# Patient Record
Sex: MALE | Race: BLACK OR AFRICAN AMERICAN | Employment: OTHER | URBAN - METROPOLITAN AREA
[De-identification: names, ages, dates, MRNs, and addresses within clinical notes are randomized per-mention and may not be internally consistent; named-entity substitution may affect disease eponyms.]

---

## 2021-01-01 ENCOUNTER — APPOINTMENT (OUTPATIENT)
Dept: GENERAL RADIOLOGY | Age: 51
DRG: 871 | End: 2021-01-01
Payer: MEDICARE

## 2021-01-01 ENCOUNTER — APPOINTMENT (OUTPATIENT)
Dept: CT IMAGING | Age: 51
DRG: 871 | End: 2021-01-01
Attending: INTERNAL MEDICINE
Payer: MEDICARE

## 2021-01-01 ENCOUNTER — APPOINTMENT (OUTPATIENT)
Dept: GENERAL RADIOLOGY | Age: 51
End: 2021-01-01
Payer: MEDICARE

## 2021-01-01 ENCOUNTER — TELEPHONE (OUTPATIENT)
Dept: OTHER | Facility: CLINIC | Age: 51
End: 2021-01-01

## 2021-01-01 ENCOUNTER — HOSPITAL ENCOUNTER (EMERGENCY)
Age: 51
Discharge: OTHER INSTITUTION | End: 2021-11-30
Attending: STUDENT IN AN ORGANIZED HEALTH CARE EDUCATION/TRAINING PROGRAM
Payer: MEDICARE

## 2021-01-01 ENCOUNTER — APPOINTMENT (OUTPATIENT)
Dept: MRI IMAGING | Age: 51
DRG: 871 | End: 2021-01-01
Attending: INTERNAL MEDICINE
Payer: MEDICARE

## 2021-01-01 ENCOUNTER — HOSPITAL ENCOUNTER (EMERGENCY)
Age: 51
Discharge: ANOTHER HOSPITAL | End: 2021-12-11
Attending: EMERGENCY MEDICINE
Payer: MEDICARE

## 2021-01-01 ENCOUNTER — APPOINTMENT (OUTPATIENT)
Dept: CT IMAGING | Age: 51
End: 2021-01-01
Payer: MEDICARE

## 2021-01-01 ENCOUNTER — HOSPITAL ENCOUNTER (INPATIENT)
Age: 51
LOS: 13 days | Discharge: OTHER INSTITUTION | DRG: 871 | End: 2021-11-24
Attending: EMERGENCY MEDICINE | Admitting: INTERNAL MEDICINE
Payer: MEDICARE

## 2021-01-01 ENCOUNTER — HOSPITAL ENCOUNTER (INPATIENT)
Age: 51
LOS: 7 days | Discharge: NURSING, REHAB OR LONG-TERM CARE | DRG: 871 | End: 2021-12-18
Attending: INTERNAL MEDICINE | Admitting: INTERNAL MEDICINE
Payer: MEDICARE

## 2021-01-01 ENCOUNTER — ANESTHESIA (OUTPATIENT)
Dept: ENDOSCOPY | Age: 51
DRG: 871 | End: 2021-01-01
Payer: MEDICARE

## 2021-01-01 ENCOUNTER — ANESTHESIA EVENT (OUTPATIENT)
Dept: ENDOSCOPY | Age: 51
DRG: 871 | End: 2021-01-01
Payer: MEDICARE

## 2021-01-01 VITALS
HEIGHT: 76 IN | WEIGHT: 156.31 LBS | TEMPERATURE: 97.6 F | RESPIRATION RATE: 18 BRPM | HEART RATE: 58 BPM | OXYGEN SATURATION: 98 % | SYSTOLIC BLOOD PRESSURE: 115 MMHG | DIASTOLIC BLOOD PRESSURE: 48 MMHG | BODY MASS INDEX: 19.03 KG/M2

## 2021-01-01 VITALS
HEART RATE: 72 BPM | DIASTOLIC BLOOD PRESSURE: 82 MMHG | HEIGHT: 70 IN | BODY MASS INDEX: 24.34 KG/M2 | OXYGEN SATURATION: 99 % | SYSTOLIC BLOOD PRESSURE: 115 MMHG | RESPIRATION RATE: 23 BRPM | WEIGHT: 170 LBS

## 2021-01-01 VITALS
RESPIRATION RATE: 15 BRPM | HEART RATE: 84 BPM | BODY MASS INDEX: 20.7 KG/M2 | DIASTOLIC BLOOD PRESSURE: 72 MMHG | WEIGHT: 170 LBS | SYSTOLIC BLOOD PRESSURE: 110 MMHG | TEMPERATURE: 96.4 F | HEIGHT: 76 IN | OXYGEN SATURATION: 95 %

## 2021-01-01 VITALS
RESPIRATION RATE: 20 BRPM | DIASTOLIC BLOOD PRESSURE: 52 MMHG | SYSTOLIC BLOOD PRESSURE: 93 MMHG | OXYGEN SATURATION: 97 %

## 2021-01-01 VITALS
OXYGEN SATURATION: 96 % | WEIGHT: 185.63 LBS | RESPIRATION RATE: 16 BRPM | DIASTOLIC BLOOD PRESSURE: 89 MMHG | HEART RATE: 90 BPM | SYSTOLIC BLOOD PRESSURE: 156 MMHG | TEMPERATURE: 97.5 F | BODY MASS INDEX: 26.63 KG/M2

## 2021-01-01 DIAGNOSIS — N18.6 ESRD (END STAGE RENAL DISEASE) (HCC): ICD-10-CM

## 2021-01-01 DIAGNOSIS — E16.2 HYPOGLYCEMIA: Primary | ICD-10-CM

## 2021-01-01 DIAGNOSIS — R56.9 SEIZURE (HCC): ICD-10-CM

## 2021-01-01 DIAGNOSIS — N18.6 ESRD (END STAGE RENAL DISEASE) ON DIALYSIS (HCC): ICD-10-CM

## 2021-01-01 DIAGNOSIS — J18.9 PNEUMONIA DUE TO INFECTIOUS ORGANISM, UNSPECIFIED LATERALITY, UNSPECIFIED PART OF LUNG: ICD-10-CM

## 2021-01-01 DIAGNOSIS — Z99.2 ESRD (END STAGE RENAL DISEASE) ON DIALYSIS (HCC): ICD-10-CM

## 2021-01-01 DIAGNOSIS — J96.01 ACUTE RESPIRATORY FAILURE WITH HYPOXEMIA (HCC): Primary | ICD-10-CM

## 2021-01-01 DIAGNOSIS — R41.82 ALTERED MENTAL STATUS, UNSPECIFIED ALTERED MENTAL STATUS TYPE: Primary | ICD-10-CM

## 2021-01-01 DIAGNOSIS — I24.8 DEMAND ISCHEMIA OF MYOCARDIUM (HCC): ICD-10-CM

## 2021-01-01 LAB
A/G RATIO: 0.7 (ref 1.1–2.2)
A/G RATIO: 0.8 (ref 1.1–2.2)
A/G RATIO: 0.9 (ref 1.1–2.2)
ABO/RH: NORMAL
ACANTHOCYTES: ABNORMAL
ALBUMIN SERPL-MCNC: 3.1 G/DL (ref 3.1–4.9)
ALBUMIN SERPL-MCNC: 3.3 G/DL (ref 3.4–5)
ALBUMIN SERPL-MCNC: 3.4 G/DL (ref 3.4–5)
ALBUMIN SERPL-MCNC: 3.4 G/DL (ref 3.4–5)
ALBUMIN SERPL-MCNC: 3.6 G/DL (ref 3.4–5)
ALBUMIN SERPL-MCNC: 3.6 G/DL (ref 3.4–5)
ALBUMIN SERPL-MCNC: 3.8 G/DL (ref 3.4–5)
ALP BLD-CCNC: 143 U/L (ref 40–129)
ALP BLD-CCNC: 212 U/L (ref 40–129)
ALP BLD-CCNC: 225 U/L (ref 40–129)
ALP BLD-CCNC: 287 U/L (ref 40–129)
ALPHA-1-GLOBULIN: 0.4 G/DL (ref 0.2–0.4)
ALPHA-2-GLOBULIN: 0.7 G/DL (ref 0.4–1.1)
ALT SERPL-CCNC: 12 U/L (ref 10–40)
ALT SERPL-CCNC: 13 U/L (ref 10–40)
ALT SERPL-CCNC: 13 U/L (ref 10–40)
ALT SERPL-CCNC: 21 U/L (ref 10–40)
AMMONIA: 45 UMOL/L (ref 16–60)
ANION GAP SERPL CALCULATED.3IONS-SCNC: 15 MMOL/L (ref 3–16)
ANION GAP SERPL CALCULATED.3IONS-SCNC: 16 MMOL/L (ref 3–16)
ANION GAP SERPL CALCULATED.3IONS-SCNC: 17 MMOL/L (ref 3–16)
ANION GAP SERPL CALCULATED.3IONS-SCNC: 18 MMOL/L (ref 3–16)
ANION GAP SERPL CALCULATED.3IONS-SCNC: 20 MMOL/L (ref 3–16)
ANION GAP SERPL CALCULATED.3IONS-SCNC: 22 MMOL/L (ref 3–16)
ANION GAP SERPL CALCULATED.3IONS-SCNC: 22 MMOL/L (ref 3–16)
ANION GAP SERPL CALCULATED.3IONS-SCNC: 27 MMOL/L (ref 3–16)
ANION GAP SERPL CALCULATED.3IONS-SCNC: 28 MMOL/L (ref 3–16)
ANION GAP SERPL CALCULATED.3IONS-SCNC: 29 MMOL/L (ref 3–16)
ANION GAP SERPL CALCULATED.3IONS-SCNC: 31 MMOL/L (ref 3–16)
ANION GAP SERPL CALCULATED.3IONS-SCNC: 32 MMOL/L (ref 3–16)
ANION GAP SERPL CALCULATED.3IONS-SCNC: 35 MMOL/L (ref 3–16)
ANION GAP SERPL CALCULATED.3IONS-SCNC: 36 MMOL/L (ref 3–16)
ANION GAP SERPL CALCULATED.3IONS-SCNC: 37 MMOL/L (ref 3–16)
ANISOCYTOSIS: ABNORMAL
ANTIBODY SCREEN: NORMAL
APTT: 36.8 SEC (ref 26.2–38.6)
AST SERPL-CCNC: 17 U/L (ref 15–37)
AST SERPL-CCNC: 20 U/L (ref 15–37)
AST SERPL-CCNC: 30 U/L (ref 15–37)
AST SERPL-CCNC: 43 U/L (ref 15–37)
BASE EXCESS ARTERIAL: -2 (ref -3–3)
BASE EXCESS ARTERIAL: 1.9 MMOL/L (ref -3–3)
BASE EXCESS ARTERIAL: 5.1 MMOL/L (ref -3–3)
BASE EXCESS VENOUS: -17.9 MMOL/L (ref -3–3)
BASE EXCESS VENOUS: 4.4 MMOL/L (ref -3–3)
BASOPHILS ABSOLUTE: 0 K/UL (ref 0–0.2)
BASOPHILS ABSOLUTE: 0.1 K/UL (ref 0–0.2)
BASOPHILS RELATIVE PERCENT: 0 %
BASOPHILS RELATIVE PERCENT: 0.1 %
BASOPHILS RELATIVE PERCENT: 0.2 %
BASOPHILS RELATIVE PERCENT: 0.3 %
BASOPHILS RELATIVE PERCENT: 0.4 %
BASOPHILS RELATIVE PERCENT: 0.4 %
BASOPHILS RELATIVE PERCENT: 0.6 %
BASOPHILS RELATIVE PERCENT: 0.8 %
BASOPHILS RELATIVE PERCENT: 1 %
BASOPHILS RELATIVE PERCENT: 1.2 %
BASOPHILS RELATIVE PERCENT: 2.3 %
BETA GLOBULIN: 1.5 G/DL (ref 0.9–1.6)
BILIRUB SERPL-MCNC: 0.5 MG/DL (ref 0–1)
BILIRUB SERPL-MCNC: 0.5 MG/DL (ref 0–1)
BILIRUB SERPL-MCNC: 0.9 MG/DL (ref 0–1)
BILIRUB SERPL-MCNC: 1.1 MG/DL (ref 0–1)
BILIRUBIN DIRECT: <0.2 MG/DL (ref 0–0.3)
BILIRUBIN, INDIRECT: ABNORMAL MG/DL (ref 0–1)
BLOOD BANK DISPENSE STATUS: NORMAL
BLOOD BANK DISPENSE STATUS: NORMAL
BLOOD BANK PRODUCT CODE: NORMAL
BLOOD BANK PRODUCT CODE: NORMAL
BLOOD CULTURE, ROUTINE: ABNORMAL
BLOOD CULTURE, ROUTINE: NORMAL
BPU ID: NORMAL
BPU ID: NORMAL
BUN BLDV-MCNC: 24 MG/DL (ref 7–20)
BUN BLDV-MCNC: 25 MG/DL (ref 7–20)
BUN BLDV-MCNC: 26 MG/DL (ref 7–20)
BUN BLDV-MCNC: 27 MG/DL (ref 7–20)
BUN BLDV-MCNC: 29 MG/DL (ref 7–20)
BUN BLDV-MCNC: 30 MG/DL (ref 7–20)
BUN BLDV-MCNC: 30 MG/DL (ref 7–20)
BUN BLDV-MCNC: 32 MG/DL (ref 7–20)
BUN BLDV-MCNC: 33 MG/DL (ref 7–20)
BUN BLDV-MCNC: 36 MG/DL (ref 7–20)
BUN BLDV-MCNC: 37 MG/DL (ref 7–20)
BUN BLDV-MCNC: 39 MG/DL (ref 7–20)
BUN BLDV-MCNC: 40 MG/DL (ref 7–20)
BUN BLDV-MCNC: 43 MG/DL (ref 7–20)
BUN BLDV-MCNC: 51 MG/DL (ref 7–20)
BUN BLDV-MCNC: 51 MG/DL (ref 7–20)
BUN BLDV-MCNC: 52 MG/DL (ref 7–20)
BUN BLDV-MCNC: 53 MG/DL (ref 7–20)
BUN BLDV-MCNC: 55 MG/DL (ref 7–20)
BUN BLDV-MCNC: 57 MG/DL (ref 7–20)
BUN BLDV-MCNC: 60 MG/DL (ref 7–20)
BUN BLDV-MCNC: 64 MG/DL (ref 7–20)
BURR CELLS: ABNORMAL
C-REACTIVE PROTEIN: 114.7 MG/L (ref 0–5.1)
C-REACTIVE PROTEIN: 231.9 MG/L (ref 0–5.1)
C-REACTIVE PROTEIN: 299.9 MG/L (ref 0–5.1)
CALCIUM IONIZED: 0.97 MMOL/L (ref 1.12–1.32)
CALCIUM SERPL-MCNC: 6.7 MG/DL (ref 8.3–10.6)
CALCIUM SERPL-MCNC: 6.8 MG/DL (ref 8.3–10.6)
CALCIUM SERPL-MCNC: 7.1 MG/DL (ref 8.3–10.6)
CALCIUM SERPL-MCNC: 7.2 MG/DL (ref 8.3–10.6)
CALCIUM SERPL-MCNC: 7.3 MG/DL (ref 8.3–10.6)
CALCIUM SERPL-MCNC: 7.4 MG/DL (ref 8.3–10.6)
CALCIUM SERPL-MCNC: 7.5 MG/DL (ref 8.3–10.6)
CALCIUM SERPL-MCNC: 7.6 MG/DL (ref 8.3–10.6)
CALCIUM SERPL-MCNC: 7.7 MG/DL (ref 8.3–10.6)
CALCIUM SERPL-MCNC: 7.8 MG/DL (ref 8.3–10.6)
CALCIUM SERPL-MCNC: 7.9 MG/DL (ref 8.3–10.6)
CALCIUM SERPL-MCNC: 8 MG/DL (ref 8.3–10.6)
CALCIUM SERPL-MCNC: 8.2 MG/DL (ref 8.3–10.6)
CALCIUM SERPL-MCNC: 8.6 MG/DL (ref 8.3–10.6)
CALCIUM SERPL-MCNC: 8.9 MG/DL (ref 8.3–10.6)
CALCIUM SERPL-MCNC: 9 MG/DL (ref 8.3–10.6)
CARBOXYHEMOGLOBIN ARTERIAL: 1.1 % (ref 0–1.5)
CARBOXYHEMOGLOBIN ARTERIAL: <0 % (ref 0–1.5)
CARBOXYHEMOGLOBIN: 3.7 % (ref 0–1.5)
CARBOXYHEMOGLOBIN: 4.3 % (ref 0–1.5)
CERULOPLASMIN: 13 MG/DL (ref 15–30)
CHLORIDE BLD-SCNC: 86 MMOL/L (ref 99–110)
CHLORIDE BLD-SCNC: 87 MMOL/L (ref 99–110)
CHLORIDE BLD-SCNC: 87 MMOL/L (ref 99–110)
CHLORIDE BLD-SCNC: 88 MMOL/L (ref 99–110)
CHLORIDE BLD-SCNC: 88 MMOL/L (ref 99–110)
CHLORIDE BLD-SCNC: 89 MMOL/L (ref 99–110)
CHLORIDE BLD-SCNC: 90 MMOL/L (ref 99–110)
CHLORIDE BLD-SCNC: 91 MMOL/L (ref 99–110)
CHLORIDE BLD-SCNC: 91 MMOL/L (ref 99–110)
CHLORIDE BLD-SCNC: 92 MMOL/L (ref 99–110)
CHLORIDE BLD-SCNC: 94 MMOL/L (ref 99–110)
CHLORIDE BLD-SCNC: 94 MMOL/L (ref 99–110)
CHLORIDE BLD-SCNC: 97 MMOL/L (ref 99–110)
CO2: 11 MMOL/L (ref 21–32)
CO2: 11 MMOL/L (ref 21–32)
CO2: 12 MMOL/L (ref 21–32)
CO2: 16 MMOL/L (ref 21–32)
CO2: 18 MMOL/L (ref 21–32)
CO2: 20 MMOL/L (ref 21–32)
CO2: 20 MMOL/L (ref 21–32)
CO2: 22 MMOL/L (ref 21–32)
CO2: 23 MMOL/L (ref 21–32)
CO2: 23 MMOL/L (ref 21–32)
CO2: 24 MMOL/L (ref 21–32)
CO2: 24 MMOL/L (ref 21–32)
CO2: 25 MMOL/L (ref 21–32)
CO2: 26 MMOL/L (ref 21–32)
CO2: 26 MMOL/L (ref 21–32)
CO2: 27 MMOL/L (ref 21–32)
CO2: 8 MMOL/L (ref 21–32)
CO2: 9 MMOL/L (ref 21–32)
COPPER: 48.4 UG/DL (ref 70–140)
CREAT SERPL-MCNC: 3.2 MG/DL (ref 0.9–1.3)
CREAT SERPL-MCNC: 3.7 MG/DL (ref 0.9–1.3)
CREAT SERPL-MCNC: 3.8 MG/DL (ref 0.9–1.3)
CREAT SERPL-MCNC: 4.3 MG/DL (ref 0.9–1.3)
CREAT SERPL-MCNC: 4.4 MG/DL (ref 0.9–1.3)
CREAT SERPL-MCNC: 4.6 MG/DL (ref 0.9–1.3)
CREAT SERPL-MCNC: 4.7 MG/DL (ref 0.9–1.3)
CREAT SERPL-MCNC: 4.8 MG/DL (ref 0.9–1.3)
CREAT SERPL-MCNC: 4.8 MG/DL (ref 0.9–1.3)
CREAT SERPL-MCNC: 5 MG/DL (ref 0.9–1.3)
CREAT SERPL-MCNC: 5 MG/DL (ref 0.9–1.3)
CREAT SERPL-MCNC: 5.1 MG/DL (ref 0.9–1.3)
CREAT SERPL-MCNC: 5.3 MG/DL (ref 0.9–1.3)
CREAT SERPL-MCNC: 5.4 MG/DL (ref 0.9–1.3)
CREAT SERPL-MCNC: 5.5 MG/DL (ref 0.9–1.3)
CREAT SERPL-MCNC: 5.5 MG/DL (ref 0.9–1.3)
CREAT SERPL-MCNC: 5.6 MG/DL (ref 0.9–1.3)
CREAT SERPL-MCNC: 5.8 MG/DL (ref 0.9–1.3)
CREAT SERPL-MCNC: 6 MG/DL (ref 0.9–1.3)
CREAT SERPL-MCNC: 6 MG/DL (ref 0.9–1.3)
CREAT SERPL-MCNC: 6.1 MG/DL (ref 0.9–1.3)
CULTURE, BLOOD 2: NORMAL
D DIMER: 3681 NG/ML DDU (ref 0–229)
D DIMER: 3740 NG/ML DDU (ref 0–229)
D DIMER: 4327 NG/ML DDU (ref 0–229)
DESCRIPTION BLOOD BANK: NORMAL
DESCRIPTION BLOOD BANK: NORMAL
EKG ATRIAL RATE: 76 BPM
EKG ATRIAL RATE: 93 BPM
EKG DIAGNOSIS: NORMAL
EKG DIAGNOSIS: NORMAL
EKG P AXIS: 104 DEGREES
EKG P AXIS: 86 DEGREES
EKG P-R INTERVAL: 146 MS
EKG P-R INTERVAL: 166 MS
EKG Q-T INTERVAL: 430 MS
EKG Q-T INTERVAL: 432 MS
EKG QRS DURATION: 104 MS
EKG QRS DURATION: 92 MS
EKG QTC CALCULATION (BAZETT): 483 MS
EKG QTC CALCULATION (BAZETT): 537 MS
EKG R AXIS: -19 DEGREES
EKG R AXIS: 21 DEGREES
EKG T AXIS: 130 DEGREES
EKG T AXIS: 144 DEGREES
EKG VENTRICULAR RATE: 76 BPM
EKG VENTRICULAR RATE: 93 BPM
EOSINOPHILS ABSOLUTE: 0 K/UL (ref 0–0.6)
EOSINOPHILS ABSOLUTE: 0.1 K/UL (ref 0–0.6)
EOSINOPHILS ABSOLUTE: 0.2 K/UL (ref 0–0.6)
EOSINOPHILS RELATIVE PERCENT: 0 %
EOSINOPHILS RELATIVE PERCENT: 0.1 %
EOSINOPHILS RELATIVE PERCENT: 0.2 %
EOSINOPHILS RELATIVE PERCENT: 0.3 %
EOSINOPHILS RELATIVE PERCENT: 1 %
EOSINOPHILS RELATIVE PERCENT: 3.1 %
EOSINOPHILS RELATIVE PERCENT: 3.6 %
EOSINOPHILS RELATIVE PERCENT: 3.7 %
EOSINOPHILS RELATIVE PERCENT: 4.2 %
EOSINOPHILS RELATIVE PERCENT: 5.2 %
EOSINOPHILS RELATIVE PERCENT: 5.6 %
ESTIMATED AVERAGE GLUCOSE: 355.1 MG/DL
FERRITIN: 1623 NG/ML (ref 30–400)
FIBRINOGEN: 446 MG/DL (ref 200–397)
GAMMA GLOBULIN: 1.5 G/DL (ref 0.6–1.8)
GFR AFRICAN AMERICAN: 12
GFR AFRICAN AMERICAN: 13
GFR AFRICAN AMERICAN: 14
GFR AFRICAN AMERICAN: 14
GFR AFRICAN AMERICAN: 15
GFR AFRICAN AMERICAN: 16
GFR AFRICAN AMERICAN: 17
GFR AFRICAN AMERICAN: 18
GFR AFRICAN AMERICAN: 20
GFR AFRICAN AMERICAN: 21
GFR AFRICAN AMERICAN: 25
GFR NON-AFRICAN AMERICAN: 10
GFR NON-AFRICAN AMERICAN: 11
GFR NON-AFRICAN AMERICAN: 12
GFR NON-AFRICAN AMERICAN: 13
GFR NON-AFRICAN AMERICAN: 14
GFR NON-AFRICAN AMERICAN: 14
GFR NON-AFRICAN AMERICAN: 15
GFR NON-AFRICAN AMERICAN: 17
GFR NON-AFRICAN AMERICAN: 17
GFR NON-AFRICAN AMERICAN: 21
GLUCOSE BLD-MCNC: 100 MG/DL (ref 70–99)
GLUCOSE BLD-MCNC: 100 MG/DL (ref 70–99)
GLUCOSE BLD-MCNC: 103 MG/DL (ref 70–99)
GLUCOSE BLD-MCNC: 107 MG/DL (ref 70–99)
GLUCOSE BLD-MCNC: 107 MG/DL (ref 70–99)
GLUCOSE BLD-MCNC: 111 MG/DL (ref 70–99)
GLUCOSE BLD-MCNC: 113 MG/DL (ref 70–99)
GLUCOSE BLD-MCNC: 114 MG/DL (ref 70–99)
GLUCOSE BLD-MCNC: 115 MG/DL (ref 70–99)
GLUCOSE BLD-MCNC: 116 MG/DL (ref 70–99)
GLUCOSE BLD-MCNC: 117 MG/DL (ref 70–99)
GLUCOSE BLD-MCNC: 121 MG/DL (ref 70–99)
GLUCOSE BLD-MCNC: 121 MG/DL (ref 70–99)
GLUCOSE BLD-MCNC: 126 MG/DL (ref 70–99)
GLUCOSE BLD-MCNC: 133 MG/DL (ref 70–99)
GLUCOSE BLD-MCNC: 135 MG/DL (ref 70–99)
GLUCOSE BLD-MCNC: 139 MG/DL (ref 70–99)
GLUCOSE BLD-MCNC: 139 MG/DL (ref 70–99)
GLUCOSE BLD-MCNC: 141 MG/DL (ref 70–99)
GLUCOSE BLD-MCNC: 143 MG/DL (ref 70–99)
GLUCOSE BLD-MCNC: 145 MG/DL (ref 70–99)
GLUCOSE BLD-MCNC: 149 MG/DL (ref 70–99)
GLUCOSE BLD-MCNC: 149 MG/DL (ref 70–99)
GLUCOSE BLD-MCNC: 150 MG/DL (ref 70–99)
GLUCOSE BLD-MCNC: 150 MG/DL (ref 70–99)
GLUCOSE BLD-MCNC: 156 MG/DL (ref 70–99)
GLUCOSE BLD-MCNC: 157 MG/DL (ref 70–99)
GLUCOSE BLD-MCNC: 163 MG/DL (ref 70–99)
GLUCOSE BLD-MCNC: 163 MG/DL (ref 70–99)
GLUCOSE BLD-MCNC: 164 MG/DL (ref 70–99)
GLUCOSE BLD-MCNC: 167 MG/DL (ref 70–99)
GLUCOSE BLD-MCNC: 169 MG/DL (ref 70–99)
GLUCOSE BLD-MCNC: 171 MG/DL (ref 70–99)
GLUCOSE BLD-MCNC: 171 MG/DL (ref 70–99)
GLUCOSE BLD-MCNC: 172 MG/DL (ref 70–99)
GLUCOSE BLD-MCNC: 176 MG/DL (ref 70–99)
GLUCOSE BLD-MCNC: 177 MG/DL (ref 70–99)
GLUCOSE BLD-MCNC: 179 MG/DL (ref 70–99)
GLUCOSE BLD-MCNC: 179 MG/DL (ref 70–99)
GLUCOSE BLD-MCNC: 190 MG/DL (ref 70–99)
GLUCOSE BLD-MCNC: 192 MG/DL (ref 70–99)
GLUCOSE BLD-MCNC: 193 MG/DL (ref 70–99)
GLUCOSE BLD-MCNC: 198 MG/DL (ref 70–99)
GLUCOSE BLD-MCNC: 204 MG/DL (ref 70–99)
GLUCOSE BLD-MCNC: 217 MG/DL (ref 70–99)
GLUCOSE BLD-MCNC: 220 MG/DL (ref 70–99)
GLUCOSE BLD-MCNC: 225 MG/DL (ref 70–99)
GLUCOSE BLD-MCNC: 225 MG/DL (ref 70–99)
GLUCOSE BLD-MCNC: 228 MG/DL (ref 70–99)
GLUCOSE BLD-MCNC: 229 MG/DL (ref 70–99)
GLUCOSE BLD-MCNC: 237 MG/DL (ref 70–99)
GLUCOSE BLD-MCNC: 258 MG/DL (ref 70–99)
GLUCOSE BLD-MCNC: 268 MG/DL (ref 70–99)
GLUCOSE BLD-MCNC: 272 MG/DL (ref 70–99)
GLUCOSE BLD-MCNC: 278 MG/DL (ref 70–99)
GLUCOSE BLD-MCNC: 280 MG/DL (ref 70–99)
GLUCOSE BLD-MCNC: 285 MG/DL (ref 70–99)
GLUCOSE BLD-MCNC: 293 MG/DL (ref 70–99)
GLUCOSE BLD-MCNC: 293 MG/DL (ref 70–99)
GLUCOSE BLD-MCNC: 299 MG/DL (ref 70–99)
GLUCOSE BLD-MCNC: 300 MG/DL (ref 70–99)
GLUCOSE BLD-MCNC: 302 MG/DL (ref 70–99)
GLUCOSE BLD-MCNC: 308 MG/DL (ref 70–99)
GLUCOSE BLD-MCNC: 316 MG/DL (ref 70–99)
GLUCOSE BLD-MCNC: 320 MG/DL (ref 70–99)
GLUCOSE BLD-MCNC: 322 MG/DL (ref 70–99)
GLUCOSE BLD-MCNC: 328 MG/DL (ref 70–99)
GLUCOSE BLD-MCNC: 35 MG/DL (ref 70–99)
GLUCOSE BLD-MCNC: 400 MG/DL (ref 70–99)
GLUCOSE BLD-MCNC: 41 MG/DL (ref 70–99)
GLUCOSE BLD-MCNC: 42 MG/DL (ref 70–99)
GLUCOSE BLD-MCNC: 44 MG/DL (ref 70–99)
GLUCOSE BLD-MCNC: 46 MG/DL (ref 70–99)
GLUCOSE BLD-MCNC: 47 MG/DL (ref 70–99)
GLUCOSE BLD-MCNC: 48 MG/DL (ref 70–99)
GLUCOSE BLD-MCNC: 52 MG/DL (ref 70–99)
GLUCOSE BLD-MCNC: 53 MG/DL (ref 70–99)
GLUCOSE BLD-MCNC: 56 MG/DL (ref 70–99)
GLUCOSE BLD-MCNC: 64 MG/DL (ref 70–99)
GLUCOSE BLD-MCNC: 67 MG/DL (ref 70–99)
GLUCOSE BLD-MCNC: 69 MG/DL (ref 70–99)
GLUCOSE BLD-MCNC: 70 MG/DL (ref 70–99)
GLUCOSE BLD-MCNC: 71 MG/DL (ref 70–99)
GLUCOSE BLD-MCNC: 72 MG/DL (ref 70–99)
GLUCOSE BLD-MCNC: 72 MG/DL (ref 70–99)
GLUCOSE BLD-MCNC: 74 MG/DL (ref 70–99)
GLUCOSE BLD-MCNC: 75 MG/DL (ref 70–99)
GLUCOSE BLD-MCNC: 76 MG/DL (ref 70–99)
GLUCOSE BLD-MCNC: 76 MG/DL (ref 70–99)
GLUCOSE BLD-MCNC: 77 MG/DL (ref 70–99)
GLUCOSE BLD-MCNC: 77 MG/DL (ref 70–99)
GLUCOSE BLD-MCNC: 78 MG/DL (ref 70–99)
GLUCOSE BLD-MCNC: 82 MG/DL (ref 70–99)
GLUCOSE BLD-MCNC: 83 MG/DL (ref 70–99)
GLUCOSE BLD-MCNC: 84 MG/DL (ref 70–99)
GLUCOSE BLD-MCNC: 86 MG/DL (ref 70–99)
GLUCOSE BLD-MCNC: 87 MG/DL (ref 70–99)
GLUCOSE BLD-MCNC: 87 MG/DL (ref 70–99)
GLUCOSE BLD-MCNC: 89 MG/DL (ref 70–99)
GLUCOSE BLD-MCNC: 91 MG/DL (ref 70–99)
GLUCOSE BLD-MCNC: 94 MG/DL (ref 70–99)
GLUCOSE BLD-MCNC: 94 MG/DL (ref 70–99)
GLUCOSE BLD-MCNC: 97 MG/DL (ref 70–99)
GLUCOSE BLD-MCNC: 98 MG/DL (ref 70–99)
GLUCOSE BLD-MCNC: 98 MG/DL (ref 70–99)
GLUCOSE BLD-MCNC: 99 MG/DL (ref 70–99)
HAPTOGLOBIN: 145 MG/DL (ref 30–200)
HAV IGM SER IA-ACNC: NORMAL
HBA1C MFR BLD: 14 %
HBV SURFACE AB TITR SER: 5.14 MIU/ML
HBV SURFACE AB TITR SER: 5.69 MIU/ML
HBV SURFACE AB TITR SER: <3.5 MIU/ML
HCO3 ARTERIAL: 23.2 MMOL/L (ref 21–29)
HCO3 ARTERIAL: 26 MMOL/L (ref 21–29)
HCO3 ARTERIAL: 28.6 MMOL/L (ref 21–29)
HCO3 VENOUS: 27.9 MMOL/L (ref 23–29)
HCO3 VENOUS: 9.7 MMOL/L (ref 23–29)
HCT VFR BLD CALC: 19.8 % (ref 40.5–52.5)
HCT VFR BLD CALC: 20.3 % (ref 40.5–52.5)
HCT VFR BLD CALC: 21.1 % (ref 40.5–52.5)
HCT VFR BLD CALC: 21.3 % (ref 40.5–52.5)
HCT VFR BLD CALC: 21.6 % (ref 40.5–52.5)
HCT VFR BLD CALC: 21.9 % (ref 40.5–52.5)
HCT VFR BLD CALC: 23 % (ref 40.5–52.5)
HCT VFR BLD CALC: 23.1 % (ref 40.5–52.5)
HCT VFR BLD CALC: 23.6 % (ref 40.5–52.5)
HCT VFR BLD CALC: 23.8 % (ref 40.5–52.5)
HCT VFR BLD CALC: 23.9 % (ref 40.5–52.5)
HCT VFR BLD CALC: 23.9 % (ref 40.5–52.5)
HCT VFR BLD CALC: 24.5 % (ref 40.5–52.5)
HCT VFR BLD CALC: 24.6 % (ref 40.5–52.5)
HCT VFR BLD CALC: 24.8 % (ref 40.5–52.5)
HCT VFR BLD CALC: 25 % (ref 40.5–52.5)
HCT VFR BLD CALC: 25.3 % (ref 40.5–52.5)
HCT VFR BLD CALC: 25.5 % (ref 40.5–52.5)
HCT VFR BLD CALC: 25.8 % (ref 40.5–52.5)
HCT VFR BLD CALC: 26.5 % (ref 40.5–52.5)
HCT VFR BLD CALC: 27.6 % (ref 40.5–52.5)
HCT VFR BLD CALC: 29.4 % (ref 40.5–52.5)
HCT VFR BLD CALC: 30.3 % (ref 40.5–52.5)
HCT VFR BLD CALC: 31.5 % (ref 40.5–52.5)
HCT VFR BLD CALC: 31.7 % (ref 40.5–52.5)
HCT VFR BLD CALC: 31.7 % (ref 40.5–52.5)
HCT VFR BLD CALC: 32 % (ref 40.5–52.5)
HCT VFR BLD CALC: 32.3 % (ref 40.5–52.5)
HCT VFR BLD CALC: 33 % (ref 40.5–52.5)
HCT VFR BLD CALC: 33.6 % (ref 40.5–52.5)
HCT VFR BLD CALC: 35 % (ref 40.5–52.5)
HCT VFR BLD CALC: 35.7 % (ref 40.5–52.5)
HCT VFR BLD CALC: 42.5 % (ref 40.5–52.5)
HEMATOLOGY PATH CONSULT: NO
HEMATOLOGY PATH CONSULT: NORMAL
HEMATOLOGY PATH CONSULT: YES
HEMOGLOBIN, ART, EXTENDED: 10.5 G/DL (ref 13.5–17.5)
HEMOGLOBIN, ART, EXTENDED: 12.2 G/DL
HEMOGLOBIN, VEN, REDUCED: 3 %
HEMOGLOBIN, VEN, REDUCED: 4 %
HEMOGLOBIN: 10 G/DL (ref 13.5–17.5)
HEMOGLOBIN: 10.3 G/DL (ref 13.5–17.5)
HEMOGLOBIN: 10.7 G/DL (ref 13.5–17.5)
HEMOGLOBIN: 10.7 G/DL (ref 13.5–17.5)
HEMOGLOBIN: 11 G/DL (ref 13.5–17.5)
HEMOGLOBIN: 11.2 G/DL (ref 13.5–17.5)
HEMOGLOBIN: 11.3 G/DL (ref 13.5–17.5)
HEMOGLOBIN: 13.4 G/DL (ref 13.5–17.5)
HEMOGLOBIN: 6.5 G/DL (ref 13.5–17.5)
HEMOGLOBIN: 6.7 G/DL (ref 13.5–17.5)
HEMOGLOBIN: 6.9 G/DL (ref 13.5–17.5)
HEMOGLOBIN: 7 G/DL (ref 13.5–17.5)
HEMOGLOBIN: 7.1 G/DL (ref 13.5–17.5)
HEMOGLOBIN: 7.2 G/DL (ref 13.5–17.5)
HEMOGLOBIN: 7.5 G/DL (ref 13.5–17.5)
HEMOGLOBIN: 7.7 G/DL (ref 13.5–17.5)
HEMOGLOBIN: 7.8 G/DL (ref 13.5–17.5)
HEMOGLOBIN: 7.8 G/DL (ref 13.5–17.5)
HEMOGLOBIN: 7.9 G/DL (ref 13.5–17.5)
HEMOGLOBIN: 8 G/DL (ref 13.5–17.5)
HEMOGLOBIN: 8 G/DL (ref 13.5–17.5)
HEMOGLOBIN: 8.1 G/DL (ref 13.5–17.5)
HEMOGLOBIN: 8.3 G/DL (ref 13.5–17.5)
HEMOGLOBIN: 8.4 G/DL (ref 13.5–17.5)
HEMOGLOBIN: 8.5 G/DL (ref 13.5–17.5)
HEMOGLOBIN: 8.7 G/DL (ref 13.5–17.5)
HEMOGLOBIN: 9 G/DL (ref 13.5–17.5)
HEMOGLOBIN: 9.9 G/DL (ref 13.5–17.5)
HEPATITIS B CORE IGM ANTIBODY: NORMAL
HEPATITIS B CORE TOTAL ANTIBODY: NEGATIVE
HEPATITIS B SURFACE ANTIGEN INTERPRETATION: NORMAL
HEPATITIS C ANTIBODY INTERPRETATION: NORMAL
HYPOCHROMIA: ABNORMAL
IGA: 306 MG/DL (ref 70–400)
IGG: 1700 MG/DL (ref 700–1600)
IGM: 144 MG/DL (ref 40–230)
INR BLD: 1.07 (ref 0.88–1.12)
INR BLD: 1.1 (ref 0.88–1.12)
IRON SATURATION: 80 % (ref 20–50)
IRON: 106 UG/DL (ref 59–158)
KAPPA, FREE LIGHT CHAINS, SERUM: 256.82 MG/L (ref 3.3–19.4)
KAPPA/LAMBDA RATIO: 1.71 (ref 0.26–1.65)
KAPPA/LAMBDA TEST COMMENT: ABNORMAL
LACTATE DEHYDROGENASE: 310 U/L (ref 100–190)
LACTATE DEHYDROGENASE: 366 U/L (ref 100–190)
LACTATE: 0.77 MMOL/L (ref 0.4–2)
LACTIC ACID, SEPSIS: 1.1 MMOL/L (ref 0.4–1.9)
LACTIC ACID: 1.6 MMOL/L (ref 0.4–2)
LACTIC ACID: 1.7 MMOL/L (ref 0.4–2)
LACTIC ACID: 1.9 MMOL/L (ref 0.4–2)
LAMBDA, FREE LIGHT CHAINS, SERUM: 150.05 MG/L (ref 5.71–26.3)
LIPASE: 13 U/L (ref 13–60)
LYMPHOCYTES ABSOLUTE: 0.1 K/UL (ref 1–5.1)
LYMPHOCYTES ABSOLUTE: 0.2 K/UL (ref 1–5.1)
LYMPHOCYTES ABSOLUTE: 0.3 K/UL (ref 1–5.1)
LYMPHOCYTES ABSOLUTE: 0.4 K/UL (ref 1–5.1)
LYMPHOCYTES ABSOLUTE: 0.4 K/UL (ref 1–5.1)
LYMPHOCYTES ABSOLUTE: 0.5 K/UL (ref 1–5.1)
LYMPHOCYTES ABSOLUTE: 0.6 K/UL (ref 1–5.1)
LYMPHOCYTES ABSOLUTE: 0.8 K/UL (ref 1–5.1)
LYMPHOCYTES RELATIVE PERCENT: 14.1 %
LYMPHOCYTES RELATIVE PERCENT: 18.8 %
LYMPHOCYTES RELATIVE PERCENT: 20.2 %
LYMPHOCYTES RELATIVE PERCENT: 20.7 %
LYMPHOCYTES RELATIVE PERCENT: 21 %
LYMPHOCYTES RELATIVE PERCENT: 23 %
LYMPHOCYTES RELATIVE PERCENT: 3.1 %
LYMPHOCYTES RELATIVE PERCENT: 3.1 %
LYMPHOCYTES RELATIVE PERCENT: 3.2 %
LYMPHOCYTES RELATIVE PERCENT: 3.6 %
LYMPHOCYTES RELATIVE PERCENT: 3.9 %
LYMPHOCYTES RELATIVE PERCENT: 4 %
LYMPHOCYTES RELATIVE PERCENT: 4.4 %
LYMPHOCYTES RELATIVE PERCENT: 4.7 %
LYMPHOCYTES RELATIVE PERCENT: 5.1 %
LYMPHOCYTES RELATIVE PERCENT: 6.1 %
LYMPHOCYTES RELATIVE PERCENT: 6.4 %
LYMPHOCYTES RELATIVE PERCENT: 6.7 %
LYMPHOCYTES RELATIVE PERCENT: 9 %
LYMPHOCYTES RELATIVE PERCENT: 9.2 %
MAGNESIUM: 1.6 MG/DL (ref 1.8–2.4)
MAGNESIUM: 1.8 MG/DL (ref 1.8–2.4)
MAGNESIUM: 1.8 MG/DL (ref 1.8–2.4)
MAGNESIUM: 1.9 MG/DL (ref 1.8–2.4)
MCH RBC QN AUTO: 22.4 PG (ref 26–34)
MCH RBC QN AUTO: 22.5 PG (ref 26–34)
MCH RBC QN AUTO: 22.6 PG (ref 26–34)
MCH RBC QN AUTO: 22.7 PG (ref 26–34)
MCH RBC QN AUTO: 22.8 PG (ref 26–34)
MCH RBC QN AUTO: 22.8 PG (ref 26–34)
MCH RBC QN AUTO: 22.9 PG (ref 26–34)
MCH RBC QN AUTO: 23.1 PG (ref 26–34)
MCH RBC QN AUTO: 23.1 PG (ref 26–34)
MCH RBC QN AUTO: 23.3 PG (ref 26–34)
MCH RBC QN AUTO: 24.6 PG (ref 26–34)
MCH RBC QN AUTO: 24.6 PG (ref 26–34)
MCH RBC QN AUTO: 25.1 PG (ref 26–34)
MCH RBC QN AUTO: 25.2 PG (ref 26–34)
MCH RBC QN AUTO: 25.2 PG (ref 26–34)
MCH RBC QN AUTO: 25.5 PG (ref 26–34)
MCHC RBC AUTO-ENTMCNC: 31.4 G/DL (ref 31–36)
MCHC RBC AUTO-ENTMCNC: 31.4 G/DL (ref 31–36)
MCHC RBC AUTO-ENTMCNC: 31.7 G/DL (ref 31–36)
MCHC RBC AUTO-ENTMCNC: 31.9 G/DL (ref 31–36)
MCHC RBC AUTO-ENTMCNC: 32.4 G/DL (ref 31–36)
MCHC RBC AUTO-ENTMCNC: 32.4 G/DL (ref 31–36)
MCHC RBC AUTO-ENTMCNC: 32.5 G/DL (ref 31–36)
MCHC RBC AUTO-ENTMCNC: 32.6 G/DL (ref 31–36)
MCHC RBC AUTO-ENTMCNC: 32.7 G/DL (ref 31–36)
MCHC RBC AUTO-ENTMCNC: 32.7 G/DL (ref 31–36)
MCHC RBC AUTO-ENTMCNC: 32.8 G/DL (ref 31–36)
MCHC RBC AUTO-ENTMCNC: 32.8 G/DL (ref 31–36)
MCHC RBC AUTO-ENTMCNC: 33 G/DL (ref 31–36)
MCHC RBC AUTO-ENTMCNC: 33 G/DL (ref 31–36)
MCHC RBC AUTO-ENTMCNC: 33.3 G/DL (ref 31–36)
MCHC RBC AUTO-ENTMCNC: 33.3 G/DL (ref 31–36)
MCHC RBC AUTO-ENTMCNC: 33.5 G/DL (ref 31–36)
MCHC RBC AUTO-ENTMCNC: 34 G/DL (ref 31–36)
MCV RBC AUTO: 67.3 FL (ref 80–100)
MCV RBC AUTO: 67.7 FL (ref 80–100)
MCV RBC AUTO: 69.2 FL (ref 80–100)
MCV RBC AUTO: 69.4 FL (ref 80–100)
MCV RBC AUTO: 69.8 FL (ref 80–100)
MCV RBC AUTO: 69.9 FL (ref 80–100)
MCV RBC AUTO: 70.3 FL (ref 80–100)
MCV RBC AUTO: 70.4 FL (ref 80–100)
MCV RBC AUTO: 70.7 FL (ref 80–100)
MCV RBC AUTO: 71.2 FL (ref 80–100)
MCV RBC AUTO: 71.5 FL (ref 80–100)
MCV RBC AUTO: 71.8 FL (ref 80–100)
MCV RBC AUTO: 71.9 FL (ref 80–100)
MCV RBC AUTO: 73 FL (ref 80–100)
MCV RBC AUTO: 74.7 FL (ref 80–100)
MCV RBC AUTO: 75.7 FL (ref 80–100)
MCV RBC AUTO: 75.8 FL (ref 80–100)
MCV RBC AUTO: 76.4 FL (ref 80–100)
MCV RBC AUTO: 76.5 FL (ref 80–100)
MCV RBC AUTO: 76.6 FL (ref 80–100)
METHEMOGLOBIN ARTERIAL: 0.3 % (ref 0–1.4)
METHEMOGLOBIN ARTERIAL: 0.8 %
METHEMOGLOBIN VENOUS: 0.4 %
METHEMOGLOBIN VENOUS: 0.8 %
MICROCYTES: ABNORMAL
MONOCYTES ABSOLUTE: 0.1 K/UL (ref 0–1.3)
MONOCYTES ABSOLUTE: 0.2 K/UL (ref 0–1.3)
MONOCYTES ABSOLUTE: 0.3 K/UL (ref 0–1.3)
MONOCYTES ABSOLUTE: 0.4 K/UL (ref 0–1.3)
MONOCYTES ABSOLUTE: 0.4 K/UL (ref 0–1.3)
MONOCYTES ABSOLUTE: 0.5 K/UL (ref 0–1.3)
MONOCYTES ABSOLUTE: 0.7 K/UL (ref 0–1.3)
MONOCYTES ABSOLUTE: 0.8 K/UL (ref 0–1.3)
MONOCYTES ABSOLUTE: 0.9 K/UL (ref 0–1.3)
MONOCYTES RELATIVE PERCENT: 10.6 %
MONOCYTES RELATIVE PERCENT: 10.8 %
MONOCYTES RELATIVE PERCENT: 11.5 %
MONOCYTES RELATIVE PERCENT: 13.3 %
MONOCYTES RELATIVE PERCENT: 14 %
MONOCYTES RELATIVE PERCENT: 3.2 %
MONOCYTES RELATIVE PERCENT: 4 %
MONOCYTES RELATIVE PERCENT: 4.2 %
MONOCYTES RELATIVE PERCENT: 4.7 %
MONOCYTES RELATIVE PERCENT: 4.9 %
MONOCYTES RELATIVE PERCENT: 5 %
MONOCYTES RELATIVE PERCENT: 6 %
MONOCYTES RELATIVE PERCENT: 6.3 %
MONOCYTES RELATIVE PERCENT: 6.3 %
MONOCYTES RELATIVE PERCENT: 6.6 %
MONOCYTES RELATIVE PERCENT: 6.7 %
MONOCYTES RELATIVE PERCENT: 6.8 %
MONOCYTES RELATIVE PERCENT: 7.9 %
MONOCYTES RELATIVE PERCENT: 8.3 %
MONOCYTES RELATIVE PERCENT: 8.7 %
MRSA SCREEN RT-PCR: NORMAL
NEUTROPHILS ABSOLUTE: 1.6 K/UL (ref 1.7–7.7)
NEUTROPHILS ABSOLUTE: 1.7 K/UL (ref 1.7–7.7)
NEUTROPHILS ABSOLUTE: 1.9 K/UL (ref 1.7–7.7)
NEUTROPHILS ABSOLUTE: 2 K/UL (ref 1.7–7.7)
NEUTROPHILS ABSOLUTE: 2.2 K/UL (ref 1.7–7.7)
NEUTROPHILS ABSOLUTE: 2.2 K/UL (ref 1.7–7.7)
NEUTROPHILS ABSOLUTE: 2.5 K/UL (ref 1.7–7.7)
NEUTROPHILS ABSOLUTE: 2.8 K/UL (ref 1.7–7.7)
NEUTROPHILS ABSOLUTE: 2.9 K/UL (ref 1.7–7.7)
NEUTROPHILS ABSOLUTE: 3.2 K/UL (ref 1.7–7.7)
NEUTROPHILS ABSOLUTE: 3.2 K/UL (ref 1.7–7.7)
NEUTROPHILS ABSOLUTE: 3.4 K/UL (ref 1.7–7.7)
NEUTROPHILS ABSOLUTE: 3.6 K/UL (ref 1.7–7.7)
NEUTROPHILS ABSOLUTE: 3.7 K/UL (ref 1.7–7.7)
NEUTROPHILS ABSOLUTE: 4.7 K/UL (ref 1.7–7.7)
NEUTROPHILS ABSOLUTE: 4.7 K/UL (ref 1.7–7.7)
NEUTROPHILS ABSOLUTE: 4.8 K/UL (ref 1.7–7.7)
NEUTROPHILS ABSOLUTE: 4.9 K/UL (ref 1.7–7.7)
NEUTROPHILS ABSOLUTE: 6.6 K/UL (ref 1.7–7.7)
NEUTROPHILS ABSOLUTE: 8 K/UL (ref 1.7–7.7)
NEUTROPHILS RELATIVE PERCENT: 57.3 %
NEUTROPHILS RELATIVE PERCENT: 62.1 %
NEUTROPHILS RELATIVE PERCENT: 66.2 %
NEUTROPHILS RELATIVE PERCENT: 66.3 %
NEUTROPHILS RELATIVE PERCENT: 74 %
NEUTROPHILS RELATIVE PERCENT: 75.6 %
NEUTROPHILS RELATIVE PERCENT: 79.9 %
NEUTROPHILS RELATIVE PERCENT: 81.1 %
NEUTROPHILS RELATIVE PERCENT: 81.7 %
NEUTROPHILS RELATIVE PERCENT: 82.5 %
NEUTROPHILS RELATIVE PERCENT: 85.8 %
NEUTROPHILS RELATIVE PERCENT: 87.5 %
NEUTROPHILS RELATIVE PERCENT: 87.9 %
NEUTROPHILS RELATIVE PERCENT: 88.4 %
NEUTROPHILS RELATIVE PERCENT: 89.8 %
NEUTROPHILS RELATIVE PERCENT: 89.9 %
NEUTROPHILS RELATIVE PERCENT: 90.3 %
NEUTROPHILS RELATIVE PERCENT: 91.9 %
NEUTROPHILS RELATIVE PERCENT: 92.1 %
NEUTROPHILS RELATIVE PERCENT: 93.3 %
O2 CONTENT, VEN: 12 VOL %
O2 CONTENT, VEN: 15 VOL %
O2 SAT, ARTERIAL: 39.1 %
O2 SAT, ARTERIAL: 92 % (ref 93–100)
O2 SAT, ARTERIAL: 96 % (ref 93–100)
O2 SAT, VEN: 96 %
O2 SAT, VEN: 97 %
O2 THERAPY: ABNORMAL
OCCULT BLOOD DIAGNOSTIC: ABNORMAL
ORGANISM: ABNORMAL
OVALOCYTES: ABNORMAL
OVALOCYTES: ABNORMAL
PCO2 ARTERIAL: 36.8 MMHG (ref 35–45)
PCO2 ARTERIAL: 37.2 MMHG (ref 35–45)
PCO2 ARTERIAL: 37.4 MM HG (ref 35–45)
PCO2, VEN: 28.5 MMHG (ref 40–50)
PCO2, VEN: 36.4 MMHG (ref 40–50)
PDW BLD-RTO: 18.6 % (ref 12.4–15.4)
PDW BLD-RTO: 18.6 % (ref 12.4–15.4)
PDW BLD-RTO: 18.7 % (ref 12.4–15.4)
PDW BLD-RTO: 18.8 % (ref 12.4–15.4)
PDW BLD-RTO: 18.9 % (ref 12.4–15.4)
PDW BLD-RTO: 19 % (ref 12.4–15.4)
PDW BLD-RTO: 19.2 % (ref 12.4–15.4)
PDW BLD-RTO: 19.2 % (ref 12.4–15.4)
PDW BLD-RTO: 19.4 % (ref 12.4–15.4)
PDW BLD-RTO: 19.5 % (ref 12.4–15.4)
PDW BLD-RTO: 19.7 % (ref 12.4–15.4)
PDW BLD-RTO: 23.7 % (ref 12.4–15.4)
PDW BLD-RTO: 24.5 % (ref 12.4–15.4)
PDW BLD-RTO: 24.5 % (ref 12.4–15.4)
PDW BLD-RTO: 24.6 % (ref 12.4–15.4)
PDW BLD-RTO: 24.7 % (ref 12.4–15.4)
PDW BLD-RTO: 25 % (ref 12.4–15.4)
PDW BLD-RTO: 25 % (ref 12.4–15.4)
PERFORMED ON: ABNORMAL
PERFORMED ON: NORMAL
PH ARTERIAL: 7.4 (ref 7.35–7.45)
PH ARTERIAL: 7.45 (ref 7.35–7.45)
PH ARTERIAL: 7.5 (ref 7.35–7.45)
PH VENOUS: 7.14 (ref 7.35–7.45)
PH VENOUS: 7.49 (ref 7.35–7.45)
PHOSPHORUS: 4.7 MG/DL (ref 2.5–4.9)
PHOSPHORUS: 6.9 MG/DL (ref 2.5–4.9)
PHOSPHORUS: 7.9 MG/DL (ref 2.5–4.9)
PLATELET # BLD: 100 K/UL (ref 135–450)
PLATELET # BLD: 104 K/UL (ref 135–450)
PLATELET # BLD: 109 K/UL (ref 135–450)
PLATELET # BLD: 111 K/UL (ref 135–450)
PLATELET # BLD: 117 K/UL (ref 135–450)
PLATELET # BLD: 122 K/UL (ref 135–450)
PLATELET # BLD: 22 K/UL (ref 135–450)
PLATELET # BLD: 26 K/UL (ref 135–450)
PLATELET # BLD: 29 K/UL (ref 135–450)
PLATELET # BLD: 36 K/UL (ref 135–450)
PLATELET # BLD: 41 K/UL (ref 135–450)
PLATELET # BLD: 42 K/UL (ref 135–450)
PLATELET # BLD: 52 K/UL (ref 135–450)
PLATELET # BLD: 65 K/UL (ref 135–450)
PLATELET # BLD: 72 K/UL (ref 135–450)
PLATELET # BLD: 73 K/UL (ref 135–450)
PLATELET # BLD: 75 K/UL (ref 135–450)
PLATELET # BLD: 78 K/UL (ref 135–450)
PLATELET # BLD: 92 K/UL (ref 135–450)
PLATELET # BLD: 94 K/UL (ref 135–450)
PLATELET SLIDE REVIEW: ABNORMAL
PLATELET SLIDE REVIEW: ADEQUATE
PMV BLD AUTO: 8 FL (ref 5–10.5)
PMV BLD AUTO: 8.1 FL (ref 5–10.5)
PMV BLD AUTO: 8.2 FL (ref 5–10.5)
PMV BLD AUTO: 8.3 FL (ref 5–10.5)
PMV BLD AUTO: 8.4 FL (ref 5–10.5)
PMV BLD AUTO: 8.5 FL (ref 5–10.5)
PMV BLD AUTO: 8.6 FL (ref 5–10.5)
PMV BLD AUTO: 8.7 FL (ref 5–10.5)
PMV BLD AUTO: 8.7 FL (ref 5–10.5)
PMV BLD AUTO: 8.8 FL (ref 5–10.5)
PMV BLD AUTO: 9.4 FL (ref 5–10.5)
PMV BLD AUTO: 9.9 FL (ref 5–10.5)
PO2 ARTERIAL: 73 MMHG (ref 75–108)
PO2 ARTERIAL: 80.1 MM HG (ref 75–108)
PO2 ARTERIAL: <30 MMHG (ref 75–108)
PO2, VEN: 145 MMHG (ref 25–40)
PO2, VEN: 155 MMHG (ref 25–40)
POC POTASSIUM: 4.1 MMOL/L (ref 3.5–5.1)
POC SAMPLE TYPE: ABNORMAL
POC SODIUM: 130 MMOL/L (ref 136–145)
POIKILOCYTES: ABNORMAL
POLYCHROMASIA: ABNORMAL
POTASSIUM REFLEX MAGNESIUM: 3.6 MMOL/L (ref 3.5–5.1)
POTASSIUM REFLEX MAGNESIUM: 4.6 MMOL/L (ref 3.5–5.1)
POTASSIUM REFLEX MAGNESIUM: 4.6 MMOL/L (ref 3.5–5.1)
POTASSIUM SERPL-SCNC: 3.4 MMOL/L (ref 3.5–5.1)
POTASSIUM SERPL-SCNC: 3.6 MMOL/L (ref 3.5–5.1)
POTASSIUM SERPL-SCNC: 3.7 MMOL/L (ref 3.5–5.1)
POTASSIUM SERPL-SCNC: 3.8 MMOL/L (ref 3.5–5.1)
POTASSIUM SERPL-SCNC: 4.3 MMOL/L (ref 3.5–5.1)
POTASSIUM SERPL-SCNC: 4.4 MMOL/L (ref 3.5–5.1)
POTASSIUM SERPL-SCNC: 4.5 MMOL/L (ref 3.5–5.1)
POTASSIUM SERPL-SCNC: 4.7 MMOL/L (ref 3.5–5.1)
POTASSIUM SERPL-SCNC: 4.8 MMOL/L (ref 3.5–5.1)
POTASSIUM SERPL-SCNC: 4.8 MMOL/L (ref 3.5–5.1)
POTASSIUM SERPL-SCNC: 5.4 MMOL/L (ref 3.5–5.1)
POTASSIUM SERPL-SCNC: 5.4 MMOL/L (ref 3.5–5.1)
POTASSIUM SERPL-SCNC: 5.6 MMOL/L (ref 3.5–5.1)
POTASSIUM SERPL-SCNC: 6.1 MMOL/L (ref 3.5–5.1)
PRO-BNP: 7065 PG/ML (ref 0–124)
PRO-BNP: 8609 PG/ML (ref 0–124)
PROCALCITONIN: 2.06 NG/ML (ref 0–0.15)
PROCALCITONIN: 3.83 NG/ML (ref 0–0.15)
PROCALCITONIN: 3.87 NG/ML (ref 0–0.15)
PROTHROMBIN TIME: 12.1 SEC (ref 9.9–12.7)
PROTHROMBIN TIME: 12.5 SEC (ref 9.9–12.7)
RBC # BLD: 2.72 M/UL (ref 4.2–5.9)
RBC # BLD: 2.79 M/UL (ref 4.2–5.9)
RBC # BLD: 2.9 M/UL (ref 4.2–5.9)
RBC # BLD: 3.02 M/UL (ref 4.2–5.9)
RBC # BLD: 3.1 M/UL (ref 4.2–5.9)
RBC # BLD: 3.1 M/UL (ref 4.2–5.9)
RBC # BLD: 3.42 M/UL (ref 4.2–5.9)
RBC # BLD: 3.75 M/UL (ref 4.2–5.9)
RBC # BLD: 4.37 M/UL (ref 4.2–5.9)
RBC # BLD: 4.37 M/UL (ref 4.2–5.9)
RBC # BLD: 4.48 M/UL (ref 4.2–5.9)
RBC # BLD: 4.5 M/UL (ref 4.2–5.9)
RBC # BLD: 4.51 M/UL (ref 4.2–5.9)
RBC # BLD: 4.54 M/UL (ref 4.2–5.9)
RBC # BLD: 4.63 M/UL (ref 4.2–5.9)
RBC # BLD: 4.72 M/UL (ref 4.2–5.9)
RBC # BLD: 4.82 M/UL (ref 4.2–5.9)
RBC # BLD: 4.97 M/UL (ref 4.2–5.9)
RBC # BLD: 5.04 M/UL (ref 4.2–5.9)
RBC # BLD: 5.95 M/UL (ref 4.2–5.9)
REASON FOR REJECTION: NORMAL
REASON FOR REJECTION: NORMAL
REJECTED TEST: NORMAL
REJECTED TEST: NORMAL
REPORT: NORMAL
SARS-COV-2: DETECTED
SARS-COV-2: DETECTED
SCHISTOCYTES: ABNORMAL
SLIDE REVIEW: ABNORMAL
SODIUM BLD-SCNC: 125 MMOL/L (ref 136–145)
SODIUM BLD-SCNC: 130 MMOL/L (ref 136–145)
SODIUM BLD-SCNC: 130 MMOL/L (ref 136–145)
SODIUM BLD-SCNC: 131 MMOL/L (ref 136–145)
SODIUM BLD-SCNC: 132 MMOL/L (ref 136–145)
SODIUM BLD-SCNC: 132 MMOL/L (ref 136–145)
SODIUM BLD-SCNC: 133 MMOL/L (ref 136–145)
SODIUM BLD-SCNC: 134 MMOL/L (ref 136–145)
SODIUM BLD-SCNC: 135 MMOL/L (ref 136–145)
SODIUM BLD-SCNC: 136 MMOL/L (ref 136–145)
SODIUM BLD-SCNC: 138 MMOL/L (ref 136–145)
SPE/IFE INTERPRETATION: NORMAL
TARGET CELLS: ABNORMAL
TARGET CELLS: ABNORMAL
TCO2 ARTERIAL: 24 MMOL/L
TCO2 ARTERIAL: 27 MMOL/L
TCO2 ARTERIAL: 66.5 MMOL/L
TCO2 CALC VENOUS: 24 MMOL/L
TCO2 CALC VENOUS: 65 MMOL/L
TEAR DROP CELLS: ABNORMAL
TOTAL CK: 130 U/L (ref 39–308)
TOTAL IRON BINDING CAPACITY: 133 UG/DL (ref 260–445)
TOTAL PROTEIN: 7.2 G/DL (ref 6.4–8.2)
TOTAL PROTEIN: 7.2 G/DL (ref 6.4–8.2)
TOTAL PROTEIN: 7.5 G/DL (ref 6.4–8.2)
TOTAL PROTEIN: 7.8 G/DL (ref 6.4–8.2)
TOTAL PROTEIN: 8.3 G/DL (ref 6.4–8.2)
TROPONIN: 0.07 NG/ML
TROPONIN: 0.09 NG/ML
TROPONIN: 0.1 NG/ML
VANCOMYCIN RANDOM: 10.7 UG/ML
VANCOMYCIN RANDOM: 20.6 UG/ML
VANCOMYCIN RANDOM: 21.4 UG/ML
VANCOMYCIN RANDOM: 28.1 UG/ML
VITAMIN D 25-HYDROXY: 44.6 NG/ML
WBC # BLD: 2.5 K/UL (ref 4–11)
WBC # BLD: 2.7 K/UL (ref 4–11)
WBC # BLD: 2.7 K/UL (ref 4–11)
WBC # BLD: 2.8 K/UL (ref 4–11)
WBC # BLD: 2.9 K/UL (ref 4–11)
WBC # BLD: 3 K/UL (ref 4–11)
WBC # BLD: 3.1 K/UL (ref 4–11)
WBC # BLD: 3.4 K/UL (ref 4–11)
WBC # BLD: 3.5 K/UL (ref 4–11)
WBC # BLD: 3.5 K/UL (ref 4–11)
WBC # BLD: 3.8 K/UL (ref 4–11)
WBC # BLD: 3.8 K/UL (ref 4–11)
WBC # BLD: 4 K/UL (ref 4–11)
WBC # BLD: 4.1 K/UL (ref 4–11)
WBC # BLD: 5.1 K/UL (ref 4–11)
WBC # BLD: 5.6 K/UL (ref 4–11)
WBC # BLD: 5.7 K/UL (ref 4–11)
WBC # BLD: 6 K/UL (ref 4–11)
WBC # BLD: 8 K/UL (ref 4–11)
WBC # BLD: 9.3 K/UL (ref 4–11)
ZINC: 59 UG/DL (ref 60–120)

## 2021-01-01 PROCEDURE — 85025 COMPLETE CBC W/AUTO DIFF WBC: CPT

## 2021-01-01 PROCEDURE — 90935 HEMODIALYSIS ONE EVALUATION: CPT

## 2021-01-01 PROCEDURE — 2500000003 HC RX 250 WO HCPCS

## 2021-01-01 PROCEDURE — 6360000002 HC RX W HCPCS: Performed by: INTERNAL MEDICINE

## 2021-01-01 PROCEDURE — 84484 ASSAY OF TROPONIN QUANT: CPT

## 2021-01-01 PROCEDURE — 87340 HEPATITIS B SURFACE AG IA: CPT

## 2021-01-01 PROCEDURE — 93308 TTE F-UP OR LMTD: CPT

## 2021-01-01 PROCEDURE — 6370000000 HC RX 637 (ALT 250 FOR IP): Performed by: NURSE PRACTITIONER

## 2021-01-01 PROCEDURE — 83735 ASSAY OF MAGNESIUM: CPT

## 2021-01-01 PROCEDURE — 6370000000 HC RX 637 (ALT 250 FOR IP): Performed by: INTERNAL MEDICINE

## 2021-01-01 PROCEDURE — 99233 SBSQ HOSP IP/OBS HIGH 50: CPT | Performed by: INTERNAL MEDICINE

## 2021-01-01 PROCEDURE — 99285 EMERGENCY DEPT VISIT HI MDM: CPT

## 2021-01-01 PROCEDURE — 36415 COLL VENOUS BLD VENIPUNCTURE: CPT

## 2021-01-01 PROCEDURE — 95813 EEG EXTND MNTR 61-119 MIN: CPT

## 2021-01-01 PROCEDURE — 93010 ELECTROCARDIOGRAM REPORT: CPT | Performed by: INTERNAL MEDICINE

## 2021-01-01 PROCEDURE — 3700000001 HC ADD 15 MINUTES (ANESTHESIA): Performed by: INTERNAL MEDICINE

## 2021-01-01 PROCEDURE — 6360000002 HC RX W HCPCS

## 2021-01-01 PROCEDURE — 2580000003 HC RX 258: Performed by: PHYSICIAN ASSISTANT

## 2021-01-01 PROCEDURE — 2500000003 HC RX 250 WO HCPCS: Performed by: NURSE ANESTHETIST, CERTIFIED REGISTERED

## 2021-01-01 PROCEDURE — 2000000000 HC ICU R&B

## 2021-01-01 PROCEDURE — 5A1D70Z PERFORMANCE OF URINARY FILTRATION, INTERMITTENT, LESS THAN 6 HOURS PER DAY: ICD-10-PCS | Performed by: HOSPITALIST

## 2021-01-01 PROCEDURE — 97530 THERAPEUTIC ACTIVITIES: CPT

## 2021-01-01 PROCEDURE — 2700000000 HC OXYGEN THERAPY PER DAY

## 2021-01-01 PROCEDURE — 84295 ASSAY OF SERUM SODIUM: CPT

## 2021-01-01 PROCEDURE — 85014 HEMATOCRIT: CPT

## 2021-01-01 PROCEDURE — 82728 ASSAY OF FERRITIN: CPT

## 2021-01-01 PROCEDURE — 83010 ASSAY OF HAPTOGLOBIN QUANT: CPT

## 2021-01-01 PROCEDURE — 36556 INSERT NON-TUNNEL CV CATH: CPT

## 2021-01-01 PROCEDURE — 2580000003 HC RX 258: Performed by: INTERNAL MEDICINE

## 2021-01-01 PROCEDURE — 82803 BLOOD GASES ANY COMBINATION: CPT

## 2021-01-01 PROCEDURE — 6360000002 HC RX W HCPCS: Performed by: STUDENT IN AN ORGANIZED HEALTH CARE EDUCATION/TRAINING PROGRAM

## 2021-01-01 PROCEDURE — 71045 X-RAY EXAM CHEST 1 VIEW: CPT

## 2021-01-01 PROCEDURE — 84145 PROCALCITONIN (PCT): CPT

## 2021-01-01 PROCEDURE — 6360000002 HC RX W HCPCS: Performed by: NURSE PRACTITIONER

## 2021-01-01 PROCEDURE — 6370000000 HC RX 637 (ALT 250 FOR IP)

## 2021-01-01 PROCEDURE — 87150 DNA/RNA AMPLIFIED PROBE: CPT

## 2021-01-01 PROCEDURE — 82947 ASSAY GLUCOSE BLOOD QUANT: CPT

## 2021-01-01 PROCEDURE — 87641 MR-STAPH DNA AMP PROBE: CPT

## 2021-01-01 PROCEDURE — 94761 N-INVAS EAR/PLS OXIMETRY MLT: CPT

## 2021-01-01 PROCEDURE — C9113 INJ PANTOPRAZOLE SODIUM, VIA: HCPCS

## 2021-01-01 PROCEDURE — 86923 COMPATIBILITY TEST ELECTRIC: CPT

## 2021-01-01 PROCEDURE — 83883 ASSAY NEPHELOMETRY NOT SPEC: CPT

## 2021-01-01 PROCEDURE — 85730 THROMBOPLASTIN TIME PARTIAL: CPT

## 2021-01-01 PROCEDURE — 85384 FIBRINOGEN ACTIVITY: CPT

## 2021-01-01 PROCEDURE — 80074 ACUTE HEPATITIS PANEL: CPT

## 2021-01-01 PROCEDURE — 1200000000 HC SEMI PRIVATE

## 2021-01-01 PROCEDURE — 83880 ASSAY OF NATRIURETIC PEPTIDE: CPT

## 2021-01-01 PROCEDURE — 6360000002 HC RX W HCPCS: Performed by: HOSPITALIST

## 2021-01-01 PROCEDURE — 83605 ASSAY OF LACTIC ACID: CPT

## 2021-01-01 PROCEDURE — P9047 ALBUMIN (HUMAN), 25%, 50ML: HCPCS

## 2021-01-01 PROCEDURE — C9113 INJ PANTOPRAZOLE SODIUM, VIA: HCPCS | Performed by: INTERNAL MEDICINE

## 2021-01-01 PROCEDURE — 2500000003 HC RX 250 WO HCPCS: Performed by: STUDENT IN AN ORGANIZED HEALTH CARE EDUCATION/TRAINING PROGRAM

## 2021-01-01 PROCEDURE — 36600 WITHDRAWAL OF ARTERIAL BLOOD: CPT

## 2021-01-01 PROCEDURE — 86706 HEP B SURFACE ANTIBODY: CPT

## 2021-01-01 PROCEDURE — U0005 INFEC AGEN DETEC AMPLI PROBE: HCPCS

## 2021-01-01 PROCEDURE — 86850 RBC ANTIBODY SCREEN: CPT

## 2021-01-01 PROCEDURE — 85018 HEMOGLOBIN: CPT

## 2021-01-01 PROCEDURE — 99255 IP/OBS CONSLTJ NEW/EST HI 80: CPT | Performed by: INTERNAL MEDICINE

## 2021-01-01 PROCEDURE — 93005 ELECTROCARDIOGRAM TRACING: CPT | Performed by: EMERGENCY MEDICINE

## 2021-01-01 PROCEDURE — 86704 HEP B CORE ANTIBODY TOTAL: CPT

## 2021-01-01 PROCEDURE — 83550 IRON BINDING TEST: CPT

## 2021-01-01 PROCEDURE — 80048 BASIC METABOLIC PNL TOTAL CA: CPT

## 2021-01-01 PROCEDURE — 99232 SBSQ HOSP IP/OBS MODERATE 35: CPT | Performed by: NURSE PRACTITIONER

## 2021-01-01 PROCEDURE — 2580000003 HC RX 258: Performed by: EMERGENCY MEDICINE

## 2021-01-01 PROCEDURE — 2580000003 HC RX 258: Performed by: STUDENT IN AN ORGANIZED HEALTH CARE EDUCATION/TRAINING PROGRAM

## 2021-01-01 PROCEDURE — 2580000003 HC RX 258: Performed by: NURSE PRACTITIONER

## 2021-01-01 PROCEDURE — 90935 HEMODIALYSIS ONE EVALUATION: CPT | Performed by: INTERNAL MEDICINE

## 2021-01-01 PROCEDURE — 97162 PT EVAL MOD COMPLEX 30 MIN: CPT

## 2021-01-01 PROCEDURE — 80069 RENAL FUNCTION PANEL: CPT

## 2021-01-01 PROCEDURE — 99232 SBSQ HOSP IP/OBS MODERATE 35: CPT | Performed by: INTERNAL MEDICINE

## 2021-01-01 PROCEDURE — APPNB60 APP NON BILLABLE TIME 46-60 MINS: Performed by: NURSE PRACTITIONER

## 2021-01-01 PROCEDURE — P9016 RBC LEUKOCYTES REDUCED: HCPCS

## 2021-01-01 PROCEDURE — 83615 LACTATE (LD) (LDH) ENZYME: CPT

## 2021-01-01 PROCEDURE — 96375 TX/PRO/DX INJ NEW DRUG ADDON: CPT

## 2021-01-01 PROCEDURE — 85379 FIBRIN DEGRADATION QUANT: CPT

## 2021-01-01 PROCEDURE — 70450 CT HEAD/BRAIN W/O DYE: CPT

## 2021-01-01 PROCEDURE — 80076 HEPATIC FUNCTION PANEL: CPT

## 2021-01-01 PROCEDURE — 97166 OT EVAL MOD COMPLEX 45 MIN: CPT

## 2021-01-01 PROCEDURE — 96365 THER/PROPH/DIAG IV INF INIT: CPT

## 2021-01-01 PROCEDURE — 2580000003 HC RX 258

## 2021-01-01 PROCEDURE — 82784 ASSAY IGA/IGD/IGG/IGM EACH: CPT

## 2021-01-01 PROCEDURE — 6370000000 HC RX 637 (ALT 250 FOR IP): Performed by: HOSPITALIST

## 2021-01-01 PROCEDURE — 3700000000 HC ANESTHESIA ATTENDED CARE: Performed by: INTERNAL MEDICINE

## 2021-01-01 PROCEDURE — 80053 COMPREHEN METABOLIC PANEL: CPT

## 2021-01-01 PROCEDURE — 83690 ASSAY OF LIPASE: CPT

## 2021-01-01 PROCEDURE — 70551 MRI BRAIN STEM W/O DYE: CPT

## 2021-01-01 PROCEDURE — 6360000002 HC RX W HCPCS: Performed by: PHYSICIAN ASSISTANT

## 2021-01-01 PROCEDURE — 80202 ASSAY OF VANCOMYCIN: CPT

## 2021-01-01 PROCEDURE — 2580000003 HC RX 258: Performed by: NURSE ANESTHETIST, CERTIFIED REGISTERED

## 2021-01-01 PROCEDURE — 0DJ08ZZ INSPECTION OF UPPER INTESTINAL TRACT, VIA NATURAL OR ARTIFICIAL OPENING ENDOSCOPIC: ICD-10-PCS | Performed by: INTERNAL MEDICINE

## 2021-01-01 PROCEDURE — 84155 ASSAY OF PROTEIN SERUM: CPT

## 2021-01-01 PROCEDURE — 6370000000 HC RX 637 (ALT 250 FOR IP): Performed by: STUDENT IN AN ORGANIZED HEALTH CARE EDUCATION/TRAINING PROGRAM

## 2021-01-01 PROCEDURE — 93325 DOPPLER ECHO COLOR FLOW MAPG: CPT

## 2021-01-01 PROCEDURE — 97535 SELF CARE MNGMENT TRAINING: CPT

## 2021-01-01 PROCEDURE — 36430 TRANSFUSION BLD/BLD COMPNT: CPT

## 2021-01-01 PROCEDURE — 2500000003 HC RX 250 WO HCPCS: Performed by: INTERNAL MEDICINE

## 2021-01-01 PROCEDURE — 87040 BLOOD CULTURE FOR BACTERIA: CPT

## 2021-01-01 PROCEDURE — 84100 ASSAY OF PHOSPHORUS: CPT

## 2021-01-01 PROCEDURE — 85610 PROTHROMBIN TIME: CPT

## 2021-01-01 PROCEDURE — U0003 INFECTIOUS AGENT DETECTION BY NUCLEIC ACID (DNA OR RNA); SEVERE ACUTE RESPIRATORY SYNDROME CORONAVIRUS 2 (SARS-COV-2) (CORONAVIRUS DISEASE [COVID-19]), AMPLIFIED PROBE TECHNIQUE, MAKING USE OF HIGH THROUGHPUT TECHNOLOGIES AS DESCRIBED BY CMS-2020-01-R: HCPCS

## 2021-01-01 PROCEDURE — 86901 BLOOD TYPING SEROLOGIC RH(D): CPT

## 2021-01-01 PROCEDURE — 99291 CRITICAL CARE FIRST HOUR: CPT | Performed by: INTERNAL MEDICINE

## 2021-01-01 PROCEDURE — 0BH17EZ INSERTION OF ENDOTRACHEAL AIRWAY INTO TRACHEA, VIA NATURAL OR ARTIFICIAL OPENING: ICD-10-PCS | Performed by: STUDENT IN AN ORGANIZED HEALTH CARE EDUCATION/TRAINING PROGRAM

## 2021-01-01 PROCEDURE — 2580000003 HC RX 258: Performed by: HOSPITALIST

## 2021-01-01 PROCEDURE — 97110 THERAPEUTIC EXERCISES: CPT

## 2021-01-01 PROCEDURE — 5A1935Z RESPIRATORY VENTILATION, LESS THAN 24 CONSECUTIVE HOURS: ICD-10-PCS | Performed by: STUDENT IN AN ORGANIZED HEALTH CARE EDUCATION/TRAINING PROGRAM

## 2021-01-01 PROCEDURE — 82390 ASSAY OF CERULOPLASMIN: CPT

## 2021-01-01 PROCEDURE — 87077 CULTURE AEROBIC IDENTIFY: CPT

## 2021-01-01 PROCEDURE — 93005 ELECTROCARDIOGRAM TRACING: CPT | Performed by: PHYSICIAN ASSISTANT

## 2021-01-01 PROCEDURE — 82270 OCCULT BLOOD FECES: CPT

## 2021-01-01 PROCEDURE — 99232 SBSQ HOSP IP/OBS MODERATE 35: CPT

## 2021-01-01 PROCEDURE — 86140 C-REACTIVE PROTEIN: CPT

## 2021-01-01 PROCEDURE — 94002 VENT MGMT INPAT INIT DAY: CPT

## 2021-01-01 PROCEDURE — 83036 HEMOGLOBIN GLYCOSYLATED A1C: CPT

## 2021-01-01 PROCEDURE — C9113 INJ PANTOPRAZOLE SODIUM, VIA: HCPCS | Performed by: STUDENT IN AN ORGANIZED HEALTH CARE EDUCATION/TRAINING PROGRAM

## 2021-01-01 PROCEDURE — 84132 ASSAY OF SERUM POTASSIUM: CPT

## 2021-01-01 PROCEDURE — 94799 UNLISTED PULMONARY SVC/PX: CPT

## 2021-01-01 PROCEDURE — 93320 DOPPLER ECHO COMPLETE: CPT

## 2021-01-01 PROCEDURE — 83540 ASSAY OF IRON: CPT

## 2021-01-01 PROCEDURE — 6360000004 HC RX CONTRAST MEDICATION: Performed by: INTERNAL MEDICINE

## 2021-01-01 PROCEDURE — 82330 ASSAY OF CALCIUM: CPT

## 2021-01-01 PROCEDURE — 3609017100 HC EGD: Performed by: INTERNAL MEDICINE

## 2021-01-01 PROCEDURE — 86900 BLOOD TYPING SEROLOGIC ABO: CPT

## 2021-01-01 PROCEDURE — 6360000002 HC RX W HCPCS: Performed by: EMERGENCY MEDICINE

## 2021-01-01 PROCEDURE — 94003 VENT MGMT INPAT SUBQ DAY: CPT

## 2021-01-01 PROCEDURE — 84630 ASSAY OF ZINC: CPT

## 2021-01-01 PROCEDURE — 82525 ASSAY OF COPPER: CPT

## 2021-01-01 PROCEDURE — 82550 ASSAY OF CK (CPK): CPT

## 2021-01-01 PROCEDURE — 99223 1ST HOSP IP/OBS HIGH 75: CPT | Performed by: HOSPITALIST

## 2021-01-01 PROCEDURE — 7100000010 HC PHASE II RECOVERY - FIRST 15 MIN: Performed by: INTERNAL MEDICINE

## 2021-01-01 PROCEDURE — 87186 SC STD MICRODIL/AGAR DIL: CPT

## 2021-01-01 PROCEDURE — 7100000011 HC PHASE II RECOVERY - ADDTL 15 MIN: Performed by: INTERNAL MEDICINE

## 2021-01-01 PROCEDURE — 99221 1ST HOSP IP/OBS SF/LOW 40: CPT | Performed by: NURSE PRACTITIONER

## 2021-01-01 PROCEDURE — 0DJD8ZZ INSPECTION OF LOWER INTESTINAL TRACT, VIA NATURAL OR ARTIFICIAL OPENING ENDOSCOPIC: ICD-10-PCS | Performed by: INTERNAL MEDICINE

## 2021-01-01 PROCEDURE — 3609027000 HC COLONOSCOPY: Performed by: INTERNAL MEDICINE

## 2021-01-01 PROCEDURE — 96368 THER/DIAG CONCURRENT INF: CPT

## 2021-01-01 PROCEDURE — 84165 PROTEIN E-PHORESIS SERUM: CPT

## 2021-01-01 PROCEDURE — P9047 ALBUMIN (HUMAN), 25%, 50ML: HCPCS | Performed by: INTERNAL MEDICINE

## 2021-01-01 PROCEDURE — 82306 VITAMIN D 25 HYDROXY: CPT

## 2021-01-01 PROCEDURE — 31500 INSERT EMERGENCY AIRWAY: CPT

## 2021-01-01 PROCEDURE — 6360000002 HC RX W HCPCS: Performed by: NURSE ANESTHETIST, CERTIFIED REGISTERED

## 2021-01-01 PROCEDURE — 96366 THER/PROPH/DIAG IV INF ADDON: CPT

## 2021-01-01 PROCEDURE — 74176 CT ABD & PELVIS W/O CONTRAST: CPT

## 2021-01-01 PROCEDURE — 82140 ASSAY OF AMMONIA: CPT

## 2021-01-01 RX ORDER — SODIUM CHLORIDE 0.9 % (FLUSH) 0.9 %
5-40 SYRINGE (ML) INJECTION EVERY 12 HOURS SCHEDULED
Status: DISCONTINUED | OUTPATIENT
Start: 2021-01-01 | End: 2021-01-01 | Stop reason: HOSPADM

## 2021-01-01 RX ORDER — SODIUM CHLORIDE 9 MG/ML
25 INJECTION, SOLUTION INTRAVENOUS PRN
Status: DISCONTINUED | OUTPATIENT
Start: 2021-01-01 | End: 2021-01-01 | Stop reason: HOSPADM

## 2021-01-01 RX ORDER — SODIUM CHLORIDE 0.9 % (FLUSH) 0.9 %
5-40 SYRINGE (ML) INJECTION PRN
Status: DISCONTINUED | OUTPATIENT
Start: 2021-01-01 | End: 2021-01-01 | Stop reason: HOSPADM

## 2021-01-01 RX ORDER — LINEZOLID 2 MG/ML
600 INJECTION, SOLUTION INTRAVENOUS EVERY 12 HOURS
Status: DISCONTINUED | OUTPATIENT
Start: 2021-01-01 | End: 2021-01-01

## 2021-01-01 RX ORDER — HEPARIN SODIUM 1000 [USP'U]/ML
1000 INJECTION, SOLUTION INTRAVENOUS; SUBCUTANEOUS PRN
Status: DISCONTINUED | OUTPATIENT
Start: 2021-01-01 | End: 2021-01-01

## 2021-01-01 RX ORDER — MIDODRINE HYDROCHLORIDE 2.5 MG/1
2.5 TABLET ORAL
Qty: 90 TABLET | Refills: 3 | Status: ON HOLD
Start: 2021-01-01 | End: 2021-01-01 | Stop reason: HOSPADM

## 2021-01-01 RX ORDER — HEPARIN SODIUM 5000 [USP'U]/ML
5000 INJECTION, SOLUTION INTRAVENOUS; SUBCUTANEOUS EVERY 8 HOURS SCHEDULED
Status: DISCONTINUED | OUTPATIENT
Start: 2021-01-01 | End: 2021-01-01 | Stop reason: ALTCHOICE

## 2021-01-01 RX ORDER — ALBUMIN (HUMAN) 12.5 G/50ML
12.5 SOLUTION INTRAVENOUS PRN
Status: DISCONTINUED | OUTPATIENT
Start: 2021-01-01 | End: 2021-01-01 | Stop reason: HOSPADM

## 2021-01-01 RX ORDER — ALBUMIN (HUMAN) 12.5 G/50ML
SOLUTION INTRAVENOUS
Status: COMPLETED
Start: 2021-01-01 | End: 2021-01-01

## 2021-01-01 RX ORDER — MIDODRINE HYDROCHLORIDE 5 MG/1
2.5 TABLET ORAL
Status: DISCONTINUED | OUTPATIENT
Start: 2021-01-01 | End: 2021-01-01 | Stop reason: HOSPADM

## 2021-01-01 RX ORDER — ONDANSETRON 4 MG/1
4 TABLET, FILM COATED ORAL EVERY 8 HOURS PRN
COMMUNITY

## 2021-01-01 RX ORDER — SODIUM HYPOCHLORITE 1.25 MG/ML
SOLUTION TOPICAL DAILY
Status: DISCONTINUED | OUTPATIENT
Start: 2021-01-01 | End: 2021-01-01 | Stop reason: HOSPADM

## 2021-01-01 RX ORDER — ONDANSETRON 2 MG/ML
4 INJECTION INTRAMUSCULAR; INTRAVENOUS EVERY 6 HOURS PRN
Status: DISCONTINUED | OUTPATIENT
Start: 2021-01-01 | End: 2021-01-01 | Stop reason: HOSPADM

## 2021-01-01 RX ORDER — CASTOR OIL AND BALSAM, PERU 788; 87 MG/G; MG/G
OINTMENT TOPICAL 2 TIMES DAILY
Status: DISCONTINUED | OUTPATIENT
Start: 2021-01-01 | End: 2021-01-01 | Stop reason: HOSPADM

## 2021-01-01 RX ORDER — FENTANYL CITRATE 50 UG/ML
INJECTION, SOLUTION INTRAMUSCULAR; INTRAVENOUS
Status: COMPLETED
Start: 2021-01-01 | End: 2021-01-01

## 2021-01-01 RX ORDER — INSULIN LISPRO 100 [IU]/ML
0-3 INJECTION, SOLUTION INTRAVENOUS; SUBCUTANEOUS NIGHTLY
Status: DISCONTINUED | OUTPATIENT
Start: 2021-01-01 | End: 2021-01-01 | Stop reason: HOSPADM

## 2021-01-01 RX ORDER — DEXTROSE MONOHYDRATE 100 MG/ML
INJECTION, SOLUTION INTRAVENOUS CONTINUOUS
Status: DISPENSED | OUTPATIENT
Start: 2021-01-01 | End: 2021-01-01

## 2021-01-01 RX ORDER — KETAMINE HYDROCHLORIDE 100 MG/ML
200 INJECTION, SOLUTION INTRAMUSCULAR; INTRAVENOUS ONCE
Status: DISCONTINUED | OUTPATIENT
Start: 2021-01-01 | End: 2021-01-01 | Stop reason: HOSPADM

## 2021-01-01 RX ORDER — PROPOFOL 10 MG/ML
INJECTION, EMULSION INTRAVENOUS PRN
Status: DISCONTINUED | OUTPATIENT
Start: 2021-01-01 | End: 2021-01-01 | Stop reason: SDUPTHER

## 2021-01-01 RX ORDER — PROMETHAZINE HYDROCHLORIDE 25 MG/ML
6.25 INJECTION, SOLUTION INTRAMUSCULAR; INTRAVENOUS EVERY 6 HOURS PRN
Status: DISCONTINUED | OUTPATIENT
Start: 2021-01-01 | End: 2021-01-01 | Stop reason: HOSPADM

## 2021-01-01 RX ORDER — DEXTROSE MONOHYDRATE 50 MG/ML
100 INJECTION, SOLUTION INTRAVENOUS PRN
Status: DISCONTINUED | OUTPATIENT
Start: 2021-01-01 | End: 2021-01-01 | Stop reason: HOSPADM

## 2021-01-01 RX ORDER — 0.9 % SODIUM CHLORIDE 0.9 %
250 INTRAVENOUS SOLUTION INTRAVENOUS ONCE
Status: COMPLETED | OUTPATIENT
Start: 2021-01-01 | End: 2021-01-01

## 2021-01-01 RX ORDER — INSULIN LISPRO 100 [IU]/ML
0-9 INJECTION, SOLUTION INTRAVENOUS; SUBCUTANEOUS NIGHTLY
Status: DISCONTINUED | OUTPATIENT
Start: 2021-01-01 | End: 2021-01-01

## 2021-01-01 RX ORDER — SEVELAMER CARBONATE 800 MG/1
1 TABLET, FILM COATED ORAL
COMMUNITY

## 2021-01-01 RX ORDER — PHENYLEPHRINE HCL IN 0.9% NACL 1 MG/10 ML
SYRINGE (ML) INTRAVENOUS
Status: COMPLETED
Start: 2021-01-01 | End: 2021-01-01

## 2021-01-01 RX ORDER — INSULIN LISPRO 100 [IU]/ML
0-3 INJECTION, SOLUTION INTRAVENOUS; SUBCUTANEOUS NIGHTLY
Status: DISCONTINUED | OUTPATIENT
Start: 2021-01-01 | End: 2021-01-01

## 2021-01-01 RX ORDER — HEPARIN SODIUM (PORCINE) LOCK FLUSH IV SOLN 100 UNIT/ML 100 UNIT/ML
100 SOLUTION INTRAVENOUS PRN
Status: DISCONTINUED | OUTPATIENT
Start: 2021-01-01 | End: 2021-01-01

## 2021-01-01 RX ORDER — ZOLPIDEM TARTRATE 5 MG/1
5 TABLET ORAL NIGHTLY PRN
Status: DISCONTINUED | OUTPATIENT
Start: 2021-01-01 | End: 2021-01-01

## 2021-01-01 RX ORDER — INSULIN LISPRO 100 [IU]/ML
0-12 INJECTION, SOLUTION INTRAVENOUS; SUBCUTANEOUS
Status: DISCONTINUED | OUTPATIENT
Start: 2021-01-01 | End: 2021-01-01

## 2021-01-01 RX ORDER — ACETAMINOPHEN 650 MG/1
650 SUPPOSITORY RECTAL EVERY 6 HOURS PRN
Status: DISCONTINUED | OUTPATIENT
Start: 2021-01-01 | End: 2021-01-01 | Stop reason: HOSPADM

## 2021-01-01 RX ORDER — DEXAMETHASONE SODIUM PHOSPHATE 4 MG/ML
10 INJECTION, SOLUTION INTRA-ARTICULAR; INTRALESIONAL; INTRAMUSCULAR; INTRAVENOUS; SOFT TISSUE ONCE
Status: COMPLETED | OUTPATIENT
Start: 2021-01-01 | End: 2021-01-01

## 2021-01-01 RX ORDER — DEXAMETHASONE SODIUM PHOSPHATE 10 MG/ML
10 INJECTION, SOLUTION INTRAMUSCULAR; INTRAVENOUS DAILY
Status: DISCONTINUED | OUTPATIENT
Start: 2021-01-01 | End: 2021-01-01

## 2021-01-01 RX ORDER — ONDANSETRON 4 MG/1
4 TABLET, ORALLY DISINTEGRATING ORAL EVERY 8 HOURS PRN
Status: DISCONTINUED | OUTPATIENT
Start: 2021-01-01 | End: 2021-01-01 | Stop reason: HOSPADM

## 2021-01-01 RX ORDER — DEXAMETHASONE SODIUM PHOSPHATE 10 MG/ML
6 INJECTION, SOLUTION INTRAMUSCULAR; INTRAVENOUS DAILY
Status: DISCONTINUED | OUTPATIENT
Start: 2021-01-01 | End: 2021-01-01 | Stop reason: HOSPADM

## 2021-01-01 RX ORDER — INSULIN LISPRO 100 [IU]/ML
0-6 INJECTION, SOLUTION INTRAVENOUS; SUBCUTANEOUS
Status: DISCONTINUED | OUTPATIENT
Start: 2021-01-01 | End: 2021-01-01 | Stop reason: HOSPADM

## 2021-01-01 RX ORDER — LIDOCAINE HYDROCHLORIDE 10 MG/ML
5 INJECTION, SOLUTION EPIDURAL; INFILTRATION; INTRACAUDAL; PERINEURAL ONCE
Status: DISCONTINUED | OUTPATIENT
Start: 2021-01-01 | End: 2021-01-01 | Stop reason: HOSPADM

## 2021-01-01 RX ORDER — IPRATROPIUM BROMIDE AND ALBUTEROL SULFATE 2.5; .5 MG/3ML; MG/3ML
1 SOLUTION RESPIRATORY (INHALATION) EVERY 4 HOURS PRN
Status: DISCONTINUED | OUTPATIENT
Start: 2021-01-01 | End: 2021-01-01

## 2021-01-01 RX ORDER — DEXTROSE MONOHYDRATE 25 G/50ML
INJECTION, SOLUTION INTRAVENOUS
Status: COMPLETED
Start: 2021-01-01 | End: 2021-01-01

## 2021-01-01 RX ORDER — FERROUS SULFATE 325(65) MG
325 TABLET ORAL 2 TIMES DAILY
COMMUNITY

## 2021-01-01 RX ORDER — MAGNESIUM SULFATE IN WATER 40 MG/ML
2000 INJECTION, SOLUTION INTRAVENOUS ONCE
Status: COMPLETED | OUTPATIENT
Start: 2021-01-01 | End: 2021-01-01

## 2021-01-01 RX ORDER — HEPARIN SODIUM 1000 [USP'U]/ML
10000 INJECTION, SOLUTION INTRAVENOUS; SUBCUTANEOUS PRN
Status: DISCONTINUED | OUTPATIENT
Start: 2021-01-01 | End: 2021-01-01 | Stop reason: HOSPADM

## 2021-01-01 RX ORDER — PANTOPRAZOLE SODIUM 40 MG/10ML
40 INJECTION, POWDER, LYOPHILIZED, FOR SOLUTION INTRAVENOUS DAILY
Status: DISCONTINUED | OUTPATIENT
Start: 2021-01-01 | End: 2021-01-01 | Stop reason: HOSPADM

## 2021-01-01 RX ORDER — HYDROCODONE BITARTRATE AND ACETAMINOPHEN 5; 325 MG/1; MG/1
1 TABLET ORAL EVERY 6 HOURS PRN
Status: DISCONTINUED | OUTPATIENT
Start: 2021-01-01 | End: 2021-01-01 | Stop reason: HOSPADM

## 2021-01-01 RX ORDER — GUAIFENESIN/DEXTROMETHORPHAN 100-10MG/5
5 SYRUP ORAL EVERY 4 HOURS PRN
Qty: 120 ML | Refills: 0 | Status: SHIPPED | OUTPATIENT
Start: 2021-01-01 | End: 2021-01-01

## 2021-01-01 RX ORDER — LANOLIN ALCOHOL/MO/W.PET/CERES
3 CREAM (GRAM) TOPICAL NIGHTLY PRN
Status: DISCONTINUED | OUTPATIENT
Start: 2021-01-01 | End: 2021-01-01 | Stop reason: HOSPADM

## 2021-01-01 RX ORDER — LORAZEPAM 2 MG/ML
1 INJECTION INTRAMUSCULAR ONCE
Status: COMPLETED | OUTPATIENT
Start: 2021-01-01 | End: 2021-01-01

## 2021-01-01 RX ORDER — DEXAMETHASONE SODIUM PHOSPHATE 4 MG/ML
6 INJECTION, SOLUTION INTRA-ARTICULAR; INTRALESIONAL; INTRAMUSCULAR; INTRAVENOUS; SOFT TISSUE EVERY 24 HOURS
Status: DISCONTINUED | OUTPATIENT
Start: 2021-01-01 | End: 2021-01-01

## 2021-01-01 RX ORDER — MIDODRINE HYDROCHLORIDE 5 MG/1
5 TABLET ORAL 2 TIMES DAILY WITH MEALS
Status: DISCONTINUED | OUTPATIENT
Start: 2021-01-01 | End: 2021-01-01 | Stop reason: HOSPADM

## 2021-01-01 RX ORDER — LOPERAMIDE HYDROCHLORIDE 2 MG/1
2 TABLET ORAL PRN
COMMUNITY
End: 2022-01-01 | Stop reason: SDUPTHER

## 2021-01-01 RX ORDER — FONDAPARINUX SODIUM 2.5 MG/.5ML
2.5 INJECTION SUBCUTANEOUS DAILY
Status: DISCONTINUED | OUTPATIENT
Start: 2021-01-01 | End: 2021-01-01

## 2021-01-01 RX ORDER — HEPARIN SODIUM 5000 [USP'U]/ML
5000 INJECTION, SOLUTION INTRAVENOUS; SUBCUTANEOUS EVERY 8 HOURS SCHEDULED
Status: DISCONTINUED | OUTPATIENT
Start: 2021-01-01 | End: 2021-01-01 | Stop reason: HOSPADM

## 2021-01-01 RX ORDER — HEPARIN SODIUM 1000 [USP'U]/ML
5200 INJECTION, SOLUTION INTRAVENOUS; SUBCUTANEOUS ONCE
Status: DISCONTINUED | OUTPATIENT
Start: 2021-01-01 | End: 2021-01-01 | Stop reason: HOSPADM

## 2021-01-01 RX ORDER — LEVOTHYROXINE SODIUM 0.03 MG/1
25 TABLET ORAL DAILY
COMMUNITY

## 2021-01-01 RX ORDER — DEXAMETHASONE SODIUM PHOSPHATE 10 MG/ML
10 INJECTION, SOLUTION INTRAMUSCULAR; INTRAVENOUS EVERY 24 HOURS
Status: DISCONTINUED | OUTPATIENT
Start: 2021-01-01 | End: 2021-01-01

## 2021-01-01 RX ORDER — ACETAMINOPHEN 325 MG/1
650 TABLET ORAL EVERY 6 HOURS PRN
Status: DISCONTINUED | OUTPATIENT
Start: 2021-01-01 | End: 2021-01-01 | Stop reason: HOSPADM

## 2021-01-01 RX ORDER — POLYETHYLENE GLYCOL 3350 17 G/17G
17 POWDER, FOR SOLUTION ORAL DAILY PRN
Status: DISCONTINUED | OUTPATIENT
Start: 2021-01-01 | End: 2021-01-01 | Stop reason: HOSPADM

## 2021-01-01 RX ORDER — MIDODRINE HYDROCHLORIDE 5 MG/1
5 TABLET ORAL 2 TIMES DAILY WITH MEALS
Qty: 90 TABLET | Refills: 3 | Status: SHIPPED | OUTPATIENT
Start: 2021-01-01

## 2021-01-01 RX ORDER — PROPOFOL 10 MG/ML
5-50 INJECTION, EMULSION INTRAVENOUS
Status: DISCONTINUED | OUTPATIENT
Start: 2021-01-01 | End: 2021-01-01 | Stop reason: HOSPADM

## 2021-01-01 RX ORDER — 0.9 % SODIUM CHLORIDE 0.9 %
1000 INTRAVENOUS SOLUTION INTRAVENOUS ONCE
Status: COMPLETED | OUTPATIENT
Start: 2021-01-01 | End: 2021-01-01

## 2021-01-01 RX ORDER — PROPOFOL 10 MG/ML
5-50 INJECTION, EMULSION INTRAVENOUS
Status: DISCONTINUED | OUTPATIENT
Start: 2021-01-01 | End: 2021-01-01

## 2021-01-01 RX ORDER — NICOTINE POLACRILEX 4 MG
15 LOZENGE BUCCAL PRN
Status: DISCONTINUED | OUTPATIENT
Start: 2021-01-01 | End: 2021-01-01 | Stop reason: HOSPADM

## 2021-01-01 RX ORDER — DEXTROSE MONOHYDRATE 25 G/50ML
12.5 INJECTION, SOLUTION INTRAVENOUS PRN
Status: DISCONTINUED | OUTPATIENT
Start: 2021-01-01 | End: 2021-01-01 | Stop reason: HOSPADM

## 2021-01-01 RX ORDER — GUAIFENESIN/DEXTROMETHORPHAN 100-10MG/5
5 SYRUP ORAL EVERY 4 HOURS PRN
Status: DISCONTINUED | OUTPATIENT
Start: 2021-01-01 | End: 2021-01-01 | Stop reason: HOSPADM

## 2021-01-01 RX ORDER — ZOLPIDEM TARTRATE 5 MG/1
10 TABLET ORAL NIGHTLY PRN
Status: DISCONTINUED | OUTPATIENT
Start: 2021-01-01 | End: 2021-01-01 | Stop reason: HOSPADM

## 2021-01-01 RX ORDER — PHENYLEPHRINE HYDROCHLORIDE 10 MG/ML
INJECTION INTRAVENOUS
Status: DISCONTINUED
Start: 2021-01-01 | End: 2021-01-01 | Stop reason: HOSPADM

## 2021-01-01 RX ORDER — GUAIFENESIN AND DEXTROMETHORPHAN HYDROBROMIDE 100; 10 MG/5ML; MG/5ML
5 SOLUTION ORAL EVERY 4 HOURS PRN
COMMUNITY

## 2021-01-01 RX ORDER — NOREPINEPHRINE BIT/0.9 % NACL 16MG/250ML
2-100 INFUSION BOTTLE (ML) INTRAVENOUS CONTINUOUS
Status: DISCONTINUED | OUTPATIENT
Start: 2021-01-01 | End: 2021-01-01 | Stop reason: HOSPADM

## 2021-01-01 RX ORDER — INSULIN LISPRO 100 [IU]/ML
0-18 INJECTION, SOLUTION INTRAVENOUS; SUBCUTANEOUS
Status: DISCONTINUED | OUTPATIENT
Start: 2021-01-01 | End: 2021-01-01

## 2021-01-01 RX ORDER — SODIUM CHLORIDE 9 MG/ML
INJECTION, SOLUTION INTRAVENOUS CONTINUOUS
Status: DISCONTINUED | OUTPATIENT
Start: 2021-01-01 | End: 2021-01-01

## 2021-01-01 RX ORDER — VITAMIN B COMPLEX
2000 TABLET ORAL DAILY
Status: DISCONTINUED | OUTPATIENT
Start: 2021-01-01 | End: 2021-01-01 | Stop reason: HOSPADM

## 2021-01-01 RX ORDER — MIDODRINE HYDROCHLORIDE 5 MG/1
5 TABLET ORAL PRN
Status: DISCONTINUED | OUTPATIENT
Start: 2021-01-01 | End: 2021-01-01 | Stop reason: HOSPADM

## 2021-01-01 RX ORDER — SODIUM CHLORIDE, SODIUM LACTATE, POTASSIUM CHLORIDE, CALCIUM CHLORIDE 600; 310; 30; 20 MG/100ML; MG/100ML; MG/100ML; MG/100ML
INJECTION, SOLUTION INTRAVENOUS CONTINUOUS PRN
Status: DISCONTINUED | OUTPATIENT
Start: 2021-01-01 | End: 2021-01-01 | Stop reason: SDUPTHER

## 2021-01-01 RX ORDER — KETAMINE HYDROCHLORIDE 100 MG/ML
INJECTION, SOLUTION INTRAMUSCULAR; INTRAVENOUS
Status: COMPLETED
Start: 2021-01-01 | End: 2021-01-01

## 2021-01-01 RX ORDER — BISACODYL 10 MG
10 SUPPOSITORY, RECTAL RECTAL DAILY
COMMUNITY

## 2021-01-01 RX ORDER — LIDOCAINE 4 G/G
1 PATCH TOPICAL DAILY
Status: DISCONTINUED | OUTPATIENT
Start: 2021-01-01 | End: 2021-01-01 | Stop reason: HOSPADM

## 2021-01-01 RX ORDER — PROPOFOL 10 MG/ML
INJECTION, EMULSION INTRAVENOUS CONTINUOUS PRN
Status: DISCONTINUED | OUTPATIENT
Start: 2021-01-01 | End: 2021-01-01 | Stop reason: SDUPTHER

## 2021-01-01 RX ORDER — FENTANYL CITRATE 50 UG/ML
150 INJECTION, SOLUTION INTRAMUSCULAR; INTRAVENOUS ONCE
Status: COMPLETED | OUTPATIENT
Start: 2021-01-01 | End: 2021-01-01

## 2021-01-01 RX ORDER — PROPOFOL 10 MG/ML
INJECTION, EMULSION INTRAVENOUS
Status: COMPLETED
Start: 2021-01-01 | End: 2021-01-01

## 2021-01-01 RX ORDER — INSULIN LISPRO 100 [IU]/ML
0-6 INJECTION, SOLUTION INTRAVENOUS; SUBCUTANEOUS NIGHTLY
Status: DISCONTINUED | OUTPATIENT
Start: 2021-01-01 | End: 2021-01-01

## 2021-01-01 RX ORDER — LIDOCAINE HYDROCHLORIDE 20 MG/ML
INJECTION, SOLUTION INFILTRATION; PERINEURAL PRN
Status: DISCONTINUED | OUTPATIENT
Start: 2021-01-01 | End: 2021-01-01 | Stop reason: SDUPTHER

## 2021-01-01 RX ORDER — HEPARIN SODIUM 1000 [USP'U]/ML
5200 INJECTION, SOLUTION INTRAVENOUS; SUBCUTANEOUS ONCE
Status: DISCONTINUED | OUTPATIENT
Start: 2021-01-01 | End: 2021-01-01

## 2021-01-01 RX ORDER — HEPARIN SODIUM 5000 [USP'U]/ML
5000 INJECTION, SOLUTION INTRAVENOUS; SUBCUTANEOUS EVERY 8 HOURS SCHEDULED
Status: DISCONTINUED | OUTPATIENT
Start: 2021-01-01 | End: 2021-01-01

## 2021-01-01 RX ORDER — SODIUM CHLORIDE 9 MG/ML
INJECTION, SOLUTION INTRAVENOUS ONCE
Status: DISCONTINUED | OUTPATIENT
Start: 2021-01-01 | End: 2021-01-01 | Stop reason: HOSPADM

## 2021-01-01 RX ORDER — DEXAMETHASONE SODIUM PHOSPHATE 4 MG/ML
10 INJECTION, SOLUTION INTRA-ARTICULAR; INTRALESIONAL; INTRAMUSCULAR; INTRAVENOUS; SOFT TISSUE EVERY 24 HOURS
Status: DISCONTINUED | OUTPATIENT
Start: 2021-01-01 | End: 2021-01-01 | Stop reason: SDUPTHER

## 2021-01-01 RX ORDER — INSULIN LISPRO 100 [IU]/ML
0-6 INJECTION, SOLUTION INTRAVENOUS; SUBCUTANEOUS
Status: DISCONTINUED | OUTPATIENT
Start: 2021-01-01 | End: 2021-01-01

## 2021-01-01 RX ORDER — KETAMINE HYDROCHLORIDE 100 MG/ML
INJECTION, SOLUTION INTRAMUSCULAR; INTRAVENOUS
Status: DISCONTINUED
Start: 2021-01-01 | End: 2021-01-01 | Stop reason: HOSPADM

## 2021-01-01 RX ORDER — SODIUM CHLORIDE 9 MG/ML
INJECTION, SOLUTION INTRAVENOUS PRN
Status: DISCONTINUED | OUTPATIENT
Start: 2021-01-01 | End: 2021-01-01 | Stop reason: HOSPADM

## 2021-01-01 RX ORDER — M-VIT,TX,IRON,MINS/CALC/FOLIC 27MG-0.4MG
1 TABLET ORAL DAILY
COMMUNITY

## 2021-01-01 RX ORDER — VANCOMYCIN HYDROCHLORIDE 1 G/200ML
1000 INJECTION, SOLUTION INTRAVENOUS ONCE
Status: DISCONTINUED | OUTPATIENT
Start: 2021-01-01 | End: 2021-01-01 | Stop reason: SDUPTHER

## 2021-01-01 RX ORDER — DEXTROSE MONOHYDRATE 50 MG/ML
100 INJECTION, SOLUTION INTRAVENOUS PRN
Status: DISCONTINUED | OUTPATIENT
Start: 2021-01-01 | End: 2021-01-01

## 2021-01-01 RX ORDER — ROCURONIUM BROMIDE 10 MG/ML
INJECTION, SOLUTION INTRAVENOUS
Status: DISCONTINUED
Start: 2021-01-01 | End: 2021-01-01 | Stop reason: HOSPADM

## 2021-01-01 RX ORDER — LEVOTHYROXINE SODIUM 0.05 MG/1
25 TABLET ORAL DAILY
Status: DISCONTINUED | OUTPATIENT
Start: 2021-01-01 | End: 2021-01-01 | Stop reason: HOSPADM

## 2021-01-01 RX ADMIN — POLYETHYLENE GLYCOL 3350, SODIUM SULFATE ANHYDROUS, SODIUM BICARBONATE, SODIUM CHLORIDE, POTASSIUM CHLORIDE 4000 ML: 236; 22.74; 6.74; 5.86; 2.97 POWDER, FOR SOLUTION ORAL at 17:13

## 2021-01-01 RX ADMIN — PROPOFOL 30 MCG/KG/MIN: 10 INJECTION, EMULSION INTRAVENOUS at 22:24

## 2021-01-01 RX ADMIN — SODIUM CHLORIDE, PRESERVATIVE FREE 10 ML: 5 INJECTION INTRAVENOUS at 10:37

## 2021-01-01 RX ADMIN — LEVETIRACETAM 2000 MG: 100 INJECTION, SOLUTION INTRAVENOUS at 09:53

## 2021-01-01 RX ADMIN — INSULIN LISPRO 2 UNITS: 100 INJECTION, SOLUTION INTRAVENOUS; SUBCUTANEOUS at 10:46

## 2021-01-01 RX ADMIN — Medication 15 G: at 08:33

## 2021-01-01 RX ADMIN — MIDODRINE HYDROCHLORIDE 2.5 MG: 5 TABLET ORAL at 08:44

## 2021-01-01 RX ADMIN — MIDODRINE HYDROCHLORIDE 2.5 MG: 5 TABLET ORAL at 17:52

## 2021-01-01 RX ADMIN — DEXAMETHASONE SODIUM PHOSPHATE 10 MG: 4 INJECTION, SOLUTION INTRAMUSCULAR; INTRAVENOUS at 15:28

## 2021-01-01 RX ADMIN — Medication 25 MCG/HR: at 20:52

## 2021-01-01 RX ADMIN — DEXAMETHASONE SODIUM PHOSPHATE 6 MG: 10 INJECTION, SOLUTION INTRAMUSCULAR; INTRAVENOUS at 09:03

## 2021-01-01 RX ADMIN — Medication 10 ML: at 22:37

## 2021-01-01 RX ADMIN — SODIUM CHLORIDE 25 ML: 9 INJECTION, SOLUTION INTRAVENOUS at 03:48

## 2021-01-01 RX ADMIN — FAMOTIDINE 20 MG: 10 INJECTION, SOLUTION INTRAVENOUS at 21:07

## 2021-01-01 RX ADMIN — Medication: at 10:00

## 2021-01-01 RX ADMIN — PROPOFOL 30 MCG/KG/MIN: 10 INJECTION, EMULSION INTRAVENOUS at 06:08

## 2021-01-01 RX ADMIN — MIDODRINE HYDROCHLORIDE 2.5 MG: 5 TABLET ORAL at 12:37

## 2021-01-01 RX ADMIN — Medication 10 ML: at 20:14

## 2021-01-01 RX ADMIN — HEPARIN SODIUM 5000 UNITS: 5000 INJECTION INTRAVENOUS; SUBCUTANEOUS at 14:51

## 2021-01-01 RX ADMIN — INSULIN LISPRO 4 UNITS: 100 INJECTION, SOLUTION INTRAVENOUS; SUBCUTANEOUS at 17:38

## 2021-01-01 RX ADMIN — FENTANYL CITRATE 150 MCG: 50 INJECTION, SOLUTION INTRAMUSCULAR; INTRAVENOUS at 10:32

## 2021-01-01 RX ADMIN — DEXAMETHASONE SODIUM PHOSPHATE 6 MG: 10 INJECTION, SOLUTION INTRAMUSCULAR; INTRAVENOUS at 10:37

## 2021-01-01 RX ADMIN — DEXTROSE MONOHYDRATE 12.5 G: 25 INJECTION, SOLUTION INTRAVENOUS at 06:30

## 2021-01-01 RX ADMIN — Medication 10 ML: at 21:26

## 2021-01-01 RX ADMIN — PANTOPRAZOLE SODIUM 40 MG: 40 INJECTION, POWDER, FOR SOLUTION INTRAVENOUS at 08:45

## 2021-01-01 RX ADMIN — Medication 10 ML: at 20:53

## 2021-01-01 RX ADMIN — PANTOPRAZOLE SODIUM 40 MG: 40 INJECTION, POWDER, FOR SOLUTION INTRAVENOUS at 10:57

## 2021-01-01 RX ADMIN — HEPARIN SODIUM 5000 UNITS: 5000 INJECTION INTRAVENOUS; SUBCUTANEOUS at 06:37

## 2021-01-01 RX ADMIN — INSULIN LISPRO 3 UNITS: 100 INJECTION, SOLUTION INTRAVENOUS; SUBCUTANEOUS at 21:14

## 2021-01-01 RX ADMIN — MAGNESIUM SULFATE HEPTAHYDRATE 2000 MG: 2 INJECTION, SOLUTION INTRAVENOUS at 07:31

## 2021-01-01 RX ADMIN — PANTOPRAZOLE SODIUM 40 MG: 40 INJECTION, POWDER, FOR SOLUTION INTRAVENOUS at 08:28

## 2021-01-01 RX ADMIN — MIDODRINE HYDROCHLORIDE 5 MG: 5 TABLET ORAL at 10:36

## 2021-01-01 RX ADMIN — Medication 10 ML: at 21:00

## 2021-01-01 RX ADMIN — Medication 10 ML: at 10:59

## 2021-01-01 RX ADMIN — PROMETHAZINE HYDROCHLORIDE 6.25 MG: 25 INJECTION INTRAMUSCULAR; INTRAVENOUS at 08:45

## 2021-01-01 RX ADMIN — ONDANSETRON 4 MG: 2 INJECTION INTRAMUSCULAR; INTRAVENOUS at 00:40

## 2021-01-01 RX ADMIN — DEXAMETHASONE SODIUM PHOSPHATE 10 MG: 10 INJECTION INTRAMUSCULAR; INTRAVENOUS at 08:18

## 2021-01-01 RX ADMIN — MIDODRINE HYDROCHLORIDE 2.5 MG: 5 TABLET ORAL at 16:46

## 2021-01-01 RX ADMIN — Medication 1000 MG: at 15:29

## 2021-01-01 RX ADMIN — HEPARIN SODIUM 5000 UNITS: 5000 INJECTION INTRAVENOUS; SUBCUTANEOUS at 21:55

## 2021-01-01 RX ADMIN — MIDODRINE HYDROCHLORIDE 2.5 MG: 5 TABLET ORAL at 08:28

## 2021-01-01 RX ADMIN — INSULIN LISPRO 3 UNITS: 100 INJECTION, SOLUTION INTRAVENOUS; SUBCUTANEOUS at 19:04

## 2021-01-01 RX ADMIN — VANCOMYCIN HYDROCHLORIDE 750 MG: 10 INJECTION, POWDER, LYOPHILIZED, FOR SOLUTION INTRAVENOUS at 10:39

## 2021-01-01 RX ADMIN — Medication 10 ML: at 08:18

## 2021-01-01 RX ADMIN — PANTOPRAZOLE SODIUM 40 MG: 40 INJECTION, POWDER, FOR SOLUTION INTRAVENOUS at 10:37

## 2021-01-01 RX ADMIN — ONDANSETRON 4 MG: 2 INJECTION INTRAMUSCULAR; INTRAVENOUS at 06:34

## 2021-01-01 RX ADMIN — PIPERACILLIN AND TAZOBACTAM 3375 MG: 3; .375 INJECTION, POWDER, LYOPHILIZED, FOR SOLUTION INTRAVENOUS at 16:45

## 2021-01-01 RX ADMIN — Medication 100 MCG/HR: at 10:33

## 2021-01-01 RX ADMIN — Medication 10 ML: at 10:38

## 2021-01-01 RX ADMIN — DAKIN'S SOLUTION 0.125% (QUARTER STRENGTH): 0.12 SOLUTION at 12:23

## 2021-01-01 RX ADMIN — HEPARIN SODIUM 5000 UNITS: 5000 INJECTION INTRAVENOUS; SUBCUTANEOUS at 21:01

## 2021-01-01 RX ADMIN — MIDODRINE HYDROCHLORIDE 5 MG: 5 TABLET ORAL at 10:17

## 2021-01-01 RX ADMIN — PROPOFOL 10 MCG/KG/MIN: 10 INJECTION, EMULSION INTRAVENOUS at 12:25

## 2021-01-01 RX ADMIN — DAKIN'S SOLUTION 0.125% (QUARTER STRENGTH): 0.12 SOLUTION at 16:49

## 2021-01-01 RX ADMIN — Medication: at 10:39

## 2021-01-01 RX ADMIN — Medication 10 ML: at 15:57

## 2021-01-01 RX ADMIN — DAKIN'S SOLUTION 0.125% (QUARTER STRENGTH): 0.12 SOLUTION at 14:34

## 2021-01-01 RX ADMIN — MIDODRINE HYDROCHLORIDE 2.5 MG: 5 TABLET ORAL at 14:23

## 2021-01-01 RX ADMIN — INSULIN LISPRO 3 UNITS: 100 INJECTION, SOLUTION INTRAVENOUS; SUBCUTANEOUS at 08:37

## 2021-01-01 RX ADMIN — COLLAGENASE SANTYL: 250 OINTMENT TOPICAL at 16:36

## 2021-01-01 RX ADMIN — MIDODRINE HYDROCHLORIDE 2.5 MG: 5 TABLET ORAL at 17:11

## 2021-01-01 RX ADMIN — LEVOTHYROXINE SODIUM 25 MCG: 50 TABLET ORAL at 06:01

## 2021-01-01 RX ADMIN — LORAZEPAM 1 MG: 2 INJECTION INTRAMUSCULAR; INTRAVENOUS at 01:43

## 2021-01-01 RX ADMIN — CEFEPIME HYDROCHLORIDE 500 MG: 1 INJECTION, POWDER, FOR SOLUTION INTRAMUSCULAR; INTRAVENOUS at 09:05

## 2021-01-01 RX ADMIN — CEFEPIME HYDROCHLORIDE 1000 MG: 1 INJECTION, POWDER, FOR SOLUTION INTRAMUSCULAR; INTRAVENOUS at 10:48

## 2021-01-01 RX ADMIN — PANTOPRAZOLE SODIUM 40 MG: 40 INJECTION, POWDER, FOR SOLUTION INTRAVENOUS at 08:08

## 2021-01-01 RX ADMIN — DEXTROSE MONOHYDRATE: 100 INJECTION, SOLUTION INTRAVENOUS at 14:34

## 2021-01-01 RX ADMIN — Medication 10 ML: at 09:55

## 2021-01-01 RX ADMIN — PANTOPRAZOLE SODIUM 40 MG: 40 INJECTION, POWDER, FOR SOLUTION INTRAVENOUS at 08:34

## 2021-01-01 RX ADMIN — LIDOCAINE HYDROCHLORIDE 100 MG: 20 INJECTION, SOLUTION INFILTRATION; PERINEURAL at 15:40

## 2021-01-01 RX ADMIN — MIDODRINE HYDROCHLORIDE 5 MG: 5 TABLET ORAL at 17:03

## 2021-01-01 RX ADMIN — DEXTROSE MONOHYDRATE 12.5 G: 25 INJECTION, SOLUTION INTRAVENOUS at 12:45

## 2021-01-01 RX ADMIN — ALBUMIN (HUMAN) 12.5 G: 0.25 INJECTION, SOLUTION INTRAVENOUS at 15:52

## 2021-01-01 RX ADMIN — INSULIN LISPRO 2 UNITS: 100 INJECTION, SOLUTION INTRAVENOUS; SUBCUTANEOUS at 17:57

## 2021-01-01 RX ADMIN — Medication 2000 UNITS: at 08:44

## 2021-01-01 RX ADMIN — SODIUM CHLORIDE, PRESERVATIVE FREE 10 ML: 5 INJECTION INTRAVENOUS at 08:09

## 2021-01-01 RX ADMIN — VANCOMYCIN HYDROCHLORIDE 1250 MG: 10 INJECTION, POWDER, LYOPHILIZED, FOR SOLUTION INTRAVENOUS at 12:13

## 2021-01-01 RX ADMIN — HEPARIN SODIUM 5000 UNITS: 5000 INJECTION INTRAVENOUS; SUBCUTANEOUS at 06:07

## 2021-01-01 RX ADMIN — MIDODRINE HYDROCHLORIDE 5 MG: 5 TABLET ORAL at 16:10

## 2021-01-01 RX ADMIN — LINEZOLID 600 MG: 600 INJECTION, SOLUTION INTRAVENOUS at 04:21

## 2021-01-01 RX ADMIN — ALBUMIN (HUMAN) 12.5 G: 0.25 INJECTION, SOLUTION INTRAVENOUS at 17:04

## 2021-01-01 RX ADMIN — Medication: at 21:11

## 2021-01-01 RX ADMIN — LEVETIRACETAM 500 MG: 100 INJECTION, SOLUTION INTRAVENOUS at 00:20

## 2021-01-01 RX ADMIN — INSULIN LISPRO 3 UNITS: 100 INJECTION, SOLUTION INTRAVENOUS; SUBCUTANEOUS at 09:55

## 2021-01-01 RX ADMIN — AMPICILLIN SODIUM AND SULBACTAM SODIUM 1500 MG: 1; .5 INJECTION, POWDER, FOR SOLUTION INTRAMUSCULAR; INTRAVENOUS at 03:49

## 2021-01-01 RX ADMIN — INSULIN LISPRO 2 UNITS: 100 INJECTION, SOLUTION INTRAVENOUS; SUBCUTANEOUS at 21:27

## 2021-01-01 RX ADMIN — MIDODRINE HYDROCHLORIDE 2.5 MG: 5 TABLET ORAL at 16:36

## 2021-01-01 RX ADMIN — Medication 10 ML: at 08:30

## 2021-01-01 RX ADMIN — Medication 10 ML: at 08:35

## 2021-01-01 RX ADMIN — LEVETIRACETAM 1000 MG: 100 INJECTION, SOLUTION INTRAVENOUS at 10:19

## 2021-01-01 RX ADMIN — INSULIN LISPRO 2 UNITS: 100 INJECTION, SOLUTION INTRAVENOUS; SUBCUTANEOUS at 13:13

## 2021-01-01 RX ADMIN — Medication: at 08:35

## 2021-01-01 RX ADMIN — SODIUM CHLORIDE: 9 INJECTION, SOLUTION INTRAVENOUS at 14:15

## 2021-01-01 RX ADMIN — PROPOFOL 20 MCG/KG/MIN: 10 INJECTION, EMULSION INTRAVENOUS at 15:47

## 2021-01-01 RX ADMIN — DEXAMETHASONE SODIUM PHOSPHATE 10 MG: 10 INJECTION, SOLUTION INTRAMUSCULAR; INTRAVENOUS at 12:56

## 2021-01-01 RX ADMIN — INSULIN LISPRO 2 UNITS: 100 INJECTION, SOLUTION INTRAVENOUS; SUBCUTANEOUS at 09:31

## 2021-01-01 RX ADMIN — Medication 125 MCG/HR: at 12:26

## 2021-01-01 RX ADMIN — DAKIN'S SOLUTION 0.125% (QUARTER STRENGTH): 0.12 SOLUTION at 01:39

## 2021-01-01 RX ADMIN — PIPERACILLIN AND TAZOBACTAM 3375 MG: 3; .375 INJECTION, POWDER, LYOPHILIZED, FOR SOLUTION INTRAVENOUS at 06:13

## 2021-01-01 RX ADMIN — PANTOPRAZOLE SODIUM 40 MG: 40 INJECTION, POWDER, FOR SOLUTION INTRAVENOUS at 09:28

## 2021-01-01 RX ADMIN — INSULIN GLARGINE 16 UNITS: 100 INJECTION, SOLUTION SUBCUTANEOUS at 22:22

## 2021-01-01 RX ADMIN — FAMOTIDINE 20 MG: 10 INJECTION, SOLUTION INTRAVENOUS at 22:22

## 2021-01-01 RX ADMIN — LEVOTHYROXINE SODIUM 25 MCG: 50 TABLET ORAL at 07:00

## 2021-01-01 RX ADMIN — ZOLPIDEM TARTRATE 5 MG: 5 TABLET ORAL at 21:01

## 2021-01-01 RX ADMIN — DEXTROSE MONOHYDRATE 50 ML: 25 INJECTION, SOLUTION INTRAVENOUS at 08:42

## 2021-01-01 RX ADMIN — ZOLPIDEM TARTRATE 10 MG: 5 TABLET ORAL at 21:55

## 2021-01-01 RX ADMIN — INSULIN LISPRO 2 UNITS: 100 INJECTION, SOLUTION INTRAVENOUS; SUBCUTANEOUS at 20:40

## 2021-01-01 RX ADMIN — INSULIN LISPRO 3 UNITS: 100 INJECTION, SOLUTION INTRAVENOUS; SUBCUTANEOUS at 17:55

## 2021-01-01 RX ADMIN — Medication 10 ML: at 15:45

## 2021-01-01 RX ADMIN — MIDODRINE HYDROCHLORIDE 2.5 MG: 5 TABLET ORAL at 12:51

## 2021-01-01 RX ADMIN — MIDODRINE HYDROCHLORIDE 2.5 MG: 5 TABLET ORAL at 10:37

## 2021-01-01 RX ADMIN — HEPARIN SODIUM 5000 UNITS: 5000 INJECTION INTRAVENOUS; SUBCUTANEOUS at 14:28

## 2021-01-01 RX ADMIN — PANTOPRAZOLE SODIUM 40 MG: 40 INJECTION, POWDER, FOR SOLUTION INTRAVENOUS at 10:17

## 2021-01-01 RX ADMIN — DEXTROSE MONOHYDRATE 12.5 G: 25 INJECTION, SOLUTION INTRAVENOUS at 21:22

## 2021-01-01 RX ADMIN — MIDODRINE HYDROCHLORIDE 2.5 MG: 5 TABLET ORAL at 09:56

## 2021-01-01 RX ADMIN — MIDODRINE HYDROCHLORIDE 2.5 MG: 5 TABLET ORAL at 10:49

## 2021-01-01 RX ADMIN — HEPARIN SODIUM 5000 UNITS: 5000 INJECTION INTRAVENOUS; SUBCUTANEOUS at 06:02

## 2021-01-01 RX ADMIN — Medication 10 ML: at 09:10

## 2021-01-01 RX ADMIN — HEPARIN SODIUM 5000 UNITS: 5000 INJECTION INTRAVENOUS; SUBCUTANEOUS at 22:22

## 2021-01-01 RX ADMIN — DEXTROSE MONOHYDRATE 12.5 G: 500 INJECTION PARENTERAL at 07:19

## 2021-01-01 RX ADMIN — Medication 10 ML: at 21:31

## 2021-01-01 RX ADMIN — ACETAMINOPHEN 650 MG: 325 TABLET ORAL at 00:09

## 2021-01-01 RX ADMIN — FAMOTIDINE 20 MG: 10 INJECTION, SOLUTION INTRAVENOUS at 20:04

## 2021-01-01 RX ADMIN — COLLAGENASE SANTYL: 250 OINTMENT TOPICAL at 12:30

## 2021-01-01 RX ADMIN — VANCOMYCIN HYDROCHLORIDE 1250 MG: 10 INJECTION, POWDER, LYOPHILIZED, FOR SOLUTION INTRAVENOUS at 11:18

## 2021-01-01 RX ADMIN — SODIUM CHLORIDE 250 ML: 9 INJECTION, SOLUTION INTRAVENOUS at 17:28

## 2021-01-01 RX ADMIN — Medication 10 ML: at 10:41

## 2021-01-01 RX ADMIN — DEXTROSE MONOHYDRATE 100 ML/HR: 5 INJECTION, SOLUTION INTRAVENOUS at 10:48

## 2021-01-01 RX ADMIN — PROPOFOL 15 MCG/KG/MIN: 10 INJECTION, EMULSION INTRAVENOUS at 13:14

## 2021-01-01 RX ADMIN — PROPOFOL 30 MCG/KG/MIN: 10 INJECTION, EMULSION INTRAVENOUS at 20:50

## 2021-01-01 RX ADMIN — AMPICILLIN SODIUM AND SULBACTAM SODIUM 1500 MG: 1; .5 INJECTION, POWDER, FOR SOLUTION INTRAMUSCULAR; INTRAVENOUS at 16:40

## 2021-01-01 RX ADMIN — ACETAMINOPHEN 650 MG: 325 TABLET ORAL at 03:09

## 2021-01-01 RX ADMIN — DEXTROSE MONOHYDRATE 12.5 G: 500 INJECTION PARENTERAL at 21:56

## 2021-01-01 RX ADMIN — MIDODRINE HYDROCHLORIDE 5 MG: 5 TABLET ORAL at 10:42

## 2021-01-01 RX ADMIN — DEXTROSE MONOHYDRATE 12.5 G: 500 INJECTION PARENTERAL at 08:09

## 2021-01-01 RX ADMIN — INSULIN LISPRO 1 UNITS: 100 INJECTION, SOLUTION INTRAVENOUS; SUBCUTANEOUS at 17:29

## 2021-01-01 RX ADMIN — ZOLPIDEM TARTRATE 10 MG: 5 TABLET ORAL at 21:09

## 2021-01-01 RX ADMIN — LEVOTHYROXINE SODIUM 25 MCG: 50 TABLET ORAL at 05:10

## 2021-01-01 RX ADMIN — COLLAGENASE SANTYL: 250 OINTMENT TOPICAL at 16:34

## 2021-01-01 RX ADMIN — HEPARIN SODIUM 5000 UNITS: 5000 INJECTION INTRAVENOUS; SUBCUTANEOUS at 20:16

## 2021-01-01 RX ADMIN — ALBUMIN (HUMAN) 12.5 G: 0.25 INJECTION, SOLUTION INTRAVENOUS at 16:56

## 2021-01-01 RX ADMIN — INSULIN LISPRO 3 UNITS: 100 INJECTION, SOLUTION INTRAVENOUS; SUBCUTANEOUS at 18:06

## 2021-01-01 RX ADMIN — HYDROCODONE BITARTRATE AND ACETAMINOPHEN 1 TABLET: 5; 325 TABLET ORAL at 22:43

## 2021-01-01 RX ADMIN — Medication 10 ML: at 08:42

## 2021-01-01 RX ADMIN — MIDODRINE HYDROCHLORIDE 2.5 MG: 5 TABLET ORAL at 09:32

## 2021-01-01 RX ADMIN — HEPARIN SODIUM 5000 UNITS: 5000 INJECTION INTRAVENOUS; SUBCUTANEOUS at 15:39

## 2021-01-01 RX ADMIN — Medication 10 ML: at 10:37

## 2021-01-01 RX ADMIN — Medication 10 ML: at 21:13

## 2021-01-01 RX ADMIN — PANTOPRAZOLE SODIUM 40 MG: 40 INJECTION, POWDER, FOR SOLUTION INTRAVENOUS at 10:36

## 2021-01-01 RX ADMIN — MELATONIN TAB 3 MG 3 MG: 3 TAB at 00:40

## 2021-01-01 RX ADMIN — PIPERACILLIN AND TAZOBACTAM 3375 MG: 3; .375 INJECTION, POWDER, LYOPHILIZED, FOR SOLUTION INTRAVENOUS at 06:14

## 2021-01-01 RX ADMIN — CEFTRIAXONE 2000 MG: 2 INJECTION, POWDER, FOR SOLUTION INTRAMUSCULAR; INTRAVENOUS at 12:58

## 2021-01-01 RX ADMIN — INSULIN LISPRO 1 UNITS: 100 INJECTION, SOLUTION INTRAVENOUS; SUBCUTANEOUS at 10:38

## 2021-01-01 RX ADMIN — Medication 10 ML: at 20:38

## 2021-01-01 RX ADMIN — INSULIN LISPRO 1 UNITS: 100 INJECTION, SOLUTION INTRAVENOUS; SUBCUTANEOUS at 20:49

## 2021-01-01 RX ADMIN — LEVOTHYROXINE SODIUM 25 MCG: 50 TABLET ORAL at 06:48

## 2021-01-01 RX ADMIN — PIPERACILLIN AND TAZOBACTAM 3375 MG: 3; .375 INJECTION, POWDER, LYOPHILIZED, FOR SOLUTION INTRAVENOUS at 22:31

## 2021-01-01 RX ADMIN — LINEZOLID 600 MG: 600 INJECTION, SOLUTION INTRAVENOUS at 15:39

## 2021-01-01 RX ADMIN — INSULIN LISPRO 2 UNITS: 100 INJECTION, SOLUTION INTRAVENOUS; SUBCUTANEOUS at 21:08

## 2021-01-01 RX ADMIN — HEPARIN SODIUM 5000 UNITS: 5000 INJECTION INTRAVENOUS; SUBCUTANEOUS at 21:39

## 2021-01-01 RX ADMIN — Medication 10 ML: at 08:40

## 2021-01-01 RX ADMIN — LINEZOLID 600 MG: 600 INJECTION, SOLUTION INTRAVENOUS at 03:35

## 2021-01-01 RX ADMIN — PANTOPRAZOLE SODIUM 40 MG: 40 INJECTION, POWDER, FOR SOLUTION INTRAVENOUS at 07:27

## 2021-01-01 RX ADMIN — MIDODRINE HYDROCHLORIDE 5 MG: 5 TABLET ORAL at 08:34

## 2021-01-01 RX ADMIN — Medication 2000 UNITS: at 08:27

## 2021-01-01 RX ADMIN — SODIUM CHLORIDE, PRESERVATIVE FREE 10 ML: 5 INJECTION INTRAVENOUS at 08:35

## 2021-01-01 RX ADMIN — ACETAMINOPHEN 650 MG: 325 TABLET ORAL at 14:22

## 2021-01-01 RX ADMIN — LEVETIRACETAM 1000 MG: 100 INJECTION, SOLUTION INTRAVENOUS at 10:47

## 2021-01-01 RX ADMIN — PROPOFOL 40 MG: 10 INJECTION, EMULSION INTRAVENOUS at 15:41

## 2021-01-01 RX ADMIN — PIPERACILLIN AND TAZOBACTAM 2250 MG: 2; .25 INJECTION, POWDER, LYOPHILIZED, FOR SOLUTION INTRAVENOUS at 01:05

## 2021-01-01 RX ADMIN — DAKIN'S SOLUTION 0.125% (QUARTER STRENGTH): 0.12 SOLUTION at 16:31

## 2021-01-01 RX ADMIN — HEPARIN SODIUM 5000 UNITS: 5000 INJECTION INTRAVENOUS; SUBCUTANEOUS at 21:09

## 2021-01-01 RX ADMIN — Medication 10 ML: at 09:14

## 2021-01-01 RX ADMIN — HEPARIN SODIUM 2600 UNITS: 1000 INJECTION INTRAVENOUS; SUBCUTANEOUS at 11:26

## 2021-01-01 RX ADMIN — MIDODRINE HYDROCHLORIDE 2.5 MG: 5 TABLET ORAL at 17:21

## 2021-01-01 RX ADMIN — PIPERACILLIN AND TAZOBACTAM 3375 MG: 3; .375 INJECTION, POWDER, LYOPHILIZED, FOR SOLUTION INTRAVENOUS at 23:03

## 2021-01-01 RX ADMIN — PROPOFOL 60 MG: 10 INJECTION, EMULSION INTRAVENOUS at 15:40

## 2021-01-01 RX ADMIN — INSULIN LISPRO 2 UNITS: 100 INJECTION, SOLUTION INTRAVENOUS; SUBCUTANEOUS at 20:33

## 2021-01-01 RX ADMIN — PANTOPRAZOLE SODIUM 40 MG: 40 INJECTION, POWDER, FOR SOLUTION INTRAVENOUS at 22:01

## 2021-01-01 RX ADMIN — VANCOMYCIN HYDROCHLORIDE 1500 MG: 10 INJECTION, POWDER, LYOPHILIZED, FOR SOLUTION INTRAVENOUS at 12:41

## 2021-01-01 RX ADMIN — ALTEPLASE 2 MG: 2.2 INJECTION, POWDER, LYOPHILIZED, FOR SOLUTION INTRAVENOUS at 18:52

## 2021-01-01 RX ADMIN — MIDODRINE HYDROCHLORIDE 5 MG: 5 TABLET ORAL at 19:26

## 2021-01-01 RX ADMIN — Medication: at 10:44

## 2021-01-01 RX ADMIN — EPOETIN ALFA-EPBX 10000 UNITS: 10000 INJECTION, SOLUTION INTRAVENOUS; SUBCUTANEOUS at 16:19

## 2021-01-01 RX ADMIN — INSULIN LISPRO 1 UNITS: 100 INJECTION, SOLUTION INTRAVENOUS; SUBCUTANEOUS at 21:16

## 2021-01-01 RX ADMIN — SODIUM CHLORIDE, PRESERVATIVE FREE 10 ML: 5 INJECTION INTRAVENOUS at 21:39

## 2021-01-01 RX ADMIN — PROPOFOL 20 MCG/KG/MIN: 10 INJECTION, EMULSION INTRAVENOUS at 08:05

## 2021-01-01 RX ADMIN — DEXTROSE MONOHYDRATE 12.5 G: 25 INJECTION, SOLUTION INTRAVENOUS at 17:20

## 2021-01-01 RX ADMIN — Medication 10 ML: at 11:04

## 2021-01-01 RX ADMIN — HEPARIN SODIUM 5000 UNITS: 5000 INJECTION INTRAVENOUS; SUBCUTANEOUS at 04:27

## 2021-01-01 RX ADMIN — Medication 2000 UNITS: at 08:59

## 2021-01-01 RX ADMIN — SODIUM CHLORIDE, SODIUM LACTATE, POTASSIUM CHLORIDE, AND CALCIUM CHLORIDE: .6; .31; .03; .02 INJECTION, SOLUTION INTRAVENOUS at 15:34

## 2021-01-01 RX ADMIN — DAKIN'S SOLUTION 0.125% (QUARTER STRENGTH): 0.12 SOLUTION at 12:31

## 2021-01-01 RX ADMIN — PANTOPRAZOLE SODIUM 40 MG: 40 INJECTION, POWDER, FOR SOLUTION INTRAVENOUS at 09:55

## 2021-01-01 RX ADMIN — SODIUM CHLORIDE, PRESERVATIVE FREE 10 ML: 5 INJECTION INTRAVENOUS at 21:59

## 2021-01-01 RX ADMIN — PIPERACILLIN AND TAZOBACTAM 2250 MG: 2; .25 INJECTION, POWDER, LYOPHILIZED, FOR SOLUTION INTRAVENOUS at 09:12

## 2021-01-01 RX ADMIN — HEPARIN SODIUM 5000 UNITS: 5000 INJECTION INTRAVENOUS; SUBCUTANEOUS at 20:34

## 2021-01-01 RX ADMIN — AMPICILLIN SODIUM AND SULBACTAM SODIUM 1500 MG: 1; .5 INJECTION, POWDER, FOR SOLUTION INTRAMUSCULAR; INTRAVENOUS at 16:19

## 2021-01-01 RX ADMIN — AMPICILLIN SODIUM AND SULBACTAM SODIUM 1500 MG: 1; .5 INJECTION, POWDER, FOR SOLUTION INTRAMUSCULAR; INTRAVENOUS at 16:14

## 2021-01-01 RX ADMIN — DEXTROSE MONOHYDRATE 12.5 G: 500 INJECTION PARENTERAL at 07:30

## 2021-01-01 RX ADMIN — CEFTRIAXONE 2000 MG: 2 INJECTION, POWDER, FOR SOLUTION INTRAMUSCULAR; INTRAVENOUS at 10:56

## 2021-01-01 RX ADMIN — Medication 2000 UNITS: at 09:56

## 2021-01-01 RX ADMIN — PANTOPRAZOLE SODIUM 40 MG: 40 INJECTION, POWDER, FOR SOLUTION INTRAVENOUS at 13:01

## 2021-01-01 RX ADMIN — Medication 10 ML: at 13:04

## 2021-01-01 RX ADMIN — Medication 2000 UNITS: at 10:49

## 2021-01-01 RX ADMIN — Medication 10 ML: at 21:14

## 2021-01-01 RX ADMIN — DEXAMETHASONE SODIUM PHOSPHATE 6 MG: 10 INJECTION, SOLUTION INTRAMUSCULAR; INTRAVENOUS at 10:57

## 2021-01-01 RX ADMIN — MIDODRINE HYDROCHLORIDE 5 MG: 5 TABLET ORAL at 16:48

## 2021-01-01 RX ADMIN — INSULIN LISPRO 3 UNITS: 100 INJECTION, SOLUTION INTRAVENOUS; SUBCUTANEOUS at 16:37

## 2021-01-01 RX ADMIN — DAKIN'S SOLUTION 0.125% (QUARTER STRENGTH): 0.12 SOLUTION at 12:43

## 2021-01-01 RX ADMIN — LEVOTHYROXINE SODIUM 25 MCG: 50 TABLET ORAL at 05:58

## 2021-01-01 RX ADMIN — MELATONIN TAB 3 MG 3 MG: 3 TAB at 00:09

## 2021-01-01 RX ADMIN — MIDODRINE HYDROCHLORIDE 5 MG: 5 TABLET ORAL at 16:54

## 2021-01-01 RX ADMIN — MIDODRINE HYDROCHLORIDE 2.5 MG: 5 TABLET ORAL at 09:00

## 2021-01-01 RX ADMIN — INSULIN LISPRO 3 UNITS: 100 INJECTION, SOLUTION INTRAVENOUS; SUBCUTANEOUS at 17:59

## 2021-01-01 RX ADMIN — Medication 20 ML: at 09:28

## 2021-01-01 RX ADMIN — INSULIN LISPRO 1 UNITS: 100 INJECTION, SOLUTION INTRAVENOUS; SUBCUTANEOUS at 20:15

## 2021-01-01 RX ADMIN — INSULIN LISPRO 9 UNITS: 100 INJECTION, SOLUTION INTRAVENOUS; SUBCUTANEOUS at 18:18

## 2021-01-01 RX ADMIN — MIDODRINE HYDROCHLORIDE 2.5 MG: 5 TABLET ORAL at 13:00

## 2021-01-01 RX ADMIN — DEXAMETHASONE SODIUM PHOSPHATE 6 MG: 10 INJECTION, SOLUTION INTRAMUSCULAR; INTRAVENOUS at 08:28

## 2021-01-01 RX ADMIN — SODIUM CHLORIDE, PRESERVATIVE FREE 10 ML: 5 INJECTION INTRAVENOUS at 10:42

## 2021-01-01 RX ADMIN — MIDODRINE HYDROCHLORIDE 5 MG: 5 TABLET ORAL at 17:18

## 2021-01-01 RX ADMIN — SODIUM CHLORIDE, PRESERVATIVE FREE 10 ML: 5 INJECTION INTRAVENOUS at 10:17

## 2021-01-01 RX ADMIN — COLLAGENASE SANTYL: 250 OINTMENT TOPICAL at 16:32

## 2021-01-01 RX ADMIN — DEXAMETHASONE SODIUM PHOSPHATE 10 MG: 10 INJECTION, SOLUTION INTRAMUSCULAR; INTRAVENOUS at 08:36

## 2021-01-01 RX ADMIN — Medication 2000 UNITS: at 10:37

## 2021-01-01 RX ADMIN — DAKIN'S SOLUTION 0.125% (QUARTER STRENGTH): 0.12 SOLUTION at 16:36

## 2021-01-01 RX ADMIN — HEPARIN SODIUM 5000 UNITS: 5000 INJECTION INTRAVENOUS; SUBCUTANEOUS at 14:10

## 2021-01-01 RX ADMIN — Medication 10 ML: at 09:30

## 2021-01-01 RX ADMIN — LEVOTHYROXINE SODIUM 25 MCG: 50 TABLET ORAL at 06:37

## 2021-01-01 RX ADMIN — Medication: at 21:10

## 2021-01-01 RX ADMIN — SODIUM CHLORIDE 1000 ML: 9 INJECTION, SOLUTION INTRAVENOUS at 03:41

## 2021-01-01 RX ADMIN — Medication 15 G: at 08:32

## 2021-01-01 RX ADMIN — ZOLPIDEM TARTRATE 5 MG: 5 TABLET ORAL at 23:36

## 2021-01-01 RX ADMIN — MELATONIN TAB 3 MG 3 MG: 3 TAB at 16:51

## 2021-01-01 RX ADMIN — COLLAGENASE SANTYL: 250 OINTMENT TOPICAL at 12:42

## 2021-01-01 RX ADMIN — DEXAMETHASONE SODIUM PHOSPHATE 6 MG: 10 INJECTION, SOLUTION INTRAMUSCULAR; INTRAVENOUS at 09:55

## 2021-01-01 RX ADMIN — HEPARIN SODIUM 5000 UNITS: 5000 INJECTION INTRAVENOUS; SUBCUTANEOUS at 14:42

## 2021-01-01 RX ADMIN — SODIUM CHLORIDE, PRESERVATIVE FREE 10 ML: 5 INJECTION INTRAVENOUS at 21:10

## 2021-01-01 RX ADMIN — MIDODRINE HYDROCHLORIDE 2.5 MG: 5 TABLET ORAL at 15:57

## 2021-01-01 RX ADMIN — DEXAMETHASONE SODIUM PHOSPHATE 6 MG: 10 INJECTION, SOLUTION INTRAMUSCULAR; INTRAVENOUS at 09:28

## 2021-01-01 RX ADMIN — KETAMINE HYDROCHLORIDE: 100 INJECTION INTRAMUSCULAR; INTRAVENOUS at 09:59

## 2021-01-01 RX ADMIN — DEXTROSE 15 G: 15 GEL ORAL at 18:17

## 2021-01-01 RX ADMIN — HEPARIN SODIUM 5000 UNITS: 5000 INJECTION INTRAVENOUS; SUBCUTANEOUS at 05:58

## 2021-01-01 RX ADMIN — INSULIN GLARGINE 10 UNITS: 100 INJECTION, SOLUTION SUBCUTANEOUS at 21:14

## 2021-01-01 RX ADMIN — MIDODRINE HYDROCHLORIDE 2.5 MG: 5 TABLET ORAL at 09:12

## 2021-01-01 RX ADMIN — Medication 10 ML: at 22:27

## 2021-01-01 RX ADMIN — INSULIN LISPRO 4 UNITS: 100 INJECTION, SOLUTION INTRAVENOUS; SUBCUTANEOUS at 19:03

## 2021-01-01 RX ADMIN — Medication 10 ML: at 20:06

## 2021-01-01 RX ADMIN — SODIUM CHLORIDE 25 ML: 9 INJECTION, SOLUTION INTRAVENOUS at 15:38

## 2021-01-01 RX ADMIN — DEXTROSE MONOHYDRATE 12.5 G: 500 INJECTION PARENTERAL at 10:43

## 2021-01-01 RX ADMIN — INSULIN LISPRO 2 UNITS: 100 INJECTION, SOLUTION INTRAVENOUS; SUBCUTANEOUS at 22:23

## 2021-01-01 RX ADMIN — SODIUM CHLORIDE 1000 ML: 9 INJECTION, SOLUTION INTRAVENOUS at 06:58

## 2021-01-01 RX ADMIN — Medication 175 MCG/HR: at 15:54

## 2021-01-01 RX ADMIN — Medication 10 ML: at 00:40

## 2021-01-01 RX ADMIN — AZITHROMYCIN MONOHYDRATE 500 MG: 500 INJECTION, POWDER, LYOPHILIZED, FOR SOLUTION INTRAVENOUS at 15:29

## 2021-01-01 RX ADMIN — SODIUM CHLORIDE 25 ML: 9 INJECTION, SOLUTION INTRAVENOUS at 15:46

## 2021-01-01 RX ADMIN — ALTEPLASE 2 MG: 2.2 INJECTION, POWDER, LYOPHILIZED, FOR SOLUTION INTRAVENOUS at 18:51

## 2021-01-01 RX ADMIN — INSULIN LISPRO 2 UNITS: 100 INJECTION, SOLUTION INTRAVENOUS; SUBCUTANEOUS at 13:12

## 2021-01-01 RX ADMIN — Medication 2000 UNITS: at 09:28

## 2021-01-01 RX ADMIN — INSULIN LISPRO 1 UNITS: 100 INJECTION, SOLUTION INTRAVENOUS; SUBCUTANEOUS at 20:47

## 2021-01-01 RX ADMIN — PROPOFOL 120 MCG/KG/MIN: 10 INJECTION, EMULSION INTRAVENOUS at 15:40

## 2021-01-01 RX ADMIN — Medication: at 21:00

## 2021-01-01 RX ADMIN — PROPOFOL 10 MCG/KG/MIN: 10 INJECTION, EMULSION INTRAVENOUS at 08:45

## 2021-01-01 RX ADMIN — FENTANYL CITRATE: 50 INJECTION, SOLUTION INTRAMUSCULAR; INTRAVENOUS at 10:30

## 2021-01-01 RX ADMIN — AMPICILLIN SODIUM AND SULBACTAM SODIUM 1500 MG: 1; .5 INJECTION, POWDER, FOR SOLUTION INTRAMUSCULAR; INTRAVENOUS at 04:29

## 2021-01-01 RX ADMIN — AMPICILLIN SODIUM AND SULBACTAM SODIUM 1500 MG: 1; .5 INJECTION, POWDER, FOR SOLUTION INTRAMUSCULAR; INTRAVENOUS at 03:38

## 2021-01-01 RX ADMIN — DAKIN'S SOLUTION 0.125% (QUARTER STRENGTH): 0.12 SOLUTION at 16:34

## 2021-01-01 RX ADMIN — HEPARIN SODIUM 5000 UNITS: 5000 INJECTION INTRAVENOUS; SUBCUTANEOUS at 13:26

## 2021-01-01 RX ADMIN — Medication 2000 UNITS: at 09:32

## 2021-01-01 RX ADMIN — SODIUM BICARBONATE: 84 INJECTION, SOLUTION INTRAVENOUS at 16:42

## 2021-01-01 RX ADMIN — PIPERACILLIN AND TAZOBACTAM 2250 MG: 2; .25 INJECTION, POWDER, LYOPHILIZED, FOR SOLUTION INTRAVENOUS at 17:47

## 2021-01-01 RX ADMIN — INSULIN LISPRO 3 UNITS: 100 INJECTION, SOLUTION INTRAVENOUS; SUBCUTANEOUS at 15:54

## 2021-01-01 RX ADMIN — PANTOPRAZOLE SODIUM 40 MG: 40 INJECTION, POWDER, FOR SOLUTION INTRAVENOUS at 10:42

## 2021-01-01 RX ADMIN — HEPARIN SODIUM 2600 UNITS: 1000 INJECTION INTRAVENOUS; SUBCUTANEOUS at 21:31

## 2021-01-01 RX ADMIN — COLLAGENASE SANTYL: 250 OINTMENT TOPICAL at 14:34

## 2021-01-01 RX ADMIN — Medication 10 ML: at 20:41

## 2021-01-01 RX ADMIN — COLLAGENASE SANTYL: 250 OINTMENT TOPICAL at 01:39

## 2021-01-01 RX ADMIN — DEXAMETHASONE SODIUM PHOSPHATE 20 MG: 4 INJECTION, SOLUTION INTRA-ARTICULAR; INTRALESIONAL; INTRAMUSCULAR; INTRAVENOUS; SOFT TISSUE at 15:48

## 2021-01-01 RX ADMIN — HEPARIN SODIUM 5000 UNITS: 5000 INJECTION INTRAVENOUS; SUBCUTANEOUS at 06:48

## 2021-01-01 RX ADMIN — ONDANSETRON 4 MG: 2 INJECTION INTRAMUSCULAR; INTRAVENOUS at 06:52

## 2021-01-01 RX ADMIN — INSULIN LISPRO 12 UNITS: 100 INJECTION, SOLUTION INTRAVENOUS; SUBCUTANEOUS at 09:40

## 2021-01-01 RX ADMIN — DEXTROSE MONOHYDRATE 100 ML/HR: 50 INJECTION, SOLUTION INTRAVENOUS at 08:40

## 2021-01-01 RX ADMIN — COLLAGENASE SANTYL: 250 OINTMENT TOPICAL at 08:19

## 2021-01-01 RX ADMIN — IOHEXOL 50 ML: 240 INJECTION, SOLUTION INTRATHECAL; INTRAVASCULAR; INTRAVENOUS; ORAL at 23:40

## 2021-01-01 RX ADMIN — COLLAGENASE SANTYL: 250 OINTMENT TOPICAL at 16:49

## 2021-01-01 ASSESSMENT — ENCOUNTER SYMPTOMS
DIARRHEA: 0
RHINORRHEA: 0
COUGH: 1
VOMITING: 1
SHORTNESS OF BREATH: 0
SHORTNESS OF BREATH: 0
ABDOMINAL PAIN: 0
DIARRHEA: 0
COUGH: 1
NAUSEA: 1
ABDOMINAL PAIN: 0
EYE PAIN: 0
VOMITING: 0
BACK PAIN: 1
SINUS PAIN: 0
CONSTIPATION: 0

## 2021-01-01 ASSESSMENT — PULMONARY FUNCTION TESTS
PIF_VALUE: 13
PIF_VALUE: 1
PIF_VALUE: 1
PIF_VALUE: 10
PIF_VALUE: 13
PIF_VALUE: 19
PIF_VALUE: 13
PIF_VALUE: 14
PIF_VALUE: 21
PIF_VALUE: 13
PIF_VALUE: 1
PIF_VALUE: 13
PIF_VALUE: 1
PIF_VALUE: 13
PIF_VALUE: 13
PIF_VALUE: 1
PIF_VALUE: 1
PIF_VALUE: 21
PIF_VALUE: 11
PIF_VALUE: 1
PIF_VALUE: 11
PIF_VALUE: 13
PIF_VALUE: 13
PIF_VALUE: 1
PIF_VALUE: 13
PIF_VALUE: 1
PIF_VALUE: 13
PIF_VALUE: 13
PIF_VALUE: 1
PIF_VALUE: 11
PIF_VALUE: 13
PIF_VALUE: 1
PIF_VALUE: 20
PIF_VALUE: 1
PIF_VALUE: 18
PIF_VALUE: 11
PIF_VALUE: 1
PIF_VALUE: 22
PIF_VALUE: 13
PIF_VALUE: 11
PIF_VALUE: 1
PIF_VALUE: 13
PIF_VALUE: 18
PIF_VALUE: 11
PIF_VALUE: 13
PIF_VALUE: 1
PIF_VALUE: 1
PIF_VALUE: 19
PIF_VALUE: 1
PIF_VALUE: 18
PIF_VALUE: 1
PIF_VALUE: 1
PIF_VALUE: 11
PIF_VALUE: 1
PIF_VALUE: 16
PIF_VALUE: 20
PIF_VALUE: 13
PIF_VALUE: 16
PIF_VALUE: 13
PIF_VALUE: 13
PIF_VALUE: 19
PIF_VALUE: 1
PIF_VALUE: 13
PIF_VALUE: 13
PIF_VALUE: 17
PIF_VALUE: 13
PIF_VALUE: 1
PIF_VALUE: 13
PIF_VALUE: 1
PIF_VALUE: 1
PIF_VALUE: 13
PIF_VALUE: 1
PIF_VALUE: 13
PIF_VALUE: 13
PIF_VALUE: 1
PIF_VALUE: 13
PIF_VALUE: 1
PIF_VALUE: 1
PIF_VALUE: 13
PIF_VALUE: 13
PIF_VALUE: 1
PIF_VALUE: 1
PIF_VALUE: 13
PIF_VALUE: 12
PIF_VALUE: 1
PIF_VALUE: 13
PIF_VALUE: 1
PIF_VALUE: 15
PIF_VALUE: 21
PIF_VALUE: 11
PIF_VALUE: 1
PIF_VALUE: 14
PIF_VALUE: 19
PIF_VALUE: 1
PIF_VALUE: 14
PIF_VALUE: 11
PIF_VALUE: 12
PIF_VALUE: 20
PIF_VALUE: 1
PIF_VALUE: 1
PIF_VALUE: 13
PIF_VALUE: 13
PIF_VALUE: 19
PIF_VALUE: 13
PIF_VALUE: 1
PIF_VALUE: 13
PIF_VALUE: 12
PIF_VALUE: 11
PIF_VALUE: 11
PIF_VALUE: 16
PIF_VALUE: 1
PIF_VALUE: 1
PIF_VALUE: 13
PIF_VALUE: 1
PIF_VALUE: 1
PIF_VALUE: 13
PIF_VALUE: 19

## 2021-01-01 ASSESSMENT — PAIN SCALES - GENERAL
PAINLEVEL_OUTOF10: 0
PAINLEVEL_OUTOF10: 6
PAINLEVEL_OUTOF10: 0
PAINLEVEL_OUTOF10: 5
PAINLEVEL_OUTOF10: 0
PAINLEVEL_OUTOF10: 6
PAINLEVEL_OUTOF10: 0
PAINLEVEL_OUTOF10: 6
PAINLEVEL_OUTOF10: 0
PAINLEVEL_OUTOF10: 0
PAINLEVEL_OUTOF10: 3
PAINLEVEL_OUTOF10: 0
PAINLEVEL_OUTOF10: 5
PAINLEVEL_OUTOF10: 0
PAINLEVEL_OUTOF10: 4
PAINLEVEL_OUTOF10: 0
PAINLEVEL_OUTOF10: 5
PAINLEVEL_OUTOF10: 0
PAINLEVEL_OUTOF10: 4
PAINLEVEL_OUTOF10: 0
PAINLEVEL_OUTOF10: 5
PAINLEVEL_OUTOF10: 0
PAINLEVEL_OUTOF10: 6
PAINLEVEL_OUTOF10: 0
PAINLEVEL_OUTOF10: 7
PAINLEVEL_OUTOF10: 0
PAINLEVEL_OUTOF10: 6
PAINLEVEL_OUTOF10: 0
PAINLEVEL_OUTOF10: 0
PAINLEVEL_OUTOF10: 6
PAINLEVEL_OUTOF10: 0
PAINLEVEL_OUTOF10: 0
PAINLEVEL_OUTOF10: 6
PAINLEVEL_OUTOF10: 0

## 2021-01-01 ASSESSMENT — VISUAL ACUITY: OU: 1

## 2021-01-01 ASSESSMENT — PAIN SCALES - WONG BAKER
WONGBAKER_NUMERICALRESPONSE: 2
WONGBAKER_NUMERICALRESPONSE: 0
WONGBAKER_NUMERICALRESPONSE: 0
WONGBAKER_NUMERICALRESPONSE: 2

## 2021-01-01 ASSESSMENT — PAIN DESCRIPTION - DESCRIPTORS
DESCRIPTORS: SHARP
DESCRIPTORS: SHARP;DULL

## 2021-01-01 ASSESSMENT — PAIN DESCRIPTION - PAIN TYPE
TYPE: CHRONIC PAIN

## 2021-01-01 ASSESSMENT — PAIN DESCRIPTION - PROGRESSION
CLINICAL_PROGRESSION: NOT CHANGED
CLINICAL_PROGRESSION: NOT CHANGED

## 2021-01-01 ASSESSMENT — PAIN DESCRIPTION - LOCATION
LOCATION: BACK

## 2021-11-11 PROBLEM — J18.9 PNEUMONIA: Status: ACTIVE | Noted: 2021-01-01

## 2021-11-11 NOTE — RT PROTOCOL NOTE
RT Nebulizer Bronchodilator Protocol Note    There is a bronchodilator order in the chart from a provider indicating to follow the RT Bronchodilator Protocol and there is an Initiate RT Bronchodilator Protocol order as well (see protocol at bottom of note). CXR Findings:  XR CHEST PORTABLE    Result Date: 11/11/2021  Limited study. Left-sided airspace disease may represent pneumonia or asymmetric edema. Atelectasis present in the right base       The findings from the last RT Protocol Assessment were as follows:  Smoking: None or smoker <15 pack years  Respiratory Pattern: Regular pattern and RR 12-20 bpm  Breath Sounds: Slightly diminished and/or crackles  Cough: Strong, spontaneous, non-productive  Indication for Bronchodilator Therapy: None  Bronchodilator Assessment Score: 2    Aerosolized bronchodilator medication orders have been revised according to the RT Nebulizer Bronchodilator Protocol below. Respiratory Therapist to perform RT Therapy Protocol Assessment initially then follow the protocol. Repeat RT Therapy Protocol Assessment PRN for score 0-3 or on second treatment, BID, and PRN for scores above 3. No Indications - adjust the frequency to every 6 hours PRN wheezing or bronchospasm, if no treatments needed after 48 hours then discontinue using Per Protocol order mode. If indication present, adjust the RT bronchodilator orders based on the Bronchodilator Assessment Score as indicated below. If a patient is on this medication at home then do not decrease Frequency below that used at home. 0-3 - enter or revise RT bronchodilator order(s) to equivalent RT Bronchodilator order with Frequency of every 4 hours PRN for wheezing or increased work of breathing using Per Protocol order mode.        4-6 - enter or revise RT Bronchodilator order(s) to two equivalent RT bronchodilator orders with one order with BID Frequency and one order with Frequency of every 4 hours PRN wheezing or increased work of breathing using Per Protocol order mode. 7-10 - enter or revise RT Bronchodilator order(s) to two equivalent RT bronchodilator orders with one order with TID Frequency and one order with Frequency of every 4 hours PRN wheezing or increased work of breathing using Per Protocol order mode. 11-13 - enter or revise RT Bronchodilator order(s) to one equivalent RT bronchodilator order with QID Frequency and an Albuterol order with Frequency of every 4 hours PRN wheezing or increased work of breathing using Per Protocol order mode. Greater than 13 - enter or revise RT Bronchodilator order(s) to one equivalent RT bronchodilator order with every 4 hours Frequency and an Albuterol order with Frequency of every 2 hours PRN wheezing or increased work of breathing using Per Protocol order mode. RT to enter RT Home Evaluation for COPD & MDI Assessment order using Per Protocol order mode.     Electronically signed by Jacinta Ayoub RCP on 11/11/2021 at 4:20 PM

## 2021-11-11 NOTE — ED PROVIDER NOTES
[facility]        Pt Name: Luiza Delgado  MRN: [id number]  [place] 1970  Date of evaluation: 11/11/2021  Provider: Graham Garcia PA-C  PCP: Flor Wong MD  Note Started: 2:42 PM EST        I have seen and evaluated this patient with my supervising physician Sanjana Orozco MD.    [address]       Chief Complaint   Patient presents with    Shortness of Breath     Came in by [facility] from home with c/o SOB and no appetite, has dialysis at [facility] 3x week. Last treatment was yesterday 11/10/2021. RA 85%       HISTORY OF PRESENT ILLNESS   (Location, Timing/Onset, Context/Setting, Quality, Duration, Modifying Factors, Severity, Associated Signs and Symptoms)  Note limiting factors. Chief Complaint: Shortness of breath    Luiza Delgado is a 49 y.o. male who presents to the emergency department today for evaluation for shortness of breath. The patient has been complaining of shortness of breath, cough, congestion, neurolyse body aches, nausea, and vomiting. The patient states that the symptoms have been ongoing for the past 3 days. The patient tells me that his cough is dry, there is no sputum production or hemoptysis. Patient denies any chest pain. Patient states that he has had increasing shortness of breath since yesterday after receiving full dialysis treatment. Patient is 85% on room air when he arrives to the ED and he states he otherwise does not wear oxygen. Patient has not had any fevers. He denies any abdominal pain. No diarrhea. He states that he did have emesis several days ago however this seems to have resolved. Patient is unsure who his nephrologist is, patient does receive Monday Wednesday Friday dialysis and received full dialysis yesterday. Patient is not vaccinated for COVID-19 and is refusing a PCR swab.     Nursing Notes were all reviewed and agreed with or any disagreements were addressed in the HPI. REVIEW OF SYSTEMS    (2-9 systems for level 4, 10 or more for level 5)     Review of Systems   Constitutional: Negative for activity change, appetite change, chills and fever. HENT: Positive for congestion. Negative for rhinorrhea. Respiratory: Positive for cough. Negative for shortness of breath. Cardiovascular: Negative for chest pain. Gastrointestinal: Positive for nausea and vomiting. Negative for abdominal pain and diarrhea. Genitourinary: Negative for hematuria. Dialysis patient   Musculoskeletal: Positive for myalgias. Positives and Pertinent negatives as per HPI. Except as noted above in the ROS, all other systems were reviewed and negative. 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    [facility]odialysis patient ([facility]     Low blood pressure     Pancreatitis     Peripheral Neuropathy     Renal failure 1992    on dialysis 3x/wk  (T, Th, Sat)         SURGICAL HISTORY     Past Surgical History:   Procedure Laterality Date    CHOLECYSTECTOMY      DIALYSIS FISTULA CREATION      left arm/currently non functioning    KIDNEY TRANSPLANT  9/1994    R-kidney    TOE SURGERY  10-8-2010    cut and realign 1st, 2nd, toe left foot , fixation 1st, 2nd toe left foot    TUNNELED VENOUS CATHETER PLACEMENT      left chest for dialysis    TUNNELED VENOUS PORT PLACEMENT           CURRENTMEDICATIONS       Previous Medications    ALPRAZOLAM (XANAX) 1 MG TABLET    Take 2 mg by mouth 2 times daily.     FENTANYL (DURAGESIC) 50 MCG/HR    Place 1 patch onto the skin every 72 hours    INSULIN DETEMIR (LEVEMIR FLEXPEN) 100 UNIT/ML INJECTION    Inject 50 Units into the skin nightly     ONDANSETRON (ZOFRAN ODT) 4 MG DISINTEGRATING TABLET    Take 1 tablet by mouth every 8 hours as needed for Nausea OXYCODONE (OXY-IR) 30 MG IMMEDIATE RELEASE TABLET    Take 30 mg by mouth every 4 hours as needed for Pain. PANTOPRAZOLE (PROTONIX) 40 MG TABLET    Take 40 mg by mouth 2 times daily     PROMETHAZINE HCL 6.25 MG/5ML SOLN    Take 12.5 mg by mouth 2 times daily as needed. TEMAZEPAM (RESTORIL) 30 MG CAPSULE    Take 30 mg by mouth nightly as needed. ALLERGIES     Flu virus vaccine    FAMILYHISTORY       Family History   Problem Relation Age of Onset    Diabetes Mother           SOCIAL HISTORY       Social History     Tobacco Use    Smoking status: Never Smoker    Smokeless tobacco: Never Used   Substance Use Topics    Alcohol use: No    Drug use: No       SCREENINGS    Michele Coma Scale  Eye Opening: Spontaneous  Best Verbal Response: Oriented  Best Motor Response: Obeys commands  [place] Coma Scale Score: 15        PHYSICAL EXAM    (up to 7 for level 4, 8 or more for level 5)     ED Triage Vitals   BP Temp Temp Source Pulse Resp SpO2 Height Weight   11/11/21 1301 11/11/21 1304 11/11/21 1304 11/11/21 1301 11/11/21 1301 11/11/21 1301 11/11/21 1304 11/11/21 1304   108/78 98.2 °F (36.8 °C) Oral 76 29 97 % 6' 4\" (1.93 m) 178 lb (80.7 kg)       Physical Exam  Vitals and nursing note reviewed. Constitutional:       Appearance: He is well-developed. He is ill-appearing. He is not diaphoretic. HENT:      Head: Normocephalic and atraumatic. Right Ear: External ear normal.      Left Ear: External ear normal.      Nose: Nose normal.   Eyes:      General:         Right eye: No discharge. Left eye: No discharge. Neck:      Trachea: No tracheal deviation. Cardiovascular:      Rate and Rhythm: Normal rate and regular rhythm. Pulmonary:      Effort: Pulmonary effort is normal. No respiratory distress. Breath sounds: No wheezing. Comments: Diminished aeration throughout with rales to bilateral bases  Abdominal:      General: There is no distension. Palpations: Abdomen is soft. Tenderness: There is no abdominal tenderness. There is no guarding. Musculoskeletal:         General: Normal range of motion. Cervical back: Normal range of motion and neck supple. Skin:     General: Skin is warm and dry. Neurological:      Mental Status: He is alert and oriented to person, place, and time.    Psychiatric:         Behavior: Behavior normal.         DIAGNOSTIC RESULTS   LABS:    Labs Reviewed   COMPREHENSIVE METABOLIC PANEL - Abnormal; Notable for the following components:       Result Value    Sodium 133 (*)     Chloride 89 (*)     CO2 16 (*)     Anion Gap 28 (*)     Glucose 107 (*)     BUN 29 (*)     CREATININE 5.0 (*)     GFR Non- 12 (*)     GFR  15 (*)     Albumin 3.3 (*)     Albumin/Globulin Ratio 0.7 (*)     Total Bilirubin 1.1 (*)     Alkaline Phosphatase 212 (*)     AST 43 (*)     All other components within normal limits    Narrative:     Performed at:  [facility] [address]  [place], [employer]   Phone [phone]   TROPONIN - Abnormal; Notable for the following components:    Troponin 0.10 (*)     All other components within normal limits    Narrative:     Performed at:  [facility] [email or web address]   Phone 21  - Abnormal; Notable for the following components:    Pro-BNP 8,609 (*)     All other components within normal limits    Narrative:     Performed at:  [facility] [address], [email or web address]   Phone [phone]   PROCALCITONIN - Abnormal; Notable for the following components:    Procalcitonin 3.87 (*)     All other components within normal limits    Narrative:     Performed at:  [facility] [address]  [place], [email or web address]   Phone [phone]   CULTURE, BLOOD 1   CULTURE, BLOOD 2   LIPASE    Narrative:     Performed at:  [facility]  [address], [address]   Phone [phone]   CBC WITH AUTO DIFFERENTIAL   URINE RT REFLEX TO CULTURE   PROTIME-INR   APTT   LACTATE, SEPSIS   LACTATE, SEPSIS       When ordered only abnormal lab results are displayed. All other labs were within normal range or not returned as of this dictation. EKG: When ordered, EKG's are interpreted by the Emergency Department Physician in the absence of a cardiologist.  Please see their note for interpretation of EKG. RADIOLOGY:   Non-plain film images such as CT, Ultrasound and MRI are read by the radiologist. Plain radiographic images are visualized and preliminarily interpreted by the ED Provider with the below findings:        Interpretation per the Radiologist below, if available at the time of this note:    XR CHEST PORTABLE   Final Result   Limited study. Left-sided airspace disease may represent pneumonia or   asymmetric edema. Atelectasis present in the right base           XR CHEST PORTABLE    Result Date: 11/11/2021  EXAMINATION: ONE XRAY VIEW OF THE CHEST 11/11/2021 1:21 pm COMPARISON: None. HISTORY: ORDERING SYSTEM PROVIDED HISTORY: SOB TECHNOLOGIST PROVIDED HISTORY: Reason for exam:->SOB Reason for Exam: SOB Acuity: Acute Type of Exam: Initial FINDINGS: Exaggerated kyphotic positioning and patient rotation. Patient's chin obscures the right lung apex. Heart size indeterminate. Airspace disease in the mid and lower left lung. Strandy opacity in the right lung base     Limited study. Left-sided airspace disease may represent pneumonia or asymmetric edema.   Atelectasis present in the right base           PROCEDURES   Unless otherwise noted below, none     Procedures  PROCEDURE NOTE -  peripheral IV placement  Indication: unable to obtain adequate PIV access for medication administration, fluid resuscitation and/or lab draw    The Left external nect prepped with chloraprep  Using a 20g angiocath, , IV access was established to left EJ and secured on first attempt. The PIV withdrew blood easily and flushed easily. Tourniquet removed. Complications: none    Images obtained by Kenyon Arreola PA-C, interpreted by Kenyon Arreola PA-C.    [place] [address]  There was a high probability of life-threatening clinical deterioration in the patient's condition requiring my urgent intervention. Total critical care time with the patient was 35 minutes excluding separately reportable procedures. Critical care required due to patients acute respiratory failure with hypoxemia, requiring supplemental oxygen, and admission    CONSULTS:  None      EMERGENCY DEPARTMENT COURSE and DIFFERENTIAL DIAGNOSIS/MDM:   Vitals:    Vitals:    11/11/21 1314 11/11/21 1318 11/11/21 1329 11/11/21 1400   BP: (!) 112/49  94/64 103/74   Pulse: 96  78 75   Resp:       Temp:       TempSrc:       SpO2:  97%     Weight:       Height:           Patient was given the following medications:  Medications - No data to display        Briefly, this is a 64-year-old male who presents to the emergency department today, body aches, cough, and congestion. He is a dialysis patient. No longer makes urine. Patient is not vaccinated for COVID-19. Job Marks patient arrives to the ED he is 85% on room air, supplemental oxygen applied. There was difficulty in obtaining IV access, I did place a left EJ cath, please see procedure note above. EKG is documented by my attending, please see his note for further details. He has a leukopenia of 3.8 as well as a lymphopenia 0.2. His CMP does show chronic kidney disease otherwise his potassium is normal.  Troponin is elevated 0.1 likely secondary to his renal disease. His procalcitonin is 3.87. Although his lactic acid is normal, chest x-ray does show possible pneumonia, will cover with Rocephin, and Zithromax.     Patient is refusing the PCR swab, I did have a long discussion with the patient in regards to the importance of the PCR swab however he continues to refuse. As the patient is refusing a PCR swab, states that he will not admit the patient, hospitalist have agreed to admit the patient, the patient is otherwise stable for admission at this time    FINAL IMPRESSION      1. Acute respiratory failure with hypoxemia (HCC)    2. Pneumonia due to infectious organism, unspecified laterality, unspecified part of lung    3. ESRD (end stage renal disease) ([facility] [address]    4. Demand ischemia of myocardium ([facility]          DISPOSITION/PLAN   DISPOSITION        PATIENT REFERRED TO:  No follow-up provider specified.     DISCHARGE MEDICATIONS:  New Prescriptions    No medications on file       DISCONTINUED MEDICATIONS:  Discontinued Medications    No medications on file              (Please note that portions of this note were completed with a voice recognition program.  Efforts were made to edit the dictations but occasionally words are mis-transcribed.)    Dayday Mckeon PA-C (electronically signed)            Dayday Mckeon PA-C  11/11/21 2266

## 2021-11-11 NOTE — CONSULTS
MD Zayra Estrella MD Reginald Francis, MD                Office: [phone]                      Fax: [phone]          NEPHROLOGY INITIAL CONSULT NOTE:     PATIENT NAME: Bairon Choi  : 1970  MRN: [id number]  REASON FOR CONSULT: I am asked to see this patient in consultation for my opinion regarding management of ESRD. My recommendations will be communicated by way of shared medical record. IMPRESSION / RECOMMENDATION:      Admitted for:  Pneumonia [J18.9]  Acute hypoxic respiratory failure - needing NIPPV     1. ESRD on iHD: MWF.   - at [facility] nephrology team   - next HD today   - UF to DW + ~1L challenge if tolerated      2. Hypertension/Volume Status:  - BP - no need for tight control:   - EDW obtain outpt records -> 72 kg on admission  - Na Hyponatremia  (see above)     3. Electrolytes/acid-base:  K: WNL  High AG metabolic acidosis: fu w/ HD - higher bath, LA WNL    4. Anemia of renal failure: at goal  - ASHUTOSH: not needed    5. BMD:  - Phos: f/u in am labs -  - follow iPTH outpt    : Other supportive care :   - Check daily renal function panel with electrolytes-phosphorus  - Strict monitoring of I/Os, daily weight  - Renal feeds/diet  - Current medications reviewed. - Nephrotoxic medications have been discontinued. - Dose adjusted and appropriate. - Dose meds for eGFR <15 mL/min/1.73m2.    - Avoid heavy opioids due to renal failure - may use very low dose dilaudid / fentanyl with close monitoring of CNS and respiratory depression.       - Zosyn: 2.25 g every 12 hours (2.25 g every 8 hours for hospital-acquired or ventilator-associated pneumonia); Hemodialysis removes 30% to 40% of a piperacillin/tazobactam dose. Administer scheduled doses after hemodialysis on dialysis days.     - Vancomycin: administer after hemodialysis on dialysis days: loading dose of 15 to 25 mg/kg, maintenance 500 to 1,000 mg or 5 to 10 mg/kg after each HPI: Mr. Ricardo Duckworth is a 49 y.o. 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 Last treatment was yesterday 11/10/2021. RA 85%)  Admitted with Pneumonia [J18. 9]We are called for ESRD care. Re: ESRD  · Duration (when): Chronic   · Location (where): kidneys  · Severity (ex: CKD stage): End stage  · Timing (ex: continuous, intermittent): intermittent HD  · Context (ex: related to condition): presumed DM / HTN related.   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    [facility]odialysis [facility] ([facility]     Low blood pressure     Pancreatitis     Peripheral Neuropathy     Pneumonia 11/11/2021    Renal failure 1992    on dialysis 3x/wk  (T, Th, Sat)     PAST SURGICAL HISTORY:   Past Surgical History:   Procedure Laterality Date    CHOLECYSTECTOMY      DIALYSIS FISTULA CREATION      left arm/currently non functioning    KIDNEY TRANSPLANT  9/1994    R-kidney    TOE SURGERY  10-8-2010    cut and realign 1st, 2nd, toe left foot , fixation 1st, 2nd toe left foot    TUNNELED VENOUS CATHETER PLACEMENT      left chest for dialysis    TUNNELED VENOUS PORT PLACEMENT       FAMILY HISTORY:   Family History   Problem Relation Age of Onset    Diabetes Mother      SOCIAL HISTORY:   Social History     Socioeconomic History    Marital status: Single     Spouse name: None    Number of children: None    Years of education: None    Highest education level: None   Occupational History    None   Tobacco Use    Smoking status: Never Smoker    Smokeless tobacco: Never Used   Substance and Sexual Activity    Alcohol use: No    Drug use: No    Sexual activity: None   Other Topics Concern    None   Social History Narrative    None     Social Determinants of Health     Financial Resource Strain:     Difficulty of Paying Living Expenses: Not on file   Food Insecurity:     Worried About Running Out of Food in the Last Year: Not on file    Marko of Food in the Last Year: Not on file   Transportation Needs:     Lack of Transportation (Medical): Not on file    Lack of Transportation (Non-Medical):  Not on file   Physical Activity:     Days of Exercise per Week: Not on file    Minutes of Exercise per Session: Not on file   Stress:     Feeling of Stress : Not on file   Social Connections:     Frequency of Communication with Friends and Family: Not on file    Frequency of Social Gatherings with Friends and Family: Not on file    Attends Christian Services: Not on file    Active Member of [address] [place] [address] or Organizations: Not on file    Attends Club or Organization Meetings: Not on file    Marital Status: Not on file   Intimate Partner Violence:     Fear of Current or Ex-Partner: Not on file    Emotionally Abused: Not on file    Physically Abused: Not on file    Sexually Abused: Not on file   Housing Stability:     Unable to Pay for Housing in the Last Year: Not on file    Number of [place] in the Last Year: Not on file    Unstable Housing in the Last Year: Not on file          MEDICATIONS reviewed by me:  Prior to Admission Medications:  No current facility-administered medications on file prior to encounter. Current Outpatient Medications on File Prior to Encounter   Medication Sig Dispense Refill    ondansetron (ZOFRAN ODT) 4 MG disintegrating tablet Take 1 tablet by mouth every 8 hours as needed for Nausea 20 tablet 0    pantoprazole (PROTONIX) 40 MG tablet Take 40 mg by mouth 2 times daily       oxyCODONE (OXY-IR) 30 MG immediate release tablet Take 30 mg by mouth every 4 hours as needed for Pain.  Promethazine HCl 6.25 MG/5ML SOLN Take 12.5 mg by mouth 2 times daily as needed.  insulin detemir (LEVEMIR FLEXPEN) 100 UNIT/ML injection Inject 50 Units into the skin nightly       ALPRAZolam (XANAX) 1 MG tablet Take 2 mg by mouth 2 times daily.  fentaNYL (DURAGESIC) 50 MCG/HR Place 1 patch onto the skin every 72 hours      temazepam (RESTORIL) 30 MG capsule Take 30 mg by mouth nightly as needed. Medications Prior to Admission: ondansetron (ZOFRAN ODT) 4 MG disintegrating tablet, Take 1 tablet by mouth every 8 hours as needed for Nausea  pantoprazole (PROTONIX) 40 MG tablet, Take 40 mg by mouth 2 times daily   oxyCODONE (OXY-IR) 30 MG immediate release tablet, Take 30 mg by mouth every 4 hours as needed for Pain. Promethazine HCl 6.25 MG/5ML SOLN, Take 12.5 mg by mouth 2 times daily as needed. insulin detemir (LEVEMIR FLEXPEN) 100 UNIT/ML injection, Inject 50 Units into the skin nightly   ALPRAZolam (XANAX) 1 MG tablet, Take 2 mg by mouth 2 times daily. temazepam (RESTORIL) 30 MG capsule, Take 30 mg by mouth nightly as needed.   fentaNYL (DURAGESIC) 50 MCG/HR, Place 1 patch onto the skin every 72 hours     Inpatient Medications:  Scheduled Meds:   sodium chloride flush  5-40 mL IntraVENous 2 times per day    heparin (porcine)  5,000 Units SubCUTAneous 3 times per day    [START ON 11/12/2021] Vitamin D  2,000 Units Oral Daily    famotidine (PEPCID) injection  20 mg IntraVENous Daily    insulin lispro  0-6 Units SubCUTAneous TID WC    insulin lispro  0-3 Units SubCUTAneous Nightly    [START ON 11/12/2021] azithromycin  500 mg IntraVENous Q24H    [START ON 11/12/2021] cefTRIAXone (ROCEPHIN) IV  1,000 mg IntraVENous Q24H    [START ON 11/12/2021] dexamethasone  10 mg IntraVENous Q24H     Continuous Infusions:   sodium chloride      dextrose       PRN Meds:.sodium chloride flush, sodium chloride, ondansetron **OR** ondansetron, polyethylene glycol, acetaminophen **OR** acetaminophen, guaiFENesin-dextromethorphan, glucose, dextrose, glucagon (rDNA), dextrose, ipratropium-albuterol    Allergies: Flu virus vaccine    REVIEW OF SYSTEMS:  Review of systems not obtained due to patient factors     PHYSICAL EXAM:  Patient Vitals for the past 24 hrs:   BP Temp Temp src Pulse Resp SpO2 Height Weight   11/11/21 1533 110/69 -- -- 80 -- 91 % -- --   11/11/21 1503 112/62 -- -- 75 -- 96 % -- --   11/11/21 1448 110/63 -- -- 72 -- 97 % -- --   11/11/21 1429 104/62 -- -- 76 -- 91 % -- --   11/11/21 1414 (!) 98/53 -- -- 75 -- 90 % -- --   11/11/21 1400 103/74 -- -- 75 -- -- -- --   11/11/21 1329 94/64 -- -- 78 -- -- -- --   11/11/21 1318 -- -- -- -- -- 97 % -- --   11/11/21 1314 (!) 112/49 -- -- 96 -- -- -- --   11/11/21 1304 108/78 98.2 °F (36.8 °C) Oral 74 16 (!) 85 % 6' 4\" (1.93 m) 178 lb (80.7 kg)   11/11/21 1301 108/78 -- -- 76 29 97 % -- --     No intake or output data in the 24 hours ending 11/11/21 1637       Physical exam:    In-person bedside physical examination deferred. Pursuant to the emergency declaration under the [facility] [address] authority and the [employer] Act, this clinical encounter was conducted to provide necessary medical care.    (Also consistent with new provisions and guidance offered by [facility] on March 18, 2020 in setting of COVID 19 outbreak and in order to preserve personal protective equipment in accordance with the flexibilities announced by CMS on March 30, 2020)   References: https://[facility]. [facility]/Portals/0/Resources/COVID-19/3_18%20Telemed%20Guidance%20Updated%20March%2018. pdf?iap=[email or web address]                      https://[facility]/Portals/0/Resources/COVID-19/3_18%20Telemed%20Guidance%20Updated%20March%2018. pdf?lff=[phone]                      http://[employer]/. pdf    However, I did visually inspect the patient and observed the following:      Vitals signs reviewed. Constitutional:       General: not in acute distress. Appearance:   ill-appearing. HENT:      Head: Normocephalic and atraumatic. Nose: Nose normal.      Mouth/Throat:      Mouth: Mucous membranes are dry . Eyes:      General: No scleral icterus. Conjunctiva/sclera: Conjunctivae normal.   Neck:      Musculoskeletal: Normal range of motion and neck supple. Cardiovascular:      Rate and Rhythm: Normal rate. Pulmonary:      Effort: Pulmonary effort is  Ab-normal.   Abdominal:      General: There is  distension. Musculoskeletal:      Right lower leg: edema. Left lower leg: edema. Skin:     Coloration: Skin is not jaundiced. Neurological:      General:  Deferred                DATA:  Diagnostic tests reviewed for today's visit:    Recent Labs     11/11/21  1339   WBC 3.8*   HCT 35.7*   PLT 78*     Iron Saturation:  No components found for: PERCENTFE  FERRITIN:  No results found for: FERRITIN  IRON:  No results found for: IRON  TIBC:  No results found for: TIBC    Recent Labs     11/11/21  1339   *   K 4.7   CL 89*   CO2 16*   BUN 29*   CREATININE 5.0*     Recent Labs     11/11/21  1339   CALCIUM 8.6     No results for input(s): PH, PCO2, PO2 in the last 72 hours.     Invalid input(s): [id number], INSPIREDO2    ABG:  No results found for: PH, PCO2, PO2, HCO3, BE, THGB, TCO2, O2SAT  VBG:    Lab Results   Component Value Date    PHVEN 7.32 10/28/2016    LQV6AIR 35.6 10/28/2016    BEVEN -7.4 10/28/2016    [id number] 76 10/28/2016           BELOW MENTIONED RADIOLOGY STUDY RESULTS REVIEWED BY ME:    XR CHEST PORTABLE    Result Date: 11/11/2021  EXAMINATION: ONE XRAY VIEW OF THE CHEST 11/11/2021 1:21 pm COMPARISON: None. HISTORY: ORDERING SYSTEM PROVIDED HISTORY: SOB TECHNOLOGIST PROVIDED HISTORY: Reason for exam:->SOB Reason for Exam: SOB Acuity: Acute Type of Exam: Initial FINDINGS: Exaggerated kyphotic positioning and patient rotation. Patient's chin obscures the right lung apex. Heart size indeterminate. Airspace disease in the mid and lower left lung. Strandy opacity in the right lung base     Limited study. Left-sided airspace disease may represent pneumonia or asymmetric edema.   Atelectasis present in the right base

## 2021-11-11 NOTE — CARE COORDINATION
file prior to encounter. Current Outpatient Medications on File Prior to Encounter   Medication Sig Dispense Refill    ondansetron (ZOFRAN ODT) 4 MG disintegrating tablet Take 1 tablet by mouth every 8 hours as needed for Nausea 20 tablet 0    pantoprazole (PROTONIX) 40 MG tablet Take 40 mg by mouth 2 times daily       oxyCODONE (OXY-IR) 30 MG immediate release tablet Take 30 mg by mouth every 4 hours as needed for Pain.  Promethazine HCl 6.25 MG/5ML SOLN Take 12.5 mg by mouth 2 times daily as needed.  insulin detemir (LEVEMIR FLEXPEN) 100 UNIT/ML injection Inject 50 Units into the skin nightly       ALPRAZolam (XANAX) 1 MG tablet Take 2 mg by mouth 2 times daily.  fentaNYL (DURAGESIC) 50 MCG/HR Place 1 patch onto the skin every 72 hours      temazepam (RESTORIL) 30 MG capsule Take 30 mg by mouth nightly as needed.          Objective    /69   Pulse 80   Temp 98.2 °F (36.8 °C) (Oral)   Resp 16   Ht 6' 4\" (1.93 m)   Wt 178 lb (80.7 kg)   SpO2 91%   BMI 21.67 kg/m²     LABS:  WBC:    Lab Results   Component Value Date    WBC 3.8 11/11/2021     H/H:    Lab Results   Component Value Date    HGB 11.2 11/11/2021    HCT 35.7 11/11/2021     PTT:    Lab Results   Component Value Date    APTT 36.8 11/11/2021   [APTT}  PT/INR:    Lab Results   Component Value Date    PROTIME 12.5 11/11/2021    INR 1.10 11/11/2021     HgBA1c:    Lab Results   Component Value Date    LABA1C 10.2 11/26/2015       Assessment   Yang Risk Score:      Patient Active Problem List   Diagnosis Code    ESRD (end stage renal disease) ([facility] N18.6    Anemia D64.9    Toe deformity M20.60    Peripheral neuropathy G62.9    Hypogonadism, male E29.1    Hyperkalemia E87.5    Acute pancreatitis K85.90    Acute on chronic renal failure (HCC) N17.9, N18.9    Chronic pancreatitis (HCC) K86.1    Pericardial effusion I31.3    Hypotension I95.9    Diabetes mellitus ([facility] E11.9    Sepsis ([address] A41.9    [facility] E11.621, L97.509    Pancreatitis K85.90    Chronic pain G89.29    C. difficile diarrhea A04.72    Pneumonia J18.9       Measurements:  Wound 11/11/21 Foot Dorsal; Right wound top of right foot 6 cm x 3 cm  (Active)   Wound Image   11/11/21 1647   Wound Etiology Diabetic 11/11/21 1647   Dressing/Treatment Foam 11/11/21 1647   Wound Length (cm) 6 cm 11/11/21 1647   Wound Width (cm) 3 cm 11/11/21 1647   Wound Depth (cm) 0.1 cm 11/11/21 1647   Wound Surface Area (cm^2) 18 cm^2 11/11/21 1647   Wound Volume (cm^3) 1.8 cm^3 11/11/21 1647   Wound Assessment Devitalized tissue; [place]/red; [facility] 11/11/21 1647   Drainage Amount Small 11/11/21 1647   Drainage Description Yellow 11/11/21 1647   Odor None 11/11/21 1647   [place]-wound Assessment Dry/flaky 11/11/21 1647   Margins Defined edges 11/11/21 1647   Number of days: 0      patient from home with shortness of breath. Patient has dry flaky skin to feet and legs. There is a dressing on the dorsal right foot. Wound bed is pink/ white and drainage, liberty wound is dry flaky. Wound looks to have contracture of edges. Will clean with dakins and apply santyl to wound to debride. Cover santyl with moistened gauze and dry dressing roll gauze and paper tape. change daily. Response to treatment:  Well tolerated by patient. Pain Assessment:  Severity:  0 / 10  Quality of pain: N/A  Wound Pain Timing/Severity: none  Premedicated: No    Plan   Plan of Care: Wound 11/11/21 Foot Dorsal; Right wound top of right foot 6 cm x 3 cm -Dressing/Treatment: Foam  Cleanse wound with Dakins 0.125% solution, then apply thin layer of Santyl to wound bed, cover with saline moistened gauze, dry dressing and wrap with roll gauze and use paper tape to secure      Specialty Bed Required : No   [] Low Air Loss   [] Pressure Redistribution  [] Fluid Immersion  [] Bariatric  [] Total Pressure Relief  [] Other:     Current Diet: ADULT DIET;  Regular; 3 carb choices (45 gm/meal); Low Potassium (Less than 3000 mg/day);  Low Phosphorus (Less than 1000 mg)  Dietician consult:  Yes    Discharge Plan:  Placement for patient upon discharge: skilled nursing    Patient appropriate for Outpatient [address]: Yes    Referrals:  []   [] [facility]  [] Supplies  [] Other    Patient/Caregiver Teaching:  Level of patient/caregiver understanding able to:   [] Indicates understanding       [] Needs reinforcement  [] Unsuccessful      [] Verbal Understanding  [] Demonstrated understanding       [] No evidence of learning  [] Refused teaching         [] N/A       Electronically signed by  GAGE Javier, RN  Wound/Ostomy Care on 11/11/2021 at 4:50 PM

## 2021-11-11 NOTE — H&P
HOSPITALISTS HISTORY AND PHYSICAL    11/11/2021 3:31 PM    Patient Information:  Jeramie Herrera is a 49 y.o. male [id number]  PCP:  Kymberly Luciano MD (Tel: [phone] )    Chief complaint:    Chief Complaint   Patient presents with    Shortness of Breath     Came in by [address] [place] [address] EMS from home with c/o SOB and no appetite, has dialysis [facility] 3x week. Last treatment was yesterday 11/10/2021. RA 85%        History of Present Illness:  Parag Jacobsen is a 49 y.o. male  with PMHx of ESRD on HD (M,W,F), GB syndrome, diabetes mellitus and peripheral neuropathy presented with shortness of breath. Patient reported that he has been experiencing shortness of breath, nonproductive cough, congestion, generalized body aches and pain, nausea and vomiting for the past 3 days. Per patient's report, nausea and vomiting has resolved. Patient stated that he had full run of HD yesterday. Of note, patient is not vaccinated for Covid and persistently refusing Covid testing. Patient denied any fevers, chills, chest pain, palpitations, dizziness, recent sick contact, travel. On admission, patient was hypoxic satting around 85% on room air; started on supplemental oxygen. Initial diagnostic level showed leukopenia as well as lymphopenia. Procalcitonin elevated to 3.87. Troponin: 0.10, BNP 8, 609, BUN: 29, creatinine 5.0, anion gap: 28, bicarb 16, hemoglobin: 11.2, platelets: 78. CXR showed left-sided airspace disease suggestive of pneumonia/pulmonary edema.         REVIEW OF SYSTEMS:   Detailed 12 point ROS obtained which were negative except what mentioned above in HPI    Past Medical History:   has a past medical history of Blood transfusion, Chronic pain, Diabetes mellitus ([facility] Dialysis, Guillain [place] syndrome, Hemodialysis patient ([facility] Low blood pressure, Pancreatitis, Peripheral Neuropathy, and Renal failure. Past Surgical History:   has a past surgical history that includes Kidney transplant (9/1994); Cholecystectomy; Tunneled venous port placement; Dialysis fistula creation; Tunneled venous catheter placement; and Toe Surgery (10-8-2010). Medications:  No current facility-administered medications on file prior to encounter. Current Outpatient Medications on File Prior to Encounter   Medication Sig Dispense Refill    ondansetron (ZOFRAN ODT) 4 MG disintegrating tablet Take 1 tablet by mouth every 8 hours as needed for Nausea 20 tablet 0    pantoprazole (PROTONIX) 40 MG tablet Take 40 mg by mouth 2 times daily       oxyCODONE (OXY-IR) 30 MG immediate release tablet Take 30 mg by mouth every 4 hours as needed for Pain.  Promethazine HCl 6.25 MG/5ML SOLN Take 12.5 mg by mouth 2 times daily as needed.  insulin detemir (LEVEMIR FLEXPEN) 100 UNIT/ML injection Inject 50 Units into the skin nightly       ALPRAZolam (XANAX) 1 MG tablet Take 2 mg by mouth 2 times daily.  fentaNYL (DURAGESIC) 50 MCG/HR Place 1 patch onto the skin every 72 hours      temazepam (RESTORIL) 30 MG capsule Take 30 mg by mouth nightly as needed. Allergies: Allergies   Allergen Reactions    Flu Virus Vaccine      Caused paralysis        Social History:   reports that he has never smoked. He has never used smokeless tobacco. He reports that he does not drink alcohol and does not use drugs. Family History:  family history includes Diabetes in his mother. Physical Exam:  /62   Pulse 76   Temp 98.2 °F (36.8 °C) (Oral)   Resp 16   Ht 6' 4\" (1.93 m)   Wt 178 lb (80.7 kg)   SpO2 91%   BMI 21.67 kg/m²     General appearance: Ill-appearing  HEENT Normal cephalic, atraumatic without obvious deformity. Pupils equal, round, and reactive to light. Extra ocular muscles intact. Conjunctivae/corneas clear. Neck: Supple, No jugular venous distention/bruits.   Trachea midline without procalcitonin leukopenic and lymphopenic on admission  Not vaccinated for Covid; Refusing Covid PCR  LDH, ferritin, fibrinogen, CRP, D-dimer ordered  Maintain droplet plus precautions  Continue IV Rocephin and azithromycin  Continue IV steroids   Wean as tolerated to keep sats above 90%  Pulmonology consulted    Diabetes mellitus; on 50 units of Levemir at home  Blood glucose 107 admission   started on low-dose SSI; will adjust meds as needed  Monitor blood glucose closely    ESRD on hemodialysis Monday Wednesday Friday  Nephrology consulted  Avoid nephrotoxins  Strict intake and output  Daily weights    Elevated troponin ? In setting of ESRD  Trend troponin  Monitor on telemetry    Elevated alk phos likely secondary to CKD    Microcytic anemia  Iron studies ordered    Thrombocytopenia:  Platelets 78 on admission  Monitor level closely      DVT prophylaxis: Subcu heparin  Code status: Full    Admit as inpatient/Observation I anticipate hospitalization spanning less/more than two midnights for investigation and treatment of the above medically necessary diagnoses. Please note that some part of this chart was generated using Dragon dictation software. Although every effort was made to ensure the accuracy of this automated transcription, some errors in transcription may have occurred inadvertently. If you may need any clarification, please do not hesitate to contact me through [facility].        Elif Salguero MD    11/11/2021 3:31 PM

## 2021-11-11 NOTE — PROGRESS NOTES
11/11/21 1619   RT Protocol   History Pulmonary Disease 0   Respiratory pattern 0   Breath sounds 2   Cough 0   Indications for Bronchodilator Therapy None   Bronchodilator Assessment Score 2

## 2021-11-11 NOTE — ED PROVIDER NOTES
I independently performed a history and physical on Jessica Pratt. All diagnostic, treatment, and disposition decisions were made by myself in conjunction with the advanced practice provider. Briefly, this is a 49 y.o. male here for a chief complaint of dyspnea. Patient states he feels too tired and short of breath given a cohesive history. For can ascertain he has history of ESRD, last dialyzed yesterday, here today with cough congestion malaise fatigue and myalgias. He was not vaccinated for coronavirus. He has history of diabetes ESRD anemia and chronic pancreatitis. Also history of prior pericardial effusion but I am not sure when that was since he cannot tell me    On exam, he is thin, chronically ill-appearing. Heart is regular lungs are clear abdomen is soft. Lower extremities are chronically weak. EKG  EKG is read by myself. Dated today at 46. Rate 74 sinus rhythm LVH pattern with prior anterior infarction. No change from priors. Screenings     Franklin Coma Scale  Eye Opening: Spontaneous  Best Verbal Response: Oriented  Best Motor Response: Obeys commands  Franklin Coma Scale Score: 13      MDM  51M with dyspnea, hx of ESRD  Checking labs/EKG/K/CXR  Checking COVID swab, but patient refused swab; given unvaccinated status and ESRD, this is high on the differential.    His work-up is reviewed and demonstrates what appears to be pneumonia; I will start macrolides and steroids. PCP Dr. Leeanne Orozco is consulted. As he is very suspicious for coronavirus will cohort him with the above. I cannot force patient to take a COVID test.    35 min of critical care time; indication and intervention as above. Patient Referrals:  No follow-up provider specified. Discharge Medications:  New Prescriptions    No medications on file       FINAL IMPRESSION  1. Acute respiratory failure with hypoxemia (HCC)    2. Pneumonia due to infectious organism, unspecified laterality, unspecified part of lung    3.

## 2021-11-12 NOTE — PROGRESS NOTES
Patient arrived to [facility]; with 2RN. VS/ Assessment completed respiratory bedside. Airvo applied 45L with FI02 80. With oxygen Saturations 99%. Pulmonology bedside. BNP and ABG ordered stat. Chest xray completed. Currently NSR. No chest pain/ palpitations. Patient alert/ disoriented to time and place. Family updated on transfer. Patient temp trending low95.2 F; warming blanket applied. Will continue to monitor. Blood pressure stable after 2nd 500cc Bolus. Bed brakes locked; bed in lowest position; call light within reach. Patient to receive dialysis bedside at this time; Will continue to monitor.

## 2021-11-12 NOTE — PROGRESS NOTES
HOSPITALISTS PROGRESS NOTE    11/12/2021 8:25 AM        Name: Espinoza Campbell . Admitted: 11/11/2021  Primary Care Provider: Maria Eugenia Lackey MD (Tel: [phone])      CC: Shortness of breath      Brief Course: This 49 y.o. male  with PMHx of ESRD on HD (M,W,F), GB syndrome, diabetes mellitus and peripheral neuropathy presented with shortness of breath. Patient reported that he has been experiencing shortness of breath, nonproductive cough, congestion, generalized body aches and pain, nausea and vomiting for the past 3 days. Per patient's report, nausea and vomiting has resolved. Patient stated that he had full run of HD yesterday. Of note, patient is not vaccinated for Covid and persistently refusing Covid testing. Patient denied any fevers, chills, chest pain, palpitations, dizziness, recent sick contact, travel. On admission, patient was hypoxic satting around 85% on room air; started on supplemental oxygen. Initial diagnostic level showed leukopenia as well as lymphopenia. Procalcitonin elevated to 3.87. Troponin: 0.10, BNP 8, 609, BUN: 29, creatinine 5.0, anion gap: 28, bicarb 16, hemoglobin: 11.2, platelets: 78. CXR showed left-sided airspace disease suggestive of pneumonia/pulmonary edema.     Interval history:   Pt seen and examined today   Overnight events noted and interval ancillary notes  Afebrile overnight, WBCs within normal limits  O2 requirement increased to 10  L HFNC satting around 95%: Transferred to ICU  Hypotensive this morning: Received 500 cc boluses x2  CRP elevated to 299.9  Troponin trended down to 0.09  Patient still refusing Covid PCR testing  Palliative care on board CODE STATUS changed to limited; no intubation      Assessment & Plan:     Acute hypoxic respiratory failure likely secondary to pneumonia/Covid 19  Satting around 85% on room air on admission  proBNP elevated to 8,069  Afebrile, elevated procalcitonin leukopenic and lymphopenic on admission  Not vaccinated for Covid; Refusing Covid PCR  LDH, ferritin, fibrinogen, CRP, D-dimer ordered  Maintain droplet plus precautions  Was on azithromycin and Rocephin: Switch to Zyvox and Zosyn pending cultures  Continue IV steroids   Wean as tolerated to keep sats above 90%  Pulmonology on board appreciate the recs     Diabetes mellitus; on 50 units of Levemir at home  Started on SSI and basal insulin  Monitor blood glucose closely     ESRD on hemodialysis Monday Wednesday Friday  Nephrology consulted  Avoid nephrotoxins  Strict intake and output  Daily weights     Elevated troponin ? In setting of ESRD  Trend troponin  Monitor on telemetry     Elevated alk phos likely secondary to CKD  Monitor liver function closely     Microcytic anemia  Iron studies ordered     Thrombocytopenia:  Platelets 78 on admission  Monitor level closely        DVT prophylaxis: Subcu heparin  Code:Full Code  Diet: ADULT DIET; Regular; 3 carb choices (45 gm/meal); Low Potassium (Less than 3000 mg/day);  Low Phosphorus (Less than 1000 mg)    Disposition:     Current Medications  sodium hypochlorite (DAKINS) 0.125 % external solution, Daily  collagenase ointment, Daily  lip balm petroleum free (PHYTOPLEX) stick, PRN  dexamethasone (PF) (DECADRON) injection 10 mg, Q24H  promethazine (PHENERGAN) injection 6.25 mg, Q6H PRN  sodium chloride flush 0.9 % injection 5-40 mL, 2 times per day  sodium chloride flush 0.9 % injection 5-40 mL, PRN  0.9 % sodium chloride infusion, PRN  ondansetron (ZOFRAN-ODT) disintegrating tablet 4 mg, Q8H PRN   Or  ondansetron (ZOFRAN) injection 4 mg, Q6H PRN  polyethylene glycol (GLYCOLAX) packet 17 g, Daily PRN  acetaminophen (TYLENOL) tablet 650 mg, Q6H PRN   Or  acetaminophen (TYLENOL) suppository 650 mg, Q6H PRN  heparin (porcine) injection 5,000 Units, 3 times per day  guaiFENesin-dextromethorphan (ROBITUSSIN DM) 100-10 MG/5ML syrup 5 mL, Q4H PRN  Vitamin D (CHOLECALCIFEROL) tablet 2,000 Units, Daily  famotidine (PEPCID) injection 20 mg, Daily  glucose (GLUTOSE) 40 % oral gel 15 g, PRN  dextrose 50 % IV solution, PRN  glucagon (rDNA) injection 1 mg, PRN  dextrose 5 % solution, PRN  insulin lispro (1 Unit Dial) 0-6 Units, TID WC  insulin lispro (1 Unit Dial) 0-3 Units, Nightly  azithromycin (ZITHROMAX) 500 mg in D5W 250ml Vial Mate, Q24H  cefTRIAXone (ROCEPHIN) 1000 mg in sterile water 10 mL IV syringe, Q24H  ipratropium-albuterol (DUONEB) nebulizer solution 1 ampule, Q4H PRN        Objective:  /81   Pulse 83   Temp 96 °F (35.6 °C) (Temporal)   Resp 20   Ht 6' 4\" (1.93 m)   Wt 158 lb 3.2 oz (71.8 kg)   SpO2 95%   BMI 19.26 kg/m²   No intake or output data in the 24 hours ending 11/12/21 0825   Wt Readings from Last 3 Encounters:   11/12/21 158 lb 3.2 oz (71.8 kg)   03/24/17 205 lb 0.4 oz (93 kg)   10/31/16 218 lb 11.1 oz (99.2 kg)       Physical Examination:   General appearance: Ill-appearing  HEENT Normal cephalic, atraumatic without obvious deformity. Pupils equal, round, and reactive to light. Extra ocular muscles intact. Conjunctivae/corneas clear. Neck: Supple, No jugular venous distention/bruits. Trachea midline without thyromegaly  Lungs: diminished breath sounds with scattered Rales at bases bilaterally  Heart: Regular rate and rhythm with Normal S1/S2 without murmurs, rubs   Abdomen: Soft, non-tender or non-distended without rigidity or guarding and positive bowel sounds all four quadrants. Extremities: Contracted right upper extremity; range of motion could not be obtained as patient is wheelchair-bound  Skin: Skin color, texture, turgor normal.  No rashes or lesions. Neurologic: Alert and oriented X 3, neurovascularly intact with sensory/motor intact upper extremities/lower extremities, bilaterally. Cranial nerves: II-XII intact, grossly non-focal.  Psychiatry: Appropriate affect.  Not agitated  Skin: Dry, scaly, scattered wounds on bilateral feet  Brisk capillary refill, peripheral pulses palpable     Labs and Tests:  CBC:   Recent Labs     11/11/21  1339 11/12/21  0443   WBC 3.8* 4.1   HGB 11.2* 13.4*   PLT 78* 72*     BMP:    Recent Labs     11/11/21  1339 11/12/21  0443   * 135*   K 4.7 4.6   CL 89* 87*   CO2 16* 12*   BUN 29* 36*   CREATININE 5.0* 5.5*   GLUCOSE 107* 190*     Hepatic:   Recent Labs     11/11/21  1339 11/12/21  0443   AST 43* 30   ALT 13 12   BILITOT 1.1* 0.9   ALKPHOS 212* 225*     XR CHEST PORTABLE   Final Result   Limited study. Left-sided airspace disease may represent pneumonia or   asymmetric edema. Atelectasis present in the right base             Problem List  Active Problems:    Pneumonia  Resolved Problems:    * No resolved hospital problems.  Aliya Mason MD   11/12/2021 8:25 AM

## 2021-11-12 NOTE — PROGRESS NOTES
Pt admitted to the floor @0030. Pt A&O, VSS w/O2 sats between 88&90% on 4LNC. Pt c/o Nausea, zofran given, see MAR. Call light within reach. Denies further needs.

## 2021-11-12 NOTE — ED NOTES
Patient is not able to support himself sitting on the edge of the bed but continues to demand that RN place him on the edge of the bed to sit. Patient states that if RN does not do this that he will leave the hospital AMA. RN explained to patient that RN was unable to support him sitting on the edge of the bed. RN repositioned patient multiple times and patient continues to demand that RN sit him on the edge of the bed.      Deena Paris RN  11/11/21 [id number]

## 2021-11-12 NOTE — ED NOTES
RN repositioned patient on to right side. Oxygen weaned from 4.5 L to 3L NC. Patient tolerated.      Ronda Carrasquillo RN  11/11/21 7201

## 2021-11-12 NOTE — PROGRESS NOTES
Renal consult received    Notes and labs reviewed  Plan for HD tomorrow  Full note to follow    Thank you

## 2021-11-12 NOTE — PROGRESS NOTES
4 Eyes Skin Assessment     NAME:  Wesley Monique  YOB: 1970  MEDICAL RECORD NUMBER:  [id number]    The patient is being assess for  Admission    I agree that 2 RN's have performed a thorough Head to Toe Skin Assessment on the patient. ALL assessment sites listed below have been assessed. Areas assessed by both nurses:    Head, Face, Ears, Shoulders, Back, Chest, Arms, Elbows, Hands, Sacrum. Buttock, Coccyx, Ischium and Legs. Feet and Heels        Does the Patient have a Wound? Yes wound(s) were present on assessment.  LDA wound assessment was Initiated and completed        Yang Prevention initiated:  Yes   Wound Care Orders initiated:  Yes    Pressure Injury (Stage 3,4, Unstageable, DTI, NWPT, and Complex wounds) if present place consult order under [de-identified] No    New and Established Ostomies if present place consult order under : No      Nurse 1 eSignature: Electronically signed by Rosa Isela Joshi RN on 11/12/21 at 7:56 AM EST    **SHARE this note so that the co-signing nurse is able to place an eSignature**    Nurse 2 eSignature: Electronically signed by Moi Anguiano RN on 11/12/21 at 8:15 AM EST

## 2021-11-12 NOTE — ED NOTES
Pt called out requesting assistance repositioning in the bed. This tech went into patients room, pt wanted to be moved up in the bed. This tech went to get another nurse to assist with boosting pt up in the bed. Pt was moved up in the bed, sat up and repositioned with pillow. Pt said it only made things worse, pt stated wanting to sit on the side of the bed. RN explained that was unsafe. Pt stated he wanted to leave A.      [facility]  11/11/21 4173

## 2021-11-12 NOTE — CONSULTS
Palliative Care:   a 49 y.o. male  with PMHx of ESRD on HD (M,W,F), GB syndrome, diabetes mellitus and peripheral neuropathy presented with shortness of breath. Patient reported that he has been experiencing shortness of breath, nonproductive cough, congestion, generalized body aches and pain, nausea and vomiting for the past 3 days. Per patient's report, nausea and vomiting has resolved. Patient stated that he had full run of HD yesterday. Of note, patient is not vaccinated for Covid and persistently refusing Covid testing. Patient denied any fevers, chills, chest pain, palpitations, dizziness, recent sick contact, travel. On admission, patient was hypoxic satting around 85% on room air; started on supplemental oxygen. Initial diagnostic level showed leukopenia as well as lymphopenia. Procalcitonin elevated to 3.87. Troponin: 0.10, BNP 8, 609, BUN: 29, creatinine 5.0, anion gap: 28, bicarb 16, hemoglobin: 11.2, platelets: 78. CXR showed left-sided airspace disease suggestive of pneumonia/pulmonary edema. Patient admitted 11/11/21 for symptom management. Palliative consult placed 11/12/21. Past Medical History:   has a past medical history of Blood transfusion, Chronic pain, Diabetes mellitus ([facility] [address] Dialysis, Guillain [place] syndrome, Hemodialysis patient ([facility] [address] Low blood pressure, Pancreatitis, Peripheral Neuropathy, Pneumonia, and Renal failure. Past Surgical History:   has a past surgical history that includes Kidney transplant (9/1994); Cholecystectomy; Tunneled venous port placement; Dialysis fistula creation; Tunneled venous catheter placement; and Toe Surgery (10-8-2010). Advance Directives:  LIMITED. NO INTUBATION. Problem Severity: Pain/Other Symptoms:   Symptomatic of potential covid virus. Refusing to allow the team do covid testing. Bed Mobility/Toileting/Transfer: Bed bound.  Dialysis patient       Performance Status:        Palliative Performance Scale:  100% []Full Normal activity & work No evidence of disease  90%   [] Full Normal activity & work Some evidence of disease  80%   [] Full Normal activity with Effort Some evidence of disease  70%   [] Reduced Unable Normal Job/Work Significant disease Full Normal or reduced  60%   [] Ambulation reduced; Significant disease; Can't do hobbies/housework; intake normal   or reduced; occasional assist; LOC full/confusion  50%   [] Mainly sit/lie; Extensive disease; Can't do any work; Considerable assist; intake normal  Or reduced; LOC full/confusion  40%   [] Mainly in bed; Extensive disease; Mainly assist; intake normal or reduced; occasional assist; LOC full/confusion  30%   [x] Bed Bound; Extensive disease; Total care; intake reduced; LOC full/confusion  20%   [] Bed Bound; Extensive disease; Total care; intake minimal; Drowsy/coma  10%   [] Bed Bound; Extensive disease; Total care; Mouth care only; Drowsy/coma    PPS 30%    Symptom Assessment: Appetite/Nausea/Bowels/Fatigue:   lbs. Albumin 3.6       Social History:   reports that he has never smoked. He has never used smokeless tobacco. He reports that he does not drink alcohol and does not use drugs. Family History:  family history includes Diabetes in his mother. Psychological/Spiritual: Single. Adventism. Denies offer of any spiritual support. Family Discussion:     Patient unable to comprehend need of COVID testing to r/o as he is exhibiting symptoms of respiratory decline presently. On 15L rebreather with saturations in 80's. When asked on code status patient refuses to discuss. When asked on covid testing patient simply says, \"No I don't want it. \"    Call to mother of patient as he is unable to have a meaningful orientated discussions. Mother states should patient become sedated at such time the TEAM has her approval to complete the covid testing at such time. Discussed code status. Education provided on all variables.  Patient code status changed to LIMITED. Perfect Serve to physician regarding pending covid testing options and change in code status to LIMITED. Orders obtained. Mother updated of patient transfer to [facility] as of 1430. Nurse from  and [facility] updated of patient code status change and to obtain swab for covid when patient is unable to respond to questions or physically refuse process of testing. Palliative will continue to follow as indicated.

## 2021-11-12 NOTE — PROGRESS NOTES
Transfer of care to ICU 5circle from 5T. Report given to receiving RN and transferred with RT and 2 RNs present.

## 2021-11-12 NOTE — PROGRESS NOTES
Physician Progress Note      PATIENT:               Delia Sneed  CSN #:                  [id number]  :                       1970  ADMIT DATE:       2021 12:49 PM  [address] [place] DATE:  RESPONDING  PROVIDER #:        Ken El MD          QUERY TEXT:    Pt admitted with Acute hypoxic respiratory failure. Pt noted to have pneumonia   . If possible, please document in the progress notes and discharge summary if   you are evaluating and /or treating any of the following: The medical record reflects the following:  Risk Factors: Acute hypoxic resp failure, Pneumonia  Clinical Indicators: proBNP- 8609, CRP- 299.9, WBC- 3.8 on admit, procal-   3.87, plt- 78, lymphs- 0.2. CX- \"Left-sided airspace disease may represent   pneumonia or asymmetric edema. Atelectasis present in the right base\" Refuses   Covid swab  Treatment: IV rocephin, Azithromycin, Steroids, O2, pulm consult, droplet   precautions  Options provided:  -- Sepsis, present on admission  -- Sepsis, not present on admission  -- Pneumonia without Sepsis  -- Sepsis was ruled out  -- Other - I will add my own diagnosis  -- Disagree - Not applicable / Not valid  -- Disagree - Clinically unable to determine / Unknown  -- Refer to Clinical Documentation Reviewer    PROVIDER RESPONSE TEXT:    This patient has sepsis which was present on admission.     Query created by: Johnny Dixon on 2021 10:53 AM      Electronically signed by:  Ken El MD 2021 12:13 PM

## 2021-11-12 NOTE — PROGRESS NOTES
Comprehensive Nutrition Assessment    Type and Reason for Visit:  Initial, Wound, Positive Nutrition Screen (+wounds; false + wounds- diabetic ulcer, however pt with poor appetite and N/V PTA)    Nutrition Recommendations/Plan:   1. Continue ADULT DIET; Regular; 3 carb choices (45 gm/meal); Low Potassium (Less than 3000 mg/day); Low Phosphorous (Less than 1000 mg/day)   2. Add Nepro with breakfast and dinner meals  3. Monitor appetite, meal intake and acceptance/intake of ONS  4. Monitor weight trends, for any additional lab/fluid disturbances, bowel function, and respiratory status. Nutrition Assessment:  patient is nutritionally compromised AEB poor appetite, N/V and SOB x 3 days PTA, and he is at risk for further compromise d/t ongoing poor appetite + N/V, respiratory dysfunction r/t increasing O2 requirements and pt is COVID-19 R/O (pt persistently refusing PCR test), ESRD on HD + increased nutrient needs r/t HD, and altered nutrition related lab values; Will continue ADULT DIET; Regular; 3 carb choices (45 gm/meal); Low Potassium (Less than 3000 mg/day); Low Phosphorous (Less than 1000 mg/day) and add Nepro with breakfast and dinner meals    Malnutrition Assessment:  Malnutrition Status:   At risk for malnutrition (Comment)    Context:  Acute Illness     Findings of the 6 clinical characteristics of malnutrition:  Energy Intake:  Mild decrease in energy intake (Comment) (poor appetite x 3 days PTA)  Weight Loss:  Unable to assess (d/t weight fluctuations r/t fluid imbalances)     Body Fat Loss:  Unable to assess (COVID-19 R/O, droplet + isolation)     Muscle Mass Loss:  Unable to assess (COVID-19 R/O, droplet + isolation)    Fluid Accumulation:  7 - Moderate to Severe Extremities (BLE + 2 edema)   Strength:  Not Performed    Estimated Daily Nutrient Needs:  Energy (kcal):  6323-6855 kcals based on 30-33 kcals/kg/CBW (HD); Weight Used for Energy Requirements:  Current     Protein (g):   g protein based on 1.2-1.5 g/kg/CBW (HD); Weight Used for Protein Requirements:  Current        Fluid (ml/day):  5202-5673 ml; Method Used for Fluid Requirements:  1 ml/kcal      Nutrition Related Findings:  Attempted to call pt room however pt did not answer; noted pt with SOB, poor appetite and N/V x 3 days PTA; noted pt denied fever, diarrhea; pt is a COVID 19 R/O + noted persistently refusing PCR test; Abdomen is flat, +loss of appetite, + nausea/vomitting; BLE +2 edema; skin is dusky, tania, dry, flaky, cool. R foot diabetic ulcer; Na and Cl are low; BUN, creat, and BG are elevated; GFR = 13; weight hx if difficult to assess d/t fluid imbalances r/t ESRD + HD; hx of chronic pancreatitis; pt has zithromax, rocephin, decadron, pepcid, heparin, lantus, insulin lispro, dakins, and Vit D ordered at this time; Wounds:  Diabetic Ulcer (Right foot)       Current Nutrition Therapies:    ADULT DIET; Regular; 3 carb choices (45 gm/meal); Low Potassium (Less than 3000 mg/day); Low Phosphorus (Less than 1000 mg)    Anthropometric Measures:  · Height: 6' 4\" (193 cm)  · Current Body Weight: 158 lb 3.2 oz (71.8 kg) (obtained 11/12; actual weight)   · Admission Body Weight: 158 lb 3.2 oz (71.8 kg) (obtained 11/12; actual weight)    · Usual Body Weight: 202 lb 13.2 oz (92 kg) (obtained 12/2/2020 per past encounters; weight hx is difficult to assess d/t fluid imbalances r/t ESRD on HD)     · Ideal Body Weight: 202 lbs; % Ideal Body Weight 78.3 %   · BMI: 19.3  · BMI Categories: Normal Weight (BMI 18.5-24. 9)       Nutrition Diagnosis:   · Inadequate oral intake related to inadequate protein-energy intake, impaired respiratory function, increase demand for energy/nutrients, renal dysfunction, endocrine dysfuntion as evidenced by poor intake prior to admission, wounds, lab values, nausea, vomiting, localized or generalized fluid accumulation, other (comment), dialysis (SOB PTA, COVID-19 R/O; ESRD on HD; diabetic ulcer; hx of chronic

## 2021-11-12 NOTE — PROGRESS NOTES
Occupational Therapy/Physical Therapy  Tea Diaz    Per discussion with RN, will hold OT/PT evaluations at this time--pt with low BP and increasing oxygen requirements with transfer pending to [facility]. Will follow up for OT/PT assessment as time/schedule allows.     Thank you,    Antwan Swanson, [facility] OTR/L [id number]  Janell Delgado, [address] [facility], DPT [id number]

## 2021-11-12 NOTE — PROGRESS NOTES
MD Eloisa Borden MD Viva Rasp, MD                Office: [phone]                      Fax: [phone]          Inpatient Dialysis Progress Note:     PATIENT NAME: Peace Cardoza  : 1970  MRN: [id number]    Indication for Dialysis: ESRD    Patient seen on dialysis treatment. Tolerating treatment  Fairly well    Vitals:    21 1651   BP:    Pulse:    Resp: 22   Temp:    SpO2: 100%           Physical exam:    In-person bedside physical examination deferred.  Pursuant to the emergency declaration under the [employer] and the [address] authority and the [employer] Act, this clinical encounter was conducted to provide necessary medical care.   (Also consistent with new provisions and guidance offered by Gerdaune Conchita on 2020 in setting of COVID 19 outbreak and in order to preserve personal protective equipment in accordance with the flexibilities announced by CMS on 2020)   References: [email or web address]/[email or web address]/Resources/COVID-19/3_18%20Telemed%20Guidance%20Updated%20March%2018. pdf?[email or web address]                      [email or web address]/Portals/[email or web address]/Resources/COVID-19/3_18%20Telemed%20Guidance%20Updated%20March%2018. pdf?[email or web address]  [email or web address]. pdf     However, I did visually inspect the patient and observed the following:       Vitals signs reviewed. Constitutional:       General: not in acute distress. Appearance:   ill-appearing. HENT:      Head: Normocephalic and atraumatic. Nose: Nose normal.      Mouth/Throat:      Mouth: Mucous membranes are dry . Eyes:      General: No scleral icterus. Conjunctiva/sclera: Conjunctivae normal.   Neck:      Musculoskeletal: Normal range of motion and neck supple.

## 2021-11-12 NOTE — CONSULTS
P Pulmonary and Critical Care   Consult Note      Reason for Consult: Acute hypoxemic respiratory failure, pneumonia, sepsis, ESRD  Requesting Physician: Dr Crystal Fernandez    Subjective:   [address] [facility] / HPI:                The patient is a 49 y.o. 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 Also had cough and chest congestion with generalized body aches and pain. Since admission he has had worsening hypoxemic respiratory failure now requiring 12 L/min HFNC to maintain saturations in the low 90s. He was difficult to arouse when I saw him and did not answer questions. In the past, he has refused both testing and treatment for Covid. He is reportedly unvaccinated.       Past Surgical History:        Procedure Laterality Date    CHOLECYSTECTOMY      DIALYSIS FISTULA CREATION      left arm/currently non functioning    KIDNEY TRANSPLANT  9/1994    R-kidney    TOE SURGERY  10-8-2010    cut and realign 1st, 2nd, toe left foot , fixation 1st, 2nd toe left foot    TUNNELED VENOUS CATHETER PLACEMENT      left chest for dialysis    TUNNELED VENOUS PORT PLACEMENT       Current Medications:    Current Facility-Administered Medications: sodium hypochlorite (DAKINS) 0.125 % external solution, , Irrigation, Daily  collagenase ointment, , Topical, Daily  lip balm petroleum free (PHYTOPLEX) stick, , Topical, PRN  dexamethasone (PF) (DECADRON) injection 10 mg, 10 mg, IntraVENous, Q24H  promethazine (PHENERGAN) injection 6.25 mg, 6.25 mg, IntraMUSCular, Q6H PRN  insulin lispro (1 Unit Dial) 0-12 Units, 0-12 Units, SubCUTAneous, TID WC  insulin lispro (1 Unit Dial) 0-6 Units, 0-6 Units, SubCUTAneous, Nightly  insulin glargine (LANTUS;BASAGLAR) injection pen 11 Units, 0.15 Units/kg, SubCUTAneous, Nightly  sodium chloride flush 0.9 % injection 5-40 mL, 5-40 mL, IntraVENous, 2 times per day  sodium chloride flush 0.9 % injection 5-40 mL, 5-40 mL, IntraVENous, PRN  0.9 % sodium chloride infusion, 25 mL, IntraVENous, PRN  ondansetron (ZOFRAN-ODT) disintegrating tablet 4 mg, 4 mg, Oral, Q8H PRN **OR** ondansetron (ZOFRAN) injection 4 mg, 4 mg, IntraVENous, Q6H PRN  polyethylene glycol (GLYCOLAX) packet 17 g, 17 g, Oral, Daily PRN  acetaminophen (TYLENOL) tablet 650 mg, 650 mg, Oral, Q6H PRN **OR** acetaminophen (TYLENOL) suppository 650 mg, 650 mg, Rectal, Q6H PRN  heparin (porcine) injection 5,000 Units, 5,000 Units, SubCUTAneous, 3 times per day  guaiFENesin-dextromethorphan (ROBITUSSIN DM) 100-10 MG/5ML syrup 5 mL, 5 mL, Oral, Q4H PRN  Vitamin D (CHOLECALCIFEROL) tablet 2,000 Units, 2,000 Units, Oral, Daily  famotidine (PEPCID) injection 20 mg, 20 mg, IntraVENous, Daily  glucose (GLUTOSE) 40 % oral gel 15 g, 15 g, Oral, PRN  dextrose 50 % IV solution, 12.5 g, IntraVENous, PRN  glucagon (rDNA) injection 1 mg, 1 mg, IntraMUSCular, PRN  dextrose 5 % solution, 100 mL/hr, IntraVENous, PRN  azithromycin (ZITHROMAX) 500 mg in D5W 250ml Vial Mate, 500 mg, IntraVENous, Q24H  cefTRIAXone (ROCEPHIN) 1000 mg in sterile water 10 mL IV syringe, 1,000 mg, IntraVENous, Q24H  ipratropium-albuterol (DUONEB) nebulizer solution 1 ampule, 1 ampule, Inhalation, Q4H PRN    Allergies   Allergen Reactions    Flu Virus Vaccine      Caused paralysis       Social History:    TOBACCO:   reports that he has never smoked. He has never used smokeless tobacco.  ETOH:   reports no history of alcohol use.   Patient currently lives independently  Environmental/chemical exposure: None known    Family History:       Problem Relation Age of Onset    Diabetes Mother      REVIEW OF SYSTEMS:    CONSTITUTIONAL:  negative for fevers, chills, diaphoresis, activity change, appetite change, fatigue, night sweats and unexpected weight change. EYES:  negative for blurred vision, eye discharge, visual disturbance and icterus  HEENT:  negative for hearing loss, tinnitus, ear drainage, sinus pressure, nasal congestion, epistaxis and snoring  RESPIRATORY:  See HPI  CARDIOVASCULAR:  negative for chest pain, palpitations, exertional chest pressure/discomfort, edema, syncope  GASTROINTESTINAL:  negative for nausea, vomiting, diarrhea, constipation, blood in stool and abdominal pain  GENITOURINARY:  negative for frequency, dysuria, urinary incontinence, decreased urine volume, and hematuria  HEMATOLOGIC/LYMPHATIC:  negative for easy bruising, bleeding and lymphadenopathy  ALLERGIC/IMMUNOLOGIC:  negative for recurrent infections, angioedema, anaphylaxis and drug reactions  ENDOCRINE:  negative for weight changes and diabetic symptoms including polyuria, polydipsia and polyphagia  MUSCULOSKELETAL:  negative for  pain, joint swelling, decreased range of motion and muscle weakness  NEUROLOGICAL:  negative for headaches, slurred speech, unilateral weakness  PSYCHIATRIC/BEHAVIORAL: negative for hallucinations, behavioral problems, confusion and agitation. Objective:   PHYSICAL EXAM:      VITALS:  BP 92/61   Pulse 74   Temp 95.2 °F (35.1 °C) (Temporal)   Resp 20   Ht 6' 4\" (1.93 m)   Wt 158 lb 3.2 oz (71.8 kg)   SpO2 91%   BMI 19.26 kg/m²      24HR INTAKE/OUTPUT:  No intake or output data in the 24 hours ending 11/12/21 1347  CONSTITUTIONAL:  awake, alert, cooperative, no apparent distress, and appears stated age  NECK:  Supple, symmetrical, trachea midline, no adenopathy, thyroid symmetric, not enlarged and no tenderness, skin normal  LUNGS:  no increased work of breathing and basilar crackles to auscultation.  No accessory muscle use  CARDIOVASCULAR: S1 and S2, no edema and no JVD  ABDOMEN:  normal bowel sounds, non-distended and no masses palpated, and no tenderness to palpation. No hepatospleenomegaly  LYMPHADENOPATHY:  no axillary or supraclavicular adenopathy. No cervical adnenopathy  PSYCHIATRIC: Oriented to person place and time. No obvious depression or anxiety. MUSCULOSKELETAL: No obvious misalignment or effusion of the joints. No clubbing, cyanosis of the digits. SKIN:  normal skin color, texture, turgor and no redness, warmth, or swelling. No palpable nodules    DATA:    Old records have been reviewed    CBC:  Recent Labs     11/11/21  1339 11/12/21  0443   WBC 3.8* 4.1   RBC 4.97 5.95*   HGB 11.2* 13.4*   HCT 35.7* 42.5   PLT 78* 72*   MCV 71.8* 71.5*   MCH 22.6* 22.4*   MCHC 31.4 31.4   RDW 18.8* 19.4*      BMP:  Recent Labs     11/11/21  1339 11/12/21  0443   * 135*   K 4.7 4.6   CL 89* 87*   CO2 16* 12*   BUN 29* 36*   CREATININE 5.0* 5.5*   CALCIUM 8.6 8.9   GLUCOSE 107* 190*      ABG:  No results for input(s): PHART, ULW0UMG, PO2ART, KUV1RDR, T1KTVKYX, BEART in the last 72 hours. Procalcitonin  Recent Labs     11/11/21  1339 11/12/21  0443   PROCAL 3.87* 3.83*       No results found for: BNP  Lab Results   Component Value Date    TROPONINI 0.09 (H) 11/12/2021           Radiology Review:  All pertinent images / reports were reviewed as a part of this visit. Assessment:     1. Acute hypoxemic respiratory failure  2. Severe sepsis  3. Multifocal pneumonia  4. ESRD      Plan:     Patient demonstrates worsening acute hypoxemic respiratory failure  Now requiring 12 L/min HFNC to support saturations  At risk for progressing for the need for mechanical ventilation  Agree with transferring him to ICU    Appear septic  Worsening mental status  Hypotensive  Received 500 cc bolus  Repeat 500 cc bolus  May need norepinephrine in support of blood pressure    Chest x-ray shows multifocal airspace disease left worse than right.   Could represent Covid pneumonia but he is refusing testing  He also refused Actemra  Has been given Decadron  Procalcitonin is elevated at 3.8 then this could be a bacterial pneumonia  He is on azithromycin and Rocephin  We will change him to Zyvox and Zosyn pending culture results  He is moving to ICU    Total critical care time caring for this patient with life threatening illness, including direct patient contact, management of life support systems, review of data including imaging and labs, discussions with other team members and physicians is at least 32 minutes so far today, excluding procedures.

## 2021-11-13 NOTE — PLAN OF CARE
Problem: Falls - Risk of:  Goal: Will remain free from falls  Description: Will remain free from falls  Outcome: Ongoing  Goal: Absence of physical injury  Description: Absence of physical injury  Outcome: Ongoing     Problem: Pain:  Goal: Pain level will decrease  Description: Pain level will decrease  Outcome: Ongoing  Goal: Control of acute pain  Description: Control of acute pain  Outcome: Ongoing  Goal: Control of chronic pain  Description: Control of chronic pain  Outcome: Ongoing     Problem: Skin Integrity:  Goal: Will show no infection signs and symptoms  Description: Will show no infection signs and symptoms  Outcome: Ongoing  Goal: Absence of new skin breakdown  Description: Absence of new skin breakdown  Outcome: Ongoing     Problem: Fluid Volume:  Goal: Will show no signs or symptoms of fluid imbalance  Description: Will show no signs or symptoms of fluid imbalance  Outcome: Ongoing     Problem: Nutrition  Goal: Optimal nutrition therapy  Outcome: Ongoing  Goal: Understanding of nutritional guidelines  Outcome: Ongoing     Problem: Non-Violent Restraints  Goal: Removal from restraints as soon as assessed to be safe  Outcome: Ongoing  Goal: No harm/injury to patient while restraints in use  Outcome: Ongoing  Goal: Patient's dignity will be maintained  Outcome: Ongoing

## 2021-11-13 NOTE — PROGRESS NOTES
Treatment time: 2.5 hours-(3 hours ordered)  Net UF: 0 ml    Pre weight: 75.5 kg   Post weight: 75.8 kg  EDW: 79.5 kg    Access used: R femeral tunneled line  Access function: fair with  ml/min    Medications or blood products given: albumin 12.5gm x2 doses. Midodrine 5mg po-hypotension     Regular outpatient schedule: MWF at [facility], [address]    Summary of response to treatment: Tolerated fair. Hypotensive prior to iniation of HD treatment. Started with albumin and midodrine. SBP never got above 110. Throughout treatment-constantly having to replace oxygen and mask-pt kept pulling it off, trying to get out bed. Code status changed prior to transfer to ICU-now everything except intubation. Pt stated he wanted off treatment and started pulling at HD line. Pt is oriented. Took pt off machine per pt insistance and safety. ICU RN notified. Dr. Melanie Mathis made aware. Copy of dialysis treatment record placed in chart, to be scanned into EMR.

## 2021-11-13 NOTE — FLOWSHEET NOTE
11/12/21 1604 11/12/21 1836   Treatment   Time On 1604  --    Time Off  --  1836   Treatment Goal 0  --    Observations & Evaluations   Level of Consciousness Alert (0) Alert (0)   Oriented X 2  --    Vital Signs   BP  --  106/63   Temp (!) 91 °F (32.8 °C) 96.3 °F (35.7 °C)   Resp 26 24   Weight 166 lb 7.2 oz (75.5 kg) 167 lb 1.7 oz (75.8 kg)   Weight Method Bed scale Bed scale   Percent Weight Change 5.21 0.4   Dry Weight 175 lb 4.3 oz (79.5 kg)  --    Pain Assessment   Pain Assessment 0-10 0-10   Pain Level 0 0   Technical Checks   RO Machine Log Sheet Completed Yes  --    Machine Alarm Self Test Completed  --    Machine Autotest Completed  --    Air Foam Detector Tested  --    Extracorporeal Circuit Tested for Integrity Yes  --    Treatment Initiation   Dialyze Hours 3  --    Treatment  Initiation Universal Precautions maintained; Lines secured to patient; Connections secured; Prime given; Venous Parameters set; Arterial Parameters set; Air foam detector engaged; Hemosafe Device; Saline line double clamped; Hemo-Safe Applied; Dialyzer; F180  --    During Hemodialysis Assessment   Blood Flow Rate (ml/min) 400 ml/min  --    Ultrafiltration Rate (ml/hr) 300 ml/hr  --    Arterial Pressure (mmHg) -50 mmHg  --    Venous Pressure (mmHg) 20  --      --      --    Access Visible Yes  --    Dialysis Bath   K+ (Potassium) 3  --    Ca+ (Calcium) 2.5  --    Na+ (Sodium) 137  --    HCO3 (Bicarb) 37  --    Post-Hemodialysis Assessment   Post-Treatment Procedures  --  Blood returned; Catheter capped, clamped and heparinized x 2 ports   Machine Disinfection Process  --  Acid/Vinegar Clean; Bleach;  Exterior Machine Disinfection   Rinseback Volume (ml)  --  400 ml   Total Liters Processed (l/min)  --  37.5 l/min   Dialyzer Clearance  --  Moderately streaked   Duration of Treatment (minutes)  --  165 minutes   Hemodialysis Intake (ml)  --  800 ml   Hemodialysis Output (ml)  --  519 ml   NET Removed (ml)  --  0 ml Tolerated Treatment  --  Fair

## 2021-11-13 NOTE — PROGRESS NOTES
HOSPITALISTS PROGRESS NOTE    11/13/2021 8:38 AM        Name: Jacqui Lovett . Admitted: 11/11/2021  Primary Care Provider: Jesse Lopez MD (Tel: [phone])      CC: Shortness of breath    Brief Course: This 49 y.o. male  with PMHx of ESRD on HD (M,W,F), GB syndrome, diabetes mellitus and peripheral neuropathy presented with shortness of breath. Patient reported that he has been experiencing shortness of breath, nonproductive cough, congestion, generalized body aches and pain, nausea and vomiting for the past 3 days. Per patient's report, nausea and vomiting has resolved. Patient stated that he had full run of HD yesterday. Of note, patient is not vaccinated for Covid and persistently refusing Covid testing. Patient denied any fevers, chills, chest pain, palpitations, dizziness, recent sick contact, travel. On admission, patient was hypoxic satting around 85% on room air; started on supplemental oxygen. Initial diagnostic level showed leukopenia as well as lymphopenia. Procalcitonin elevated to 3.87. Troponin: 0.10, BNP 8, 609, BUN: 29, creatinine 5.0, anion gap: 28, bicarb 16, hemoglobin: 11.2, platelets: 78. CXR showed left-sided airspace disease suggestive of pneumonia/pulmonary edema. Interval history:   Pt seen and examined today. Overnight events noted, interval ancillary notes and labs reviewed.    On Airvo 65% at 45 L/min satting around 94 to 97%; wean as tolerated keep sats above 92  Afebrile overnight, WBCs within normal limits  Elevated glucose this morning; insulin dose adjusted  Refusing Covid PCR, elevated CRP refusing Actemra  Palliative care on board CODE STATUS changed to limited; no intubation          Assessment & Plan:     Acute hypoxic respiratory failure likely secondary to pneumonia/Covid 19  Satting around 85% on room air on admission  proBNP elevated to 8,069  Afebrile, elevated procalcitonin leukopenic and lymphopenic on admission  Not vaccinated for Covid; Refusing Covid PCR  LDH, ferritin, fibrinogen, CRP, D-dimer ordered  Maintain droplet plus precautions  Was on azithromycin and Rocephin: Switch to Zyvox and Zosyn pending cultures  Continue IV steroids   Wean as tolerated to keep sats above 90%  Pulmonology on board appreciate the recs     Diabetes mellitus; on 50 units of Levemir at home  Started on SSI and basal insulin  Monitor blood glucose closely     ESRD on hemodialysis Monday Wednesday Friday  Nephrology consulted  Avoid nephrotoxins  Strict intake and output  Daily weights     Elevated troponin ? In setting of ESRD  Trend troponin  Monitor on telemetry     Elevated alk phos likely secondary to CKD  Monitor liver function closely     Microcytic anemia  Iron studies ordered     Thrombocytopenia:  Platelets 78 on admission  Monitor level closely        DVT prophylaxis: Subcu heparin  Code:Limited  Diet: ADULT DIET; Regular; 3 carb choices (45 gm/meal); Low Potassium (Less than 3000 mg/day);  Low Phosphorus (Less than 1000 mg)  ADULT ORAL NUTRITION SUPPLEMENT; Breakfast, Dinner; Renal Oral Supplement    Disposition:     Current Medications  sodium hypochlorite (DAKINS) 0.125 % external solution, Daily  collagenase ointment, Daily  lip balm petroleum free (PHYTOPLEX) stick, PRN  promethazine (PHENERGAN) injection 6.25 mg, Q6H PRN  insulin lispro (1 Unit Dial) 0-12 Units, TID WC  insulin lispro (1 Unit Dial) 0-6 Units, Nightly  insulin glargine (LANTUS;BASAGLAR) injection pen 11 Units, Nightly  linezolid (ZYVOX) IVPB 600 mg, Q12H  piperacillin-tazobactam (ZOSYN) 3,375 mg in dextrose 5 % 50 mL IVPB extended infusion (mini-bag), Q8H  0.9 % sodium chloride infusion, Continuous  albumin human 25 % IV solution 12.5 g, PRN  midodrine (PROAMATINE) tablet 5 mg, PRN  heparin (porcine) injection 1,000 Units, PRN  dexamethasone (DECADRON) 20 mg in sodium chloride 0.9 % IVPB, Daily  heparin (porcine) injection 1,000 Units, PRN  sodium chloride flush 0.9 % injection 5-40 mL, 2 times per day  sodium chloride flush 0.9 % injection 5-40 mL, PRN  0.9 % sodium chloride infusion, PRN  ondansetron (ZOFRAN-ODT) disintegrating tablet 4 mg, Q8H PRN   Or  ondansetron (ZOFRAN) injection 4 mg, Q6H PRN  polyethylene glycol (GLYCOLAX) packet 17 g, Daily PRN  acetaminophen (TYLENOL) tablet 650 mg, Q6H PRN   Or  acetaminophen (TYLENOL) suppository 650 mg, Q6H PRN  heparin (porcine) injection 5,000 Units, 3 times per day  guaiFENesin-dextromethorphan (ROBITUSSIN DM) 100-10 MG/5ML syrup 5 mL, Q4H PRN  Vitamin D (CHOLECALCIFEROL) tablet 2,000 Units, Daily  famotidine (PEPCID) injection 20 mg, Daily  glucose (GLUTOSE) 40 % oral gel 15 g, PRN  dextrose 50 % IV solution, PRN  glucagon (rDNA) injection 1 mg, PRN  dextrose 5 % solution, PRN  ipratropium-albuterol (DUONEB) nebulizer solution 1 ampule, Q4H PRN        Objective:  /69   Pulse 73   Temp 97.1 °F (36.2 °C) (Temporal)   Resp 24   Ht 6' 4\" (1.93 m)   Wt 167 lb 5.3 oz (75.9 kg)   SpO2 97%   BMI 20.37 kg/m²     Intake/Output Summary (Last 24 hours) at 11/13/2021 0838  Last data filed at 11/13/2021 0532  Gross per 24 hour   Intake 1320 ml   Output 519 ml   Net 801 ml      Wt Readings from Last 3 Encounters:   11/13/21 167 lb 5.3 oz (75.9 kg)   03/24/17 205 lb 0.4 oz (93 kg)   10/31/16 218 lb 11.1 oz (99.2 kg)       Physical Examination:   General appearance: Ill-appearing  HEENT Normal cephalic, atraumatic without obvious deformity. Pupils equal, round, and reactive to light. Extra ocular muscles intact. Conjunctivae/corneas clear. Neck: Supple, No jugular venous distention/bruits.   Trachea midline without thyromegaly  Lungs: diminished breath sounds with scattered Rales at bases bilaterally  Heart: Regular rate and rhythm with Normal S1/S2 without murmurs, rubs   Abdomen: Soft, non-tender or non-distended without rigidity or guarding and positive bowel sounds all four quadrants. Extremities: Contracted right upper extremity; range of motion could not be obtained as patient is wheelchair-bound  Skin: Skin color, texture, turgor normal.  No rashes or lesions. Neurologic: Alert and oriented X 3, neurovascularly intact with sensory/motor intact upper extremities/lower extremities, bilaterally. Cranial nerves: II-XII intact, grossly non-focal.  Psychiatry: Appropriate affect. Not agitated  Skin: Dry, scaly, scattered wounds on bilateral feet  Brisk capillary refill, peripheral pulses palpable     Labs and Tests:  CBC:   Recent Labs     11/11/21  1339 11/12/21  0443   WBC 3.8* 4.1   HGB 11.2* 13.4*   PLT 78* 72*     BMP:    Recent Labs     11/11/21  1339 11/12/21  0443   * 135*   K 4.7 4.6   CL 89* 87*   CO2 16* 12*   BUN 29* 36*   CREATININE 5.0* 5.5*   GLUCOSE 107* 190*     Hepatic:   Recent Labs     11/11/21  1339 11/12/21  0443   AST 43* 30   ALT 13 12   BILITOT 1.1* 0.9   ALKPHOS 212* 225*     XR CHEST PORTABLE   Final Result      Patchy opacity in the left lateral mid lung field and left lower lung field   and right infrahilar lower lung suggesting probable pneumonia/pneumonitis   other also may be some basilar atelectasis. Findings in the left lateral mid   lung field have worsened since the prior study. Findings are somewhat   exaggerated by a less than optimal inspiration. XR CHEST PORTABLE   Final Result   Limited study. Left-sided airspace disease may represent pneumonia or   asymmetric edema. Atelectasis present in the right base             Problem List  Active Problems:    Pneumonia  Resolved Problems:    * No resolved hospital problems.  Daniel Fairchild MD   11/13/2021 8:38 AM

## 2021-11-13 NOTE — FLOWSHEET NOTE
Pt refusing all care, treatment, testing, repositioning. Pt's speech mildly slurred but refusing to allow swallow screen to eval. RN uncomfortable providing food/drink until bedside swallow or SLP eval performed. RN attempted multiple methods of communication but pt verbally refusing everything, calling RN names, physically moving away and aggressively acting contrary to all therapy interventions.     Electronically signed by GAGE Saxena RN

## 2021-11-13 NOTE — PROGRESS NOTES
Samuel Felling, MD Branson Bumpers, MD Linder Edelson, MD                Office: [phone]                      Fax: [phone]          NEPHROLOGY ICU PROGRESS NOTE:     PATIENT NAME: Espinoza Campbell  : 1970  MRN: [id number]       IMPRESSION / RECOMMENDATION:      Admitted for:  Pneumonia [J18.9]  ESRD (end stage renal disease) ([facility] [address] [N18.6]  Demand ischemia of myocardium ([facility] [address] [I24.8]  Acute respiratory failure with hypoxemia ([facility] [address] [J96.01]  Pneumonia due to infectious organism, unspecified laterality, unspecified part of lung [J18.9]  Acute hypoxic respiratory failure - needing NIPPV   COVID r/o ** pending     1. ESRD on iHD: MWF.   - at [facility] nephrology team   - S/P HD Friday - pt did not want and tolerate much, d/to hypotension  - pending labs from today am   - overall, poor prognosis, appropriate for palliative consult - following       2. Hypertension/Volume Status:  - BP - no need for tight control:   - EDW obtain outpt records -> 72 kg on admission  - Na Hyponatremia  (see above)     3. Electrolytes/acid-base:  K: WNL  High AG metabolic acidosis: fu w/ HD - higher bath, LA WNL    4. Anemia of renal failure: at goal  - ASHUTOSH: not needed    5. BMD:  - Phos: f/u in am labs -  - follow iPTH outpt    : Other supportive care :   - Check daily renal function panel with electrolytes-phosphorus  - Strict monitoring of I/Os, daily weight  - Renal feeds/diet  - Current medications reviewed. - Nephrotoxic medications have been discontinued. - Dose adjusted and appropriate. - Dose meds for eGFR <15 mL/min/1.73m2.    - Avoid heavy opioids due to renal failure - may use very low dose dilaudid / fentanyl with close monitoring of CNS and respiratory depression.       - Zosyn: 2.25 g every 12 hours (2.25 g every 8 hours for hospital-acquired or ventilator-associated pneumonia); Hemodialysis removes 30% to 40% of a piperacillin/tazobactam dose.  Administer scheduled doses after hemodialysis on dialysis days. - Vancomycin: administer after hemodialysis on dialysis days: loading dose of 15 to 25 mg/kg, maintenance 500 to 1,000 mg or 5 to 10 mg/kg after each dialysis session.    - Meropenem: and its metabolite are readily dialyzable: 500 mg every 24 hours       Other Problems:  Hospital Problems           Last Modified POA    Pneumonia 11/11/2021 Yes          Please refer to orders. Multiple complex problems. High risk  Discussed with patient, 's treatment team    Thank you for allowing me to participate in this patient's care. Please do not hesitate to contact me with any questions/concerns. We will follow along with you. Zay Castillo MD  [facility] [address] [place] [address] [place]: [phone] or Via [employer]  Fax: [phone]    Time spent  ~ 35 minutes that included face-to-face meeting/discussion with patient's treatment team (including primary/referring team and other consultants; included coordination of care with the treatment team; and review of patient's electronic medical records and ordering appropriates tests.       ===========================================  ===========================================  This patient is COVID-19 rule out, so in a strict isolation, as per current protocol; So in order to preserve PPE supply and to avoid unnecessary exposure to prevent transmission of COVID-19; this patient was NOT examined face to face, as risk of contact outweighs the benefits and likely would not change much of my clinical management. HPI, review of system and physical exam was limited, as it was mainly obtained from chart review and the primary team. But all relevant records and diagnostic tests were reviewed, including laboratory and imaging results. Patient's care is being coordinated with the primary service.   Subjective:    Seen via glass door   COVID pending  HD yesterday  - w/ hypotension   Remain on NIPPV     Past medical, surgical, social and family medial history reviewed by me:  MEDICATIONS reviewed by me:  Prior to Admission Medications:  No current facility-administered medications on file prior to encounter. Current Outpatient Medications on File Prior to Encounter   Medication Sig Dispense Refill    ondansetron (ZOFRAN ODT) 4 MG disintegrating tablet Take 1 tablet by mouth every 8 hours as needed for Nausea 20 tablet 0    pantoprazole (PROTONIX) 40 MG tablet Take 40 mg by mouth 2 times daily       oxyCODONE (OXY-IR) 30 MG immediate release tablet Take 30 mg by mouth every 4 hours as needed for Pain.  Promethazine HCl 6.25 MG/5ML SOLN Take 12.5 mg by mouth 2 times daily as needed.  insulin detemir (LEVEMIR FLEXPEN) 100 UNIT/ML injection Inject 50 Units into the skin nightly       ALPRAZolam (XANAX) 1 MG tablet Take 2 mg by mouth 2 times daily.  temazepam (RESTORIL) 30 MG capsule Take 30 mg by mouth nightly as needed.  fentaNYL (DURAGESIC) 50 MCG/HR Place 1 patch onto the skin every 72 hours         Medications Prior to Admission: ondansetron (ZOFRAN ODT) 4 MG disintegrating tablet, Take 1 tablet by mouth every 8 hours as needed for Nausea  pantoprazole (PROTONIX) 40 MG tablet, Take 40 mg by mouth 2 times daily   oxyCODONE (OXY-IR) 30 MG immediate release tablet, Take 30 mg by mouth every 4 hours as needed for Pain. Promethazine HCl 6.25 MG/5ML SOLN, Take 12.5 mg by mouth 2 times daily as needed. insulin detemir (LEVEMIR FLEXPEN) 100 UNIT/ML injection, Inject 50 Units into the skin nightly   ALPRAZolam (XANAX) 1 MG tablet, Take 2 mg by mouth 2 times daily. temazepam (RESTORIL) 30 MG capsule, Take 30 mg by mouth nightly as needed.   fentaNYL (DURAGESIC) 50 MCG/HR, Place 1 patch onto the skin every 72 hours     Inpatient Medications:  Scheduled Meds:   insulin lispro  0-18 Units SubCUTAneous TID WC    insulin lispro  0-9 Units SubCUTAneous Nightly    insulin glargine  16 Units SubCUTAneous Nightly  sodium hypochlorite   Irrigation Daily    collagenase   Topical Daily    linezolid  600 mg IntraVENous Q12H    piperacillin-tazobactam  3,375 mg IntraVENous Q8H    dexamethasone  20 mg IntraVENous Daily    sodium chloride flush  5-40 mL IntraVENous 2 times per day    heparin (porcine)  5,000 Units SubCUTAneous 3 times per day    Vitamin D  2,000 Units Oral Daily    famotidine (PEPCID) injection  20 mg IntraVENous Daily     Continuous Infusions:   sodium chloride 500 mL/hr at 11/12/21 1415    sodium chloride      dextrose       PRN Meds:.lip balm petroleum free, promethazine, albumin human, midodrine, heparin (porcine), heparin (porcine), sodium chloride flush, sodium chloride, ondansetron **OR** ondansetron, polyethylene glycol, acetaminophen **OR** acetaminophen, guaiFENesin-dextromethorphan, glucose, dextrose, glucagon (rDNA), dextrose, ipratropium-albuterol       REVIEW OF SYSTEMS:  Review of systems not obtained due to patient factors     PHYSICAL EXAM:  Patient Vitals for the past 24 hrs:   BP Temp Temp src Pulse Resp SpO2 Height Weight   11/13/21 0853 -- -- -- -- -- 91 % -- --   11/13/21 0800 110/69 97.1 °F (36.2 °C) Temporal 73 -- 97 % -- --   11/13/21 0615 -- -- -- -- -- -- -- 167 lb 5.3 oz (75.9 kg)   11/13/21 0605 -- -- -- -- 24 90 % -- --   11/13/21 0600 118/68 -- -- 76 -- -- -- --   11/13/21 0500 (!) 98/55 -- -- 71 -- -- -- --   11/13/21 0406 -- -- -- -- 26 95 % -- --   11/13/21 0405 -- -- -- -- 24 98 % -- --   11/13/21 0400 102/62 96.9 °F (36.1 °C) Temporal 74 22 -- -- --   11/13/21 0300 98/64 -- -- 74 -- -- -- --   11/13/21 0200 105/65 -- -- 75 -- -- -- --   11/13/21 0100 111/70 -- -- 79 -- -- -- --   11/13/21 0002 -- -- -- -- 24 95 % -- --   11/13/21 0000 104/69 97.1 °F (36.2 °C) Temporal 78 24 99 % -- --   11/12/21 2300 (!) 93/59 -- -- 81 -- -- -- --   11/12/21 2200 (!) 81/43 -- -- 83 -- 94 % -- --   11/12/21 2100 (!) 89/47 -- -- 89 -- 100 % -- --   11/12/21 2036 -- -- -- -- -- 93 % -- --   11/12/21 2028 -- -- -- -- -- 98 % -- --   11/12/21 2000 (!) 95/53 96.9 °F (36.1 °C) Temporal 90 22 98 % -- --   11/12/21 1900 (!) 99/53 -- -- 85 -- -- -- --   11/12/21 1836 106/63 96.3 °F (35.7 °C) -- -- 24 -- -- 167 lb 1.7 oz (75.8 kg)   11/12/21 1651 -- -- -- -- 22 100 % -- --   11/12/21 1604 -- (!) 91 °F (32.8 °C) -- -- 26 -- -- 166 lb 7.2 oz (75.5 kg)   11/12/21 1545 (!) 87/46 -- -- -- -- -- -- --   11/12/21 1530 (!) 86/50 (!) 91 °F (32.8 °C) Rectal 72 24 100 % -- --   11/12/21 1523 (!) 95/52 -- -- -- 26 100 % -- --   11/12/21 1445 107/64 95.2 °F (35.1 °C) Temporal 66 28 99 % -- --   11/12/21 1105 92/61 95.2 °F (35.1 °C) Temporal 74 20 91 % 6' 4\" (1.93 m) --   11/12/21 1100 92/61 95.2 °F (35.1 °C) Temporal 74 20 91 % -- --       Intake/Output Summary (Last 24 hours) at 11/13/2021 1014  Last data filed at 11/13/2021 0532  Gross per 24 hour   Intake 1320 ml   Output 519 ml   Net 801 ml          Physical exam:    In-person bedside physical examination deferred. Pursuant to the emergency declaration under the [facility], [address] [place] [address] authority and the [employer] Act, this clinical encounter was conducted to provide necessary medical care. (Also consistent with new provisions and guidance offered by [facility] on March 18, 2020 in setting of COVID 19 outbreak and in order to preserve personal protective equipment in accordance with the flexibilities announced by CMS on March 30, 2020)   References: [email or web address] [facility]/Portals/0/Resources/COVID-19/3_18%20Telemed%20Guidance%20Updated%20March%2018. pdf?[email or web address]                      [email or web address]://[facility]. [facility]/Portals/0/Resources/COVID-19/3_18%20Telemed%20Guidance%20Updated%20March%2018. pdf?ort=[email or web address]                      [email or web address]    However, I did visually inspect the patient and observed the following:      Vitals signs reviewed. Constitutional:       General: not in acute distress. Appearance:   ill-appearing. HENT:      Head: Normocephalic and atraumatic. Nose: Nose normal.      Mouth/Throat:      Mouth: Mucous membranes are dry . Eyes:      General: No scleral icterus. Conjunctiva/sclera: Conjunctivae normal.   Neck:      Musculoskeletal: Normal range of motion and neck supple. Cardiovascular:      Rate and Rhythm: Normal rate. Pulmonary:      Effort: Pulmonary effort is  Ab-normal.   Abdominal:      General: There is  distension. Musculoskeletal:      Right lower leg: edema. Left lower leg: edema. Skin:     Coloration: Skin is not jaundiced. Neurological:      General:  Deferred                DATA:  Diagnostic tests reviewed for today's visit:    Recent Labs     11/11/21  1339 11/12/21  0443   WBC 3.8* 4.1   HCT 35.7* 42.5   PLT 78* 72*     Iron Saturation:  No components found for: PERCENTFE  FERRITIN:    Lab Results   Component Value Date    FERRITIN 1,623.0 11/12/2021     IRON:    Lab Results   Component Value Date    IRON 106 11/12/2021     TIBC:    Lab Results   Component Value Date    TIBC 133 11/12/2021       Recent Labs     11/11/21  1339 11/12/21  0443   * 135*   K 4.7 4.6   CL 89* 87*   CO2 16* 12*   BUN 29* 36*   CREATININE 5.0* 5.5*     Recent Labs     11/11/21  1339 11/12/21  0443   CALCIUM 8.6 8.9     No results for input(s): PH, PCO2, PO2 in the last 72 hours. Invalid input(s): Joanne Gustafson    ABG:  No results found for: PH, PCO2, PO2, HCO3, BE, THGB, TCO2, O2SAT  VBG:    Lab Results   Component Value Date    PHVEN 7.32 10/28/2016    QOC9LUJ 35.6 10/28/2016    BEVEN -7.4 10/28/2016    [id number] 76 10/28/2016           BELOW MENTIONED RADIOLOGY STUDY RESULTS REVIEWED BY ME:    XR CHEST PORTABLE    Result Date: 11/11/2021  EXAMINATION: ONE XRAY VIEW OF THE CHEST 11/11/2021 1:21 pm COMPARISON: None.  HISTORY: ORDERING SYSTEM PROVIDED HISTORY: SOB TECHNOLOGIST PROVIDED HISTORY: Reason for exam:->SOB Reason for Exam: SOB Acuity: Acute Type of Exam: Initial FINDINGS: Exaggerated kyphotic positioning and patient rotation. Patient's chin obscures the right lung apex. Heart size indeterminate. Airspace disease in the mid and lower left lung. Strandy opacity in the right lung base     Limited study. Left-sided airspace disease may represent pneumonia or asymmetric edema.   Atelectasis present in the right base

## 2021-11-13 NOTE — FLOWSHEET NOTE
Pt refusing all care, treatment, testing, repositioning. Pt's speech mildly slurred but refusing to allow swallow screen to eval. RN uncomfortable providing food/drink until bedside swallow or SLP eval performed. RN attempted multiple methods of communication but pt verbally refusing everything, calling RN names, physically moving away and aggressively acting contrary to all therapy interventions. Electronically signed by GAGE Carty RN    Pt called RN to bedside, asked to speak about \"[my] commercial property for sale\", RN attempted to reorient pt. Informed he was at the hospital, replied with \"I know I'm at the hospital.\" Asked what year it is now, replied with \"It's after the \". Only able to tell me his name and . Pt continues to refuses all treatment, testing, care, repositioning.

## 2021-11-13 NOTE — PROGRESS NOTES
PULMONARY AND CRITICAL CARE MEDICINE PROGRESS NOTE    Subjective: Patient continues to refuse treatment. He is on heated high flow which I believe can be weaned down. Unfortunately because of patient's peripheral artery disease, we not able to get adequate saturation. He continues to refuse blood sugar checks, IV insulin. Refuse Covid swab. Not allowing the nurses to do care. ROS could not be obtained due to patient factors. Intermittently confused      MEDICATIONS:     Scheduled Meds:   insulin lispro  0-18 Units SubCUTAneous TID WC    insulin lispro  0-9 Units SubCUTAneous Nightly    insulin glargine  16 Units SubCUTAneous Nightly    midodrine  2.5 mg Oral TID WC    sodium hypochlorite   Irrigation Daily    collagenase   Topical Daily    linezolid  600 mg IntraVENous Q12H    piperacillin-tazobactam  3,375 mg IntraVENous Q8H    dexamethasone  20 mg IntraVENous Daily    sodium chloride flush  5-40 mL IntraVENous 2 times per day    heparin (porcine)  5,000 Units SubCUTAneous 3 times per day    Vitamin D  2,000 Units Oral Daily    famotidine (PEPCID) injection  20 mg IntraVENous Daily       Current Infusions:    sodium bicarbonate infusion      sodium chloride 500 mL/hr at 11/12/21 1415    sodium chloride      dextrose         PRN meds:  lip balm petroleum free, promethazine, albumin human, midodrine, heparin (porcine), heparin (porcine), sodium chloride flush, sodium chloride, ondansetron **OR** ondansetron, polyethylene glycol, acetaminophen **OR** acetaminophen, guaiFENesin-dextromethorphan, glucose, dextrose, glucagon (rDNA), dextrose, ipratropium-albuterol    PHYSICAL EXAM:  /63   Pulse 73   Temp 96.2 °F (35.7 °C) (Temporal)   Resp 24   Ht 6' 4\" (1.93 m)   Wt 167 lb 5.3 oz (75.9 kg)   SpO2 95%   BMI 20.37 kg/m²  I/O last 3 completed shifts: In: 1320 [P.O.:100; I.V.:20; IV Piggyback:400]  Out: 519  No intake/output data recorded.       Intake/Output Summary (Last 24 hours) at 11/13/2021 1332  Last data filed at 11/13/2021 0532  Gross per 24 hour   Intake 1320 ml   Output 519 ml   Net 801 ml       CONSTITUTIONAL: He is a 49y.o.-year-old who appears his stated age. He is  in no acute distress. CARDIOVASCULAR: S1 S2 RRR. Without murmer  RESPIRATORY & CHEST: Lungs are clear to auscultation and percussion. No wheezing, no crackles. Good air movement  GASTROINTESTINAL & ABDOMEN: Soft, nontender, positive bowel sounds in all quadrants, non-distended, without hepatosplenomegaly. GENITOURINARY: Deferred. MUSCULOSKELETAL: No tenderness to palpation of the axial skeleton. There is no clubbing. No cyanosis. No edema of the lower extremities. SKIN OF BODY: No rash or jaundice. PSYCHIATRIC EVALUATION: Could not be assessed  HEMATOLOGIC/LYMPHATIC/ IMMUNOLOGIC: No palpable lymphadenopathy. NEUROLOGIC: Awake and answers for questions. Groslly non-focal. Motor strength is 5+/5 in all muscle groups. The patient has a normal sensorium globally. LABS:    Recent Labs     11/11/21  1339 11/12/21  0443 11/13/21  1026   WBC 3.8* 4.1 5.6   RBC 4.97 5.95* 4.63   HGB 11.2* 13.4* 10.7*   HCT 35.7* 42.5 33.0*   MCH 22.6* 22.4* 23.1*   MCHC 31.4 31.4 32.4   RDW 18.8* 19.4* 19.5*   PLT 78* 72* 52*   MPV 8.5 8.6 8.1   NEUTOPHILPCT 87.9 91.9 87.5   MONOPCT 6.3 4.2 8.3   BASOPCT 0.4 0.3 0.2     Recent Labs     11/11/21  1339 11/11/21  1339 11/12/21  0443 11/13/21  1026 11/13/21  1240   *   < > 135* 131* 131*   K 4.7  --  4.6 5.4* 5.6*   CL 89*   < > 87* 86* 87*   ANIONGAP 28*   < > 36* 37* 35*   CO2 16*   < > 12* 8* 9*   BUN 29*   < > 36* 52* 55*   CREATININE 5.0*   < > 5.5* 6.0* 6.0*   CALCIUM 8.6   < > 8.9 7.7* 8.0*   PROT 7.8  --  8.3*  --   --    BILITOT 1.1*  --  0.9  --   --    ALKPHOS 212*  --  225*  --   --    ALT 13  --  12  --   --    AST 43*  --  30  --   --    GLUCOSE 107*   < > 190* 320* 328*    < > = values in this interval not displayed.          IMPRESSION:  Acute hypoxic respiratory failure  Sepsis  Multifocal pneumonia  End-stage renal disease      PLAN:  Patient has acute hypoxic respite failure secondary to multifocal pneumonia. Chest x-ray shows multifocal infiltrates, left more than right. Etiology of pneumonia is unclear at this point. Could be aspiration or Covid pneumonia. Patient is refusing Covid testing. He is also refusing other treatment for Covid including Actemra. Currently on Decadron. Also on empiric antibiotics with Zyvox and Zosyn for superadded bacterial infection as procalcitonin was elevated to be 3.8. Patient was hypotensive yesterday and required a liter of fluid bolus. Blood pressure improved but he was not able to tolerate hemodialysis yesterday due to hypotension. Patient also wanted hemodialysis to be discontinued. BMP showed worsening acidosis today with bicarb of 8 and elevated potassium. Nephrology will reattempt hemodialysis if family wants aggressive care. If no hemodialysis, then plan will be to initiate on bicarb drip. Patient has been refusing care. He is refusing blood sugar checks, insulin as well as antibiotics. He is refusing testing. Goals of care needs to be addressed with patient's mother. Palliative care would be appropriate. Critical Care Time: 39 Minutes  Total critical care time caring for this patient with life threatening illness, including direct patient contact, management of life support systems, review of data including imaging and labs, discussions with other team members and physicians excluding procedures. Wade Smart MD  Pulmonary Critical Care and Sleep Medicine  11/13/2021, 1:32 PM    This note was completed using dragon medical speech recognition software. Grammatical errors, random word insertions, pronoun errors and incomplete sentences are occasional consequences of this technology due to software limitations.  If there are questions or concerns about the content of this note of information contained within the body of this dictation they should be addressed with the provider for clarification.

## 2021-11-13 NOTE — PROGRESS NOTES
Assessment completed, VSS, scheduled midodrine given, all other meds and testing refused by the pt. A&O to self, otherwise disoriented, allowed lab draw, check restraints, ROM performed, fluids offered and accepted, food declined, repositioned to promote comfort, call light within reach.

## 2021-11-13 NOTE — PROGRESS NOTES
MD Edgar Anthony MD Normajean Cheney, MD                  Office: [phone]                 Fax: [phone]         [address] [place] [address]                            NEPHROLOGY UPDATE NOTE:     PATIENT NAME: Anupama Talbot  : 1970  MRN: [id number]    Labs are back now, as expected, uncontrolled solutes, as patient did not want to finish HD yesterday and did not tolerate HD yesterday either, due to hypotensive. Currently BP remains soft. Code status - Limited now   Overall poor prognosis. Agree w/ palliative care follow up. Will d/w POA also, if they want to continue agressive care, will attempt HD again w/ changes such as lower HD temp, low flow, midodrine to help w/ hypotension. Otherwise - start low rate bicarb, add midodrine,   Re-Check LA, blood gas. F/u recheck labs in 3 hours     Also D/W team - hospitalist- Dr Helena Mckee, nurse. Thank you for allowing me to participate in this patient's care. Please do not hesitate to contact me for any questions/concerns. We will follow along with you.      Elsa Robles MD  [facility] [address]   Phone: [phone] or Via [employer]  Fax: [phone]

## 2021-11-14 NOTE — PROGRESS NOTES
HOSPITALISTS PROGRESS NOTE    11/14/2021 8:17 AM        Name: Arlette Kapadia . Admitted: 11/11/2021  Primary Care Provider: Yusra Strong MD (Tel: [phone])      CC: Shortness of breath    Brief Course: This 49 y.o. male  with PMHx of ESRD on HD (M,W,F), GB syndrome, diabetes mellitus and peripheral neuropathy presented with shortness of breath. Patient reported that he has been experiencing shortness of breath, nonproductive cough, congestion, generalized body aches and pain, nausea and vomiting for the past 3 days. Per patient's report, nausea and vomiting has resolved. Patient stated that he had full run of HD yesterday. Of note, patient is not vaccinated for Covid and persistently refusing Covid testing. Patient denied any fevers, chills, chest pain, palpitations, dizziness, recent sick contact, travel. On admission, patient was hypoxic satting around 85% on room air; started on supplemental oxygen. Initial diagnostic level showed leukopenia as well as lymphopenia. Procalcitonin elevated to 3.87. Troponin: 0.10, BNP 8, 609, BUN: 29, creatinine 5.0, anion gap: 28, bicarb 16, hemoglobin: 11.2, platelets: 78. CXR showed left-sided airspace disease suggestive of pneumonia/pulmonary edema. Interval history:   Pt seen and examined today. Overnight events noted, interval ancillary notes and labs reviewed.    On heated HFNC 30% and 30 L/min satting around 94%  Afebrile overnight, procalcitonin trended down to 2.06   Platelets down to 36 this morning; Zyvox discontinued  HIT ordered  Blood glucose this morning; basal insulin dose increased  Refusing Covid PCR, elevated CRP refusing Actemra  Palliative care on board CODE STATUS changed to limited; no intubation          Assessment & Plan:     Acute hypoxic respiratory failure likely secondary to pneumonia/Covid 19  Afebrile, elevated procalcitonin, leukopenic and lymphopenic on admission  Not vaccinated for Covid; Refusing Covid PCR  Elevated D-dimer, CRP, LDH and ferritin  Maintain droplet plus precautions  Was on azithromycin and Rocephin: Switch to Zyvox and Zosyn awaiting culture data  Lovenox discontinued secondary thrombocytopenia  Continue IV steroids   Wean as tolerated to keep sats above 90%  Pulmonology on board appreciate the recs     Diabetes mellitus; on 50 units of Levemir at home  Hemoglobin A1c 14 on admission  Started on SSI and basal insulin  Monitor blood glucose closely     ESRD on hemodialysis Monday Wednesday Friday  proBNP elevated to [place] 258  Nephrology consulted  Avoid nephrotoxins  Strict intake and output  Daily weights     Elevated troponin ? In setting of ESRD  Trend troponin  Monitor on telemetry     Elevated alk phos likely secondary to CKD  Monitor liver function closely     Microcytic anemia  Iron studies ordered     Thrombocytopenia:  Platelets 78 on admission  Monitor level closely        DVT prophylaxis: Subcu heparin  Code:Limited  Diet: ADULT DIET; Regular; 3 carb choices (45 gm/meal); Low Potassium (Less than 3000 mg/day);  Low Phosphorus (Less than 1000 mg)  ADULT ORAL NUTRITION SUPPLEMENT; Breakfast, Dinner; Renal Oral Supplement    Disposition:     Current Medications  insulin lispro (1 Unit Dial) 0-18 Units, TID WC  insulin lispro (1 Unit Dial) 0-9 Units, Nightly  insulin glargine (LANTUS;BASAGLAR) injection pen 16 Units, Nightly  sodium bicarbonate 150 mEq in dextrose 5 % 1,000 mL infusion, Continuous  midodrine (PROAMATINE) tablet 2.5 mg, TID WC  sodium hypochlorite (DAKINS) 0.125 % external solution, Daily  collagenase ointment, Daily  lip balm petroleum free (PHYTOPLEX) stick, PRN  promethazine (PHENERGAN) injection 6.25 mg, Q6H PRN  linezolid (ZYVOX) IVPB 600 mg, Q12H  piperacillin-tazobactam (ZOSYN) 3,375 mg in dextrose 5 % 50 mL IVPB extended infusion (mini-bag), Q8H  0.9 % sodium chloride infusion, Continuous  albumin human 25 % IV solution 12.5 g, PRN  midodrine (PROAMATINE) tablet 5 mg, PRN  heparin (porcine) injection 1,000 Units, PRN  dexamethasone (DECADRON) 20 mg in sodium chloride 0.9 % IVPB, Daily  heparin (porcine) injection 1,000 Units, PRN  sodium chloride flush 0.9 % injection 5-40 mL, 2 times per day  sodium chloride flush 0.9 % injection 5-40 mL, PRN  0.9 % sodium chloride infusion, PRN  ondansetron (ZOFRAN-ODT) disintegrating tablet 4 mg, Q8H PRN   Or  ondansetron (ZOFRAN) injection 4 mg, Q6H PRN  polyethylene glycol (GLYCOLAX) packet 17 g, Daily PRN  acetaminophen (TYLENOL) tablet 650 mg, Q6H PRN   Or  acetaminophen (TYLENOL) suppository 650 mg, Q6H PRN  heparin (porcine) injection 5,000 Units, 3 times per day  guaiFENesin-dextromethorphan (ROBITUSSIN DM) 100-10 MG/5ML syrup 5 mL, Q4H PRN  Vitamin D (CHOLECALCIFEROL) tablet 2,000 Units, Daily  famotidine (PEPCID) injection 20 mg, Daily  glucose (GLUTOSE) 40 % oral gel 15 g, PRN  dextrose 50 % IV solution, PRN  glucagon (rDNA) injection 1 mg, PRN  dextrose 5 % solution, PRN  ipratropium-albuterol (DUONEB) nebulizer solution 1 ampule, Q4H PRN        Objective:  BP (!) 141/80   Pulse 66   Temp 97.4 °F (36.3 °C) (Temporal)   Resp 20   Ht 6' 4\" (1.93 m)   Wt 167 lb 15.9 oz (76.2 kg)   SpO2 100%   BMI 20.45 kg/m²     Intake/Output Summary (Last 24 hours) at 11/14/2021 0817  Last data filed at 11/13/2021 1905  Gross per 24 hour   Intake 1144.56 ml   Output 2250 ml   Net -1105.44 ml      Wt Readings from Last 3 Encounters:   11/14/21 167 lb 15.9 oz (76.2 kg)   03/24/17 205 lb 0.4 oz (93 kg)   10/31/16 218 lb 11.1 oz (99.2 kg)       Physical Examination:   General appearance: Ill-appearing  HEENT Normal cephalic, atraumatic without obvious deformity. Pupils equal, round, and reactive to light. Extra ocular muscles intact. Conjunctivae/corneas clear. Neck: Supple, No jugular venous distention/bruits.   Trachea midline without thyromegaly  Lungs: diminished breath sounds with scattered Rales at bases bilaterally  Heart: Regular rate and rhythm with Normal S1/S2 without murmurs, rubs   Abdomen: Soft, non-tender or non-distended without rigidity or guarding and positive bowel sounds all four quadrants. Extremities: Contracted right upper extremity; range of motion could not be obtained as patient is wheelchair-bound  Skin: Skin color, texture, turgor normal.  No rashes or lesions. Neurologic: Alert and oriented X 3, neurovascularly intact with sensory/motor intact upper extremities/lower extremities, bilaterally. Cranial nerves: II-XII intact, grossly non-focal.  Psychiatry: Appropriate affect. Not agitated  Skin: Dry, scaly, scattered wounds on bilateral feet  Brisk capillary refill, peripheral pulses palpable     Labs and Tests:  CBC:   Recent Labs     11/12/21  0443 11/13/21  1026 11/14/21  0628   WBC 4.1 5.6 4.0   HGB 13.4* 10.7* 10.3*   PLT 72* 52* 36*     BMP:    Recent Labs     11/13/21  1026 11/13/21  1240 11/14/21  0628   * 131* 130*   K 5.4* 5.6* 4.4   CL 86* 87* 88*   CO2 8* 9* 11*   BUN 52* 55* 40*   CREATININE 6.0* 6.0* 4.7*   GLUCOSE 320* 328* 272*     Hepatic:   Recent Labs     11/11/21  1339 11/12/21  0443   AST 43* 30   ALT 13 12   BILITOT 1.1* 0.9   ALKPHOS 212* 225*     XR CHEST PORTABLE   Final Result      Patchy opacity in the left lateral mid lung field and left lower lung field   and right infrahilar lower lung suggesting probable pneumonia/pneumonitis   other also may be some basilar atelectasis. Findings in the left lateral mid   lung field have worsened since the prior study. Findings are somewhat   exaggerated by a less than optimal inspiration. XR CHEST PORTABLE   Final Result   Limited study. Left-sided airspace disease may represent pneumonia or   asymmetric edema. Atelectasis present in the right base             Problem List  Active Problems:    Pneumonia  Resolved Problems:    * No resolved hospital problems.  [facility] Toño Perez MD   11/14/2021 8:17 AM

## 2021-11-14 NOTE — PROGRESS NOTES
Accessed right arm for midline placement. Possible fistula placement in right no record in file but large structure in upper and lower portion in right arm. Known fistula in left arm. Ultrasound assessment of right arm shows large possible clot with non-compressible vessels.  Recommending CVC placement

## 2021-11-14 NOTE — PROGRESS NOTES
PULMONARY AND CRITICAL CARE MEDICINE PROGRESS NOTE    Subjective: No acute events overnight. Patient is on 10 L/min heated high flow. Saturating well. ROS could not be obtained due to patient factors. Patient is pleasantly confused    MEDICATIONS:     Scheduled Meds:   insulin glargine  20 Units SubCUTAneous Nightly    lidocaine 1 % injection  5 mL IntraDERmal Once    sodium chloride flush  5-40 mL IntraVENous 2 times per day    [START ON 11/15/2021] heparin (porcine)  5,000 Units SubCUTAneous 3 times per day    insulin lispro  0-18 Units SubCUTAneous TID WC    insulin lispro  0-9 Units SubCUTAneous Nightly    midodrine  2.5 mg Oral TID WC    sodium hypochlorite   Irrigation Daily    collagenase   Topical Daily    piperacillin-tazobactam  3,375 mg IntraVENous Q8H    dexamethasone  20 mg IntraVENous Daily    sodium chloride flush  5-40 mL IntraVENous 2 times per day    Vitamin D  2,000 Units Oral Daily    famotidine (PEPCID) injection  20 mg IntraVENous Daily       Current Infusions:    sodium chloride      sodium bicarbonate infusion 50 mL/hr at 11/13/21 1642    sodium chloride 500 mL/hr at 11/12/21 1415    sodium chloride Stopped (11/13/21 1641)    dextrose         PRN meds:  sodium chloride flush, sodium chloride, lip balm petroleum free, promethazine, albumin human, midodrine, sodium chloride flush, sodium chloride, ondansetron **OR** ondansetron, polyethylene glycol, acetaminophen **OR** acetaminophen, guaiFENesin-dextromethorphan, glucose, dextrose, glucagon (rDNA), dextrose    PHYSICAL EXAM:  /87   Pulse 59   Temp 97.6 °F (36.4 °C) (Temporal)   Resp 30   Ht 6' 4\" (1.93 m)   Wt 167 lb 15.9 oz (76.2 kg)   SpO2 100%   BMI 20.45 kg/m²  I/O last 3 completed shifts: In: 1144.6 [I.V.:4.6; IV Piggyback:540]  Out: 2250  No intake/output data recorded.       Intake/Output Summary (Last 24 hours) at 11/14/2021 1335  Last data filed at 11/13/2021 1905  Gross per 24 hour   Intake 1144.56 ml   Output 2250 ml   Net -1105.44 ml       CONSTITUTIONAL: He is a 49y.o.-year-old who appears his stated age. He is in no acute distress. CARDIOVASCULAR: S1 S2 RRR. Without murmer  RESPIRATORY & CHEST: Lungs are clear to auscultation and percussion. No wheezing, no crackles. Good air movement  GASTROINTESTINAL & ABDOMEN: Soft, nontender, positive bowel sounds in all quadrants, non-distended, without hepatosplenomegaly. GENITOURINARY: Deferred. MUSCULOSKELETAL: No tenderness to palpation of the axial skeleton. There is no clubbing. No cyanosis. No edema of the lower extremities. SKIN OF BODY: No rash or jaundice. PSYCHIATRIC EVALUATION: Could not be assessed. HEMATOLOGIC/LYMPHATIC/ IMMUNOLOGIC: No palpable lymphadenopathy. NEUROLOGIC: Awake and follows some commands. Groslly non-focal. Motor strength is 5+/5 in all muscle groups. The patient has a normal sensorium globally.      LABS:    Recent Labs     11/12/21 0443 11/13/21  1026 11/14/21  0628   WBC 4.1 5.6 4.0   RBC 5.95* 4.63 4.48   HGB 13.4* 10.7* 10.3*   HCT 42.5 33.0* 31.5*   MCH 22.4* 23.1* 22.9*   MCHC 31.4 32.4 32.6   RDW 19.4* 19.5* 19.0*   PLT 72* 52* 36*   MPV 8.6 8.1 8.6   NEUTOPHILPCT 91.9 87.5 89.9   MONOPCT 4.2 8.3 6.6   BASOPCT 0.3 0.2 0.3     Recent Labs     11/11/21  1339 11/11/21  1339 11/12/21  0443 11/12/21  0443 11/13/21  1026 11/13/21  1240 11/14/21  0628   *   < > 135*   < > 131* 131* 130*   K 4.7   < > 4.6  --  5.4* 5.6* 4.4   CL 89*   < > 87*   < > 86* 87* 88*   ANIONGAP 28*   < > 36*   < > 37* 35* 31*   CO2 16*   < > 12*   < > 8* 9* 11*   BUN 29*   < > 36*   < > 52* 55* 40*   CREATININE 5.0*   < > 5.5*   < > 6.0* 6.0* 4.7*   CALCIUM 8.6   < > 8.9   < > 7.7* 8.0* 7.9*   PROT 7.8  --  8.3*  --   --   --   --    BILITOT 1.1*  --  0.9  --   --   --   --    ALKPHOS 212*  --  225*  --   --   --   --    ALT 13  --  12  --   --   --   --    AST 43*  --  30  --   --   --   --    GLUCOSE 107*   < > 190*   < > 320* 328* 272*    < > = values in this interval not displayed. IMPRESSION:  Acute hypoxic respiratory failure  Sepsis  Multifocal pneumonia  End-stage renal disease        PLAN:  Patient has acute hypoxic respiratory failure secondary to multifocal pneumonia. Chest x-ray shows multifocal infiltrates, left more than right. Etiology of pneumonia is unclear at this point. Could be aspiration or Covid pneumonia. Patient is refusing Covid testing. He is also refusing other treatment for Covid including Actemra. Currently on Decadron. Also on empiric antibiotics with Zyvox and Zosyn for superadded bacterial infection as procalcitonin was elevated to be 3.8. Zyvox held because of thrombocytopenia with platelets down to 36. MRSA nares being obtained. Hold heparin subcu to prevent spontaneous bleeding  Patient presented with thrombocytopenia with platelets of 78.     Patient was hypotensive initially which improved with fluids. Was able to tolerate hemodialysis yesterday. Remains acidotic with bicarb of 11. On low-dose bicarb drip now per nephrology.     Patient has been refusing care. He is refusing Covid testing. He is refusing wound care. Goals of care needs to be addressed with patient's mother. Palliative care would be appropriate.       Jamal Hannon MD  Pulmonary Critical Care and Sleep Medicine  11/14/2021, 1:35 PM    This note was completed using dragon medical speech recognition software. Grammatical errors, random word insertions, pronoun errors and incomplete sentences are occasional consequences of this technology due to software limitations. If there are questions or concerns about the content of this note of information contained within the body of this dictation they should be addressed with the provider for clarification.

## 2021-11-14 NOTE — PROGRESS NOTES
MD Dorothy Grady MD Hyman Bailey, MD                Office: [phone]                      Fax: [phone]          NEPHROLOGY ICU PROGRESS NOTE:     PATIENT NAME: Brendon Shelton  : 1970  MRN: [id number]       IMPRESSION / RECOMMENDATION:      Admitted for:  Pneumonia [J18.9]  ESRD (end stage renal disease) ([facility] [address] [N18.6]  Demand ischemia of myocardium ([facility] [address] [I24.8]  Acute respiratory failure with hypoxemia ([facility] [address] [J96.01]  Pneumonia due to infectious organism, unspecified laterality, unspecified part of lung [J18.9]  Acute hypoxic respiratory failure - needing NIPPV   COVID r/o ** pending       1. ESRD on iHD: MWF.   - at [facility] nephrology team   - S/P HD Friday - pt did not want and tolerate much, d/to hypotension  - S/P HD Saturday -   Access used: R fem TDC  Access function: Unable to get BFR above 350 ml/min. -350 ml/min during treatment. Dr. Omer Smith ordered cathflo for post dwell after treatment. Cathflo 2.6 ml instilled into venous and arterial ports of right fem TDC. \"  - next HD attempt on Monday,     -but overall, poor prognosis, appropriate for palliative consult - following     - recheck blood gas-   - LA - rechecked - WNL    - keep only low rate bicarb infusion  - f/u recheck labs afternoon   - informed RN to page me, if Bicarb not improving    D/W hospitalist - Dr Kimmie Vazquez also      2. Hypertension/Volume Status:  - BP - no need for tight control:   - EDW 79.5 kg   - Na Hyponatremia  (see above)     3. Electrolytes/acid-base:  K: WNL  High AG metabolic acidosis: fu w/ HD - higher bath, LA WNL    4. Anemia of renal failure: at goal  - ASHUTOSH: not needed    5. BMD:  - Phos: f/u in am labs -  - follow iPTH outpt    : Other supportive care :   - Check daily renal function panel with electrolytes-phosphorus  - Strict monitoring of I/Os, daily weight  - Renal feeds/diet  - Current medications reviewed.     - Nephrotoxic medications have hypochlorite   Irrigation Daily    collagenase   Topical Daily    linezolid  600 mg IntraVENous Q12H    piperacillin-tazobactam  3,375 mg IntraVENous Q8H    dexamethasone  20 mg IntraVENous Daily    sodium chloride flush  5-40 mL IntraVENous 2 times per day    heparin (porcine)  5,000 Units SubCUTAneous 3 times per day    Vitamin D  2,000 Units Oral Daily    famotidine (PEPCID) injection  20 mg IntraVENous Daily     Continuous Infusions:   sodium bicarbonate infusion 50 mL/hr at 11/13/21 1642    sodium chloride 500 mL/hr at 11/12/21 1415    sodium chloride Stopped (11/13/21 1641)    dextrose       PRN Meds:.lip balm petroleum free, promethazine, albumin human, midodrine, heparin (porcine), heparin (porcine), sodium chloride flush, sodium chloride, ondansetron **OR** ondansetron, polyethylene glycol, acetaminophen **OR** acetaminophen, guaiFENesin-dextromethorphan, glucose, dextrose, glucagon (rDNA), dextrose       REVIEW OF SYSTEMS:  Review of systems not obtained due to patient factors     PHYSICAL EXAM:  Patient Vitals for the past 24 hrs:   BP Temp Temp src Pulse Resp SpO2 Weight   11/14/21 0930 -- -- -- -- -- 96 % --   11/14/21 0900 133/87 -- -- 59 (!) 34 96 % --   11/14/21 0800 123/73 97.6 °F (36.4 °C) Temporal 64 25 97 % --   11/14/21 0600 -- -- -- -- -- -- 167 lb 15.9 oz (76.2 kg)   11/14/21 0545 -- -- -- -- -- -- 167 lb 6.4 oz (75.9 kg)   11/14/21 0400 (!) 141/80 -- -- 66 -- -- --   11/14/21 0339 -- -- -- -- -- 100 % --   11/14/21 0000 130/72 97.4 °F (36.3 °C) Temporal 62 -- 96 % --   11/13/21 2000 102/64 97.1 °F (36.2 °C) Temporal -- -- -- --   11/13/21 1953 -- -- -- -- 20 95 % --   11/13/21 1905 97/64 96.5 °F (35.8 °C) Temporal 62 24 -- --   11/13/21 1800 100/68 -- -- 68 -- -- --   11/13/21 1700 92/62 -- -- 61 -- -- --   11/13/21 1600 (!) 97/55 -- -- 60 -- -- --   11/13/21 1544 106/67 96.6 °F (35.9 °C) Temporal 64 24 -- --   11/13/21 1500 115/70 95.9 °F (35.5 °C) Temporal 68 26 90 % -- 11/13/21 1400 105/65 -- -- 64 -- -- --   11/13/21 1300 101/60 -- -- 66 24 -- --   11/13/21 1200 107/63 96.2 °F (35.7 °C) Temporal 73 -- 95 % --   11/13/21 1100 100/61 -- -- 69 -- -- --   11/13/21 1000 (!) 102/56 -- -- 71 -- -- --       Intake/Output Summary (Last 24 hours) at 11/14/2021 0936  Last data filed at 11/13/2021 1905  Gross per 24 hour   Intake 1144.56 ml   Output 2250 ml   Net -1105.44 ml          Physical exam:    In-person bedside physical examination deferred. Pursuant to the emergency declaration under the [address] [place] [address], [address] [place] [address] and the [employer] Act, this clinical encounter was conducted to provide necessary medical care. (Also consistent with new provisions and guidance offered by [facility] on March 18, 2020 in setting of COVID 19 outbreak and in order to preserve personal protective equipment in accordance with the flexibilities announced by CMS on March 30, 2020)   References: [email or web address]://[facility]. [facility]/Portals/0/Resources/COVID-19/3_18%20Telemed%20Guidance%20Updated%20March%2018. pdf?[email or web address]                      [email or web address]://[facility]. [facility]/Portals/0/Resources/COVID-19/3_18%20Telemed%20Guidance%20Updated%20March%2018. pdf?[email or web address]                      http://"[employer]/. pdf    However, I did visually inspect the patient and observed the following:      Vitals signs reviewed. Constitutional:       General: not in acute distress. Appearance:   ill-appearing. HENT:      Head: Normocephalic and atraumatic. Nose: Nose normal.      Mouth/Throat:      Mouth: Mucous membranes are dry . Eyes:      General: No scleral icterus. Conjunctiva/sclera: Conjunctivae normal.   Neck:      Musculoskeletal: Normal range of motion and neck supple. Cardiovascular:      Rate and Rhythm: Normal rate. Pulmonary:      Effort: Pulmonary effort is  Ab-normal.   Abdominal:      General: There is  distension. Musculoskeletal:      Right lower leg: edema. Left lower leg: edema. Skin:     Coloration: Skin is not jaundiced. Neurological:      General:  Deferred                DATA:  Diagnostic tests reviewed for today's visit:    Recent Labs     11/11/21  1339 11/12/21  0443 11/13/21  1026 11/14/21  0628   WBC 3.8* 4.1 5.6 4.0   HCT 35.7* 42.5 33.0* 31.5*   PLT 78* 72* 52* 36*     Iron Saturation:  No components found for: PERCENTFE  FERRITIN:    Lab Results   Component Value Date    FERRITIN 1,623.0 11/12/2021     IRON:    Lab Results   Component Value Date    IRON 106 11/12/2021     TIBC:    Lab Results   Component Value Date    TIBC 133 11/12/2021       Recent Labs     11/11/21  1339 11/12/21  0443 11/13/21  1026 11/13/21  1240 11/14/21  0628   * 135* 131* 131* 130*   K 4.7 4.6 5.4* 5.6* 4.4   CL 89* 87* 86* 87* 88*   CO2 16* 12* 8* 9* 11*   BUN 29* 36* 52* 55* 40*   CREATININE 5.0* 5.5* 6.0* 6.0* 4.7*     Recent Labs     11/11/21  1339 11/12/21  0443 11/13/21  1026 11/13/21  1240 11/14/21  0628   CALCIUM 8.6 8.9 7.7* 8.0* 7.9*   PHOS  --   --   --  7.9*  --      No results for input(s): PH, PCO2, PO2 in the last 72 hours. Invalid input(s): Willodean Ping    ABG:  No results found for: PH, PCO2, PO2, HCO3, BE, THGB, TCO2, O2SAT  VBG:    Lab Results   Component Value Date    PHVEN 7.143 11/13/2021    SVX5ADQ 28.5 11/13/2021    BEVEN -17.9 11/13/2021    Y5GEWJJK 96 11/13/2021           BELOW MENTIONED RADIOLOGY STUDY RESULTS REVIEWED BY ME:    XR CHEST PORTABLE    Result Date: 11/11/2021  EXAMINATION: ONE XRAY VIEW OF THE CHEST 11/11/2021 1:21 pm COMPARISON: None. HISTORY: ORDERING SYSTEM PROVIDED HISTORY: SOB TECHNOLOGIST PROVIDED HISTORY: Reason for exam:->SOB Reason for Exam: SOB Acuity: Acute Type of Exam: Initial FINDINGS: Exaggerated kyphotic positioning and patient rotation. Patient's chin obscures the right lung apex. Heart size indeterminate. Airspace disease in the mid and lower left lung. Strandy opacity in the right lung base     Limited study. Left-sided airspace disease may represent pneumonia or asymmetric edema.   Atelectasis present in the right base

## 2021-11-14 NOTE — FLOWSHEET NOTE
11/13/21 1544 11/13/21 1905   Treatment   Time On 1601  --    Time Off  --  1856   Observations & Evaluations   Level of Consciousness Alert (0)  --    Oriented X 1  --    Heart Rhythm Regular  --    FiO2  60 %  --    O2 Device Heated high flow cannula  --    Bilateral Breath Sounds Diminished  --    Vital Signs   /67 97/64   Temp 96.6 °F (35.9 °C) 96.5 °F (35.8 °C)   Pulse 64 62   Resp 24 24   Technical Checks   Dialysis Machine No. [id number]  --    RO Machine No. [id number]  --    Dialyzer Lot No. [id number]  --    RO Machine Log Sheet Completed Yes  --    Machine Alarm Self Test Completed; Passed  --    Machine Autotest Completed; Passed  --    Air Foam Detector Tested; Proper Function  --    Extracorporeal Circuit Tested for Integrity Yes  --    Machine Conductivity 13.5  --    Machine Conductivity # 13.6  --    Manual Conductivity 13.4  --    Manual Ph 7.4  --    Bleach Test (Neg) Yes  --    Bath Temperature 95 °F (35 °C)  --    Treatment Initiation   Dialyze Hours 3  --    Treatment  Initiation Universal Precautions maintained; Lines secured to patient; Connections secured; Prime given; Venous Parameters set; Arterial Parameters set; Air foam detector engaged; Hemosafe Device; Dialysate; Saline line double clamped; Hemo-Safe Applied; Dialyzer; F180  --    Dialysis Bath   K+ (Potassium) 2  --    Ca+ (Calcium) 2.5  --    Na+ (Sodium)   (Na step 145-140)  --    HCO3 (Bicarb) 35  --    Post-Hemodialysis Assessment   Post-Treatment Procedures  --  Blood returned  (catheter capped, clamped, with cathflo x2 ports)   Machine Disinfection Process  --  Acid/Vinegar Clean; Bleach;  Exterior Machine Disinfection   Rinseback Volume (ml)  --  400 ml   Total Liters Processed (l/min)  --  48.3 l/min   Dialyzer Clearance  --  Lightly streaked   Duration of Treatment (minutes)  --  175 minutes   Hemodialysis Intake (ml)  --  600 ml   Hemodialysis Output (ml)  --  2250 ml   NET Removed (ml)  --  1650 ml   Tolerated Treatment  --  Good   Patient Response to Treatment  --  see note   Physician Notified?  --  Yes   Treatment time: 3 hours   Net UF: 1650 ml    Pre weight: 75.9 kg  Post weight: 74.2 kg  EDW: 79.5 kg    Access used: R fem TDC  Access function: Unable to get BFR above 350 ml/min. -350 ml/min during treatment. Dr. Alvaro Madera ordered cathflo for post dwell after treatment. Cathflo 2.6 ml instilled into venous and arterial ports of right fem TDC. Medications or blood products given: Albumin 12.5 gm IVPB, Cathflo post HD line dwell. Regular outpatient schedule: MWf    Summary of response to treatment: Tolerated well. Copy of dialysis treatment record placed in chart, to be scanned into EMR.

## 2021-11-14 NOTE — PROGRESS NOTES
On entering patients room patients neck and back covered in blood, left EJ noted to be out and resting on pillow. Patient cleaned up and dressing applied to site. Restraints tightened. 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    0900: Spoke on the phone with Dr. Scarlet Francois who states she consulted to Dr. Leif lAlan (nephrology) and we are ok to place a midline today, order placed, this RN called PICC team.

## 2021-11-15 NOTE — PROGRESS NOTES
HOSPITALISTS PROGRESS NOTE    11/15/2021 8:47 AM        Name: Bairon Choi . Admitted: 11/11/2021  Primary Care Provider: Hernando Villarreal MD (Tel: [phone])      CC: Shortness of breath    Brief Course: This 49 y.o. male  with PMHx of ESRD on HD (M,W,F), GB syndrome, diabetes mellitus and peripheral neuropathy presented with shortness of breath. Patient reported that he has been experiencing shortness of breath, nonproductive cough, congestion, generalized body aches and pain, nausea and vomiting for the past 3 days. Per patient's report, nausea and vomiting has resolved. Patient stated that he had full run of HD yesterday. Of note, patient is not vaccinated for Covid and persistently refusing Covid testing. Patient denied any fevers, chills, chest pain, palpitations, dizziness, recent sick contact, travel. On admission, patient was hypoxic satting around 85% on room air; started on supplemental oxygen. Initial diagnostic level showed leukopenia as well as lymphopenia. Procalcitonin elevated to 3.87. Troponin: 0.10, BNP 8, 609, BUN: 29, creatinine 5.0, anion gap: 28, bicarb 16, hemoglobin: 11.2, platelets: 78. CXR showed left-sided airspace disease suggestive of pneumonia/pulmonary edema. Interval history:     Pt seen and examined today. Overnight events noted, interval ancillary notes and labs reviewed.    Afebrile overnight, procalcitonin trended down to 2.06  O2 recommend down to 3 L satting around 98%; wean as tolerated to keep sats above 92  IV steroid dose reduced to 10 mg IV daily  Platelets down to 40;XLD ordered  Blood glucose this morning; basal insulin dose increased  Patient has been refusing\" PCR and treatment; finally cleared for Covid testing yesterday  Covid test came back positive came back positive  Palliative care on board CODE STATUS changed to limited; no intubation        Assessment & Plan: Acute hypoxic respiratory failure likely secondary to COVID pneumnia  Afebrile, elevated procalcitonin, leukopenic and lymphopenic on admission  Not vaccinated for Covid; Covid PCR on 11/14/2021 came back positive  Elevated D-dimer, CRP, LDH and ferritin  Maintain droplet plus precautions  Was on Zyvox and Zosyn: Zyvox discontinued secondary to thrombocytopenia   Lovenox discontinued secondary thrombocytopenia  Continue IV steroids   Wean as tolerated to keep sats above 90%  Pulmonology on board appreciate the recs     Diabetes mellitus; on 50 units of Levemir at home  Hemoglobin A1c 14 on admission  Started on SSI and basal insulin  Monitor blood glucose closely     ESRD on hemodialysis Monday Wednesday Friday  proBNP elevated to [place] [address]  Nephrology consulted  Avoid nephrotoxins  Strict intake and output  Daily weights    Anion gap metabolic acidosis likely secondary to ESRD  On low-dose bicarb drip per nephro     Elevated troponin ? In setting of ESRD  Trend troponin  Monitor on telemetry    Pancytopenia:  Oncology consulted    Hypotension: On midodrine 2.5 mg 3 times daily  Neuro blood pressure closelyElevated alk phos likely secondary to CKD  Monitor liver function closely     Microcytic anemia  Iron studies ordered     Thrombocytopenia:  Platelets 78 on admission  Monitor level closely        DVT prophylaxis: Subcu heparin due to low platelets count  Code:Limited  Diet: ADULT ORAL NUTRITION SUPPLEMENT; Breakfast, Dinner; Renal Oral Supplement  ADULT DIET; Regular; 3 carb choices (45 gm/meal); Low Potassium (Less than 3000 mg/day);  Low Phosphorus (Less than 1000 mg)  ADULT ORAL NUTRITION SUPPLEMENT; Breakfast, Dinner; Renal Oral Supplement    Disposition:     Current Medications  insulin glargine (LANTUS;BASAGLAR) injection pen 20 Units, Nightly  lidocaine PF 1 % injection 5 mL, Once  sodium chloride flush 0.9 % injection 5-40 mL, 2 times per day  sodium chloride flush 0.9 % injection 5-40 mL, PRN  0.9 % sodium chloride infusion, PRN  piperacillin-tazobactam (ZOSYN) 2,250 mg in dextrose 5 % 50 mL IVPB (mini-bag), Q8H  insulin lispro (1 Unit Dial) 0-18 Units, TID WC  insulin lispro (1 Unit Dial) 0-9 Units, Nightly  sodium bicarbonate 150 mEq in dextrose 5 % 1,000 mL infusion, Continuous  midodrine (PROAMATINE) tablet 2.5 mg, TID WC  sodium hypochlorite (DAKINS) 0.125 % external solution, Daily  collagenase ointment, Daily  lip balm petroleum free (PHYTOPLEX) stick, PRN  promethazine (PHENERGAN) injection 6.25 mg, Q6H PRN  0.9 % sodium chloride infusion, Continuous  albumin human 25 % IV solution 12.5 g, PRN  midodrine (PROAMATINE) tablet 5 mg, PRN  dexamethasone (DECADRON) 20 mg in sodium chloride 0.9 % IVPB, Daily  sodium chloride flush 0.9 % injection 5-40 mL, 2 times per day  sodium chloride flush 0.9 % injection 5-40 mL, PRN  0.9 % sodium chloride infusion, PRN  ondansetron (ZOFRAN-ODT) disintegrating tablet 4 mg, Q8H PRN   Or  ondansetron (ZOFRAN) injection 4 mg, Q6H PRN  polyethylene glycol (GLYCOLAX) packet 17 g, Daily PRN  acetaminophen (TYLENOL) tablet 650 mg, Q6H PRN   Or  acetaminophen (TYLENOL) suppository 650 mg, Q6H PRN  guaiFENesin-dextromethorphan (ROBITUSSIN DM) 100-10 MG/5ML syrup 5 mL, Q4H PRN  Vitamin D (CHOLECALCIFEROL) tablet 2,000 Units, Daily  famotidine (PEPCID) injection 20 mg, Daily  glucose (GLUTOSE) 40 % oral gel 15 g, PRN  dextrose 50 % IV solution, PRN  glucagon (rDNA) injection 1 mg, PRN  dextrose 5 % solution, PRN        Objective:  BP (!) 126/43   Pulse (!) 45   Temp 96.5 °F (35.8 °C)   Resp 22   Ht 6' 4\" (1.93 m)   Wt 158 lb 8.2 oz (71.9 kg)   SpO2 98%   BMI 19.29 kg/m²     Intake/Output Summary (Last 24 hours) at 11/15/2021 0847  Last data filed at 11/15/2021 0440  Gross per 24 hour   Intake 547.39 ml   Output --   Net 547.39 ml      Wt Readings from Last 3 Encounters:   11/15/21 158 lb 8.2 oz (71.9 kg)   03/24/17 205 lb 0.4 oz (93 kg)   10/31/16 218 lb 11.1 oz (99.2 kg) Physical Examination:   General appearance: Ill-appearing  HEENT Normal cephalic, atraumatic without obvious deformity. Pupils equal, round, and reactive to light. Extra ocular muscles intact. Conjunctivae/corneas clear. Neck: Supple, No jugular venous distention/bruits. Trachea midline without thyromegaly  Lungs: diminished breath sounds with scattered Rales at bases bilaterally  Heart: Regular rate and rhythm with Normal S1/S2 without murmurs, rubs   Abdomen: Soft, non-tender or non-distended without rigidity or guarding and positive bowel sounds all four quadrants. Extremities: Contracted right upper extremity; range of motion could not be obtained as patient is wheelchair-bound  Skin: Skin color, texture, turgor normal.  No rashes or lesions. Neurologic: Alert and oriented X 3, neurovascularly intact with sensory/motor intact upper extremities/lower extremities, bilaterally. Cranial nerves: II-XII intact, grossly non-focal.  Psychiatry: Appropriate affect. Not agitated  Skin: Dry, scaly, scattered wounds on bilateral feet  Brisk capillary refill, peripheral pulses palpable     Labs and Tests:  CBC:   Recent Labs     11/13/21  1026 11/14/21  0628 11/15/21  0437   WBC 5.6 4.0 2.5*   HGB 10.7* 10.3* 10.0*   PLT 52* 36* 26*     BMP:    Recent Labs     11/14/21  0628 11/14/21  1444 11/15/21  0437   * 131* 134*   K 4.4 4.8 4.4   CL 88* 88* 92*   CO2 11* 11* 20*   BUN 40* 43* 51*   CREATININE 4.7* 5.1* 5.6*   GLUCOSE 272* 171* 169*     Hepatic:   No results for input(s): AST, ALT, ALB, BILITOT, ALKPHOS in the last 72 hours. XR CHEST PORTABLE   Final Result      Patchy opacity in the left lateral mid lung field and left lower lung field   and right infrahilar lower lung suggesting probable pneumonia/pneumonitis   other also may be some basilar atelectasis. Findings in the left lateral mid   lung field have worsened since the prior study.   Findings are somewhat   exaggerated by a less than optimal inspiration. XR CHEST PORTABLE   Final Result   Limited study. Left-sided airspace disease may represent pneumonia or   asymmetric edema. Atelectasis present in the right base             Problem List  Active Problems:    Pneumonia  Resolved Problems:    * No resolved hospital problems.  Daniel Fairchild MD   11/15/2021 8:47 AM

## 2021-11-15 NOTE — PROGRESS NOTES
MD Shira Peña MD Milas Risk, MD                Office: [phone]                      Fax: [phone]          NEPHROLOGY ICU PROGRESS NOTE:     PATIENT NAME: Parag Jacobsen  : 1970  MRN: [id number]       IMPRESSION / RECOMMENDATION:      Admitted for:  Pneumonia [J18.9]  ESRD (end stage renal disease) ([facility] [N18.6]  Demand ischemia of myocardium ([facility] [I24.8]  Acute respiratory failure with hypoxemia ([facility] [J96.01]  Pneumonia due to infectious organism, unspecified laterality, unspecified part of lung [J18.9]  Acute hypoxic respiratory failure - needing NIPPV   COVID r/o ** pending       1. ESRD on iHD: MWF.   - at [facility] nephrology team   - S/P HD Friday - pt did not want and tolerate much, d/to hypotension  - S/P HD Saturday -   - tolerating HD well today. About 1L removed. Access used: R fem TDC  Access function: needed cathflo- running better today    -but overall, poor prognosis, appropriate for palliative consult - following     -possible Venous sample showing resp. Alkalosis. On NC. Serum HCO3 has improved post HD. O2 support per ICU team.       2. Hypertension/Volume Status:  - BP - no need for tight control:  At goal.   - EDW 79.5 kg. Now at 70.4kg.   - Na Hyponatremia-mild and stable. (see above)   - on Midodrine    3. Electrolytes/acid-base:  K: WNL  High AG metabolic acidosis: fu w/ HD - serum HCO3 better. 4. Anemia of renal failure: at goal  - ASHUTOSH: not needed today. Follow Hgb. 5. BMD:  - Phos: f/u in am labs -  - follow iPTH outpt    High risk.         Other Problems:  Hospital Problems           Last Modified POA    Pneumonia 2021 Yes        Malik Almaraz MD  [facility] [address] [place]: [phone] or [address] [employer]  Fax: [phone]    Time spent  ~ 35 minutes that included face-to-face meeting/discussion with patient's treatment team (including primary/referring team and other consultants; included coordination of care with the treatment team; and review of patient's electronic medical records and ordering appropriates tests.       ===========================================  ===========================================  This patient is COVID-19 rule out, so in a strict isolation, as per current protocol; So in order to preserve PPE supply and to avoid unnecessary exposure to prevent transmission of COVID-19; this patient was NOT examined face to face, as risk of contact outweighs the benefits and likely would not change much of my clinical management. HPI, review of system and physical exam was limited, as it was mainly obtained from chart review and the primary team. But all relevant records and diagnostic tests were reviewed, including laboratory and imaging results. Patient's care is being coordinated with the primary service. Subjective:    Seen inside room while on HD. COVID positive  Confused  On NC    Past medical, surgical, social and family medial history reviewed by me:  MEDICATIONS reviewed by me:  Prior to Admission Medications:  No current facility-administered medications on file prior to encounter. Current Outpatient Medications on File Prior to Encounter   Medication Sig Dispense Refill    ondansetron (ZOFRAN ODT) 4 MG disintegrating tablet Take 1 tablet by mouth every 8 hours as needed for Nausea 20 tablet 0    pantoprazole (PROTONIX) 40 MG tablet Take 40 mg by mouth 2 times daily       oxyCODONE (OXY-IR) 30 MG immediate release tablet Take 30 mg by mouth every 4 hours as needed for Pain.  Promethazine HCl 6.25 MG/5ML SOLN Take 12.5 mg by mouth 2 times daily as needed.  insulin detemir (LEVEMIR FLEXPEN) 100 UNIT/ML injection Inject 50 Units into the skin nightly       ALPRAZolam (XANAX) 1 MG tablet Take 2 mg by mouth 2 times daily.  temazepam (RESTORIL) 30 MG capsule Take 30 mg by mouth nightly as needed.       fentaNYL (DURAGESIC) 50 MCG/HR Place 1 patch onto the skin every 72 hours         Medications Prior to Admission: ondansetron (ZOFRAN ODT) 4 MG disintegrating tablet, Take 1 tablet by mouth every 8 hours as needed for Nausea  pantoprazole (PROTONIX) 40 MG tablet, Take 40 mg by mouth 2 times daily   oxyCODONE (OXY-IR) 30 MG immediate release tablet, Take 30 mg by mouth every 4 hours as needed for Pain. Promethazine HCl 6.25 MG/5ML SOLN, Take 12.5 mg by mouth 2 times daily as needed. insulin detemir (LEVEMIR FLEXPEN) 100 UNIT/ML injection, Inject 50 Units into the skin nightly   ALPRAZolam (XANAX) 1 MG tablet, Take 2 mg by mouth 2 times daily. temazepam (RESTORIL) 30 MG capsule, Take 30 mg by mouth nightly as needed.   fentaNYL (DURAGESIC) 50 MCG/HR, Place 1 patch onto the skin every 72 hours     Inpatient Medications:  Scheduled Meds:   dexamethasone  10 mg IntraVENous Daily    cefTRIAXone (ROCEPHIN) IV  2,000 mg IntraVENous Q24H    pantoprazole  40 mg IntraVENous Daily    heparin (porcine)  5,200 Units IntraCATHeter Once    insulin glargine  20 Units SubCUTAneous Nightly    lidocaine 1 % injection  5 mL IntraDERmal Once    sodium chloride flush  5-40 mL IntraVENous 2 times per day    insulin lispro  0-18 Units SubCUTAneous TID WC    insulin lispro  0-9 Units SubCUTAneous Nightly    midodrine  2.5 mg Oral TID WC    sodium hypochlorite   Irrigation Daily    collagenase   Topical Daily    sodium chloride flush  5-40 mL IntraVENous 2 times per day    Vitamin D  2,000 Units Oral Daily     Continuous Infusions:   sodium chloride      sodium chloride 25 mL (11/14/21 1276)    dextrose       PRN Meds:.sodium chloride flush, sodium chloride, lip balm petroleum free, promethazine, albumin human, midodrine, sodium chloride flush, sodium chloride, ondansetron **OR** ondansetron, polyethylene glycol, acetaminophen **OR** acetaminophen, guaiFENesin-dextromethorphan, glucose, dextrose, glucagon (rDNA), dextrose       REVIEW OF SYSTEMS:  Review of systems not obtained due to patient factors     PHYSICAL EXAM:  Patient Vitals for the past 24 hrs:   BP Temp Temp src Pulse Resp SpO2 Weight   11/15/21 1123 125/79 97 °F (36.1 °C) -- 59 16 92 % 155 lb 3.3 oz (70.4 kg)   11/15/21 0823 (!) 126/43 96.5 °F (35.8 °C) -- (!) 45 22 98 % 158 lb 8.2 oz (71.9 kg)   11/15/21 0700 (!) 112/90 -- -- 51 16 91 % --   11/15/21 0600 127/73 -- -- (!) 47 22 96 % --   11/15/21 0500 (!) 138/49 -- -- (!) 48 17 98 % --   11/15/21 0400 (!) 111/59 98.2 °F (36.8 °C) CORE (!) 46 20 99 % --   11/15/21 0300 84/69 -- -- (!) 48 26 100 % --   11/15/21 0241 -- -- -- -- 19 100 % --   11/15/21 0200 (!) 110/41 -- -- (!) 49 18 98 % --   11/15/21 0100 110/89 -- -- 52 27 98 % --   11/15/21 0017 -- -- -- -- 17 96 % --   11/15/21 0000 (!) 117/43 97.5 °F (36.4 °C) Temporal 51 22 99 % --   11/14/21 2300 (!) 123/50 -- -- 55 24 100 % --   11/14/21 2200 118/86 -- -- 55 26 98 % --   11/14/21 2100 115/62 -- -- 54 25 99 % --   11/14/21 2000 (!) 133/46 97.9 °F (36.6 °C) Temporal 52 28 98 % --   11/14/21 1900 (!) 86/46 -- -- 57 24 94 % --   11/14/21 1800 (!) 102/50 -- -- 54 (!) 31 98 % --   11/14/21 1700 (!) 113/45 96.4 °F (35.8 °C) Temporal 61 26 92 % --   11/14/21 1600 (!) 112/43 -- -- 62 20 97 % --   11/14/21 1536 (!) 104/44 97 °F (36.1 °C) Temporal -- -- -- --   11/14/21 1529 -- -- -- -- 28 100 % --   11/14/21 1500 (!) 104/44 -- -- 54 22 100 % --   11/14/21 1400 126/81 -- -- 63 26 92 % --       Intake/Output Summary (Last 24 hours) at 11/15/2021 1345  Last data filed at 11/15/2021 1123  Gross per 24 hour   Intake 947.39 ml   Output 1400 ml   Net -452.61 ml          Physical exam:    In-person bedside physical examination deferred.   Pursuant to the emergency declaration under the [address] [place] [address] authority and the [employer] Act, this clinical encounter was conducted to provide necessary medical care.   (Also consistent with new provisions and guidance offered by [facility] on March 18, 2020 in setting of COVID 19 outbreak and in order to preserve personal protective equipment in accordance with the flexibilities announced by CMS on March 30, 2020)   References: [email or web address]/Portals/0/Resources/COVID-19/3_18%20Telemed%20Guidance%20Updated%20March%2018. [email or web address]                      [email or web address]/Portals/0/Resources/COVID-19/3_18%20Telemed%20Guidance%20Updated%20March%2018. pdf?[email or web address]                      [email or web address] pdf    However, I did visually inspect the patient and observed the following:      Vitals signs reviewed. Constitutional:       General: not in acute distress. Appearance:   ill-appearing. HENT:      Head: Normocephalic and atraumatic. Nose: Nose normal.      Mouth/Throat:      Mouth: Mucous membranes are dry . Eyes:      General: No scleral icterus. Conjunctiva/sclera: Conjunctivae normal.   Neck:      Musculoskeletal: Normal range of motion and neck supple. Cardiovascular:      Rate and Rhythm: Normal rate. Pulmonary:      Effort: Pulmonary effort is  Ab-normal.   Abdominal:      General: There is  distension. Musculoskeletal:      Right lower leg: mild edema. Left lower leg: mild edema. Skin:     Coloration: Skin is not jaundiced.    Neurological:      General:  Deferred                DATA:  Diagnostic tests reviewed for today's visit:    Recent Labs     11/13/21  1026 11/14/21  0628 11/15/21  0437   WBC 5.6 4.0 2.5*   HCT 33.0* 31.5* 29.4*   PLT 52* 36* 26*     Iron Saturation:  No components found for: PERCENTFE  FERRITIN:    Lab Results   Component Value Date    FERRITIN 1,623.0 11/12/2021     IRON:    Lab Results   Component Value Date    IRON 106 11/12/2021     TIBC:    Lab Results   Component Value Date    TIBC 133 11/12/2021 Recent Labs     11/13/21  1240 11/14/21  0628 11/14/21  1444 11/15/21  0437 11/15/21  1130   * 130* 131* 134* 134*   K 5.6* 4.4 4.8 4.4 3.7   CL 87* 88* 88* 92* 92*   CO2 9* 11* 11* 20* 24   BUN 55* 40* 43* 51* 24*   CREATININE 6.0* 4.7* 5.1* 5.6* 3.2*     Recent Labs     11/13/21  1240 11/14/21  0628 11/14/21  1444 11/15/21  0437 11/15/21  1130   CALCIUM 8.0* 7.9* 7.8* 7.6* 8.2*   PHOS 7.9*  --  4.7  --   --      No results for input(s): PH, PCO2, PO2 in the last 72 hours. Invalid input(s): Laz Mooney    ABG:  No results found for: PH, PCO2, PO2, HCO3, BE, THGB, TCO2, O2SAT  VBG:    Lab Results   Component Value Date    PHVEN 7.143 11/13/2021    TCA3JQN 28.5 11/13/2021    BEVEN -17.9 11/13/2021    [id number] 96 11/13/2021           BELOW MENTIONED RADIOLOGY STUDY RESULTS REVIEWED BY ME:    XR CHEST PORTABLE    Result Date: 11/11/2021  EXAMINATION: ONE XRAY VIEW OF THE CHEST 11/11/2021 1:21 pm COMPARISON: None. HISTORY: ORDERING SYSTEM PROVIDED HISTORY: SOB TECHNOLOGIST PROVIDED HISTORY: Reason for exam:->SOB Reason for Exam: SOB Acuity: Acute Type of Exam: Initial FINDINGS: Exaggerated kyphotic positioning and patient rotation. Patient's chin obscures the right lung apex. Heart size indeterminate. Airspace disease in the mid and lower left lung. Strandy opacity in the right lung base     Limited study. Left-sided airspace disease may represent pneumonia or asymmetric edema.   Atelectasis present in the right base

## 2021-11-15 NOTE — CONSULTS
[facility]   CONSULT NOTE    11/15/2021 10:09 AM    Patient Information: Pam Regalado   Date of Admit:  11/11/2021  Primary Care Physician:  Jung Ngo MD  Requesting Physician:  Tien Alvarado MD    Reason for consult:   Evaluation and recommendations for pancytopenia    Chief complaint:    Chief Complaint   Patient presents with    Shortness of Breath     Came in by [facility] from home with c/o SOB and no appetite, has dialysis at [facility] 3x week. Last treatment was yesterday 11/10/2021. RA 85%       History of Present Illness:  Pam Regalado is a 49 y.o. male on Tien Alvarado MD service who was admitted on 11/11/2021 with acute respiratory failure. He has ESRD and is on HD TIW. He initially refused COVID testing, but was eventually swabbed on 11/14/21 results were positive. He is currently on 10 L O2 per NC. He is receiving bedside hemodialysis. He was pancytopenic on admission, WBC at 3.8, hgb 11.2 plts 78. Currently WBC have trended down to 2.5, hgb 10.0, plts 26k. He has remained afebrile, with no evidence of external bleeding  He received Zyvox and Zosyn from 11/12/21, stopped on 11/14/21. He is currently receiving Rocephin. Discussed with RN, patient is confused and oriented to self only. Past Medical History:     has a past medical history of Blood transfusion, Chronic pain, Diabetes mellitus ([address] Dialysis, Guillain Dixon syndrome, Hemodialysis patient ([facility] [address] Low blood pressure, Pancreatitis, Peripheral Neuropathy, Pneumonia, and Renal failure.      Past Surgical History:    Past Surgical History:   Procedure Laterality Date    CHOLECYSTECTOMY      DIALYSIS FISTULA CREATION      left arm/currently non functioning    KIDNEY TRANSPLANT  9/1994    R-kidney    TOE SURGERY  10-8-2010    cut and realign 1st, 2nd, toe left foot , fixation 1st, 2nd toe left foot    TUNNELED VENOUS CATHETER PLACEMENT      left chest for dialysis    TUNNELED VENOUS SWS:SW received copy of pt guardian paperwork. Copy will scanned into Epic and copy placed on pt chart.   PORT PLACEMENT          Current Medications:     dexamethasone  10 mg IntraVENous Daily    cefTRIAXone (ROCEPHIN) IV  2,000 mg IntraVENous Q24H    pantoprazole  40 mg IntraVENous Daily    insulin glargine  20 Units SubCUTAneous Nightly    lidocaine 1 % injection  5 mL IntraDERmal Once    sodium chloride flush  5-40 mL IntraVENous 2 times per day    insulin lispro  0-18 Units SubCUTAneous TID WC    insulin lispro  0-9 Units SubCUTAneous Nightly    midodrine  2.5 mg Oral TID WC    sodium hypochlorite   Irrigation Daily    collagenase   Topical Daily    sodium chloride flush  5-40 mL IntraVENous 2 times per day    Vitamin D  2,000 Units Oral Daily       Allergies: Allergies   Allergen Reactions    Flu Virus Vaccine      Caused paralysis        Social History:    reports that he has never smoked. He has never used smokeless tobacco. He reports that he does not drink alcohol and does not use drugs. Family History:     family history includes Diabetes in his mother. PHYSICAL EXAM:     Vitals:  Vitals:    11/15/21 0823   BP: (!) 126/43   Pulse: (!) 45   Resp: 22   Temp: 96.5 °F (35.8 °C)   SpO2: 98%        Intake/Output Summary (Last 24 hours) at 11/15/2021 1009  Last data filed at 11/15/2021 0440  Gross per 24 hour   Intake 547.39 ml   Output --   Net 547.39 ml      Wt Readings from Last 3 Encounters:   11/15/21 158 lb 8.2 oz (71.9 kg)   03/24/17 205 lb 0.4 oz (93 kg)   10/31/16 218 lb 11.1 oz (99.2 kg)      Physical exam deferred d/t COVID isolation    General appearance: Appears comfortable.        DATA:  CBC:   Lab Results   Component Value Date    WBC 2.5 11/15/2021    RBC 4.37 11/15/2021    HGB 10.0 11/15/2021    HCT 29.4 11/15/2021    MCV 67.3 11/15/2021    MCH 22.9 11/15/2021    MCHC 34.0 11/15/2021    RDW 18.9 11/15/2021    PLT 26 11/15/2021    MPV 8.2 11/15/2021     BMP:  Lab Results   Component Value Date     11/15/2021    K 4.4 11/15/2021    K 4.6 11/12/2021    CL 92 11/15/2021    CO2 20

## 2021-11-15 NOTE — PROGRESS NOTES
distress  Skin: warm and dry, no rash or erythema  Head: normocephalic and atraumatic  Eyes: pupils equal, round, and reactive to light, extraocular eye movements intact, conjunctivae normal  ENT: external ear and ear canal normal bilaterally, nose without deformity, nasal mucosa and turbinates normal  Neck: supple and non-tender without mass, no cervical lymphadenopathy  Pulmonary/Chest: rales present-bilateral  Cardiovascular: normal rate, regular rhythm,  no murmurs, rubs, distal pulses intact, no carotid bruits  Abdomen: soft, non-tender, non-distended, normal bowel sounds, no masses or organomegaly  Lymph Nodes: Cervical, supraclavicular normal  Extremities: no cyanosis, clubbing or edema  Musculoskeletal: normal range of motion, no joint swelling, deformity or tenderness  Neurologic: alert, no focal neurologic deficits    Data Review:  CBC:   Lab Results   Component Value Date    WBC 2.5 11/15/2021    RBC 4.37 11/15/2021     BMP:   Lab Results   Component Value Date    GLUCOSE 121 11/15/2021    CO2 24 11/15/2021    BUN 24 11/15/2021    CREATININE 3.2 11/15/2021    CALCIUM 8.2 11/15/2021     ABG:   Lab Results   Component Value Date    GLO2UHQ 28.6 11/15/2021    BEART 5.1 11/15/2021    G7IMVMCX 39.1 11/15/2021    PHART 7.499 11/15/2021    DDJ3RLI 36.8 11/15/2021    PO2ART <30.0 11/15/2021    ENX3HLS 66.5 11/15/2021       Radiology: All pertinent images / reports were reviewed as a part of this visit.     Narrative       EXAM:       XR Chest, 1 View       EXAM DATE/TIME:       11/12/2021 1:46 pm       CLINICAL HISTORY:       ORDERING SYSTEM PROVIDED  resp distress  TECHNOLOGIST PROVIDED HISTORY:   Reason for exam:->resp distress  Reason for Exam: resp distress  Acuity:   Acute  Type of Exam: Initial       TECHNIQUE:       Frontal view of the chest.       COMPARISON:       11/11/2021       FINDINGS:       Limitations:  A suboptimal inspiration and patient rotation limits the study.       Lungs and pleural spaces:  Patchy opacity in the left lateral mid lung field   and left lower lung field and right infrahilar lower lung suggesting probable   pneumonia/pneumonitis other also may be some basilar atelectasis.  Findings   in the left lateral mid lung field have worsened since the prior study.  No   pneumothorax.       Heart:  No acute findings.  No cardiomegaly.       Mediastinum:  No acute findings.       Bones/joints:  No acute findings.           Impression       Patchy opacity in the left lateral mid lung field and left lower lung field   and right infrahilar lower lung suggesting probable pneumonia/pneumonitis   other also may be some basilar atelectasis.  Findings in the left lateral mid   lung field have worsened since the prior study.  Findings are somewhat   exaggerated by a less than optimal inspiration. Problem List:     Acute hypoxic respiratory failure  COVID-19 pneumonia  Pancytopenia  ESRD on hemodialysis  Assessment/Plan:     Acute hypoxic respiratory failure related to COVID-19 infection. Currently on IV Decadron 10 mg daily. Remdesivir not administered due to end-stage renal disease. O2 requirements is now down to 2 to 3 L.  , recheck level. Elevated procalcitonin, will switch antibiotics to Rocephin for 2 more days. D-dimer 3600, anticoagulation held due to profound thrombocytopenia with platelet count of 26. Platelet count is lower than baseline, possibly related to COVID-19 infection. Use SCD instead for DVT prophylaxis. History of pancytopenia even prior to admission ? Cause. Patient has prior history of Guillain-Barré syndrome    End-stage renal disease on hemodialysis, receiving 1 session today. Stop sodium bicarbonate drip. Critical care team will follow as needed.     Toan Mauricio MD

## 2021-11-15 NOTE — CARE COORDINATION
Discharge Planning Assessment    RN discharge planner met with mother via phone to discuss reason for admission, current living situation, and potential needs at the time of discharge    Demographics/Insurance verified Yes    Current type of dwelling:  Private home    Living arrangements:  Lives and supported by mom    Level of function/Support:  Independent w/Standby assist,  recently dc from physical rehab    PCP:  Jane Jiménez    Last Visit to PCP:  3-4 weeks ago    DME:  Has a walker at home and uses PRN (rarely)    Active with any community resources/agencies/skilled home care:  No pt does have HD at [facility] on M,W,F    Medication compliance issues:  None identified    Financial issues that could impact healthcare:  None identified        Tentative discharge plan:  Home w/ mom but PT/OT eval pending    Discussed and provided facilities of choice if transition to a skilled nursing facility is required at the time of discharge      Discussed with patient and/or family that on the day of discharge home tentative time of discharge will be between 10 AM and noon.     Transportation at the time of discharge:  Mom    Electronically signed by Jas Strickland RN on 11/15/2021 at 2:49 PM

## 2021-11-15 NOTE — FLOWSHEET NOTE
11/15/21 0823 11/15/21 1123   Treatment   Time On 0823  --    Time Off  --  1123   Vital Signs   BP (!) 126/43 125/79   Temp 96.5 °F (35.8 °C) 97 °F (36.1 °C)   Pulse (!) 45 59   Resp 22 16   SpO2 98 % 92 %   Weight 158 lb 8.2 oz (71.9 kg) 155 lb 3.3 oz (70.4 kg)   Post-Hemodialysis Assessment   Hemodialysis Intake (ml)  --  400 ml   Hemodialysis Output (ml)  --  1400 ml   NET Removed (ml)  --  1000 ml     Treatment time: 3 horus  Net UF: 1000 ml    Pre weight: 71.9kg  Post weight: 70.4kg  EDW: 79.5kg    Access used: right femoral catheter  Access function: positional, unable to run treatment at the ordered rate of 400, treatment ran at 350, ok per MD.    Medications or blood products given: N/A    Regular outpatient schedule: MWF    Summary of response to treatment: pt tolerated treatment well, goal met for this treatment. Vital signs stable. Electronically signed by Trip Epstein RN on 11/15/2021 at 11:51 AM    Copy of dialysis treatment record placed in chart, to be scanned into EMR.

## 2021-11-16 NOTE — CARE COORDINATION
Discharge Planning Note:    Talked with the patient concerning placement with a SNF. The patient was given the list of two options for patient that are COVID positive. The patient is agreeable to the tw places. Referrals were sent, waiting on replies.     Referrals sent to:    [facility] of [address] [place] [address], [place]    Phone: [phone]

## 2021-11-16 NOTE — PLAN OF CARE
Problem: Falls - Risk of:  Goal: Will remain free from falls  Description: Will remain free from falls  Outcome: Ongoing  Goal: Absence of physical injury  Description: Absence of physical injury  Outcome: Ongoing   Fall precautions in place    Problem: Pain:  Goal: Pain level will decrease  Description: Pain level will decrease  Outcome: Ongoing   Will continue to monitor       Problem: Skin Integrity:  Goal: Will show no infection signs and symptoms  Description: Will show no infection signs and symptoms  Outcome: Ongoing   Pt has sores PTA.  Will care for as directed      Problem: Nutrition  Goal: Optimal nutrition therapy  Outcome: Ongoing   Encouraging to eat but Pt often refuses  Problem: Non-Violent Restraints  Goal: Removal from restraints as soon as assessed to be safe  Outcome: Ongoing   Pt still confused and had pulled out IJ x 1 And had it replaced      Problem: Gas Exchange - Impaired  Goal: Absence of hypoxia  Outcome: Ongoing  Goal: Promote optimal lung function  Outcome: Ongoing   Tolerating room air this shift       Problem: Isolation Precautions - Risk of Spread of Infection  Goal: Prevent transmission of infection  Outcome: Ongoing     Problem: Nutrition Deficits  Goal: Optimize nutritional status  Outcome: Ongoing   Supplements refused/ Encouraging oral intake    Problem: Loneliness or Risk for Loneliness  Goal: Demonstrate positive use of time alone when socialization is not possible  Outcome: Ongoing   Talks with mom and sister on phone      Problem: Fatigue  Goal: Verbalize increase energy and improved vitality  Outcome: Ongoing   Got very tired after PT/OT    Problem: Patient Education: Go to Patient Education Activity  Goal: Patient/Family Education  Outcome: Ongoing   Discussed POC with mother and sister

## 2021-11-16 NOTE — PROGRESS NOTES
Comprehensive Nutrition Assessment    Type and Reason for Visit:  Reassess    Nutrition Recommendations/Plan:   1. Consider liberalizing diet to allow for more food options. 2.  D/c supplements as pt is refusing      Nutrition Assessment:  Pt remains nutritionally compromised AEB inadequate oral intake. RN reports pt is somewhat paranoid & feels hospital staff is lying to him. He does not trust the food we provide, refusing meals/supplements often. May want to consider liberalizing diet to allow for more food selections, however this may not be useful d/t pt's mistrust.  Con't to encourage po intake to promote healing & strength. Malnutrition Assessment:  Malnutrition Status: At risk for malnutrition (Comment)    Context:  Acute Illness     Findings of the 6 clinical characteristics of malnutrition:  Energy Intake:  Mild decrease in energy intake (Comment) (poor appetite x 3 days PTA)  Weight Loss:  Unable to assess (d/t weight fluctuations r/t fluid imbalances)     Body Fat Loss:  Unable to assess (COVID-19 R/O, droplet + isolation)     Muscle Mass Loss:  Unable to assess (COVID-19 R/O, droplet + isolation)    Fluid Accumulation:  7 - Moderate to Severe Extremities (BLE + 2 edema)   Strength:  Not Performed    Estimated Daily Nutrient Needs:  Energy (kcal):  2364-6731 kcals based on 30-33 kcals/kg/CBW (HD); Weight Used for Energy Requirements:  Current     Protein (g):   g protein based on 1.2-1.5 g/kg/CBW (HD); Weight Used for Protein Requirements:  Current        Fluid (ml/day):  0413-3442 ml; Method Used for Fluid Requirements:  1 ml/kcal      Nutrition Related Findings:  LBM 11/16; +1 BLE edema      Wounds:  Diabetic Ulcer       Current Nutrition Therapies:    ADULT DIET; Regular; 3 carb choices (45 gm/meal); Low Potassium (Less than 3000 mg/day);  Low Phosphorus (Less than 1000 mg)    Anthropometric Measures:  · Height: 6' 4\" (193 cm)  · Current Body Weight: 155 lb (70.3 kg)   · Admission Body Weight: 158 lb 3.2 oz (71.8 kg) (obtained 11/12; actual weight)    · Usual Body Weight: 202 lb 13.2 oz (92 kg) (obtained 12/2/2020 per past encounters; weight hx is difficult to assess d/t fluid imbalances r/t ESRD on HD)     · Ideal Body Weight: 202 lbs; % Ideal Body Weight 76.7 %   · BMI: 18.9  · Adjusted Body Weight:  ; No Adjustment   · Adjusted BMI:      · BMI Categories: Normal Weight (BMI 22.0 to 24.9) age over 72       Nutrition Diagnosis:   · Inadequate oral intake related to inadequate protein-energy intake as evidenced by poor intake prior to admission, intake 0-25%    · Increased nutrient needs related to increase demand for energy/nutrients as evidenced by wounds      Nutrition Interventions:   Food and/or Nutrient Delivery:  Discontinue Oral Nutrition Supplement, Modify Current Diet  Nutrition Education/Counseling:  Education not indicated   Coordination of Nutrition Care:  Continue to monitor while inpatient    Goals:  patient will accept and consume 75% or greater of meals and suuplements offered during admission       Nutrition Monitoring and Evaluation:   Behavioral-Environmental Outcomes:  None Identified   Food/Nutrient Intake Outcomes:  Food and Nutrient Intake, Supplement Intake  Physical Signs/Symptoms Outcomes:  Biochemical Data, Meal Time Behavior, Skin, Weight     Discharge Planning:     Too soon to determine     Electronically signed by Ashlie Dean RD, LD on 11/16/21 at 11:42 AM EST    Contact: [phone]

## 2021-11-16 NOTE — PROGRESS NOTES
Patient in AM was completely uncooperative. Pt was lethargic. Assessment completed. Dialysis taking place. Pt was louise. 1300 pt was alert but not oriented.  Continually talking about going home

## 2021-11-16 NOTE — PROGRESS NOTES
MD Teresa Lemus MD Rexene Sails, MD                Office: [phone]                      Fax: [phone]          NEPHROLOGY ICU PROGRESS NOTE:     PATIENT NAME: Jacqui Lovett  : 1970  MRN: [id number]       IMPRESSION / RECOMMENDATION:      Admitted for:  Pneumonia [J18.9]  ESRD (end stage renal disease) ([facility] [address] [N18.6]  Demand ischemia of myocardium ([facility] [address] [I24.8]  Acute respiratory failure with hypoxemia ([facility] [address] [J96.01]  Pneumonia due to infectious organism, unspecified laterality, unspecified part of lung [J18.9]  Acute hypoxic respiratory failure - needing NIPPV   COVID r/o ** pending     Labs today not available    1. ESRD on iHD: MWF.   - at [facility] nephrology team   - S/P HD Friday - pt did not want and tolerate much, d/to hypotension  - S/P HD Saturday -   - tolerating HD well today. About 1L removed. Access used: R fem TDC  Access function: needed cathflo- running better yesterday.    -but overall, poor prognosis, appropriate for palliative consult - following     -possible Venous sample showing resp. Alkalosis. On NC. Serum HCO3 has improved post HD. O2 support per ICU team.     Next HD tomorrow. 2. Hypertension/Volume Status:  - BP - no need for tight control:  At goal.   - EDW 79.5 kg. Now at 70.4kg.   - Na Hyponatremia-mild and stable. (see above)   - on Midodrine    3. Electrolytes/acid-base:  K: WNL  High AG metabolic acidosis: fu w/ HD - serum HCO3 better. 4. Anemia of renal failure: at goal  - ASHUTOSH: not needed today. Follow Hgb. 5. BMD:  - Phos: f/u in am labs -  - follow iPTH outpt    High risk.         Other Problems:  Hospital Problems           Last Modified POA    Pneumonia 2021 Yes        Wally Sotelo MD  [facility] [address] [place]: [phone] or Via [employer]  Fax: [phone]    Time spent  ~ 35 minutes that included face-to-face meeting/discussion with patient's capsule Take 30 mg by mouth nightly as needed.  fentaNYL (DURAGESIC) 50 MCG/HR Place 1 patch onto the skin every 72 hours         Medications Prior to Admission: ondansetron (ZOFRAN ODT) 4 MG disintegrating tablet, Take 1 tablet by mouth every 8 hours as needed for Nausea  pantoprazole (PROTONIX) 40 MG tablet, Take 40 mg by mouth 2 times daily   oxyCODONE (OXY-IR) 30 MG immediate release tablet, Take 30 mg by mouth every 4 hours as needed for Pain. Promethazine HCl 6.25 MG/5ML SOLN, Take 12.5 mg by mouth 2 times daily as needed. insulin detemir (LEVEMIR FLEXPEN) 100 UNIT/ML injection, Inject 50 Units into the skin nightly   ALPRAZolam (XANAX) 1 MG tablet, Take 2 mg by mouth 2 times daily. temazepam (RESTORIL) 30 MG capsule, Take 30 mg by mouth nightly as needed.   fentaNYL (DURAGESIC) 50 MCG/HR, Place 1 patch onto the skin every 72 hours     Inpatient Medications:  Scheduled Meds:   dexamethasone  10 mg IntraVENous Daily    cefTRIAXone (ROCEPHIN) IV  2,000 mg IntraVENous Q24H    pantoprazole  40 mg IntraVENous Daily    heparin (porcine)  5,200 Units IntraCATHeter Once    insulin glargine  20 Units SubCUTAneous Nightly    lidocaine 1 % injection  5 mL IntraDERmal Once    sodium chloride flush  5-40 mL IntraVENous 2 times per day    insulin lispro  0-18 Units SubCUTAneous TID WC    insulin lispro  0-9 Units SubCUTAneous Nightly    midodrine  2.5 mg Oral TID WC    sodium hypochlorite   Irrigation Daily    collagenase   Topical Daily    sodium chloride flush  5-40 mL IntraVENous 2 times per day    Vitamin D  2,000 Units Oral Daily     Continuous Infusions:   sodium chloride      sodium chloride 25 mL (11/14/21 1546)    dextrose       PRN Meds:.sodium chloride flush, sodium chloride, lip balm petroleum free, promethazine, albumin human, midodrine, sodium chloride flush, sodium chloride, ondansetron **OR** ondansetron, polyethylene glycol, acetaminophen **OR** acetaminophen, guaiFENesin-dextromethorphan, glucose, dextrose, glucagon (rDNA), dextrose       REVIEW OF SYSTEMS:  Review of systems not obtained due to patient factors     PHYSICAL EXAM:  Patient Vitals for the past 24 hrs:   BP Temp Temp src Pulse Resp SpO2 Weight   11/16/21 0830 86/60 96.2 °F (35.7 °C) Temporal (!) 39 12 100 % --   11/16/21 0800 (!) 122/101 -- Temporal (!) 39 18 100 % --   11/16/21 0601 -- -- -- -- -- -- 155 lb 4.8 oz (70.4 kg)   11/16/21 0400 (!) 123/46 97.3 °F (36.3 °C) Temporal (!) 42 21 93 % --   11/15/21 1954 (!) 140/47 97.1 °F (36.2 °C) Temporal 55 19 97 % --   11/15/21 1900 (!) 135/58 -- -- (!) 48 20 97 % --   11/15/21 1800 (!) 130/110 -- -- 50 25 100 % --   11/15/21 1620 (!) 178/41 96.7 °F (35.9 °C) Temporal (!) 46 19 100 % --   11/15/21 1123 125/79 97 °F (36.1 °C) -- 59 16 92 % 155 lb 3.3 oz (70.4 kg)       Intake/Output Summary (Last 24 hours) at 11/16/2021 1022  Last data filed at 11/15/2021 1733  Gross per 24 hour   Intake 600 ml   Output 1400 ml   Net -800 ml          Physical exam:    In-person bedside physical examination deferred. Pursuant to the emergency declaration under the [facility] [place], [address] [place] [address] authority and the [employer] Act, this clinical encounter was conducted to provide necessary medical care. (Also consistent with new provisions and guidance offered by [facility] on March 18, 2020 in setting of COVID 19 outbreak and in order to preserve personal protective equipment in accordance with the flexibilities announced by CMS on March 30, 2020)   References: https://[facility]. [place]/Portals/0/Resources/COVID-19/3_18%20Telemed%20Guidance%20Updated%20March%2018. pdf?[email or web address]=[email or web address]                      https://[facility]. [facility]/Portals/0/Resources/COVID-19/3_18%20Telemed%20Guidance%20Updated%20March%2018. pdf?[email or web address] pdf    However, I did visually inspect the patient and observed the following:      Vitals signs reviewed. Constitutional:       General: not in acute distress. Appearance:   ill-appearing. HENT:      Head: Normocephalic and atraumatic. Nose: Nose normal.      Mouth/Throat:      Mouth: Mucous membranes are dry . Eyes:      General: No scleral icterus. Conjunctiva/sclera: Conjunctivae normal.   Neck:      Musculoskeletal: Normal range of motion and neck supple. Cardiovascular:      Rate and Rhythm: Normal rate. Pulmonary:      Effort: Pulmonary effort is  Ab-normal.   Abdominal:      General: There is  distension. Musculoskeletal:      Right lower leg: mild edema. Left lower leg: mild edema. Skin:     Coloration: Skin is not jaundiced. Neurological:      General:  Deferred                DATA:  Diagnostic tests reviewed for today's visit:    Recent Labs     11/13/21  1026 11/14/21  0628 11/15/21  0437   WBC 5.6 4.0 2.5*   HCT 33.0* 31.5* 29.4*   PLT 52* 36* 26*     Iron Saturation:  No components found for: PERCENTFE  FERRITIN:    Lab Results   Component Value Date    FERRITIN 1,623.0 11/12/2021     IRON:    Lab Results   Component Value Date    IRON 106 11/12/2021     TIBC:    Lab Results   Component Value Date    TIBC 133 11/12/2021       Recent Labs     11/13/21  1240 11/14/21  0628 11/14/21  1444 11/15/21  0437 11/15/21  1130   * 130* 131* 134* 134*   K 5.6* 4.4 4.8 4.4 3.7   CL 87* 88* 88* 92* 92*   CO2 9* 11* 11* 20* 24   BUN 55* 40* 43* 51* 24*   CREATININE 6.0* 4.7* 5.1* 5.6* 3.2*     Recent Labs     11/13/21  1240 11/14/21  0628 11/14/21  1444 11/15/21  0437 11/15/21  1130   CALCIUM 8.0* 7.9* 7.8* 7.6* 8.2*   PHOS 7.9*  --  4.7  --   --      No results for input(s): PH, PCO2, PO2 in the last 72 hours.     Invalid input(s): [id number], INSPIREDO2    ABG:  No results found for: PH, PCO2, PO2, HCO3, BE, THGB, TCO2, O2SAT  VBG:    Lab Results   Component Value Date    PHVEN 7.143 11/13/2021    WRN6ZME 28.5 11/13/2021    BEVEN -17.9 11/13/2021    J5VIYJDA 96 11/13/2021           BELOW MENTIONED RADIOLOGY STUDY RESULTS REVIEWED BY ME:    XR CHEST PORTABLE    Result Date: 11/11/2021  EXAMINATION: ONE XRAY VIEW OF THE CHEST 11/11/2021 1:21 pm COMPARISON: None. HISTORY: ORDERING SYSTEM PROVIDED HISTORY: SOB TECHNOLOGIST PROVIDED HISTORY: Reason for exam:->SOB Reason for Exam: SOB Acuity: Acute Type of Exam: Initial FINDINGS: Exaggerated kyphotic positioning and patient rotation. Patient's chin obscures the right lung apex. Heart size indeterminate. Airspace disease in the mid and lower left lung. Strandy opacity in the right lung base     Limited study. Left-sided airspace disease may represent pneumonia or asymmetric edema.   Atelectasis present in the right base

## 2021-11-16 NOTE — PROGRESS NOTES
Occupational Therapy   Occupational Therapy Initial Assessment  Date: 2021   Patient Name: Espinoza Campbell  MRN: [id number]     : 1970    Date of Service: 2021    Discharge Recommendations: Espinoza Campbell scored a 13/24 on the AM-PAC ADL Inpatient form. Current research shows that an AM-PAC score of 17 or less is typically not associated with a discharge to the patient's home setting. Based on the patient's AM-PAC score and their current ADL deficits, it is recommended that the patient have 3-5 sessions per week of Occupational Therapy at d/c to increase the patient's independence. Please see assessment section for further patient specific details. If patient discharges prior to next session this note will serve as a discharge summary. Please see below for the latest assessment towards goals. OT Equipment Recommendations  Equipment Needed: No    Assessment   Performance deficits / Impairments: Decreased functional mobility ; Decreased ADL status; Decreased high-level IADLs; Decreased endurance; Decreased cognition; Decreased balance  Assessment: Patient presents below baseline d/t above deficits; OT indicated to maximize safety/independence with ADL and IADL  Treatment Diagnosis: Above deficits associated with PNA  Prognosis: Good  Decision Making: Medium Complexity  Exam: as above  OT Education: OT Role  Patient Education: eval, discharge--pt v/u  REQUIRES OT FOLLOW UP: Yes  Activity Tolerance  Activity Tolerance: Patient limited by fatigue; Treatment limited secondary to decreased cognition  Activity Tolerance: BP initially 69/40 upon arrival supine in bed, increass to 170/40 sitting, 169/79 s/p stand, and 133/60 once returned to supine in bed at end of session. Pt on 2L/min, maintains SpO2 in 90s throughout session  Safety Devices  Safety Devices in place: Yes  Type of devices: All fall risk precautions in place; Gait belt; Nurse notified;  Bed alarm in place; Call light within reach; Left in bed           Patient Diagnosis(es): The primary encounter diagnosis was Acute respiratory failure with hypoxemia ([facility] [address] Diagnoses of Pneumonia due to infectious organism, unspecified laterality, unspecified part of lung, ESRD (end stage renal disease) ([facility] [address] and Demand ischemia of myocardium ([facility] [address] were also pertinent to this visit. has a past medical history of Blood transfusion, Chronic pain, Diabetes mellitus ([facility] [address] Dialysis, Guillain [place] syndrome, Hemodialysis patient ([facility] [address] Low blood pressure, Pancreatitis, Peripheral Neuropathy, Pneumonia, and Renal failure.   has a past surgical history that includes Kidney transplant (9/1994); Cholecystectomy; Tunneled venous port placement; Dialysis fistula creation; Tunneled venous catheter placement; and Toe Surgery (10-8-2010). Treatment Diagnosis: Above deficits associated with PNA      Restrictions  Restrictions/Precautions  Restrictions/Precautions: Fall Risk (high fall risk)  Position Activity Restriction  Sternal Precautions: 94/59 supine  Other position/activity restrictions: 49 y.o. male  with PMHx of ESRD on HD (M,W,F), GB syndrome, diabetes mellitus and peripheral neuropathy presented with shortness of breath. Patient reported that he has been experiencing shortness of breath, nonproductive cough, congestion, generalized body aches and pain, nausea and vomiting for the past 3 days. Per patient's report, nausea and vomiting has resolved. Patient stated that he had full run of HD yesterday. Of note, patient is not vaccinated for Covid and persistently refusing Covid testing. Patient denied any fevers, chills, chest pain, palpitations, dizziness, recent sick contact, travel. On admission, patient was hypoxic satting around 85% on room air; started on supplemental oxygen. Initial diagnostic level showed leukopenia as well as lymphopenia. Procalcitonin elevated to 3.87.   Troponin: 0.10, BNP 8, 609, BUN: 29, creatinine 5.0, anion gap: 28, bicarb 16, hemoglobin: 11.2, platelets: 78. CXR showed left-sided airspace disease suggestive of pneumonia/pulmonary edema.     Subjective   General  Chart Reviewed: Yes  Diagnosis: PNA  Subjective  Subjective: Patient supine in bed upon arrival, intermittently confused and easily distracted during conversation--agreeable to evaluation  Patient Currently in Pain: Denies  Vital Signs  Temp: 96.8 °F (36 °C)  Temp Source: Temporal  Pulse: (!) 45  Heart Rate Source: Monitor  Resp: 16  BP: (!) 149/67  BP Location: Left leg  MAP (mmHg): 85  Patient Position: Semi fowlers  Level of Consciousness: Alert (0)  MEWS Score: 2  Patient Currently in Pain: Denies  Height and Weight  Height: 6' 4\" (193 cm)  Oxygen Therapy  SpO2: 100 %  O2 Device: Nasal cannula  O2 Flow Rate (L/min): 1.5 L/min (turned off)  Social/Functional History  Social/Functional History  Lives With: Other (comment) (Mother)  Type of Home: House  Home Layout: One level  Home Access: Level entry  Bathroom Shower/Tub: Walk-in shower (sponges bathes seated)  Bathroom Toilet: Standard  Home Equipment: Rolling walker, Cane  ADL Assistance: Needs assistance (reports sponge bathing and dressing independently, then reports mother occasionally assists with LB dressing)  Homemaking Assistance: Needs assistance (Zara)  Homemaking Responsibilities: No  Ambulation Assistance: Independent (propels w/c with B feet)  Transfer Assistance: Independent (reports (I) with w/c transfers)  Active : No  Leisure & Hobbies: reading  Additional Comments: Pt is a questionable historian, denies recent falls       Objective   Vision: Impaired  Vision Exceptions: Wears glasses for reading  Hearing: Within functional limits    Orientation  Overall Orientation Status: Impaired     Balance  Sitting Balance: Stand by assistance (min A>>>SBA, seated EOB ~40 min total)  Standing Balance: Contact guard assistance  Standing Balance  Time: ~1 min x2, 20 seconds  Activity: stance for pericare, sheet change on bed, donning depends  ADL  LE Dressing: Maximum assistance (max A to don depends/socks)  Toileting: Dependent/Total (incontinent of bowel, assist for pericare in stance and depends change)  Tone RUE  RUE Tone: Normotonic; Hypertonic  Tone LUE  LUE Tone: Normotonic; Hypertonic  Coordination  Movements Are Fluid And Coordinated: No  Coordination and Movement description: Fine motor impairments; Gross motor impairments; Right UE; Left UE  Quality of Movement Other  Comment: spasticity to PIP and DIP of digits on (B) hands     Bed mobility  Supine to Sit: Stand by assistance  Sit to Supine: Minimal assistance (for LE)  Scooting: Stand by assistance  Transfers  Sit to stand: Contact guard assistance (x3, from EOB)  Stand to sit: Contact guard assistance (x3, to EOB)     Cognition  Overall Cognitive Status: Exceptions  Arousal/Alertness: Delayed responses to stimuli; Inconsistent responses to stimuli  Following Commands: Follows one step commands with repetition; Follows one step commands with increased time  Attention Span: Attends with cues to redirect;  Difficulty attending to directions  Memory: Decreased short term memory  Safety Judgement: Decreased awareness of need for assistance  Problem Solving: Assistance required to implement solutions; Assistance required to identify errors made; Assistance required to correct errors made  Insights: Decreased awareness of deficits  Initiation: Requires cues for some  Sequencing: Requires cues for some  Perception  Overall Perceptual Status: WFL     Sensation  Overall Sensation Status: Impaired  Light Touch: Partial deficits in the RUE; Partial deficits in the LUE; Partial deficits in the RLE; Partial deficits in the LLE (reports neuropathic deficits to LE/UE)        LUE AROM (degrees)  LUE AROM : Exceptions  LUE General AROM: 0-70  Left Hand AROM (degrees)  Left Hand AROM: Exceptions  Left Hand General AROM: Contractures to DIP and PIP joints of digits in (B)

## 2021-11-16 NOTE — PROGRESS NOTES
Physical Therapy    Facility/Department: [facility]C  Initial Assessment    NAME: Wesley Monique  : 1970  MRN: [id number]    Date of Service: 2021    Discharge Recommendations:      PT Equipment Recommendations  Equipment Needed: No  Other: Defer to next level of care     Wesley Monique scored a 14/24 on the AM-PAC short mobility form. Current research shows that an AM-PAC score of 17 or less is typically not associated with a discharge to the patient's home setting. Based on the patient's AM-PAC score and their current functional mobility deficits, it is recommended that the patient have 3-5 sessions per week of Physical Therapy at d/c to increase the patient's independence. Please see assessment section for further patient specific details. If patient discharges prior to next session this note will serve as a discharge summary. Please see below for the latest assessment towards goals. Assessment   Body structures, Functions, Activity limitations: Decreased functional mobility ; Decreased balance; Decreased safe awareness; Decreased cognition; Decreased endurance; Decreased posture; Decreased strength  Assessment: Pt is a 48 yo male admitted to [facility] for increased SOB, found to have COVID-19. The patient presents with generalized weakness and decreased endurance with standing at EOB with use of RW. The patient also had confusion and decreased attention to task that improved with time, however it would limit his ability to care for himself at home, as well as limit his safety awareness. The patient is functioning below his baseline and would benefit from additional skilled PT to safely progress tolerance to activity and independence with functional mobility.   Treatment Diagnosis: Generalized weakness and decreased tolerance to activity  Prognosis: Good  Decision Making: Medium Complexity  Exam: Balance, functional mobility  Clinical Presentation: Evolving  PT Education: Goals; PT Role; Plan of Care; Transfer Training; General Safety  Patient Education: Pt verbalized understanding, will require repetition due to acute confusion  Barriers to Learning: Cognition  REQUIRES PT FOLLOW UP: Yes  Activity Tolerance  Activity Tolerance: Patient Tolerated treatment well; Patient limited by fatigue; Patient limited by endurance  Activity Tolerance: Pt's BP flucuated throughout session but normalized with time spent upright. BP: 94/59 supine at rest, 69/40 supine at rest, 171/40 sitting EOB, 133/60 after standing trials. SpO2 >90% on 2L throughout session and HR 40s-50s with activity. RN notified of pt response to activity. Patient Diagnosis(es): The primary encounter diagnosis was Acute respiratory failure with hypoxemia ([address] Diagnoses of Pneumonia due to infectious organism, unspecified laterality, unspecified part of lung, ESRD (end stage renal disease) ([address] and Demand ischemia of myocardium ([address] were also pertinent to this visit. has a past medical history of Blood transfusion, Chronic pain, Diabetes mellitus ([address] Dialysis, Guillain Crandall syndrome, Hemodialysis patient ([address] Low blood pressure, Pancreatitis, Peripheral Neuropathy, Pneumonia, and Renal failure.   has a past surgical history that includes Kidney transplant (9/1994); Cholecystectomy; Tunneled venous port placement; Dialysis fistula creation; Tunneled venous catheter placement; and Toe Surgery (10-8-2010). Restrictions  Restrictions/Precautions  Restrictions/Precautions: Fall Risk, Isolation (high fall risk; Droplet Plus isolation (COVID +))  Position Activity Restriction  Other position/activity restrictions: 49 y.o. male  with PMHx of ESRD on HD (M,W,F), GB syndrome, diabetes mellitus and peripheral neuropathy presented with shortness of breath. Patient reported that he has been experiencing shortness of breath, nonproductive cough, congestion, generalized body aches and pain, nausea and vomiting for the past 3 days.   Per patient's report, nausea and vomiting has resolved. Patient stated that he had full run of HD yesterday. Of note, patient is not vaccinated for Covid and persistently refusing Covid testing. Patient denied any fevers, chills, chest pain, palpitations, dizziness, recent sick contact, travel. On admission, patient was hypoxic satting around 85% on room air; started on supplemental oxygen. Initial diagnostic level showed leukopenia as well as lymphopenia. Procalcitonin elevated to 3.87. Troponin: 0.10, BNP 8, 609, BUN: 29, creatinine 5.0, anion gap: 28, bicarb 16, hemoglobin: 11.2, platelets: 78. CXR showed left-sided airspace disease suggestive of pneumonia/pulmonary edema. Vision/Hearing  Vision: Impaired  Vision Exceptions: Wears glasses for reading  Hearing: Within functional limits       Subjective  General  Chart Reviewed: Yes  Patient assessed for rehabilitation services?: Yes  Family / Caregiver Present: No  Diagnosis: PNA  Follows Commands: Within Functional Limits  General Comment  Comments: Pt semi-reclined in bed upon therapist arrival.  Subjective  Subjective: Pt agreeable to PT/OT eval. Pt easily distracted initially, command following and attention to task improves throughout session. Denies pain. Pain Screening  Patient Currently in Pain: Denies  Vital Signs  Patient Currently in Pain: Denies       Orientation  Orientation  Overall Orientation Status: Impaired  Orientation Level: Oriented to person; Oriented to place;  Disoriented to situation; Disoriented to time     Social/Functional History  Social/Functional History  Lives With: Other (comment) (Mother)  Type of Home: House  Home Layout: One level  Home Access: Level entry  Bathroom Shower/Tub: Walk-in shower (sponges bathes seated)  Bathroom Toilet: Standard  Home Equipment: Rolling walker, Cane  ADL Assistance: Needs assistance (reports sponge bathing and dressing independently, then reports mother occasionally assists with LB dressing)  Homemaking Assistance: Needs assistance (Zara)  Homemaking Responsibilities: No  Ambulation Assistance: Independent (propels w/c with B feet)  Transfer Assistance: Independent (reports (I) with w/c transfers)  Active : No  Leisure & Hobbies: reading  Additional Comments: Pt is a questionable historian, denies recent falls     Cognition   Cognition  Overall Cognitive Status: Exceptions  Arousal/Alertness: Delayed responses to stimuli; Inconsistent responses to stimuli  Following Commands: Follows one step commands with repetition; Follows one step commands with increased time  Attention Span: Attends with cues to redirect; Difficulty attending to directions  Memory: Decreased short term memory  Safety Judgement: Decreased awareness of need for assistance  Problem Solving: Assistance required to implement solutions; Assistance required to identify errors made; Assistance required to correct errors made  Insights: Decreased awareness of deficits  Initiation: Requires cues for some  Sequencing: Requires cues for some    Objective  AROM RLE (degrees)  RLE AROM: WFL  RLE General AROM: Lacking ~10 deg knee extension (AROM/PROM) with firm end feel  AROM LLE (degrees)  LLE AROM : WFL  LLE General AROM: Lacking ~10 deg knee extension (AROM/PROM) with firm end feel  Strength RLE  Strength RLE: WFL  Strength LLE  Strength LLE: WFL        Sensation  Overall Sensation Status: Impaired  Light Touch: Partial deficits in the RUE; Partial deficits in the LUE; Partial deficits in the RLE; Partial deficits in the LLE (reports neuropathic deficits to LE/UE)     Bed mobility  Supine to Sit: Stand by assistance  Sit to Supine: Minimal assistance (for LE)  Scooting: Stand by assistance  Comment: HOB elevated     Transfers  Sit to Stand: Contact guard assistance (CGA from EOB to RW x3 trials with increased time)  Stand to sit: Contact guard assistance; Moderate Assistance (CGA to EOB x2 trials;  Mod A to EOB x1 trial due to increased fatigue)  Comment: Good hand placement without cues for all sit<>stand transfers     Ambulation  Ambulation?: No (deferred due to fatigue following standing trials for pericare)        Balance  Posture: Poor (forward head, rounded shoulders, slouched posture - pt's postural muscles too weak to correct)  Sitting - Static: Fair; +  Sitting - Dynamic: Fair; +  Standing - Static: Fair; +  Standing - Dynamic: Fair  Comments: Pt sat EOB x40 minutes total with Min A progressing to SBA, increased slouched posture as pt fatigued. CGA for dynamic sitting balance with lower body dressing at EOB. Pt stood 60 sec + 60 sec + 30 sec with moderate use of (B) UE support on RW. Pt fatigued quickly with final standing trial.        Other activity  Pt found to be incontinent of bowel upon standing. Pericare completed in standing and depends donned. See OT note for assist.    Plan   Plan  Times per week: 3-5x  Current Treatment Recommendations: Strengthening, Transfer Training, Endurance Training, Patient/Caregiver Education & Training, Wheelchair Mobility Training, Balance Training, Gait Training, Functional Mobility Training, Safety Education & Training, Equipment Evaluation, Education, & procurement  Safety Devices  Type of devices:  All fall risk precautions in place, Gait belt, Patient at risk for falls, Call light within reach, Left in bed, Bed alarm in place, Nurse notified  Restraints  Initially in place: Yes  Restraints: (B) wrist restraints replaced at end of session    AM-PAC Score  AM-PAC Inpatient Mobility Raw Score : 14 (11/16/21 1359)  AM-PAC Inpatient T-Scale Score : 38.1 (11/16/21 1359)  Mobility Inpatient CMS 0-100% Score: 61.29 (11/16/21 1359)  Mobility Inpatient CMS G-Code Modifier : CL (11/16/21 [facility])          Goals  Short term goals  Time Frame for Short term goals: Before discharge  Short term goal 1: Pt will complete bed mobility indep  Short term goal 2: Pt will complete sit<>stand indep  Short term

## 2021-11-16 NOTE — PLAN OF CARE
Nutrition Problem #1: Inadequate oral intake  Intervention: Food and/or Nutrient Delivery: Continue Current Diet, Discontinue Oral Nutrition Supplement  Nutritional Goals: patient will accept and consume 75% or greater of meals and suuplements offered during admission

## 2021-11-16 NOTE — PROGRESS NOTES
[facility]   Progress Note      11/16/2021 9:09 AM        Name: Suman Newton .               Admitted: 11/11/2021    SUBJECTIVE:  He appears comfortable, in no acute distress, refusing blood draw this AM    Reviewed interval ancillary notes    Current Medications  dexamethasone (PF) (DECADRON) injection 10 mg, Daily  cefTRIAXone (ROCEPHIN) 2000 mg IVPB in D5W 50ml minibag, Q24H  pantoprazole (PROTONIX) injection 40 mg, Daily  heparin (porcine) injection 5,200 Units, Once  insulin glargine (LANTUS;BASAGLAR) injection pen 20 Units, Nightly  lidocaine PF 1 % injection 5 mL, Once  sodium chloride flush 0.9 % injection 5-40 mL, 2 times per day  sodium chloride flush 0.9 % injection 5-40 mL, PRN  0.9 % sodium chloride infusion, PRN  insulin lispro (1 Unit Dial) 0-18 Units, TID WC  insulin lispro (1 Unit Dial) 0-9 Units, Nightly  midodrine (PROAMATINE) tablet 2.5 mg, TID WC  sodium hypochlorite (DAKINS) 0.125 % external solution, Daily  collagenase ointment, Daily  lip balm petroleum free (PHYTOPLEX) stick, PRN  promethazine (PHENERGAN) injection 6.25 mg, Q6H PRN  albumin human 25 % IV solution 12.5 g, PRN  midodrine (PROAMATINE) tablet 5 mg, PRN  sodium chloride flush 0.9 % injection 5-40 mL, 2 times per day  sodium chloride flush 0.9 % injection 5-40 mL, PRN  0.9 % sodium chloride infusion, PRN  ondansetron (ZOFRAN-ODT) disintegrating tablet 4 mg, Q8H PRN   Or  ondansetron (ZOFRAN) injection 4 mg, Q6H PRN  polyethylene glycol (GLYCOLAX) packet 17 g, Daily PRN  acetaminophen (TYLENOL) tablet 650 mg, Q6H PRN   Or  acetaminophen (TYLENOL) suppository 650 mg, Q6H PRN  guaiFENesin-dextromethorphan (ROBITUSSIN DM) 100-10 MG/5ML syrup 5 mL, Q4H PRN  Vitamin D (CHOLECALCIFEROL) tablet 2,000 Units, Daily  glucose (GLUTOSE) 40 % oral gel 15 g, PRN  dextrose 50 % IV solution, PRN  glucagon (rDNA) injection 1 mg, PRN  dextrose 5 % solution, PRN        Objective:  BP 86/60   Pulse (!) 39   Temp 96.2 °F (35.7 °C) (Temporal)   Resp 12   Ht 6' 4\" (1.93 m)   Wt 155 lb 4.8 oz (70.4 kg)   SpO2 100%   BMI 18.90 kg/m²     Intake/Output Summary (Last 24 hours) at 11/16/2021 0909  Last data filed at 11/15/2021 1733  Gross per 24 hour   Intake 600 ml   Output 1400 ml   Net -800 ml      Wt Readings from Last 3 Encounters:   11/16/21 155 lb 4.8 oz (70.4 kg)   03/24/17 205 lb 0.4 oz (93 kg)   10/31/16 218 lb 11.1 oz (99.2 kg)       General appearance:  Appears comfortable  Eyes: Sclera clear. Pupils equal.  ENT: Moist oral mucosa. Trachea midline, no adenopathy. Cardiovascular: Regular rhythm, normal S1, S2. No murmur. No edema in lower extremities  Respiratory: Not using accessory muscles. Good inspiratory effort. Clear to auscultation bilaterally, no wheeze or crackles. GI: Abdomen soft, no tenderness, not distended  Musculoskeletal: No cyanosis in digits, neck supple  Neurology: CN 2-12 grossly intact. No speech or motor deficits  Psych: Normal affect. Alert and oriented in time, place and person  Skin: Warm, dry, normal turgor    Labs and Tests:  CBC:   Recent Labs     11/13/21  1026 11/14/21  0628 11/15/21  0437   WBC 5.6 4.0 2.5*   HGB 10.7* 10.3* 10.0*   PLT 52* 36* 26*     BMP:    Recent Labs     11/14/21  1444 11/15/21  0437 11/15/21  1130   * 134* 134*   K 4.8 4.4 3.7   CL 88* 92* 92*   CO2 11* 20* 24   BUN 43* 51* 24*   CREATININE 5.1* 5.6* 3.2*   GLUCOSE 171* 169* 121*     Hepatic: No results for input(s): AST, ALT, ALB, BILITOT, ALKPHOS in the last 72 hours. ASSESSMENT AND PLAN    Active Problems:    Pneumonia  Resolved Problems:    * No resolved hospital problems.  *      Acute respiratory failure likely secondary to COVID pneumonia  - managed by critical care team     Pancytopenia  - likely d/t COVID infection  - afebrile  - WBC 2.5, ANC normal, lymphocytopenia  - receiving Rocephin  - anemia partially d/t ESRD  - transfuse for hgb < 7.0  - plts down to 26k, no evidence of bleeding, no need for transfusion at this time  - continue to monitor CBC daily  - check SPEP, immunofixation, QIG, SFLC, copper, Zinc.    Silvina Melchor, JONATHAN  [facility]    I agree with the above note. Pancytopenia mostly secondary to active COVID-19 infection. Patient refusing lab draws today. ANC at safe and acceptable level, lymphopenia secondary to COVID-19 infection.   Ordered SPEP, immunofixation, immunoglobulins, free light chains, copper, zinc for tomorrow a.m. labs to make sure no coexisting myeloma    Sergei Haas MD  [facility]

## 2021-11-16 NOTE — PLAN OF CARE
Problem: Falls - Risk of:  Goal: Will remain free from falls  Description: Will remain free from falls  Outcome: Ongoing  Goal: Absence of physical injury  Description: Absence of physical injury  Outcome: Ongoing     Problem: Pain:  Goal: Pain level will decrease  Description: Pain level will decrease  Outcome: Ongoing  Goal: Control of acute pain  Description: Control of acute pain  Outcome: Ongoing  Goal: Control of chronic pain  Description: Control of chronic pain  Outcome: Ongoing     Problem: Skin Integrity:  Goal: Will show no infection signs and symptoms  Description: Will show no infection signs and symptoms  Outcome: Ongoing  Goal: Absence of new skin breakdown  Description: Absence of new skin breakdown  Outcome: Ongoing     Problem: Fluid Volume:  Goal: Will show no signs or symptoms of fluid imbalance  Description: Will show no signs or symptoms of fluid imbalance  Outcome: Ongoing     Problem: Nutrition  Goal: Optimal nutrition therapy  Outcome: Ongoing  Goal: Understanding of nutritional guidelines  Outcome: Ongoing     Problem: Non-Violent Restraints  Goal: Removal from restraints as soon as assessed to be safe  Outcome: Ongoing  Goal: No harm/injury to patient while restraints in use  Outcome: Ongoing  Goal: Patient's dignity will be maintained  Outcome: Ongoing     Problem: Airway Clearance - Ineffective  Goal: Achieve or maintain patent airway  Outcome: Ongoing     Problem: Gas Exchange - Impaired  Goal: Absence of hypoxia  Outcome: Ongoing  Goal: Promote optimal lung function  Outcome: Ongoing     Problem: Breathing Pattern - Ineffective  Goal: Ability to achieve and maintain a regular respiratory rate  Outcome: Ongoing     Problem:  Body Temperature -  Risk of, Imbalanced  Goal: Ability to maintain a body temperature within defined limits  Outcome: Ongoing  Goal: Will regain or maintain usual level of consciousness  Outcome: Ongoing  Goal: Complications related to the disease process, condition or treatment will be avoided or minimized  Outcome: Ongoing     Problem: Isolation Precautions - Risk of Spread of Infection  Goal: Prevent transmission of infection  Outcome: Ongoing     Problem: Nutrition Deficits  Goal: Optimize nutritional status  Outcome: Ongoing     Problem: Risk for Fluid Volume Deficit  Goal: Maintain normal heart rhythm  Outcome: Ongoing  Goal: Maintain absence of muscle cramping  Outcome: Ongoing  Goal: Maintain normal serum potassium, sodium, calcium, phosphorus, and pH  Outcome: Ongoing     Problem: Loneliness or Risk for Loneliness  Goal: Demonstrate positive use of time alone when socialization is not possible  Outcome: Ongoing     Problem: Fatigue  Goal: Verbalize increase energy and improved vitality  Outcome: Ongoing     Problem: Patient Education: Go to Patient Education Activity  Goal: Patient/Family Education  Outcome: Ongoing

## 2021-11-16 NOTE — PROGRESS NOTES
HOSPITALISTS PROGRESS NOTE    11/16/2021 10:46 AM        Name: Lucero Ma . Admitted: 11/11/2021  Primary Care Provider: Lyssa Corley MD (Tel: [phone])      CC: Shortness of breath    Brief Course: This 49 y.o. male  with PMHx of ESRD on HD (M,W,F), GB syndrome, diabetes mellitus and peripheral neuropathy presented with shortness of breath. Patient reported that he has been experiencing shortness of breath, nonproductive cough, congestion, generalized body aches and pain, nausea and vomiting for the past 3 days. Per patient's report, nausea and vomiting has resolved. Patient stated that he had full run of HD yesterday. Of note, patient is not vaccinated for Covid and persistently refusing Covid testing. Patient denied any fevers, chills, chest pain, palpitations, dizziness, recent sick contact, travel. On admission, patient was hypoxic satting around 85% on room air; started on supplemental oxygen. Initial diagnostic level showed leukopenia as well as lymphopenia. Procalcitonin elevated to 3.87. Troponin: 0.10, BNP 8, 609, BUN: 29, creatinine 5.0, anion gap: 28, bicarb 16, hemoglobin: 11.2, platelets: 78. CXR showed left-sided airspace disease suggestive of pneumonia/pulmonary edema. Interval history:   Pt seen and examined today. Overnight events noted, interval ancillary notes and labs reviewed. Oxygen requirement improved;-steroid dose reduced  Patient refusing labs and treatment  CRP trending down          Assessment & Plan:     Acute hypoxic respiratory failure likely secondary to COVID pneumnia  Afebrile, elevated procalcitonin, leukopenic and lymphopenic on admission  Not vaccinated for Covid;  Covid PCR on 11/14/2021 came back positive  Elevated D-dimer, CRP, LDH and ferritin  Maintain droplet plus precautions  Was on Zyvox and Zosyn: Zyvox discontinued secondary to thrombocytopenia   Lovenox discontinued secondary thrombocytopenia  Continue IV steroids   Wean as tolerated to keep sats above 90%  Pulmonology on board appreciate the recs     Diabetes mellitus; on 50 units of Levemir at home  Hemoglobin A1c 14 on admission  Started on SSI and basal insulin  Monitor blood glucose closely     ESRD on hemodialysis Monday Wednesday Friday  proBNP elevated to [place] [address]  Nephrology consulted  Avoid nephrotoxins  Strict intake and output  Daily weights    Anion gap metabolic acidosis likely secondary to ESRD  On low-dose bicarb drip per nephro     Elevated troponin ? In setting of ESRD  Trend troponin  Monitor on telemetry    Pancytopenia:  Oncology consulted    Hypotension: On midodrine 2.5 mg 3 times daily  Neuro blood pressure closelyElevated alk phos likely secondary to CKD  Monitor liver function closely     Microcytic anemia  Iron studies ordered     Thrombocytopenia:  Platelets 78 on admission  Monitor level closely        DVT prophylaxis: Subcu heparin due to low platelets count  Code:Limited  Diet: ADULT ORAL NUTRITION SUPPLEMENT; Breakfast, Dinner; Renal Oral Supplement  ADULT DIET; Regular; 3 carb choices (45 gm/meal); Low Potassium (Less than 3000 mg/day);  Low Phosphorus (Less than 1000 mg)  ADULT ORAL NUTRITION SUPPLEMENT; Breakfast, Dinner; Renal Oral Supplement    Disposition:     Current Medications  dexamethasone (PF) (DECADRON) injection 10 mg, Daily  cefTRIAXone (ROCEPHIN) 2000 mg IVPB in D5W 50ml minibag, Q24H  pantoprazole (PROTONIX) injection 40 mg, Daily  heparin (porcine) injection 5,200 Units, Once  insulin glargine (LANTUS;BASAGLAR) injection pen 20 Units, Nightly  lidocaine PF 1 % injection 5 mL, Once  sodium chloride flush 0.9 % injection 5-40 mL, 2 times per day  sodium chloride flush 0.9 % injection 5-40 mL, PRN  0.9 % sodium chloride infusion, PRN  insulin lispro (1 Unit Dial) 0-18 Units, TID WC  insulin lispro (1 Unit Dial) 0-9 Units, Nightly  midodrine (PROAMATINE) tablet 2.5 mg, TID WC  sodium hypochlorite (DAKINS) 0.125 % external solution, Daily  collagenase ointment, Daily  lip balm petroleum free (PHYTOPLEX) stick, PRN  promethazine (PHENERGAN) injection 6.25 mg, Q6H PRN  albumin human 25 % IV solution 12.5 g, PRN  midodrine (PROAMATINE) tablet 5 mg, PRN  sodium chloride flush 0.9 % injection 5-40 mL, 2 times per day  sodium chloride flush 0.9 % injection 5-40 mL, PRN  0.9 % sodium chloride infusion, PRN  ondansetron (ZOFRAN-ODT) disintegrating tablet 4 mg, Q8H PRN   Or  ondansetron (ZOFRAN) injection 4 mg, Q6H PRN  polyethylene glycol (GLYCOLAX) packet 17 g, Daily PRN  acetaminophen (TYLENOL) tablet 650 mg, Q6H PRN   Or  acetaminophen (TYLENOL) suppository 650 mg, Q6H PRN  guaiFENesin-dextromethorphan (ROBITUSSIN DM) 100-10 MG/5ML syrup 5 mL, Q4H PRN  Vitamin D (CHOLECALCIFEROL) tablet 2,000 Units, Daily  glucose (GLUTOSE) 40 % oral gel 15 g, PRN  dextrose 50 % IV solution, PRN  glucagon (rDNA) injection 1 mg, PRN  dextrose 5 % solution, PRN        Objective:  BP 86/60   Pulse (!) 39   Temp 96.2 °F (35.7 °C) (Temporal)   Resp 12   Ht 6' 4\" (1.93 m)   Wt 155 lb 4.8 oz (70.4 kg)   SpO2 100%   BMI 18.90 kg/m²     Intake/Output Summary (Last 24 hours) at 11/16/2021 1046  Last data filed at 11/15/2021 1733  Gross per 24 hour   Intake 600 ml   Output 1400 ml   Net -800 ml      Wt Readings from Last 3 Encounters:   11/16/21 155 lb 4.8 oz (70.4 kg)   03/24/17 205 lb 0.4 oz (93 kg)   10/31/16 218 lb 11.1 oz (99.2 kg)       Physical Examination:   General appearance: Ill-appearing  HEENT Normal cephalic, atraumatic without obvious deformity. Pupils equal, round, and reactive to light. Extra ocular muscles intact. Conjunctivae/corneas clear. Neck: Supple, No jugular venous distention/bruits.   Trachea midline without thyromegaly  Lungs: diminished breath sounds with scattered Rales at bases bilaterally  Heart: Regular rate and rhythm with Normal S1/S2 without murmurs, rubs   Abdomen: Soft, non-tender or non-distended without rigidity or guarding and positive bowel sounds all four quadrants. Extremities: Contracted right upper extremity; range of motion could not be obtained as patient is wheelchair-bound  Skin: Skin color, texture, turgor normal.  No rashes or lesions. Neurologic: Alert and oriented X 3, neurovascularly intact with sensory/motor intact upper extremities/lower extremities, bilaterally. Cranial nerves: II-XII intact, grossly non-focal.  Psychiatry: Appropriate affect. Not agitated  Skin: Dry, scaly, scattered wounds on bilateral feet  Brisk capillary refill, peripheral pulses palpable     Labs and Tests:  CBC:   Recent Labs     11/14/21  0628 11/15/21  0437   WBC 4.0 2.5*   HGB 10.3* 10.0*   PLT 36* 26*     BMP:    Recent Labs     11/14/21  1444 11/15/21  0437 11/15/21  1130   * 134* 134*   K 4.8 4.4 3.7   CL 88* 92* 92*   CO2 11* 20* 24   BUN 43* 51* 24*   CREATININE 5.1* 5.6* 3.2*   GLUCOSE 171* 169* 121*     Hepatic:   No results for input(s): AST, ALT, ALB, BILITOT, ALKPHOS in the last 72 hours. XR CHEST PORTABLE   Final Result      Patchy opacity in the left lateral mid lung field and left lower lung field   and right infrahilar lower lung suggesting probable pneumonia/pneumonitis   other also may be some basilar atelectasis. Findings in the left lateral mid   lung field have worsened since the prior study. Findings are somewhat   exaggerated by a less than optimal inspiration. XR CHEST PORTABLE   Final Result   Limited study. Left-sided airspace disease may represent pneumonia or   asymmetric edema. Atelectasis present in the right base             Problem List  Active Problems:    Pneumonia  Resolved Problems:    * No resolved hospital problems.  Arielle Lamas MD   11/16/2021 10:46 AM

## 2021-11-17 NOTE — PROGRESS NOTES
Called and updated patients mother, [facility], she is aware we bathed and changed dressing today, removed restraints will have dialysis today and hopeful for a discharge tomorrow.

## 2021-11-17 NOTE — PROGRESS NOTES
Oncology and Hematology Care   Progress Note      11/17/2021 11:26 AM        Name: Lucero Ma . Admitted: 11/11/2021    SUBJECTIVE:  Patient refusing lab draws this morning. No bleeding per RN. Will have dialysis later today.     Reviewed interval ancillary notes    Current Medications  dexamethasone (PF) (DECADRON) injection 6 mg, Daily  [Held by provider] insulin glargine (LANTUS;BASAGLAR) injection pen 15 Units, Nightly  insulin lispro (1 Unit Dial) 0-6 Units, TID WC  insulin lispro (1 Unit Dial) 0-3 Units, Nightly  pantoprazole (PROTONIX) injection 40 mg, Daily  heparin (porcine) injection 5,200 Units, Once  lidocaine PF 1 % injection 5 mL, Once  sodium chloride flush 0.9 % injection 5-40 mL, 2 times per day  sodium chloride flush 0.9 % injection 5-40 mL, PRN  0.9 % sodium chloride infusion, PRN  midodrine (PROAMATINE) tablet 2.5 mg, TID WC  sodium hypochlorite (DAKINS) 0.125 % external solution, Daily  collagenase ointment, Daily  lip balm petroleum free (PHYTOPLEX) stick, PRN  promethazine (PHENERGAN) injection 6.25 mg, Q6H PRN  albumin human 25 % IV solution 12.5 g, PRN  midodrine (PROAMATINE) tablet 5 mg, PRN  sodium chloride flush 0.9 % injection 5-40 mL, 2 times per day  sodium chloride flush 0.9 % injection 5-40 mL, PRN  0.9 % sodium chloride infusion, PRN  ondansetron (ZOFRAN-ODT) disintegrating tablet 4 mg, Q8H PRN   Or  ondansetron (ZOFRAN) injection 4 mg, Q6H PRN  polyethylene glycol (GLYCOLAX) packet 17 g, Daily PRN  acetaminophen (TYLENOL) tablet 650 mg, Q6H PRN   Or  acetaminophen (TYLENOL) suppository 650 mg, Q6H PRN  guaiFENesin-dextromethorphan (ROBITUSSIN DM) 100-10 MG/5ML syrup 5 mL, Q4H PRN  Vitamin D (CHOLECALCIFEROL) tablet 2,000 Units, Daily  glucose (GLUTOSE) 40 % oral gel 15 g, PRN  dextrose 50 % IV solution, PRN  glucagon (rDNA) injection 1 mg, PRN  dextrose 5 % solution, PRN        Objective:  BP (!) 111/48   Pulse 52   Temp 96.7 °F (35.9 °C) (Temporal)   Resp 22 Ht 6' 4\" (1.93 m)   Wt 155 lb 4.8 oz (70.4 kg)   SpO2 100%   BMI 18.90 kg/m²     Intake/Output Summary (Last 24 hours) at 11/17/2021 1126  Last data filed at 11/16/2021 1442  Gross per 24 hour   Intake 100 ml   Output --   Net 100 ml      Wt Readings from Last 3 Encounters:   11/17/21 155 lb 4.8 oz (70.4 kg)   03/24/17 205 lb 0.4 oz (93 kg)   10/31/16 218 lb 11.1 oz (99.2 kg)       General appearance:  Appears comfortable  Eyes: Sclera clear. Pupils equal.  ENT: Moist oral mucosa. Trachea midline, no adenopathy. Cardiovascular: Regular rhythm, normal S1, S2. No murmur. No edema in lower extremities  Respiratory: Not using accessory muscles. Good inspiratory effort. Clear to auscultation bilaterally, no wheeze or crackles. GI: Abdomen soft, no tenderness, not distended  Musculoskeletal: No cyanosis in digits, neck supple  Neurology: CN 2-12 grossly intact. No speech or motor deficits  Psych: Normal affect. Alert and oriented in time, place and person  Skin: Warm, dry, normal turgor    Labs and Tests:  CBC:   Recent Labs     11/15/21  0437   WBC 2.5*   HGB 10.0*   PLT 26*     BMP:    Recent Labs     11/14/21  1444 11/15/21  0437 11/15/21  1130   * 134* 134*   K 4.8 4.4 3.7   CL 88* 92* 92*   CO2 11* 20* 24   BUN 43* 51* 24*   CREATININE 5.1* 5.6* 3.2*   GLUCOSE 171* 169* 121*     Hepatic: No results for input(s): AST, ALT, ALB, BILITOT, ALKPHOS in the last 72 hours. ASSESSMENT AND PLAN    Active Problems:    Pneumonia  Resolved Problems:    * No resolved hospital problems. *         1. Acute respiratory failure likely secondary to COVID pneumonia  - managed by critical care team       2.  Pancytopenia  -Due to COVID infection  -afebrile  -anemia partially d/t ESRD  -transfuse for hgb < 7.0  -no evidence of bleeding, no need for transfusion at this time  -SPEP, immunofixation, QIG, SFLC, copper, Zinc pending.  -Refused labs this AM.        3. ESRD on dialysis      Casimiro Leslie, [facility]  Oncology Hematology

## 2021-11-17 NOTE — PROGRESS NOTES
MD Kimberly Paiz MD Ludie Finders, MD                Office: [phone]                      Fax: [phone]          NEPHROLOGY ICU PROGRESS NOTE:     PATIENT NAME: Shahid Petersen  : 1970  MRN: [id number]       IMPRESSION / RECOMMENDATION:      Admitted for:  Pneumonia [J18.9]  ESRD (end stage renal disease) ([facility] [address] [N18.6]  Demand ischemia of myocardium ([facility] [address] [I24.8]  Acute respiratory failure with hypoxemia ([facility] [address] [J96.01]  Pneumonia due to infectious organism, unspecified laterality, unspecified part of lung [J18.9]  Acute hypoxic respiratory failure - needing NIPPV   COVID r/o ** pending     Labs today not available    1. ESRD on iHD: MWF.   - at [facility] nephrology team   - S/P HD Friday - pt did not want and tolerate much, d/to hypotension  - S/P HD Saturday -   - tolerating HD well today. About 1L removed. Access used: R fem TDC  Access function: needed cathflo- running better Monday.      -but overall, poor prognosis, appropriate for palliative consult - following     -possible Venous sample showing resp. Alkalosis. On NC. Serum HCO3 has improved post HD. O2 support per ICU team.     Next HD today. Blood draw with HD. Patient refusing separate lab draw. 2. Hypertension/Volume Status:  - BP - no need for tight control:  At goal.   - EDW 79.5 kg. Now at 70.4kg.   - Na Hyponatremia-mild and stable. (see above)   - on Midodrine    3. Electrolytes/acid-base:  K: WNL  High AG metabolic acidosis: fu w/ HD - serum HCO3 better. 4. Anemia of renal failure: at goal  - ASHUTOSH: not needed today. Follow Hgb. 5. BMD:  - Phos: f/u in am labs -  - follow iPTH outpt    High risk.         Other Problems:  Hospital Problems           Last Modified POA    Pneumonia 2021 Yes        Andrew North MD  [facility] [address] [place]: [phone] or Via [employer]  Fax: [phone]    Time spent  ~ 35 minutes that included face-to-face meeting/discussion with patient's treatment team (including primary/referring team and other consultants; included coordination of care with the treatment team; and review of patient's electronic medical records and ordering appropriates tests.       ===========================================  ===========================================  This patient is COVID-19 rule out, so in a strict isolation, as per current protocol; So in order to preserve PPE supply and to avoid unnecessary exposure to prevent transmission of COVID-19; this patient was NOT examined face to face, as risk of contact outweighs the benefits and likely would not change much of my clinical management. HPI, review of system and physical exam was limited, as it was mainly obtained from chart review and the primary team. But all relevant records and diagnostic tests were reviewed, including laboratory and imaging results. Patient's care is being coordinated with the primary service. Subjective:    Seen inside room while on HD. COVID positive  Confused but verbalizing  On NC    Past medical, surgical, social and family medial history reviewed by me:  MEDICATIONS reviewed by me:  Prior to Admission Medications:  No current facility-administered medications on file prior to encounter. Current Outpatient Medications on File Prior to Encounter   Medication Sig Dispense Refill    ondansetron (ZOFRAN ODT) 4 MG disintegrating tablet Take 1 tablet by mouth every 8 hours as needed for Nausea 20 tablet 0    pantoprazole (PROTONIX) 40 MG tablet Take 40 mg by mouth 2 times daily       oxyCODONE (OXY-IR) 30 MG immediate release tablet Take 30 mg by mouth every 4 hours as needed for Pain.  Promethazine HCl 6.25 MG/5ML SOLN Take 12.5 mg by mouth 2 times daily as needed.       insulin detemir (LEVEMIR FLEXPEN) 100 UNIT/ML injection Inject 50 Units into the skin nightly       ALPRAZolam (XANAX) 1 MG tablet Take 2 mg by mouth 2 times daily.  temazepam (RESTORIL) 30 MG capsule Take 30 mg by mouth nightly as needed.  fentaNYL (DURAGESIC) 50 MCG/HR Place 1 patch onto the skin every 72 hours         Medications Prior to Admission: ondansetron (ZOFRAN ODT) 4 MG disintegrating tablet, Take 1 tablet by mouth every 8 hours as needed for Nausea  pantoprazole (PROTONIX) 40 MG tablet, Take 40 mg by mouth 2 times daily   oxyCODONE (OXY-IR) 30 MG immediate release tablet, Take 30 mg by mouth every 4 hours as needed for Pain. Promethazine HCl 6.25 MG/5ML SOLN, Take 12.5 mg by mouth 2 times daily as needed. insulin detemir (LEVEMIR FLEXPEN) 100 UNIT/ML injection, Inject 50 Units into the skin nightly   ALPRAZolam (XANAX) 1 MG tablet, Take 2 mg by mouth 2 times daily. temazepam (RESTORIL) 30 MG capsule, Take 30 mg by mouth nightly as needed.   fentaNYL (DURAGESIC) 50 MCG/HR, Place 1 patch onto the skin every 72 hours     Inpatient Medications:  Scheduled Meds:   dexamethasone  6 mg IntraVENous Daily    [Held by provider] insulin glargine  15 Units SubCUTAneous Nightly    insulin lispro  0-6 Units SubCUTAneous TID WC    insulin lispro  0-3 Units SubCUTAneous Nightly    pantoprazole  40 mg IntraVENous Daily    heparin (porcine)  5,200 Units IntraCATHeter Once    lidocaine 1 % injection  5 mL IntraDERmal Once    sodium chloride flush  5-40 mL IntraVENous 2 times per day    midodrine  2.5 mg Oral TID WC    sodium hypochlorite   Irrigation Daily    collagenase   Topical Daily    sodium chloride flush  5-40 mL IntraVENous 2 times per day    Vitamin D  2,000 Units Oral Daily     Continuous Infusions:   sodium chloride      sodium chloride 25 mL (11/14/21 1546)    dextrose       PRN Meds:.sodium chloride flush, sodium chloride, lip balm petroleum free, promethazine, albumin human, midodrine, sodium chloride flush, sodium chloride, ondansetron **OR** ondansetron, polyethylene glycol, acetaminophen **OR** acetaminophen, guaiFENesin-dextromethorphan, glucose, dextrose, glucagon (rDNA), dextrose       REVIEW OF SYSTEMS:  Review of systems not obtained due to patient factors     PHYSICAL EXAM:  Patient Vitals for the past 24 hrs:   BP Temp Temp src Pulse Resp SpO2 Height Weight   11/17/21 0845 -- -- -- -- -- 100 % -- --   11/17/21 0800 -- 96.7 °F (35.9 °C) Temporal -- -- 96 % -- --   11/17/21 0700 -- -- -- 52 -- -- -- --   11/17/21 0657 (!) 111/48 -- -- 53 22 -- -- --   11/17/21 0630 -- -- -- -- -- -- -- 155 lb 4.8 oz (70.4 kg)   11/17/21 0500 (!) 88/40 -- Temporal (!) 46 16 -- -- --   11/17/21 0321 -- -- -- -- 18 92 % -- --   11/17/21 0000 139/70 97.7 °F (36.5 °C) Temporal 56 19 92 % -- --   11/16/21 2020 -- -- -- (!) 48 -- -- -- --   11/16/21 2012 -- -- -- -- 23 93 % -- --   11/16/21 2000 (!) 149/46 97.9 °F (36.6 °C) Temporal (!) 49 17 93 % -- --   11/16/21 1800 (!) 151/54 -- -- 51 16 -- -- --   11/16/21 1600 (!) 141/70 98.2 °F (36.8 °C) Temporal 55 19 -- -- --   11/16/21 1442 -- -- -- 53 16 93 % -- --   11/16/21 1400 (!) 141/70 -- -- (!) 49 17 94 % -- --   11/16/21 1200 (!) 149/67 96.8 °F (36 °C) Temporal (!) 45 16 100 % -- --   11/16/21 1100 -- -- -- (!) 45 21 100 % -- --   11/16/21 1057 -- -- -- -- -- -- 6' 4\" (1.93 m) --       Intake/Output Summary (Last 24 hours) at 11/17/2021 1027  Last data filed at 11/16/2021 1442  Gross per 24 hour   Intake 110 ml   Output --   Net 110 ml          Physical exam:    In-person bedside physical examination deferred. Pursuant to the emergency declaration under the [address] [place] [address] and the [employer] Act, this clinical encounter was conducted to provide necessary medical care.    (Also consistent with new provisions and guidance offered by [facility] on March 18, 2020 in setting of COVID 19 outbreak and in order to preserve personal protective equipment in accordance with the flexibilities announced by CMS on March 30, 2020)   References: https://[facility]. [facility]/Portals/0/Resources/COVID-19/3_18%20Telemed%20Guidance%20Updated%20March%2018. pdf?hko=[email or web address]                      https://[facility]. [facility]/Portals/0/Resources/COVID-19/3_18%20Telemed%20Guidance%20Updated%20March%2018. pdf?fer=[email or web address]                      [email or web address]/. pdf    However, I did visually inspect the patient and observed the following:      Vitals signs reviewed. Constitutional:       General: not in acute distress. Appearance:   ill-appearing. HENT:      Head: Normocephalic and atraumatic. Nose: Nose normal.      Mouth/Throat:      Mouth: Mucous membranes are dry . Eyes:      General: No scleral icterus. Conjunctiva/sclera: Conjunctivae normal.   Neck:      Musculoskeletal: Normal range of motion and neck supple. Cardiovascular:      Rate and Rhythm: Normal rate. Pulmonary:      Effort: Pulmonary effort is  Ab-normal.   Abdominal:      General: There is  distension. Musculoskeletal:      Right lower leg: mild edema. Left lower leg: mild edema. Skin:     Coloration: Skin is not jaundiced.    Neurological:      General:  Deferred                DATA:  Diagnostic tests reviewed for today's visit:    Recent Labs     11/15/21  0437   WBC 2.5*   HCT 29.4*   PLT 26*     Iron Saturation:  No components found for: PERCENTFE  FERRITIN:    Lab Results   Component Value Date    FERRITIN 1,623.0 11/12/2021     IRON:    Lab Results   Component Value Date    IRON 106 11/12/2021     TIBC:    Lab Results   Component Value Date    TIBC 133 11/12/2021       Recent Labs     11/14/21  1444 11/15/21  0437 11/15/21  1130   * 134* 134*   K 4.8 4.4 3.7   CL 88* 92* 92*   CO2 11* 20* 24   BUN 43* 51* 24*   CREATININE 5.1* 5.6* 3.2*     Recent Labs     11/14/21  1444 11/15/21  0437 11/15/21  1130   CALCIUM 7.8* 7.6* 8.2*   PHOS 4.7 --   --      No results for input(s): PH, PCO2, PO2 in the last 72 hours. Invalid input(s): Joanne Gustafson    ABG:  No results found for: PH, PCO2, PO2, HCO3, BE, THGB, TCO2, O2SAT  VBG:    Lab Results   Component Value Date    PHVEN 7.143 11/13/2021    ECE4NIV 28.5 11/13/2021    BEVEN -17.9 11/13/2021    B1VSOTCV 96 11/13/2021           BELOW MENTIONED RADIOLOGY STUDY RESULTS REVIEWED BY ME:    XR CHEST PORTABLE    Result Date: 11/11/2021  EXAMINATION: ONE XRAY VIEW OF THE CHEST 11/11/2021 1:21 pm COMPARISON: None. HISTORY: ORDERING SYSTEM PROVIDED HISTORY: SOB TECHNOLOGIST PROVIDED HISTORY: Reason for exam:->SOB Reason for Exam: SOB Acuity: Acute Type of Exam: Initial FINDINGS: Exaggerated kyphotic positioning and patient rotation. Patient's chin obscures the right lung apex. Heart size indeterminate. Airspace disease in the mid and lower left lung. Strandy opacity in the right lung base     Limited study. Left-sided airspace disease may represent pneumonia or asymmetric edema.   Atelectasis present in the right base

## 2021-11-17 NOTE — PROGRESS NOTES
Patient is awake and alert at this time, he seems more oriented able to tell me who he is and that he is at [facility], he agrees to not pull at his lines or try to get out of bed without assistance, restraints removed at this time. Fall precautions in place, hourly rounding, call light and belongings in reach, bed in lowest position, wheels locked in place, side rails up x 2, walkways free of clutter, bed alarm on.

## 2021-11-17 NOTE — FLOWSHEET NOTE
11/17/21 1608 11/17/21 1805   Treatment   Time On 1608  --    Time Off  --  1805   Vital Signs   BP (!) 163/57 (!) 150/76   Temp 97.1 °F (36.2 °C) 96.7 °F (35.9 °C)   Pulse (!) 49 54   Resp 18 20   Weight 157 lb 13.6 oz (71.6 kg) 157 lb 3 oz (71.3 kg)   Post-Hemodialysis Assessment   NET Removed (ml)  --  556 ml     Treatment time: 117min  Net UF: 556ml    Pre weight: 71.6kg  Post weight: 71.3kg  EDW: TBD    Access used: right fem central lines  Access function: good    Medications or blood products given: N/A    Regular outpatient schedule: MWF    Summary of response to treatment: pt tolerated treatment well, but constantly request to be off early, education provided for patient, pt agreed to stay on treatment for 2 hours, Dr Kina Grant notified. Ok to take patient off early. Copy of dialysis treatment record placed in chart, to be scanned into EMR.     Electronically signed by Eveline English RN on 11/17/2021 at 6:19 PM

## 2021-11-17 NOTE — PROGRESS NOTES
HOSPITALISTS PROGRESS NOTE    11/17/2021 9:42 AM        Name: Wesley Monique . Admitted: 11/11/2021  Primary Care Provider: Nish Chowdhury MD (Tel: [phone])      CC: Shortness of breath    Brief Course: This 49 y.o. male  with PMHx of ESRD on HD (M,W,F), GB syndrome, diabetes mellitus and peripheral neuropathy presented with shortness of breath. Patient reported that he has been experiencing shortness of breath, nonproductive cough, congestion, generalized body aches and pain, nausea and vomiting for the past 3 days. Per patient's report, nausea and vomiting has resolved. Patient stated that he had full run of HD yesterday. Of note, patient is not vaccinated for Covid and persistently refusing Covid testing. Patient denied any fevers, chills, chest pain, palpitations, dizziness, recent sick contact, travel. On admission, patient was hypoxic satting around 85% on room air; started on supplemental oxygen. Initial diagnostic level showed leukopenia as well as lymphopenia. Procalcitonin elevated to 3.87. Troponin: 0.10, BNP 8, 609, BUN: 29, creatinine 5.0, anion gap: 28, bicarb 16, hemoglobin: 11.2, platelets: 78. CXR showed left-sided airspace disease suggestive of pneumonia/pulmonary edema. Interval history:   Pt seen and examined today. Overnight events noted, interval ancillary notes and labs reviewed. Weaned to room air satting around 98%  Patient has been refusing refusing labs and treatment  Plan to get labs during HD today  Palliative care consulted for clarification of goals of care      Assessment & Plan:     Acute hypoxic respiratory failure likely secondary to COVID pneumnia: Weaned to room air  Afebrile, elevated procalcitonin, leukopenic and lymphopenic on admission  Not vaccinated for Covid;  Covid PCR on 11/14/2021 came back positive  Elevated D-dimer, CRP, LDH and ferritin  Maintain droplet plus precautions  Was on Zyvox and Zosyn: Zyvox discontinued secondary to thrombocytopenia   Lovenox discontinued secondary thrombocytopenia  IV steroid tapered  Wean as tolerated to keep sats above 90%  Pulmonology on board appreciate the recs     Diabetes mellitus; on 50 units of Levemir at home  Hemoglobin A1c 14 on admission  Started on SSI and basal insulin  Monitor blood glucose closely     ESRD on hemodialysis Monday Wednesday Friday  proBNP elevated to [place] [address]  Nephrology consulted  Avoid nephrotoxins  Strict intake and output  Daily weights    Anion gap metabolic acidosis likely secondary to ESRD  On low-dose bicarb drip per nephro     Elevated troponin ? In setting of ESRD  Trend troponin  Monitor on telemetry    Pancytopenia:  Oncology consulted    Hypotension: On midodrine 2.5 mg 3 times daily  Neuro blood pressure closelyElevated alk phos likely secondary to CKD  Monitor liver function closely     Microcytic anemia  Iron studies ordered     Thrombocytopenia:  Platelets 78 on admission  Monitor level closely        DVT prophylaxis: Subcu heparin due to low platelets count  Code:Limited  Diet: ADULT DIET; Regular; 3 carb choices (45 gm/meal); Low Potassium (Less than 3000 mg/day);  Low Phosphorus (Less than 1000 mg)    Disposition:     Current Medications  dexamethasone (PF) (DECADRON) injection 6 mg, Daily  insulin glargine (LANTUS;BASAGLAR) injection pen 15 Units, Nightly  insulin lispro (1 Unit Dial) 0-6 Units, TID WC  insulin lispro (1 Unit Dial) 0-3 Units, Nightly  pantoprazole (PROTONIX) injection 40 mg, Daily  heparin (porcine) injection 5,200 Units, Once  lidocaine PF 1 % injection 5 mL, Once  sodium chloride flush 0.9 % injection 5-40 mL, 2 times per day  sodium chloride flush 0.9 % injection 5-40 mL, PRN  0.9 % sodium chloride infusion, PRN  midodrine (PROAMATINE) tablet 2.5 mg, TID WC  sodium hypochlorite (DAKINS) 0.125 % external solution, Daily  collagenase ointment, Daily  lip balm petroleum free (PHYTOPLEX) stick, PRN  promethazine (PHENERGAN) injection 6.25 mg, Q6H PRN  albumin human 25 % IV solution 12.5 g, PRN  midodrine (PROAMATINE) tablet 5 mg, PRN  sodium chloride flush 0.9 % injection 5-40 mL, 2 times per day  sodium chloride flush 0.9 % injection 5-40 mL, PRN  0.9 % sodium chloride infusion, PRN  ondansetron (ZOFRAN-ODT) disintegrating tablet 4 mg, Q8H PRN   Or  ondansetron (ZOFRAN) injection 4 mg, Q6H PRN  polyethylene glycol (GLYCOLAX) packet 17 g, Daily PRN  acetaminophen (TYLENOL) tablet 650 mg, Q6H PRN   Or  acetaminophen (TYLENOL) suppository 650 mg, Q6H PRN  guaiFENesin-dextromethorphan (ROBITUSSIN DM) 100-10 MG/5ML syrup 5 mL, Q4H PRN  Vitamin D (CHOLECALCIFEROL) tablet 2,000 Units, Daily  glucose (GLUTOSE) 40 % oral gel 15 g, PRN  dextrose 50 % IV solution, PRN  glucagon (rDNA) injection 1 mg, PRN  dextrose 5 % solution, PRN        Objective:  BP (!) 111/48   Pulse 52   Temp 96.7 °F (35.9 °C) (Temporal)   Resp 22   Ht 6' 4\" (1.93 m)   Wt 155 lb 4.8 oz (70.4 kg)   SpO2 100%   BMI 18.90 kg/m²     Intake/Output Summary (Last 24 hours) at 11/17/2021 0942  Last data filed at 11/16/2021 1442  Gross per 24 hour   Intake 110 ml   Output --   Net 110 ml      Wt Readings from Last 3 Encounters:   11/17/21 155 lb 4.8 oz (70.4 kg)   03/24/17 205 lb 0.4 oz (93 kg)   10/31/16 218 lb 11.1 oz (99.2 kg)       Physical Examination:   General appearance: Ill-appearing  HEENT Normal cephalic, atraumatic without obvious deformity. Pupils equal, round, and reactive to light. Extra ocular muscles intact. Conjunctivae/corneas clear. Neck: Supple, No jugular venous distention/bruits. Trachea midline without thyromegaly  Lungs: diminished breath sounds with scattered Rales at bases bilaterally  Heart: Regular rate and rhythm with Normal S1/S2 without murmurs, rubs   Abdomen: Soft, non-tender or non-distended without rigidity or guarding and positive bowel sounds all four quadrants.   Extremities:

## 2021-11-18 NOTE — PROGRESS NOTES
Oncology and Hematology Care   Progress Note      11/18/2021 3:06 PM        Name: Radha Izquierdo . Admitted: 11/11/2021    SUBJECTIVE:  Doing okay. Was able to tolerate dialysis yesterday. No bleeding.      Reviewed interval ancillary notes    Current Medications  dexamethasone (PF) (DECADRON) injection 6 mg, Daily  [Held by provider] insulin glargine (LANTUS;BASAGLAR) injection pen 15 Units, Nightly  insulin lispro (1 Unit Dial) 0-6 Units, TID WC  insulin lispro (1 Unit Dial) 0-3 Units, Nightly  pantoprazole (PROTONIX) injection 40 mg, Daily  heparin (porcine) injection 5,200 Units, Once  lidocaine PF 1 % injection 5 mL, Once  sodium chloride flush 0.9 % injection 5-40 mL, 2 times per day  sodium chloride flush 0.9 % injection 5-40 mL, PRN  0.9 % sodium chloride infusion, PRN  midodrine (PROAMATINE) tablet 2.5 mg, TID WC  sodium hypochlorite (DAKINS) 0.125 % external solution, Daily  collagenase ointment, Daily  lip balm petroleum free (PHYTOPLEX) stick, PRN  promethazine (PHENERGAN) injection 6.25 mg, Q6H PRN  albumin human 25 % IV solution 12.5 g, PRN  midodrine (PROAMATINE) tablet 5 mg, PRN  sodium chloride flush 0.9 % injection 5-40 mL, 2 times per day  sodium chloride flush 0.9 % injection 5-40 mL, PRN  0.9 % sodium chloride infusion, PRN  ondansetron (ZOFRAN-ODT) disintegrating tablet 4 mg, Q8H PRN   Or  ondansetron (ZOFRAN) injection 4 mg, Q6H PRN  polyethylene glycol (GLYCOLAX) packet 17 g, Daily PRN  acetaminophen (TYLENOL) tablet 650 mg, Q6H PRN   Or  acetaminophen (TYLENOL) suppository 650 mg, Q6H PRN  guaiFENesin-dextromethorphan (ROBITUSSIN DM) 100-10 MG/5ML syrup 5 mL, Q4H PRN  Vitamin D (CHOLECALCIFEROL) tablet 2,000 Units, Daily  glucose (GLUTOSE) 40 % oral gel 15 g, PRN  dextrose 50 % IV solution, PRN  glucagon (rDNA) injection 1 mg, PRN  dextrose 5 % solution, PRN        Objective:  BP (!) 151/50   Pulse 50   Temp 98.6 °F (37 °C) (Temporal)   Resp 19   Ht 6' 4\" (1.93 m)   Wt 165 lb 11.2 oz (75.2 kg)   SpO2 (!) 81%   BMI 20.17 kg/m²     Intake/Output Summary (Last 24 hours) at 11/18/2021 1506  Last data filed at 11/17/2021 2000  Gross per 24 hour   Intake 400 ml   Output 957 ml   Net -557 ml      Wt Readings from Last 3 Encounters:   11/18/21 165 lb 11.2 oz (75.2 kg)   03/24/17 205 lb 0.4 oz (93 kg)   10/31/16 218 lb 11.1 oz (99.2 kg)       General appearance:  Appears comfortable  Eyes: Sclera clear. Pupils equal.  ENT: Moist oral mucosa. Trachea midline, no adenopathy. Cardiovascular: Regular rhythm, normal S1, S2. No murmur. No edema in lower extremities  Respiratory: Not using accessory muscles. Good inspiratory effort. Clear to auscultation bilaterally, no wheeze or crackles. GI: Abdomen soft, no tenderness, not distended  Musculoskeletal: No cyanosis in digits, neck supple  Neurology: CN 2-12 grossly intact. No speech or motor deficits  Psych: Normal affect. Alert and oriented in time, place and person  Skin: Warm, dry, normal turgor     Labs and Tests:  CBC:   Recent Labs     11/17/21  1600 11/18/21  0651   WBC 3.1* 3.5*   HGB 9.9* 10.3*   PLT 22* 29*     BMP:    Recent Labs     11/17/21  1600 11/18/21  0651   * 134*   K 6.1* 3.6   CL 92* 91*   CO2 22 16*   BUN 57* 51*   CREATININE 5.3* 4.7*   GLUCOSE 177* 172*     Hepatic: No results for input(s): AST, ALT, ALB, BILITOT, ALKPHOS in the last 72 hours. ASSESSMENT AND PLAN    Active Problems:    Pneumonia  Resolved Problems:    * No resolved hospital problems. *      1. Acute respiratory failure likely secondary to COVID pneumonia  - managed by critical care team        2. Pancytopenia  -Due to COVID infection  -anemia partially d/t ESRD  -SPEP, immunofixation, SFLC, copper, Zinc pending.  -IgG is elevated at 1,700. This could be due to covid infection. Will follow up on pending results.   -Check LDH and haptoglobin to r/o hemolysis.    -transfuse for hgb < 7.0  -no evidence of bleeding, no need for transfusion at this time 3. ESRD on dialysis       Carla Marrero [facility]  Oncology Hematology [address] [facility].  [phone]          I agree with the above note. We will review of peripheral blood smear. Most likely Covid induced marrow suppression causing his cytopenias with anemia of chronic disease from ESRD.   Monitor CBC on a daily basis transfuse for goal hemoglobin below 7 and platelet count below 10        Daisy Ortiz MD  Oncology Hematology Care

## 2021-11-18 NOTE — PROGRESS NOTES
MD Amrik Hickman MD Booker Hug, MD                Office: [phone]                      Fax: [phone]          NEPHROLOGY ICU PROGRESS NOTE:     PATIENT NAME: Tea Diaz  : 1970  MRN: [id number]       IMPRESSION / RECOMMENDATION:      Admitted for:  Pneumonia [J18.9]  ESRD (end stage renal disease) ([facility] [address] [N18.6]  Demand ischemia of myocardium ([facility] [address] [I24.8]  Acute respiratory failure with hypoxemia ([facility] [address] [J96.01]  Pneumonia due to infectious organism, unspecified laterality, unspecified part of lung [J18.9]  Acute hypoxic respiratory failure - needing NIPPV   COVID r/o ** pending     Labs today not available    1. ESRD on iHD: MWF.   - at [facility] nephrology team   - S/P HD Friday - pt did not want and tolerate much, d/to hypotension  - S/P HD Saturday -   - tolerating HD well today. About 1L removed. Access used: R fem TDC  Access function: needed cathflo- ran well yesterday but patient did not want to finish treatment.     -but overall, poor prognosis, appropriate for palliative consult - following     -possible Venous sample showing resp. Alkalosis. On NC. Serum HCO3 has improved post HD. O2 support per ICU team.     Next HD tomorrow. 2. Hypertension/Volume Status:  - BP - no need for tight control:  At goal.   - EDW 79.5 kg. Now at  ?75kg.   - Na Hyponatremia-mild and stable. (see above)   - on Midodrine    3. Electrolytes/acid-base:  K: hyperkalemia- better  High AG metabolic acidosis: fu w/ HD - serum HCO3 better. 4. Anemia of renal failure: at goal  - ASHUTOSH: not needed today. Follow Hgb. 5. BMD:  - Phos: WNL  - follow iPTH outpt    High risk.         Other Problems:  Hospital Problems           Last Modified POA    Pneumonia 2021 Yes        Dennis Thornton MD  [facility] [address] [place]: [phone] or [address]  Fax: [phone]    Time spent  ~ 35 minutes that included MG capsule Take 30 mg by mouth nightly as needed.  fentaNYL (DURAGESIC) 50 MCG/HR Place 1 patch onto the skin every 72 hours         Medications Prior to Admission: ondansetron (ZOFRAN ODT) 4 MG disintegrating tablet, Take 1 tablet by mouth every 8 hours as needed for Nausea  pantoprazole (PROTONIX) 40 MG tablet, Take 40 mg by mouth 2 times daily   oxyCODONE (OXY-IR) 30 MG immediate release tablet, Take 30 mg by mouth every 4 hours as needed for Pain. Promethazine HCl 6.25 MG/5ML SOLN, Take 12.5 mg by mouth 2 times daily as needed. insulin detemir (LEVEMIR FLEXPEN) 100 UNIT/ML injection, Inject 50 Units into the skin nightly   ALPRAZolam (XANAX) 1 MG tablet, Take 2 mg by mouth 2 times daily. temazepam (RESTORIL) 30 MG capsule, Take 30 mg by mouth nightly as needed.   fentaNYL (DURAGESIC) 50 MCG/HR, Place 1 patch onto the skin every 72 hours     Inpatient Medications:  Scheduled Meds:   dexamethasone  6 mg IntraVENous Daily    [Held by provider] insulin glargine  15 Units SubCUTAneous Nightly    insulin lispro  0-6 Units SubCUTAneous TID WC    insulin lispro  0-3 Units SubCUTAneous Nightly    pantoprazole  40 mg IntraVENous Daily    heparin (porcine)  5,200 Units IntraCATHeter Once    lidocaine 1 % injection  5 mL IntraDERmal Once    sodium chloride flush  5-40 mL IntraVENous 2 times per day    midodrine  2.5 mg Oral TID WC    sodium hypochlorite   Irrigation Daily    collagenase   Topical Daily    sodium chloride flush  5-40 mL IntraVENous 2 times per day    Vitamin D  2,000 Units Oral Daily     Continuous Infusions:   sodium chloride      sodium chloride 25 mL (11/14/21 1187)    dextrose       PRN Meds:.sodium chloride flush, sodium chloride, lip balm petroleum free, promethazine, albumin human, midodrine, sodium chloride flush, sodium chloride, ondansetron **OR** ondansetron, polyethylene glycol, acetaminophen **OR** acetaminophen, guaiFENesin-dextromethorphan, glucose, dextrose, glucagon (rDNA), dextrose       REVIEW OF SYSTEMS:  Review of systems not obtained due to patient factors     PHYSICAL EXAM:  Patient Vitals for the past 24 hrs:   BP Temp Temp src Pulse Resp SpO2 Weight   11/18/21 0900 (!) 122/25 96.7 °F (35.9 °C) Temporal 57 22 -- --   11/18/21 0700 (!) 214/38 -- -- (!) 43 15 -- 165 lb 11.2 oz (75.2 kg)   11/18/21 0612 -- -- -- -- -- (!) 81 % --   11/18/21 0600 (!) 115/30 -- -- 55 19 -- --   11/18/21 0522 -- -- -- -- -- 96 % --   11/18/21 0500 (!) 110/33 -- -- (!) 48 21 -- --   11/18/21 0433 -- -- -- -- -- (!) 83 % --   11/18/21 0400 -- 97.2 °F (36.2 °C) Temporal (!) 46 -- -- --   11/18/21 0300 (!) 130/37 -- -- 51 17 95 % --   11/18/21 0200 (!) 132/41 -- -- (!) 46 20 95 % --   11/18/21 0100 (!) 128/44 -- -- 54 19 96 % --   11/18/21 0000 (!) 128/34 96.7 °F (35.9 °C) Temporal 61 16 -- --   11/17/21 2300 (!) 117/45 -- -- 51 20 -- --   11/17/21 2200 136/60 -- -- 50 17 (!) 82 % --   11/17/21 2100 (!) 139/52 -- -- 53 25 -- --   11/17/21 2000 (!) 131/45 97 °F (36.1 °C) Temporal 56 20 -- --   11/17/21 1938 -- -- -- -- 21 -- --   11/17/21 1900 (!) 127/42 97.2 °F (36.2 °C) Temporal (!) 48 14 93 % --   11/17/21 1805 (!) 150/76 96.7 °F (35.9 °C) -- 54 20 -- 157 lb 3 oz (71.3 kg)   11/17/21 1608 (!) 163/57 97.1 °F (36.2 °C) -- (!) 49 18 -- 157 lb 13.6 oz (71.6 kg)       Intake/Output Summary (Last 24 hours) at 11/18/2021 1244  Last data filed at 11/17/2021 2000  Gross per 24 hour   Intake 400 ml   Output 957 ml   Net -557 ml          Physical exam:    In-person bedside physical examination deferred. Pursuant to the emergency declaration under the [address] [place] [address] authority and the [employer] Act, this clinical encounter was conducted to provide necessary medical care.    (Also consistent with new provisions and guidance offered by [facility] on March 18, 2020 in setting of COVID 19 outbreak and in order to preserve personal protective equipment in accordance with the flexibilities announced by CMS on March 30, 2020)   References: https://[facility]/Portals/0/Resources/COVID-19/3_18%20Telemed%20Guidance%20Updated%20March%2018. pdf?zui=[email or web address]                      https://[facility]/Portals/0/Resources/COVID-19/3_18%20Telemed%20Guidance%20Updated%20March%2018. pdf?vnu=[email or web address]                      http://[employer]/. pdf    However, I did visually inspect the patient and observed the following:      Vitals signs reviewed. Constitutional:       General: not in acute distress. Appearance:   ill-appearing. HENT:      Head: Normocephalic and atraumatic. Nose: Nose normal.      Mouth/Throat:      Mouth: Mucous membranes are dry . Eyes:      General: No scleral icterus. Conjunctiva/sclera: Conjunctivae normal.   Neck:      Musculoskeletal: Normal range of motion and neck supple. Cardiovascular:      Rate and Rhythm: Normal rate. Pulmonary:      Effort: Pulmonary effort is  Ab-normal.   Abdominal:      General: There is  distension. Musculoskeletal:      Right lower leg: mild edema. Left lower leg: mild edema. Skin:     Coloration: Skin is not jaundiced.    Neurological:      General:  Deferred                DATA:  Diagnostic tests reviewed for today's visit:    Recent Labs     11/17/21  1600 11/18/21  0651   WBC 3.1* 3.5*   HCT 30.3* 31.7*   PLT 22* 29*     Iron Saturation:  No components found for: PERCENTFE  FERRITIN:    Lab Results   Component Value Date    FERRITIN 1,623.0 11/12/2021     IRON:    Lab Results   Component Value Date    IRON 106 11/12/2021     TIBC:    Lab Results   Component Value Date    TIBC 133 11/12/2021       Recent Labs     11/17/21  1600 11/18/21  0651   * 134*   K 6.1* 3.6   CL 92* 91*   CO2 22 16*   BUN 57* 51*   CREATININE 5.3* 4.7*     Recent Labs 11/17/21  1600 11/18/21  0651   CALCIUM 7.2* 7.3*     No results for input(s): PH, PCO2, PO2 in the last 72 hours. Invalid input(s): Donnalee Stager    ABG:  No results found for: PH, PCO2, PO2, HCO3, BE, THGB, TCO2, O2SAT  VBG:    Lab Results   Component Value Date    PHVEN 7.143 11/13/2021    KGK8PRQ 28.5 11/13/2021    BEVEN -17.9 11/13/2021    [id number] 96 11/13/2021           BELOW MENTIONED RADIOLOGY STUDY RESULTS REVIEWED BY ME:    XR CHEST PORTABLE    Result Date: 11/11/2021  EXAMINATION: ONE XRAY VIEW OF THE CHEST 11/11/2021 1:21 pm COMPARISON: None. HISTORY: ORDERING SYSTEM PROVIDED HISTORY: SOB TECHNOLOGIST PROVIDED HISTORY: Reason for exam:->SOB Reason for Exam: SOB Acuity: Acute Type of Exam: Initial FINDINGS: Exaggerated kyphotic positioning and patient rotation. Patient's chin obscures the right lung apex. Heart size indeterminate. Airspace disease in the mid and lower left lung. Strandy opacity in the right lung base     Limited study. Left-sided airspace disease may represent pneumonia or asymmetric edema.   Atelectasis present in the right base

## 2021-11-18 NOTE — CARE COORDINATION
Chart reviewed for discharge planning. Barrier(s) to discharge-      Oxygen -      Testing -       Labs -       Other - Patient is Covid positive, CM is addressing a new dialysis chair time for him. No SNF has offered acceptance at this point, [facility] unable to manage the dialysis transportation and [facility] has not returned calls to this CM. Tentative discharge plan- SNF    Tentative discharge date-when HD chair, transport, and and accepting facility are obtained    *Case management will continue to follow progress and update discharge plan as needed.     Michoacano Simons, RENITAN, CCM, RN  [facility]  [phone]

## 2021-11-18 NOTE — CARE COORDINATION
CM following up on SNF referrals. [facility] is not able to secure HD transportation for patient. [facility] --voice message left. Patient needs a different dialysis time due to Covid POS. CM contacted Mary Stevens to assist in facilitation of this.       Imani Mcmanus, RENITAN, CCM, RN  [facility]  [phone]

## 2021-11-18 NOTE — PROGRESS NOTES
[address] [place] [address] PROGRESS NOTE    11/18/2021 1:23 PM        Name: Shahid Petersen . Admitted: 11/11/2021  Primary Care Provider: Nettie Anguiano MD (Tel: [phone])                        Subjective:  . No acute events overnight. Resting well. Pain control. Diet ok. Labs reviewed  Denies any chest pain sob.      Reviewed interval ancillary notes    Current Medications  dexamethasone (PF) (DECADRON) injection 6 mg, Daily  [Held by provider] insulin glargine (LANTUS;BASAGLAR) injection pen 15 Units, Nightly  insulin lispro (1 Unit Dial) 0-6 Units, TID WC  insulin lispro (1 Unit Dial) 0-3 Units, Nightly  pantoprazole (PROTONIX) injection 40 mg, Daily  heparin (porcine) injection 5,200 Units, Once  lidocaine PF 1 % injection 5 mL, Once  sodium chloride flush 0.9 % injection 5-40 mL, 2 times per day  sodium chloride flush 0.9 % injection 5-40 mL, PRN  0.9 % sodium chloride infusion, PRN  midodrine (PROAMATINE) tablet 2.5 mg, TID WC  sodium hypochlorite (DAKINS) 0.125 % external solution, Daily  collagenase ointment, Daily  lip balm petroleum free (PHYTOPLEX) stick, PRN  promethazine (PHENERGAN) injection 6.25 mg, Q6H PRN  albumin human 25 % IV solution 12.5 g, PRN  midodrine (PROAMATINE) tablet 5 mg, PRN  sodium chloride flush 0.9 % injection 5-40 mL, 2 times per day  sodium chloride flush 0.9 % injection 5-40 mL, PRN  0.9 % sodium chloride infusion, PRN  ondansetron (ZOFRAN-ODT) disintegrating tablet 4 mg, Q8H PRN   Or  ondansetron (ZOFRAN) injection 4 mg, Q6H PRN  polyethylene glycol (GLYCOLAX) packet 17 g, Daily PRN  acetaminophen (TYLENOL) tablet 650 mg, Q6H PRN   Or  acetaminophen (TYLENOL) suppository 650 mg, Q6H PRN  guaiFENesin-dextromethorphan (ROBITUSSIN DM) 100-10 MG/5ML syrup 5 mL, Q4H PRN  Vitamin D (CHOLECALCIFEROL) tablet 2,000 Units, Daily  glucose (GLUTOSE) 40 hospital problems. *       Assessment & Plan:   1. Acute hypoxic respiratory failure  -Secondary to COVID-19'-on room air. 0 Taper steroids. hemodynamically stable    Diabetes  -Uncontrolled A1c of 14  Started on Lantus and insulin will monitor will need insulin on discharge      End-stage renal disease on hemodialysis        Medically stable for discharge        Diet: ADULT DIET; Regular; 3 carb choices (45 gm/meal); Low Potassium (Less than 3000 mg/day);  Low Phosphorus (Less than 1000 mg)  Code:Limited  DVT PPX lovenox       Rock Elier MD   11/18/2021 1:23 PM

## 2021-11-19 NOTE — PROGRESS NOTES
Treatment time: 3hrs  Net UF:1 liter  Pre weight: 75.4kg  Post weight:74.5kg  EDW: 76    Access used: r femoral [address]   Access function: positional    Medications or blood products given: 0    Regular outpatient schedule:     Summary of response to treatment: HD was well tolerated. BFR had to be decreased d/t increased access pressure. Pt was confrontational at times, confused. He refused any dressing change and stated he wanted to sign out AMA. Able to remove 1 liter . Copy of dialysis treatment record placed in chart, to be scanned into EMR.

## 2021-11-19 NOTE — PLAN OF CARE
Problem: Risk for Fluid Volume Deficit  Goal: Maintain normal heart rhythm  Outcome: Ongoing     Problem: Risk for Fluid Volume Deficit  Goal: Maintain absence of muscle cramping  Outcome: Ongoing     Problem: Risk for Fluid Volume Deficit  Goal: Maintain normal serum potassium, sodium, calcium, phosphorus, and pH  Outcome: Ongoing

## 2021-11-19 NOTE — CARE COORDINATION
CM has made additional referrals to all skilled nursing facility that accept Covid positive patients on the east side of [facility] as patient will need to have his dialysis changed to [facility] [place] location. [facility] requires 10 days post diagnosis, can not accept    May March--unable to accept due to patient having C Diff and no private bathroom on the Covid unit    Pending referrals to [facility] and [facility] unable to meet needs due to dialysis transportation. Patient dialysis will be [facility] Tues-Thurs-Sat at 1630. The schedule next week will change due to the holiday, CM will obtain this once disposition is clear. Patient can return to [facility] after 11/24/2021.     Tony Guillen, RENITAN, CCM, RN  [facility]  [phone]

## 2021-11-19 NOTE — PROGRESS NOTES
Patient called out as for this RN, wanting to leave AMA. RN spoke with patient, patient had Brandon, his aunt on the phone, who states patient just wants updates. Patient and aunt were notified that the patient has been updated multiple times today, and that the patient is still waiting for SNF placement at this time. Patient repeated, multiple times, to call [facility] stating that they would get him somewhere. Patient notified that our case management team is continually working on placement for him, again. Patient's aunt verbalized understanding but patient requires frequent reminders.

## 2021-11-19 NOTE — PROGRESS NOTES
[facility] PROGRESS NOTE    11/19/2021 1:21 PM        Name: Diana Bourgeois . Admitted: 11/11/2021  Primary Care Provider: Elisa Kan MD (Tel: [phone])                        Subjective:  . No acute events overnight. Resting well. Pain control. Diet ok. Labs reviewed  Denies any chest pain sob.      Reviewed interval ancillary notes    Current Medications  dexamethasone (PF) (DECADRON) injection 6 mg, Daily  insulin glargine (LANTUS;BASAGLAR) injection pen 15 Units, Nightly  insulin lispro (1 Unit Dial) 0-6 Units, TID WC  insulin lispro (1 Unit Dial) 0-3 Units, Nightly  pantoprazole (PROTONIX) injection 40 mg, Daily  heparin (porcine) injection 5,200 Units, Once  lidocaine PF 1 % injection 5 mL, Once  sodium chloride flush 0.9 % injection 5-40 mL, 2 times per day  sodium chloride flush 0.9 % injection 5-40 mL, PRN  0.9 % sodium chloride infusion, PRN  midodrine (PROAMATINE) tablet 2.5 mg, TID WC  sodium hypochlorite (DAKINS) 0.125 % external solution, Daily  collagenase ointment, Daily  lip balm petroleum free (PHYTOPLEX) stick, PRN  promethazine (PHENERGAN) injection 6.25 mg, Q6H PRN  albumin human 25 % IV solution 12.5 g, PRN  midodrine (PROAMATINE) tablet 5 mg, PRN  sodium chloride flush 0.9 % injection 5-40 mL, 2 times per day  sodium chloride flush 0.9 % injection 5-40 mL, PRN  0.9 % sodium chloride infusion, PRN  ondansetron (ZOFRAN-ODT) disintegrating tablet 4 mg, Q8H PRN   Or  ondansetron (ZOFRAN) injection 4 mg, Q6H PRN  polyethylene glycol (GLYCOLAX) packet 17 g, Daily PRN  acetaminophen (TYLENOL) tablet 650 mg, Q6H PRN   Or  acetaminophen (TYLENOL) suppository 650 mg, Q6H PRN  guaiFENesin-dextromethorphan (ROBITUSSIN DM) 100-10 MG/5ML syrup 5 mL, Q4H PRN  Vitamin D (CHOLECALCIFEROL) tablet 2,000 Units, Daily  glucose (GLUTOSE) 40 % oral gel 15 g, PRN  dextrose 50 % IV solution, PRN  glucagon (rDNA) injection 1 mg, PRN  dextrose 5 % solution, PRN        Objective:  BP (!) 140/98   Pulse 57   Temp 98.6 °F (37 °C) (Temporal)   Resp 17   Ht 6' 4\" (1.93 m)   Wt 164 lb 3.9 oz (74.5 kg)   SpO2 96%   BMI 19.99 kg/m²     Intake/Output Summary (Last 24 hours) at 11/19/2021 1321  Last data filed at 11/18/2021 1600  Gross per 24 hour   Intake 130 ml   Output --   Net 130 ml      Wt Readings from Last 3 Encounters:   11/19/21 164 lb 3.9 oz (74.5 kg)   03/24/17 205 lb 0.4 oz (93 kg)   10/31/16 218 lb 11.1 oz (99.2 kg)       General appearance:  Appears comfortable  Eyes: Sclera clear. Pupils equal.  ENT: Moist oral mucosa. Trachea midline, no adenopathy. Cardiovascular: Regular rhythm, normal S1, S2. No murmur. No edema in lower extremities  Respiratory: Not using accessory muscles. Good inspiratory effort. Clear to auscultation bilaterally, no wheeze or crackles. GI: Abdomen soft, no tenderness, not distended, normal bowel sounds  Musculoskeletal: No cyanosis in digits, neck supple  Neurology: CN 2-12 grossly intact. No speech or motor deficits  Psych: Normal affect. Alert and oriented in time, place and person  Skin: Warm, dry, normal turgor    Labs and Tests:  CBC:   Recent Labs     11/17/21  1600 11/18/21  0651 11/19/21  0441   WBC 3.1* 3.5* 3.5*   HGB 9.9* 10.3* 10.3*   PLT 22* 29* 41*     BMP:    Recent Labs     11/17/21  1600 11/18/21  0651 11/19/21  0441   * 134* 134*   K 6.1* 3.6 3.6   CL 92* 91* 89*   CO2 22 16* 16*   BUN 57* 51* 64*   CREATININE 5.3* 4.7* 5.8*   GLUCOSE 177* 172* 225*     Hepatic: No results for input(s): AST, ALT, ALB, BILITOT, ALKPHOS in the last 72 hours.     Discussed care with family and patient             Spent 30  minutes with patient and family at bedside and on unit reviewing medical records and labs, spent greater than 50% time counseling patient and family on diagnosis and plan   Problem List  Active Problems: Pneumonia  Resolved Problems:    * No resolved hospital problems. *       Assessment & Plan:   1. Acute hypoxic respiratory failure  -Secondary to COVID-19'-on room air. 0 Taper steroids. hemodynamically stable    Diabetes  -Uncontrolled A1c of 14  Started on Lantus and insulin will monitor will need insulin on discharge      End-stage renal disease on hemodialysis        Medically stable for discharge        Diet: ADULT DIET; Regular; 3 carb choices (45 gm/meal); Low Potassium (Less than 3000 mg/day);  Low Phosphorus (Less than 1000 mg)  Code:Limited  DVT PPX lovenox       Lisa Webb MD   11/19/2021 1:21 PM

## 2021-11-19 NOTE — PROGRESS NOTES
MD Eloisa Borden MD Viva Rasp, MD                Office: [phone]                      Fax: [phone]          NEPHROLOGY ICU PROGRESS NOTE:     PATIENT NAME: Peace Cardoza  : 1970  MRN: [id number]       IMPRESSION / RECOMMENDATION:      Admitted for:  Pneumonia [J18.9]  ESRD (end stage renal disease) ([facility] [N18.6]  Demand ischemia of myocardium ([facility] [I24.8]  Acute respiratory failure with hypoxemia ([facility] [J96.01]  Pneumonia due to infectious organism, unspecified laterality, unspecified part of lung [J18.9]  Acute hypoxic respiratory failure - needing NIPPV   COVID r/o ** pending     1. ESRD on iHD: MWF.   - at [facility] nephrology team   - Episodes of hypotension with previous HD. But has been tolerating recent HD's. - Last treatment, he did not want to continue full treatment.    - HD today  Access used: R fem TDC    -but overall, poor prognosis, appropriate for palliative consult - following     -follow HCO3 tomorrow s/p HD today. 2. Hypertension/Volume Status:  - BP - no need for tight control:  At goal.   - EDW 79.5 kg. Now at  ?74.5kg.   - Na Hyponatremia-mild and stable. (see above)   - on Midodrine    3. Electrolytes/acid-base:  K: hyperkalemia- better  High AG metabolic acidosis: fu w/ HD - serum HCO3 better. 4. Anemia of renal failure: at goal  - ASHUTOSH: not needed today. Follow Hgb. 5. BMD:  - Phos: WNL  - follow iPTH outpt    High risk.         Other Problems:  Hospital Problems           Last Modified POA    Pneumonia 2021 Yes        Laurent Babcock MD  [facility] [address] [place] [address]  Office: [phone] or [email or web address]  Fax: [phone]    Time spent  ~ 35 minutes that included face-to-face meeting/discussion with patient's treatment team (including primary/referring team and other consultants; included coordination of care with the treatment team; and review of patient's electronic medical records and ordering appropriates tests.       ===========================================  ===========================================  This patient is COVID-19 rule out, so in a strict isolation, as per current protocol; So in order to preserve PPE supply and to avoid unnecessary exposure to prevent transmission of COVID-19; this patient was NOT examined face to face, as risk of contact outweighs the benefits and likely would not change much of my clinical management. HPI, review of system and physical exam was limited, as it was mainly obtained from chart review and the primary team. But all relevant records and diagnostic tests were reviewed, including laboratory and imaging results. Patient's care is being coordinated with the primary service. Subjective:    Seen outside room  COVID positive  On NC    Past medical, surgical, social and family medial history reviewed by me:  MEDICATIONS reviewed by me:  Prior to Admission Medications:  No current facility-administered medications on file prior to encounter. Current Outpatient Medications on File Prior to Encounter   Medication Sig Dispense Refill    ondansetron (ZOFRAN ODT) 4 MG disintegrating tablet Take 1 tablet by mouth every 8 hours as needed for Nausea 20 tablet 0    pantoprazole (PROTONIX) 40 MG tablet Take 40 mg by mouth 2 times daily       Promethazine HCl 6.25 MG/5ML SOLN Take 12.5 mg by mouth 2 times daily as needed.  insulin detemir (LEVEMIR FLEXPEN) 100 UNIT/ML injection Inject 50 Units into the skin nightly       temazepam (RESTORIL) 30 MG capsule Take 30 mg by mouth nightly as needed. Medications Prior to Admission: ondansetron (ZOFRAN ODT) 4 MG disintegrating tablet, Take 1 tablet by mouth every 8 hours as needed for Nausea  pantoprazole (PROTONIX) 40 MG tablet, Take 40 mg by mouth 2 times daily   Promethazine HCl 6.25 MG/5ML SOLN, Take 12.5 mg by mouth 2 times daily as needed.   insulin detemir (LEVEMIR FLEXPEN) 100 -- -- 166 lb 3.6 oz (75.4 kg)   11/19/21 0400 (!) 164/80 98.6 °F (37 °C) Temporal (!) 47 19 96 % --   11/19/21 0311 -- -- -- -- 18 97 % --   11/19/21 0300 (!) 143/85 -- -- 54 18 97 % --   11/19/21 0200 (!) 150/90 -- -- 63 17 96 % --   11/19/21 0100 -- -- -- (!) 49 25 -- --   11/19/21 0027 -- -- -- -- 18 100 % --   11/19/21 0000 -- 98 °F (36.7 °C) Temporal (!) 48 15 98 % --   11/18/21 2300 -- -- -- (!) 49 21 100 % --   11/18/21 2200 (!) 163/84 -- -- 50 18 100 % --   11/18/21 2100 (!) 165/87 -- -- (!) 48 17 100 % --   11/18/21 2000 (!) 155/84 97.8 °F (36.6 °C) Temporal 51 19 100 % --   11/18/21 1900 -- -- -- 54 19 100 % --   11/18/21 1800 -- -- -- 50 22 100 % --   11/18/21 1700 95/70 98.2 °F (36.8 °C) Temporal 69 19 100 % --     No intake or output data in the 24 hours ending 11/19/21 1608       Physical exam:    In-person bedside physical examination deferred. Pursuant to the emergency declaration under the [facility], [address] [place] [address] authority and the [employer] Act, this clinical encounter was conducted to provide necessary medical care. (Also consistent with new provisions and guidance offered by [facility] on March 18, 2020 in setting of COVID 19 outbreak and in order to preserve personal protective equipment in accordance with the flexibilities announced by CMS on March 30, 2020)   References: https://med. [place].gov/Portals/0/Resources/COVID-19/3_18%20Telemed%20Guidance%20Updated%20March%2018. pdf?iua=6034-75-34-020485-407                      https://[facility]/Portals/0/Resources/COVID-19/3_18%20Telemed%20Guidance%20Updated%20March%2018. pdf?kds=[email or web address]                      http://[employer]/. pdf    However, I did visually inspect the patient and observed the following:      Vitals signs reviewed.    Constitutional:       General: not in acute distress. Appearance:   ill-appearing. HENT:      Head: Normocephalic and atraumatic. Nose: Nose normal.      Mouth/Throat:      Mouth: Mucous membranes are dry . Eyes:      General: No scleral icterus. Conjunctiva/sclera: Conjunctivae normal.   Neck:      Musculoskeletal: Normal range of motion and neck supple. Cardiovascular:      Rate and Rhythm: Normal rate. Pulmonary:      Effort: Pulmonary effort is  Ab-normal.   Abdominal:      General: There is  distension. Musculoskeletal:      Right lower leg: mild edema. Left lower leg: mild edema. Skin:     Coloration: Skin is not jaundiced. Neurological:      General:  Deferred     DATA:  Diagnostic tests reviewed for today's visit:    Recent Labs     11/17/21  1600 11/18/21  0651 11/19/21  0441   WBC 3.1* 3.5* 3.5*   HCT 30.3* 31.7* 31.7*   PLT 22* 29* 41*     Iron Saturation:  No components found for: PERCENTFE  FERRITIN:    Lab Results   Component Value Date    FERRITIN 1,623.0 11/12/2021     IRON:    Lab Results   Component Value Date    IRON 106 11/12/2021     TIBC:    Lab Results   Component Value Date    TIBC 133 11/12/2021       Recent Labs     11/17/21  1600 11/18/21  0651 11/19/21  0441   * 134* 134*   K 6.1* 3.6 3.6   CL 92* 91* 89*   CO2 22 16* 16*   BUN 57* 51* 64*   CREATININE 5.3* 4.7* 5.8*     Recent Labs     11/17/21  1600 11/18/21  0651 11/19/21  0441   CALCIUM 7.2* 7.3* 7.2*     No results for input(s): PH, PCO2, PO2 in the last 72 hours. Invalid input(s): Greg Co    ABG:  No results found for: PH, PCO2, PO2, HCO3, BE, THGB, TCO2, O2SAT  VBG:    Lab Results   Component Value Date    PHVEN 7.143 11/13/2021    NBP4VAB 28.5 11/13/2021    BEVEN -17.9 11/13/2021    F2QBJSBV 96 11/13/2021           BELOW MENTIONED RADIOLOGY STUDY RESULTS REVIEWED BY ME:    XR CHEST PORTABLE    Result Date: 11/11/2021  EXAMINATION: ONE XRAY VIEW OF THE CHEST 11/11/2021 1:21 pm COMPARISON: None.  HISTORY: ORDERING SYSTEM PROVIDED HISTORY: SOB TECHNOLOGIST PROVIDED HISTORY: Reason for exam:->SOB Reason for Exam: SOB Acuity: Acute Type of Exam: Initial FINDINGS: Exaggerated kyphotic positioning and patient rotation. Patient's chin obscures the right lung apex. Heart size indeterminate. Airspace disease in the mid and lower left lung. Strandy opacity in the right lung base     Limited study. Left-sided airspace disease may represent pneumonia or asymmetric edema.   Atelectasis present in the right base

## 2021-11-20 NOTE — PROGRESS NOTES
[facility] PROGRESS NOTE    11/20/2021 11:20 AM        Name: Suman Newton . Admitted: 11/11/2021  Primary Care Provider: Alvin Jaimes MD (Tel: [phone])                        Subjective:  . No acute events overnight. Resting well. Pain control. Diet ok. Labs reviewed  Denies any chest pain sob.      Reviewed interval ancillary notes    Current Medications  dexamethasone (PF) (DECADRON) injection 6 mg, Daily  insulin glargine (LANTUS;BASAGLAR) injection pen 15 Units, Nightly  insulin lispro (1 Unit Dial) 0-6 Units, TID WC  insulin lispro (1 Unit Dial) 0-3 Units, Nightly  pantoprazole (PROTONIX) injection 40 mg, Daily  heparin (porcine) injection 5,200 Units, Once  lidocaine PF 1 % injection 5 mL, Once  sodium chloride flush 0.9 % injection 5-40 mL, 2 times per day  sodium chloride flush 0.9 % injection 5-40 mL, PRN  0.9 % sodium chloride infusion, PRN  midodrine (PROAMATINE) tablet 2.5 mg, TID WC  sodium hypochlorite (DAKINS) 0.125 % external solution, Daily  collagenase ointment, Daily  lip balm petroleum free (PHYTOPLEX) stick, PRN  promethazine (PHENERGAN) injection 6.25 mg, Q6H PRN  albumin human 25 % IV solution 12.5 g, PRN  midodrine (PROAMATINE) tablet 5 mg, PRN  sodium chloride flush 0.9 % injection 5-40 mL, 2 times per day  sodium chloride flush 0.9 % injection 5-40 mL, PRN  0.9 % sodium chloride infusion, PRN  ondansetron (ZOFRAN-ODT) disintegrating tablet 4 mg, Q8H PRN   Or  ondansetron (ZOFRAN) injection 4 mg, Q6H PRN  polyethylene glycol (GLYCOLAX) packet 17 g, Daily PRN  acetaminophen (TYLENOL) tablet 650 mg, Q6H PRN   Or  acetaminophen (TYLENOL) suppository 650 mg, Q6H PRN  guaiFENesin-dextromethorphan (ROBITUSSIN DM) 100-10 MG/5ML syrup 5 mL, Q4H PRN  Vitamin D (CHOLECALCIFEROL) tablet 2,000 Units, Daily  glucose (GLUTOSE) 40 % oral gel 15 g, PRN  dextrose 50 % IV solution, PRN  glucagon (rDNA) injection 1 mg, PRN  dextrose 5 % solution, PRN        Objective:  BP (!) 143/83   Pulse 71   Temp 98 °F (36.7 °C) (Temporal)   Resp 18   Ht 6' 4\" (1.93 m)   Wt 163 lb 5.8 oz (74.1 kg)   SpO2 99%   BMI 19.88 kg/m²     Intake/Output Summary (Last 24 hours) at 11/20/2021 1120  Last data filed at 11/20/2021 1000  Gross per 24 hour   Intake 120 ml   Output --   Net 120 ml      Wt Readings from Last 3 Encounters:   11/20/21 163 lb 5.8 oz (74.1 kg)   03/24/17 205 lb 0.4 oz (93 kg)   10/31/16 218 lb 11.1 oz (99.2 kg)       General appearance:  Appears comfortable  Eyes: Sclera clear. Pupils equal.  ENT: Moist oral mucosa. Trachea midline, no adenopathy. Cardiovascular: Regular rhythm, normal S1, S2. No murmur. No edema in lower extremities  Respiratory: Not using accessory muscles. Good inspiratory effort. Clear to auscultation bilaterally, no wheeze or crackles. GI: Abdomen soft, no tenderness, not distended, normal bowel sounds  Musculoskeletal: No cyanosis in digits, neck supple  Neurology: CN 2-12 grossly intact. No speech or motor deficits  Psych: Normal affect. Alert and oriented in time, place and person  Skin: Warm, dry, normal turgor    Labs and Tests:  CBC:   Recent Labs     11/18/21  0651 11/19/21  0441 11/20/21  0328   WBC 3.5* 3.5* 5.1   HGB 10.3* 10.3* 10.7*   PLT 29* 41* 42*     BMP:    Recent Labs     11/18/21  0651 11/19/21  0441 11/20/21  0328   * 134* 136   K 3.6 3.6 3.6   CL 91* 89* 94*   CO2 16* 16* 20*   BUN 51* 64* 40*   CREATININE 4.7* 5.8* 4.3*   GLUCOSE 172* 225* 179*     Hepatic: No results for input(s): AST, ALT, ALB, BILITOT, ALKPHOS in the last 72 hours.     Discussed care with family and patient             Spent 30  minutes with patient and family at bedside and on unit reviewing medical records and labs, spent greater than 50% time counseling patient and family on diagnosis and plan   Problem List  Active Problems: Pneumonia  Resolved Problems:    * No resolved hospital problems. *       Assessment & Plan:   1. Acute hypoxic respiratory failure  -Secondary to COVID-19'-on room air.  -Completed steroid course   hemodynamically stable.  -Awaiting placement    Diabetes  -Uncontrolled A1c of 14  Started on Lantus and insulin will monitor will need insulin on discharge      End-stage renal disease on hemodialysis        Medically stable for discharge        Diet: ADULT DIET; Regular; 3 carb choices (45 gm/meal); Low Potassium (Less than 3000 mg/day);  Low Phosphorus (Less than 1000 mg)  Code:Limited  DVT PPX lovenox       Art Levi MD   11/20/2021 11:20 AM

## 2021-11-20 NOTE — PROGRESS NOTES
MD Jovita Buck MD Lanney Saunders, MD                Office: [phone]                      Fax: [phone]          NEPHROLOGY ICU PROGRESS NOTE:     PATIENT NAME: Tigre Swann  : 1970  MRN: [id number]       IMPRESSION / RECOMMENDATION:      Admitted for:  Pneumonia [J18.9]  ESRD (end stage renal disease) ([facility] [N18.6]  Demand ischemia of myocardium ([facility] [I24.8]  Acute respiratory failure with hypoxemia ([facility] [J96.01]  Pneumonia due to infectious organism, unspecified laterality, unspecified part of lung [J18.9]  Acute hypoxic respiratory failure - needing NIPPV   COVID r/o ** pending     1. ESRD on iHD: MWF.   - at [facility] nephrology team   - Episodes of hypotension with previous HD. But has been tolerating recent HD's. - Last treatment, he did not want to continue full treatment.    - HD yesterday. 1L removed. Some increase access pressure. Next HD Monday. Solute status today acceptable. Access used: R fem TDC    -but overall, poor prognosis, appropriate for palliative consult - following     -HCO3 better today s/p HD    2. Hypertension/Volume Status:  - BP - no need for tight control:  At goal.   - EDW 79.5 kg. Now at  ?74.1kg.   - Na Hyponatremia-mild and stable. (see above)   - on Midodrine    3. Electrolytes/acid-base:  K: hyperkalemia- better  High AG metabolic acidosis: fu w/ HD - serum HCO3 better. 4. Anemia of renal failure: at goal  - ASHUTOSH: not needed today. Follow Hgb. 5. BMD:  - Phos: WNL  - follow iPTH outpt    High risk.         Other Problems:  Hospital Problems           Last Modified POA    Pneumonia 2021 Yes        Ezequiel Ibarra MD  [facility] [address] [place] [address]  Office: [phone] or Via [facility]  Fax: [phone]    Time spent  ~ 35 minutes that included face-to-face meeting/discussion with patient's treatment team (including primary/referring team and other consultants; included coordination of care with the treatment team; and review of patient's electronic medical records and ordering appropriates tests.       ===========================================  ===========================================  This patient is COVID-19 rule out, so in a strict isolation, as per current protocol; So in order to preserve PPE supply and to avoid unnecessary exposure to prevent transmission of COVID-19; this patient was NOT examined face to face, as risk of contact outweighs the benefits and likely would not change much of my clinical management. HPI, review of system and physical exam was limited, as it was mainly obtained from chart review and the primary team. But all relevant records and diagnostic tests were reviewed, including laboratory and imaging results. Patient's care is being coordinated with the primary service. Subjective:    Seen outside room  COVID positive  On NC    Past medical, surgical, social and family medial history reviewed by me:  MEDICATIONS reviewed by me:  Prior to Admission Medications:  No current facility-administered medications on file prior to encounter. Current Outpatient Medications on File Prior to Encounter   Medication Sig Dispense Refill    ondansetron (ZOFRAN ODT) 4 MG disintegrating tablet Take 1 tablet by mouth every 8 hours as needed for Nausea 20 tablet 0    pantoprazole (PROTONIX) 40 MG tablet Take 40 mg by mouth 2 times daily       Promethazine HCl 6.25 MG/5ML SOLN Take 12.5 mg by mouth 2 times daily as needed.  insulin detemir (LEVEMIR FLEXPEN) 100 UNIT/ML injection Inject 50 Units into the skin nightly       temazepam (RESTORIL) 30 MG capsule Take 30 mg by mouth nightly as needed.          Medications Prior to Admission: ondansetron (ZOFRAN ODT) 4 MG disintegrating tablet, Take 1 tablet by mouth every 8 hours as needed for Nausea  pantoprazole (PROTONIX) 40 MG tablet, Take 40 mg by mouth 2 times daily   Promethazine HCl 6.25 MG/5ML 146/85 -- -- 73 17 98 % --   11/20/21 0622 -- -- -- -- -- -- 163 lb 5.8 oz (74.1 kg)   11/20/21 0600 (!) 144/79 -- -- 65 22 98 % --   11/20/21 0500 (!) 147/83 -- -- 73 15 93 % --   11/20/21 0447 -- -- -- -- 19 98 % --   11/20/21 0400 (!) 160/93 98 °F (36.7 °C) Temporal 63 17 100 % --   11/20/21 0300 135/75 -- -- 61 18 99 % --   11/20/21 0200 (!) 148/85 -- -- 78 21 100 % --   11/20/21 0111 -- -- -- -- 25 100 % --   11/20/21 0100 139/88 -- -- 76 18 100 % --   11/20/21 0000 (!) 161/89 97.8 °F (36.6 °C) Temporal 72 17 100 % --   11/19/21 2300 (!) 144/101 -- -- 69 17 100 % --   11/19/21 2210 -- -- -- -- 20 100 % --   11/19/21 2200 (!) 143/93 -- -- 59 20 100 % --   11/19/21 2100 (!) 163/98 -- -- 63 16 100 % --   11/19/21 2038 -- 97.6 °F (36.4 °C) -- -- -- -- --   11/19/21 2000 (!) 154/94 97.6 °F (36.4 °C) Temporal 64 23 98 % --   11/19/21 1900 (!) 157/92 97 °F (36.1 °C) Temporal 70 21 96 % --   11/19/21 1430 (!) 114/94 97.5 °F (36.4 °C) -- 65 17 -- 164 lb 3.9 oz (74.5 kg)   11/19/21 1130 (!) 136/40 -- -- 64 23 -- --   11/19/21 1125 -- -- -- -- -- -- 164 lb 3.9 oz (74.5 kg)       Intake/Output Summary (Last 24 hours) at 11/20/2021 1036  Last data filed at 11/20/2021 1000  Gross per 24 hour   Intake 120 ml   Output --   Net 120 ml          Physical exam:    In-person bedside physical examination deferred. Pursuant to the emergency declaration under the [address] [place] [address] and the [employer] Act, this clinical encounter was conducted to provide necessary medical care.    (Also consistent with new provisions and guidance offered by [facility] on March 18, 2020 in setting of COVID 19 outbreak and in order to preserve personal protective equipment in accordance with the flexibilities announced by CMS on March 30, 2020)   References: https://[facility]. [facility]/Portals/0/Resources/COVID-19/3_18%20Telemed%20Guidance%20Updated%20March%2018. pdf?exh=[email or web address]                      https://[facility]/Portals/0/Resources/COVID-19/3_18%20Telemed%20Guidance%20Updated%20March%2018. pdf?tmy=[email or web address]                      [email or web address] pdf    However, I did visually inspect the patient and observed the following:      Vitals signs reviewed. Constitutional:       General: not in acute distress. Appearance:   ill-appearing. HENT:      Head: Normocephalic and atraumatic. Nose: Nose normal.      Mouth/Throat:      Mouth: Mucous membranes are dry . Eyes:      General: No scleral icterus. Conjunctiva/sclera: Conjunctivae normal.   Neck:      Musculoskeletal: Normal range of motion and neck supple. Cardiovascular:      Rate and Rhythm: Normal rate. Pulmonary:      Effort: Pulmonary effort is  Ab-normal.   Abdominal:      General: There is  distension. Musculoskeletal:      Right lower leg: mild edema. Left lower leg: mild edema. Skin:     Coloration: Skin is not jaundiced.    Neurological:      General:  Deferred     DATA:  Diagnostic tests reviewed for today's visit:    Recent Labs     11/17/21  1600 11/18/21  0651 11/19/21  0441 11/20/21  0328   WBC 3.1* 3.5* 3.5* 5.1   HCT 30.3* 31.7* 31.7* 32.0*   PLT 22* 29* 41* 42*     Iron Saturation:  No components found for: PERCENTFE  FERRITIN:    Lab Results   Component Value Date    FERRITIN 1,623.0 11/12/2021     IRON:    Lab Results   Component Value Date    IRON 106 11/12/2021     TIBC:    Lab Results   Component Value Date    TIBC 133 11/12/2021       Recent Labs     11/17/21  1600 11/18/21  0651 11/19/21  0441 11/20/21  0328   * 134* 134* 136   K 6.1* 3.6 3.6 3.6   CL 92* 91* 89* 94*   CO2 22 16* 16* 20*   BUN 57* 51* 64* 40*   CREATININE 5.3* 4.7* 5.8* 4.3*     Recent Labs     11/17/21  1600 11/18/21  0651 11/19/21  0441 11/20/21  0328   CALCIUM 7.2* 7.3* 7.2* 7.3*     No results for input(s): PH, PCO2, PO2 in the last 72 hours. Invalid input(s): Page Fee    ABG:  No results found for: PH, PCO2, PO2, HCO3, BE, THGB, TCO2, O2SAT  VBG:    Lab Results   Component Value Date    PHVEN 7.143 11/13/2021    GNT7EWQ 28.5 11/13/2021    BEVEN -17.9 11/13/2021    F3PMFLNQ 96 11/13/2021           BELOW MENTIONED RADIOLOGY STUDY RESULTS REVIEWED BY ME:    XR CHEST PORTABLE    Result Date: 11/11/2021  EXAMINATION: ONE XRAY VIEW OF THE CHEST 11/11/2021 1:21 pm COMPARISON: None. HISTORY: ORDERING SYSTEM PROVIDED HISTORY: SOB TECHNOLOGIST PROVIDED HISTORY: Reason for exam:->SOB Reason for Exam: SOB Acuity: Acute Type of Exam: Initial FINDINGS: Exaggerated kyphotic positioning and patient rotation. Patient's chin obscures the right lung apex. Heart size indeterminate. Airspace disease in the mid and lower left lung. Strandy opacity in the right lung base     Limited study. Left-sided airspace disease may represent pneumonia or asymmetric edema.   Atelectasis present in the right base

## 2021-11-20 NOTE — PROGRESS NOTES
Patient is insistent on leaving ama and continues to talk about it. Pt is very noncompliant. Pt is aggressive verbally. Pt got dialysis today. Got pt up to chair, incotinence care, and bathed pt.

## 2021-11-20 NOTE — PLAN OF CARE
Problem: Falls - Risk of:  Goal: Will remain free from falls  Description: Will remain free from falls  11/20/2021 1038 by Herbert Nazario RN  Outcome: Ongoing  11/19/2021 2105 by David Dempsey RN  Outcome: Ongoing   FALL PRECAUTIONS IN PLACE.        Problem: Pain:  Goal: Pain level will decrease  Description: Pain level will decrease  11/20/2021 1038 by Herbert Nazario RN  Outcome: Ongoing  11/19/2021 2105 by David Dempsey RN  Outcome: Ongoing   Re-adjusted for comfort      Problem: Skin Integrity:  Goal: Will show no infection signs and symptoms  Description: Will show no infection signs and symptoms  11/20/2021 1038 by Herbert Nazario RN  Outcome: Ongoing  11/19/2021 2105 by David Dempsey RN  Outcome: Ongoing   Diabetic leg wounds       Problem: Nutrition  Goal: Optimal nutrition therapy  11/20/2021 1038 by Herbert Nazario RN  Outcome: Ongoing  11/19/2021 2105 by David Dempsey RN  Outcome: Ongoing   Eats what family brings in at times    Problem: Gas Exchange - Impaired  Goal: Absence of hypoxia  11/20/2021 1038 by Herbert Nazario RN  Outcome: Ongoing  11/19/2021 2105 by David Dempsey RN  Outcome: Ongoing   Tolerating room air

## 2021-11-20 NOTE — PLAN OF CARE
Problem: Airway Clearance - Ineffective  Goal: Achieve or maintain patent airway  Outcome: Ongoing     Problem: Gas Exchange - Impaired  Goal: Absence of hypoxia  Outcome: Ongoing     Problem: Gas Exchange - Impaired  Goal: Promote optimal lung function  Outcome: Ongoing

## 2021-11-21 NOTE — PROGRESS NOTES
MD Bird Rice MD Donita Rower, MD                Office: [phone]                      Fax: [phone]          NEPHROLOGY ICU PROGRESS NOTE:     PATIENT NAME: Luiza Delgado  : 1970  MRN: [id number]       IMPRESSION / RECOMMENDATION:      Admitted for:  Pneumonia [J18.9]  ESRD (end stage renal disease) ([address] [N18.6]  Demand ischemia of myocardium ([address] [I24.8]  Acute respiratory failure with hypoxemia ([facility] [J96.01]  Pneumonia due to infectious organism, unspecified laterality, unspecified part of lung [J18.9]  Acute hypoxic respiratory failure - needing NIPPV   COVID r/o ** pending     1. ESRD on iHD: MWF.   - at [facility] nephrology team   - Episodes of hypotension with previous HD. But has been tolerating recent HD's. - HD today with holiday schedule. Tolerating well. - Solute status today acceptable. Access used: R fem TDC, chronic    -but overall, poor prognosis    -HCO3 better     2. Hypertension/Volume Status: euovolemic  - BP - no need for tight control:  At goal.   - EDW 79.5 kg. Now at  ?75.3kg. (see above)   - on Midodrine    3. Electrolytes/acid-base:  - Na Hyponatremia-mild and better  - Hypokalemia-higher K bath  High AG metabolic acidosis: fu w/ HD - serum HCO3 better. 4. Anemia of renal failure: at goal  - ASHUTOSH: not needed today. Follow Hgb. 5. BMD:  - Phos: WNL  - follow iPTH outpt    High risk.         Other Problems:  Hospital Problems           Last Modified POA    Pneumonia 2021 Yes        Vinicius Kent MD  [facility] [address] [place] [address]  Office: [phone] or Via "[employer]  Fax: [phone]    Time spent  ~ 35 minutes that included face-to-face meeting/discussion with patient's treatment team (including primary/referring team and other consultants; included coordination of care with the treatment team; and review of patient's electronic medical records and ordering appropriates tests.       ===========================================  ===========================================  This patient is COVID-19 rule out, so in a strict isolation, as per current protocol; So in order to preserve PPE supply and to avoid unnecessary exposure to prevent transmission of COVID-19; this patient was NOT examined face to face, as risk of contact outweighs the benefits and likely would not change much of my clinical management. HPI, review of system and physical exam was limited, as it was mainly obtained from chart review and the primary team. But all relevant records and diagnostic tests were reviewed, including laboratory and imaging results. Patient's care is being coordinated with the primary service. Subjective:    Seen inside HD room  COVID positive  On NC    Past medical, surgical, social and family medial history reviewed by me:  MEDICATIONS reviewed by me:  Prior to Admission Medications:  No current facility-administered medications on file prior to encounter. Current Outpatient Medications on File Prior to Encounter   Medication Sig Dispense Refill    ondansetron (ZOFRAN ODT) 4 MG disintegrating tablet Take 1 tablet by mouth every 8 hours as needed for Nausea 20 tablet 0    pantoprazole (PROTONIX) 40 MG tablet Take 40 mg by mouth 2 times daily       Promethazine HCl 6.25 MG/5ML SOLN Take 12.5 mg by mouth 2 times daily as needed.  insulin detemir (LEVEMIR FLEXPEN) 100 UNIT/ML injection Inject 50 Units into the skin nightly       temazepam (RESTORIL) 30 MG capsule Take 30 mg by mouth nightly as needed. Medications Prior to Admission: ondansetron (ZOFRAN ODT) 4 MG disintegrating tablet, Take 1 tablet by mouth every 8 hours as needed for Nausea  pantoprazole (PROTONIX) 40 MG tablet, Take 40 mg by mouth 2 times daily   Promethazine HCl 6.25 MG/5ML SOLN, Take 12.5 mg by mouth 2 times daily as needed.   insulin detemir (LEVEMIR FLEXPEN) 100 UNIT/ML injection, Inject 50 Units into the skin nightly   temazepam (RESTORIL) 30 MG capsule, Take 30 mg by mouth nightly as needed. [DISCONTINUED] fentaNYL (DURAGESIC) 50 MCG/HR, Place 1 patch onto the skin every 72 hours  [DISCONTINUED] oxyCODONE (OXY-IR) 30 MG immediate release tablet, Take 30 mg by mouth every 4 hours as needed for Pain. [DISCONTINUED] ALPRAZolam (XANAX) 1 MG tablet, Take 2 mg by mouth 2 times daily.      Inpatient Medications:  Scheduled Meds:   dexamethasone  6 mg IntraVENous Daily    insulin glargine  15 Units SubCUTAneous Nightly    insulin lispro  0-6 Units SubCUTAneous TID WC    insulin lispro  0-3 Units SubCUTAneous Nightly    pantoprazole  40 mg IntraVENous Daily    heparin (porcine)  5,200 Units IntraCATHeter Once    lidocaine 1 % injection  5 mL IntraDERmal Once    sodium chloride flush  5-40 mL IntraVENous 2 times per day    midodrine  2.5 mg Oral TID WC    sodium hypochlorite   Irrigation Daily    collagenase   Topical Daily    sodium chloride flush  5-40 mL IntraVENous 2 times per day    Vitamin D  2,000 Units Oral Daily     Continuous Infusions:   sodium chloride      sodium chloride 25 mL (11/14/21 1546)    dextrose 100 mL/hr (11/21/21 0840)     PRN Meds:.melatonin, sodium chloride flush, sodium chloride, lip balm petroleum free, promethazine, albumin human, midodrine, sodium chloride flush, sodium chloride, ondansetron **OR** ondansetron, polyethylene glycol, acetaminophen **OR** acetaminophen, guaiFENesin-dextromethorphan, glucose, dextrose, glucagon (rDNA), dextrose       REVIEW OF SYSTEMS:  Review of systems not obtained due to patient factors     PHYSICAL EXAM:  Patient Vitals for the past 24 hrs:   BP Temp Temp src Pulse Resp SpO2 Weight   11/21/21 0947 (!) 158/97 96.8 °F (36 °C) Temporal 68 (!) 68 -- 166 lb 0.1 oz (75.3 kg)   11/21/21 0849 -- -- -- -- 20 -- --   11/21/21 0800 138/86 98 °F (36.7 °C) Temporal 67 17 98 % --   11/21/21 0700 139/76 -- -- 64 17 98 % --   11/21/21 0600 (!) 161/91 -- -- 70 20 98 % 166 lb 0.1 oz (75.3 kg)   11/21/21 0500 (!) 142/87 -- -- 64 19 99 % --   11/21/21 0400 131/74 -- -- 78 16 96 % --   11/21/21 0300 (!) 151/88 -- -- 65 14 98 % --   11/21/21 0200 (!) 145/91 -- -- 64 14 100 % --   11/21/21 0100 137/77 -- -- 66 17 100 % --   11/21/21 0000 (!) 148/91 97.9 °F (36.6 °C) Temporal 71 20 100 % --   11/20/21 2300 136/87 -- -- 74 13 98 % --   11/20/21 2200 136/82 -- -- 82 22 -- --   11/20/21 2100 -- -- -- 84 20 -- --   11/20/21 2000 -- 98.3 °F (36.8 °C) Temporal 82 23 100 % --   11/20/21 1900 -- -- -- 80 21 -- --   11/20/21 1758 -- -- -- 76 22 -- --   11/20/21 1631 (!) 147/97 98 °F (36.7 °C) Temporal 76 21 100 % --   11/20/21 1233 (!) 150/88 97.6 °F (36.4 °C) Temporal 79 18 99 % --       Intake/Output Summary (Last 24 hours) at 11/21/2021 1232  Last data filed at 11/21/2021 0800  Gross per 24 hour   Intake 620 ml   Output --   Net 620 ml          Physical exam:    In-person bedside physical examination deferred. Pursuant to the emergency declaration under the [facility], [address] [place] [address] authority and the [employer] Act, this clinical encounter was conducted to provide necessary medical care. (Also consistent with new provisions and guidance offered by [facility] on March 18, 2020 in setting of COVID 19 outbreak and in order to preserve personal protective equipment in accordance with the flexibilities announced by CMS on March 30, 2020)   References: https://med. [facility]/Portals/0/Resources/COVID-19/3_18%20Telemed%20Guidance%20Updated%20March%2018. pdf?icu=[email or web address]                      https://[facility]. [facility]/Portals/0/Resources/COVID-19/3_18%20Telemed%20Guidance%20Updated%20March%2018. pdf?esp=[email or web address]                      http://[employer]/. pdf    However, I did visually inspect the patient and observed the following:      Vitals signs reviewed. Constitutional:       General: not in acute distress. Appearance:   ill-appearing. HENT:      Head: Normocephalic and atraumatic. Nose: Nose normal.      Mouth/Throat:      Mouth: Mucous membranes are dry . Eyes:      General: No scleral icterus. Conjunctiva/sclera: Conjunctivae normal.   Neck:      Musculoskeletal: Normal range of motion and neck supple. Cardiovascular:      Rate and Rhythm: Normal rate. Pulmonary:      Effort: Pulmonary effort is  Ab-normal.   Abdominal:      General: There is  distension. Musculoskeletal:      Right lower leg: no edema. Left lower leg: no edema. Skin:     Coloration: Skin is not jaundiced. Neurological:      General:  Deferred     DATA:  Diagnostic tests reviewed for today's visit:    Recent Labs     11/19/21 0441 11/20/21 0328 11/21/21  0738   WBC 3.5* 5.1 9.3   HCT 31.7* 32.0* 35.0*   PLT 41* 42* 100*     Iron Saturation:  No components found for: PERCENTFE  FERRITIN:    Lab Results   Component Value Date    FERRITIN 1,623.0 11/12/2021     IRON:    Lab Results   Component Value Date    IRON 106 11/12/2021     TIBC:    Lab Results   Component Value Date    TIBC 133 11/12/2021       Recent Labs     11/19/21 0441 11/20/21  0328 11/21/21  0738   * 136 138   K 3.6 3.6 3.4*   CL 89* 94* 97*   CO2 16* 20* 25   BUN 64* 40* 53*   CREATININE 5.8* 4.3* 5.5*     Recent Labs     11/19/21 0441 11/20/21  0328 11/21/21  0738   CALCIUM 7.2* 7.3* 7.9*     No results for input(s): PH, PCO2, PO2 in the last 72 hours.     Invalid input(s): Jc Castillo    ABG:  No results found for: PH, PCO2, PO2, HCO3, BE, THGB, TCO2, O2SAT  VBG:    Lab Results   Component Value Date    PHVEN 7.143 11/13/2021    TYZ5ZRS 28.5 11/13/2021    BEVEN -17.9 11/13/2021    P9VXMHPD 96 11/13/2021           BELOW MENTIONED RADIOLOGY STUDY RESULTS REVIEWED BY ME:    XR CHEST PORTABLE    Result Date: 11/11/2021  EXAMINATION: ONE XRAY VIEW OF THE CHEST 11/11/2021 1:21 pm COMPARISON: None. HISTORY: ORDERING SYSTEM PROVIDED HISTORY: SOB TECHNOLOGIST PROVIDED HISTORY: Reason for exam:->SOB Reason for Exam: SOB Acuity: Acute Type of Exam: Initial FINDINGS: Exaggerated kyphotic positioning and patient rotation. Patient's chin obscures the right lung apex. Heart size indeterminate. Airspace disease in the mid and lower left lung. Strandy opacity in the right lung base     Limited study. Left-sided airspace disease may represent pneumonia or asymmetric edema.   Atelectasis present in the right base

## 2021-11-21 NOTE — FLOWSHEET NOTE
11/21/21 0947 11/21/21 1255   Vital Signs   BP (!) 158/97 119/84   Temp 96.8 °F (36 °C) 97.2 °F (36.2 °C)   Pulse 68 75   Resp 18 18     Treatment time: 3 hours (COVID+)    Net UF: 2 L    Pre weight: 75.3 kg (bed scale)  Post weight: 73.3 kg (bed scale)  EDW: 79.5 kg    Access used: Right femoral tunneled cath  Access function: No problems    Medications or blood products given: none    Regular outpatient schedule:  Nephrology MWF    Summary of response to treatment: Tolerated tx without difficulty. VSS. Copy of dialysis treatment record placed in chart, to be scanned into EMR. Report called to Baltazar Aguirre RN.

## 2021-11-21 NOTE — PLAN OF CARE
Problem: Falls - Risk of:  Goal: Will remain free from falls  Description: Will remain free from falls  11/20/2021 2212 by Paco Vargas RN  Outcome: Ongoing  11/20/2021 1038 by Gladys Garibay RN  Outcome: Ongoing     Problem: Falls - Risk of:  Goal: Will remain free from falls  Description: Will remain free from falls  11/20/2021 2212 by Paco Vargas RN  Outcome: Ongoing  11/20/2021 1038 by Gladys Garibay RN  Outcome: Ongoing     Problem: Falls - Risk of:  Goal: Absence of physical injury  Description: Absence of physical injury  11/20/2021 2212 by Paco Vargas RN  Outcome: Ongoing  11/20/2021 1038 by Gladys Garibay RN  Outcome: Ongoing

## 2021-11-21 NOTE — PROGRESS NOTES
[facility] PROGRESS NOTE    11/21/2021 1:02 PM        Name: Nicci Stevens . Admitted: 11/11/2021  Primary Care Provider: Venkat Pal MD (Tel: [phone])                        Subjective:  . No acute events overnight. Resting well. Pain control. Diet ok. Labs reviewed  Denies any chest pain sob.      Reviewed interval ancillary notes    Current Medications  melatonin tablet 3 mg, Nightly PRN  dexamethasone (PF) (DECADRON) injection 6 mg, Daily  insulin glargine (LANTUS;BASAGLAR) injection pen 15 Units, Nightly  insulin lispro (1 Unit Dial) 0-6 Units, TID WC  insulin lispro (1 Unit Dial) 0-3 Units, Nightly  pantoprazole (PROTONIX) injection 40 mg, Daily  heparin (porcine) injection 5,200 Units, Once  lidocaine PF 1 % injection 5 mL, Once  sodium chloride flush 0.9 % injection 5-40 mL, 2 times per day  sodium chloride flush 0.9 % injection 5-40 mL, PRN  0.9 % sodium chloride infusion, PRN  midodrine (PROAMATINE) tablet 2.5 mg, TID WC  sodium hypochlorite (DAKINS) 0.125 % external solution, Daily  collagenase ointment, Daily  lip balm petroleum free (PHYTOPLEX) stick, PRN  promethazine (PHENERGAN) injection 6.25 mg, Q6H PRN  albumin human 25 % IV solution 12.5 g, PRN  midodrine (PROAMATINE) tablet 5 mg, PRN  sodium chloride flush 0.9 % injection 5-40 mL, 2 times per day  sodium chloride flush 0.9 % injection 5-40 mL, PRN  0.9 % sodium chloride infusion, PRN  ondansetron (ZOFRAN-ODT) disintegrating tablet 4 mg, Q8H PRN   Or  ondansetron (ZOFRAN) injection 4 mg, Q6H PRN  polyethylene glycol (GLYCOLAX) packet 17 g, Daily PRN  acetaminophen (TYLENOL) tablet 650 mg, Q6H PRN   Or  acetaminophen (TYLENOL) suppository 650 mg, Q6H PRN  guaiFENesin-dextromethorphan (ROBITUSSIN DM) 100-10 MG/5ML syrup 5 mL, Q4H PRN  Vitamin D (CHOLECALCIFEROL) tablet 2,000 Units, plan   Problem List  Active Problems:    Pneumonia  Resolved Problems:    * No resolved hospital problems. *       Assessment & Plan:   1. Acute hypoxic respiratory failure  -Secondary to COVID-19'-on room air.  -Completed steroid course   hemodynamically stable.  -Awaiting placement    Diabetes  -Uncontrolled A1c of 14  Started on Lantus and insulin will monitor will need insulin on discharge  -Noted some hypoglycemia this morning we will monitor closely      End-stage renal disease on hemodialysis        Medically stable for discharge        Diet: ADULT DIET; Regular; 3 carb choices (45 gm/meal); Low Potassium (Less than 3000 mg/day);  Low Phosphorus (Less than 1000 mg)  Code:Limited  DVT PPX lovenox       Jefry Jesus MD   11/21/2021 1:02 PM

## 2021-11-21 NOTE — PROGRESS NOTES
Patient POC BS is 44, asymptomatic upon assessment. Patient given 2 orange juices with breakfast. Rechecked BS after 15 minutes, and was 46. 2 ampule's of oral glucose given, and Dextrose 5% gtts administered as ordered. Dr. Madelyn Lui notified and saw patient at the bedside. BS rechecked and was 77. Per Dr. Debra Rush, patient okay to go to dialysis at this time.

## 2021-11-22 NOTE — PROGRESS NOTES
MD Bennett Castaneda MD Renold Riling, MD                Office: [phone]                      Fax: [phone]          NEPHROLOGY ICU PROGRESS NOTE:     PATIENT NAME: Yang Gonzalez  : 1970  MRN: [id number]       IMPRESSION / RECOMMENDATION:      Admitted for:  Pneumonia [J18.9]  ESRD (end stage renal disease) ([address] [N18.6]  Demand ischemia of myocardium ([facility] [I24.8]  Acute respiratory failure with hypoxemia ([facility] [J96.01]  Pneumonia due to infectious organism, unspecified laterality, unspecified part of lung [J18.9]  Acute hypoxic respiratory failure - needing NIPPV   COVID r/o ** pending       Patient will have hemodialysis treatment tomorrow    1. ESRD on iHD: MWF.   - at [facility] nephrology team   - Episodes of hypotension with previous HD. But has been tolerating recent HD's. - HD today with holiday schedule. Tolerating well. - Solute status today acceptable. Access used: R fem TDC, chronic          -HCO3 better     2. Hypertension/Volume Status: euovolemic  - BP - no need for tight control:  At goal.   - EDW 79.5 kg. Now at  ?75.3kg. (see above)   - on Midodrine    3. Electrolytes/acid-base:  - Na Hyponatremia-mild and better  - Hypokalemia-higher K bath  High AG metabolic acidosis: fu w/ HD - serum HCO3 better. 4. Anemia of renal failure: at goal  - ASHUTOSH: not needed today. Follow Hgb. 5. BMD:  - Phos: WNL  - follow iPTH outpt    High risk.         Other Problems:  Hospital Problems           Last Modified POA    Pneumonia 2021 Yes        Iván Berrios MD  [facility] [address] [place]: [phone] or [address]  Fax: [phone]    Time spent  ~ 35 minutes that included face-to-face meeting/discussion with patient's treatment team (including primary/referring team and other consultants; included coordination of care with the treatment team; and review of patient's electronic medical records and ordering appropriates tests.       ===========================================  ===========================================  This patient is COVID-19 rule out, so in a strict isolation, as per current protocol; So in order to preserve PPE supply and to avoid unnecessary exposure to prevent transmission of COVID-19; this patient was NOT examined face to face, as risk of contact outweighs the benefits and likely would not change much of my clinical management. HPI, review of system and physical exam was limited, as it was mainly obtained from chart review and the primary team. But all relevant records and diagnostic tests were reviewed, including laboratory and imaging results. Patient's care is being coordinated with the primary service. Subjective:    Seen inside HD room  COVID positive  On NC    Past medical, surgical, social and family medial history reviewed by me:  MEDICATIONS reviewed by me:  Prior to Admission Medications:  No current facility-administered medications on file prior to encounter. Current Outpatient Medications on File Prior to Encounter   Medication Sig Dispense Refill    ondansetron (ZOFRAN ODT) 4 MG disintegrating tablet Take 1 tablet by mouth every 8 hours as needed for Nausea 20 tablet 0    pantoprazole (PROTONIX) 40 MG tablet Take 40 mg by mouth 2 times daily       Promethazine HCl 6.25 MG/5ML SOLN Take 12.5 mg by mouth 2 times daily as needed.  insulin detemir (LEVEMIR FLEXPEN) 100 UNIT/ML injection Inject 50 Units into the skin nightly       temazepam (RESTORIL) 30 MG capsule Take 30 mg by mouth nightly as needed. Medications Prior to Admission: ondansetron (ZOFRAN ODT) 4 MG disintegrating tablet, Take 1 tablet by mouth every 8 hours as needed for Nausea  pantoprazole (PROTONIX) 40 MG tablet, Take 40 mg by mouth 2 times daily   Promethazine HCl 6.25 MG/5ML SOLN, Take 12.5 mg by mouth 2 times daily as needed.   insulin detemir (LEVEMIR FLEXPEN) 100 UNIT/ML injection, Inject 50 Units into the skin nightly   temazepam (RESTORIL) 30 MG capsule, Take 30 mg by mouth nightly as needed. [DISCONTINUED] fentaNYL (DURAGESIC) 50 MCG/HR, Place 1 patch onto the skin every 72 hours  [DISCONTINUED] oxyCODONE (OXY-IR) 30 MG immediate release tablet, Take 30 mg by mouth every 4 hours as needed for Pain. [DISCONTINUED] ALPRAZolam (XANAX) 1 MG tablet, Take 2 mg by mouth 2 times daily.      Inpatient Medications:  Scheduled Meds:   dexamethasone  6 mg IntraVENous Daily    insulin glargine  15 Units SubCUTAneous Nightly    insulin lispro  0-6 Units SubCUTAneous TID WC    insulin lispro  0-3 Units SubCUTAneous Nightly    pantoprazole  40 mg IntraVENous Daily    heparin (porcine)  5,200 Units IntraCATHeter Once    lidocaine 1 % injection  5 mL IntraDERmal Once    sodium chloride flush  5-40 mL IntraVENous 2 times per day    midodrine  2.5 mg Oral TID WC    sodium hypochlorite   Irrigation Daily    collagenase   Topical Daily    sodium chloride flush  5-40 mL IntraVENous 2 times per day    Vitamin D  2,000 Units Oral Daily     Continuous Infusions:   sodium chloride      sodium chloride 25 mL (11/14/21 1546)    dextrose 100 mL/hr (11/21/21 0840)     PRN Meds:.melatonin, sodium chloride flush, sodium chloride, lip balm petroleum free, promethazine, albumin human, midodrine, sodium chloride flush, sodium chloride, ondansetron **OR** ondansetron, polyethylene glycol, acetaminophen **OR** acetaminophen, guaiFENesin-dextromethorphan, glucose, dextrose, glucagon (rDNA), dextrose       REVIEW OF SYSTEMS:  Review of systems not obtained due to patient factors     PHYSICAL EXAM:  Patient Vitals for the past 24 hrs:   BP Temp Temp src Pulse Resp SpO2 Weight   11/22/21 0600 -- -- -- (!) 48 -- -- --   11/22/21 0445 92/64 98.1 °F (36.7 °C) Temporal (!) 45 19 95 % 162 lb 0.6 oz (73.5 kg)   11/22/21 0400 -- -- -- (!) 44 -- -- --   11/22/21 0200 -- -- -- (!) 48 -- -- --   11/22/21 0000 (!) 150/42 98.1 °F (36.7 °C) Temporal 68 29 96 % --   11/21/21 2200 -- -- -- 91 -- -- --   11/21/21 2000 (!) 128/48 97 °F (36.1 °C) Temporal 60 16 96 % --   11/21/21 1838 (!) 125/40 -- -- 64 21 -- --   11/21/21 1800 (!) 119/26 -- -- 63 18 -- --   11/21/21 1710 (!) 108/34 -- -- 70 14 -- --   11/21/21 1708 (!) 91/12 -- -- 70 16 -- --   11/21/21 1700 (!) 32/16 -- -- 81 20 -- --   11/21/21 1600 (!) 47/14 -- -- 76 30 -- --   11/21/21 1500 (!) 42/20 -- -- 82 17 -- --   11/21/21 1255 119/84 97.2 °F (36.2 °C) Temporal 75 18 -- 161 lb 9.6 oz (73.3 kg)       Intake/Output Summary (Last 24 hours) at 11/22/2021 1210  Last data filed at 11/21/2021 2000  Gross per 24 hour   Intake 620 ml   Output 2500 ml   Net -1880 ml          Physical exam:    In-person bedside physical examination deferred. Pursuant to the emergency declaration under the [address] [place] [address] and the [employer] Act, this clinical encounter was conducted to provide necessary medical care. (Also consistent with new provisions and guidance offered by [facility] on March 18, 2020 in setting of COVID 19 outbreak and in order to preserve personal protective equipment in accordance with the flexibilities announced by CMS on March 30, 2020)   References: https://[facility]. [place].[facility]/Portals/0/Resources/COVID-19/3_18%20Telemed%20Guidance%20Updated%20March%2018. pdf?[email or web address]                      https://[facility]. [facility]/Portals/0/Resources/COVID-19/3_18%20Telemed%20Guidance%20Updated%20March%2018. pdf?mri=[email or web address]                      [email or web address] pdf    However, I did visually inspect the patient and observed the following:      Vitals signs reviewed. Constitutional:       General: not in acute distress. Appearance:   ill-appearing.    HENT:      Head: Normocephalic and atraumatic. Nose: Nose normal.      Mouth/Throat:      Mouth: Mucous membranes are dry . Eyes:      General: No scleral icterus. Conjunctiva/sclera: Conjunctivae normal.   Neck:      Musculoskeletal: Normal range of motion and neck supple. Cardiovascular:      Rate and Rhythm: Normal rate. Pulmonary:      Effort: Pulmonary effort is  Ab-normal.   Abdominal:      General: There is  distension. Musculoskeletal:      Right lower leg: no edema. Left lower leg: no edema. Skin:     Coloration: Skin is not jaundiced. Neurological:      General:  Deferred     DATA:  Diagnostic tests reviewed for today's visit:    Recent Labs     11/20/21 0328 11/21/21  0738   WBC 5.1 9.3   HCT 32.0* 35.0*   PLT 42* 100*     Iron Saturation:  No components found for: PERCENTFE  FERRITIN:    Lab Results   Component Value Date    FERRITIN 1,623.0 11/12/2021     IRON:    Lab Results   Component Value Date    IRON 106 11/12/2021     TIBC:    Lab Results   Component Value Date    TIBC 133 11/12/2021       Recent Labs     11/20/21 0328 11/21/21  0738 11/22/21  0654    138 134*   K 3.6 3.4* 3.8   CL 94* 97* 92*   CO2 20* 25 26   BUN 40* 53* 30*   CREATININE 4.3* 5.5* 3.8*     Recent Labs     11/20/21 0328 11/21/21  0738 11/22/21  0654   CALCIUM 7.3* 7.9* 7.4*     No results for input(s): PH, PCO2, PO2 in the last 72 hours. Invalid input(s): Tejal Garcia    ABG:  No results found for: PH, PCO2, PO2, HCO3, BE, THGB, TCO2, O2SAT  VBG:    Lab Results   Component Value Date    PHVEN 7.143 11/13/2021    XHP7WNU 28.5 11/13/2021    BEVEN -17.9 11/13/2021    [id number] 96 11/13/2021           BELOW MENTIONED RADIOLOGY STUDY RESULTS REVIEWED BY ME:    XR CHEST PORTABLE    Result Date: 11/11/2021  EXAMINATION: ONE XRAY VIEW OF THE CHEST 11/11/2021 1:21 pm COMPARISON: None.  HISTORY: ORDERING SYSTEM PROVIDED HISTORY: SOB TECHNOLOGIST PROVIDED HISTORY: Reason for exam:->SOB Reason for Exam: SOB Acuity: Acute Type of Exam: Initial FINDINGS: Exaggerated kyphotic positioning and patient rotation. Patient's chin obscures the right lung apex. Heart size indeterminate. Airspace disease in the mid and lower left lung. Strandy opacity in the right lung base     Limited study. Left-sided airspace disease may represent pneumonia or asymmetric edema.   Atelectasis present in the right base

## 2021-11-22 NOTE — PROGRESS NOTES
Spoke with patients mother, [facility] on the phone, updated on patients status and plan of care, she verbalizes understanding and is aware of plan for discharge on 11/24.

## 2021-11-22 NOTE — PROGRESS NOTES
[address] [place] [address] PROGRESS NOTE    11/22/2021 4:44 PM        Name: Natasha Mendoza . Admitted: 11/11/2021  Primary Care Provider: Monica Brice MD (Tel: [phone])                        Subjective:  . No acute events overnight. Resting well. Pain control. Diet ok. Labs reviewed  Denies any chest pain sob.      Reviewed interval ancillary notes    Current Medications  melatonin tablet 3 mg, Nightly PRN  dexamethasone (PF) (DECADRON) injection 6 mg, Daily  insulin lispro (1 Unit Dial) 0-6 Units, TID WC  insulin lispro (1 Unit Dial) 0-3 Units, Nightly  pantoprazole (PROTONIX) injection 40 mg, Daily  lidocaine PF 1 % injection 5 mL, Once  sodium chloride flush 0.9 % injection 5-40 mL, 2 times per day  sodium chloride flush 0.9 % injection 5-40 mL, PRN  0.9 % sodium chloride infusion, PRN  midodrine (PROAMATINE) tablet 2.5 mg, TID WC  sodium hypochlorite (DAKINS) 0.125 % external solution, Daily  collagenase ointment, Daily  lip balm petroleum free (PHYTOPLEX) stick, PRN  promethazine (PHENERGAN) injection 6.25 mg, Q6H PRN  albumin human 25 % IV solution 12.5 g, PRN  midodrine (PROAMATINE) tablet 5 mg, PRN  sodium chloride flush 0.9 % injection 5-40 mL, 2 times per day  sodium chloride flush 0.9 % injection 5-40 mL, PRN  0.9 % sodium chloride infusion, PRN  ondansetron (ZOFRAN-ODT) disintegrating tablet 4 mg, Q8H PRN   Or  ondansetron (ZOFRAN) injection 4 mg, Q6H PRN  polyethylene glycol (GLYCOLAX) packet 17 g, Daily PRN  acetaminophen (TYLENOL) tablet 650 mg, Q6H PRN   Or  acetaminophen (TYLENOL) suppository 650 mg, Q6H PRN  guaiFENesin-dextromethorphan (ROBITUSSIN DM) 100-10 MG/5ML syrup 5 mL, Q4H PRN  Vitamin D (CHOLECALCIFEROL) tablet 2,000 Units, Daily  glucose (GLUTOSE) 40 % oral gel 15 g, PRN  dextrose 50 % IV solution, PRN  glucagon (rDNA) injection 1 mg, PRN  dextrose 5 % solution, PRN        Objective:  BP (!) 119/30   Pulse 90   Temp 98.1 °F (36.7 °C) (Temporal)   Resp 29   Ht 6' 4\" (1.93 m)   Wt 162 lb 0.6 oz (73.5 kg)   SpO2 95%   BMI 19.72 kg/m²     Intake/Output Summary (Last 24 hours) at 11/22/2021 1644  Last data filed at 11/22/2021 0800  Gross per 24 hour   Intake 240 ml   Output --   Net 240 ml      Wt Readings from Last 3 Encounters:   11/22/21 162 lb 0.6 oz (73.5 kg)   03/24/17 205 lb 0.4 oz (93 kg)   10/31/16 218 lb 11.1 oz (99.2 kg)       General appearance:  Appears comfortable  Eyes: Sclera clear. Pupils equal.  ENT: Moist oral mucosa. Trachea midline, no adenopathy. Cardiovascular: Regular rhythm, normal S1, S2. No murmur. No edema in lower extremities  Respiratory: Not using accessory muscles. Good inspiratory effort. Clear to auscultation bilaterally, no wheeze or crackles. GI: Abdomen soft, no tenderness, not distended, normal bowel sounds  Musculoskeletal: No cyanosis in digits, neck supple  Neurology: CN 2-12 grossly intact. No speech or motor deficits  Psych: Normal affect. Alert and oriented in time, place and person  Skin: Warm, dry, normal turgor    Labs and Tests:  CBC:   Recent Labs     11/20/21  0328 11/21/21  0738   WBC 5.1 9.3   HGB 10.7* 11.3*   PLT 42* 100*     BMP:    Recent Labs     11/20/21  0328 11/21/21  0738 11/22/21  0654    138 134*   K 3.6 3.4* 3.8   CL 94* 97* 92*   CO2 20* 25 26   BUN 40* 53* 30*   CREATININE 4.3* 5.5* 3.8*   GLUCOSE 179* 41* 78     Hepatic: No results for input(s): AST, ALT, ALB, BILITOT, ALKPHOS in the last 72 hours. Discussed care with family and patient             Spent 30  minutes with patient and family at bedside and on unit reviewing medical records and labs, spent greater than 50% time counseling patient and family on diagnosis and plan   Problem List  Active Problems:    Pneumonia  Resolved Problems:    * No resolved hospital problems. *       Assessment & Plan:   1.  Acute hypoxic respiratory failure  -Secondary to COVID-19'-on room air.  -Completed steroid course   hemodynamically stable.  -Awaiting placement    Diabetes  -Uncontrolled A1c of 14  Persistent hypoglycemia-  -DC Lantus for today we will continue sliding scale  -We will readjust Lantus to lower dose probably tomorrow    End-stage renal disease on hemodialysis        Medically stable for discharge        Diet: ADULT DIET; Regular; 3 carb choices (45 gm/meal); Low Potassium (Less than 3000 mg/day);  Low Phosphorus (Less than 1000 mg)  Code:Limited  DVT PPX lovenox       Golden Draper MD   11/22/2021 4:44 PM

## 2021-11-22 NOTE — PLAN OF CARE
Problem: Falls - Risk of:  Goal: Will remain free from falls  Description: Will remain free from falls  Outcome: Ongoing  Goal: Absence of physical injury  Description: Absence of physical injury  Outcome: Ongoing     Problem: Pain:  Goal: Pain level will decrease  Description: Pain level will decrease  Outcome: Ongoing  Goal: Control of acute pain  Description: Control of acute pain  Outcome: Ongoing  Goal: Control of chronic pain  Description: Control of chronic pain  Outcome: Ongoing     Problem: Skin Integrity:  Goal: Will show no infection signs and symptoms  Description: Will show no infection signs and symptoms  Outcome: Ongoing  Goal: Absence of new skin breakdown  Description: Absence of new skin breakdown  Outcome: Ongoing     Problem: Fluid Volume:  Goal: Will show no signs or symptoms of fluid imbalance  Description: Will show no signs or symptoms of fluid imbalance  Outcome: Ongoing     Problem: Nutrition  Goal: Optimal nutrition therapy  Outcome: Ongoing  Goal: Understanding of nutritional guidelines  Outcome: Ongoing     Problem: Airway Clearance - Ineffective  Goal: Achieve or maintain patent airway  Outcome: Ongoing     Problem: Gas Exchange - Impaired  Goal: Absence of hypoxia  Outcome: Ongoing  Goal: Promote optimal lung function  Outcome: Ongoing     Problem: Breathing Pattern - Ineffective  Goal: Ability to achieve and maintain a regular respiratory rate  Outcome: Ongoing     Problem:  Body Temperature -  Risk of, Imbalanced  Goal: Ability to maintain a body temperature within defined limits  Outcome: Ongoing  Goal: Will regain or maintain usual level of consciousness  Outcome: Ongoing  Goal: Complications related to the disease process, condition or treatment will be avoided or minimized  Outcome: Ongoing     Problem: Isolation Precautions - Risk of Spread of Infection  Goal: Prevent transmission of infection  Outcome: Ongoing     Problem: Nutrition Deficits  Goal: Optimize nutritional status  Outcome: Ongoing     Problem: Risk for Fluid Volume Deficit  Goal: Maintain normal heart rhythm  Outcome: Ongoing  Goal: Maintain absence of muscle cramping  Outcome: Ongoing  Goal: Maintain normal serum potassium, sodium, calcium, phosphorus, and pH  Outcome: Ongoing     Problem: Loneliness or Risk for Loneliness  Goal: Demonstrate positive use of time alone when socialization is not possible  Outcome: Ongoing     Problem: Fatigue  Goal: Verbalize increase energy and improved vitality  Outcome: Ongoing     Problem: Patient Education: Go to Patient Education Activity  Goal: Patient/Family Education  Outcome: Ongoing

## 2021-11-22 NOTE — PROGRESS NOTES
Occupational Therapy  Facility/Department: [address]  Daily Treatment Note  NAME: Leonardo Lopez  : 1970  MRN: [id number]    Date of Service: 2021    Discharge Recommendations:    Leonardo Lopez scored a 14/24 on the AM-PAC ADL Inpatient form. Current research shows that an AM-PAC score of 17 or less is typically not associated with a discharge to the patient's home setting. Based on the patient's AM-PAC score and their current ADL deficits, it is recommended that the patient have 3-5 sessions per week of Occupational Therapy at d/c to increase the patient's independence. Please see assessment section for further patient specific details. If patient discharges prior to next session this note will serve as a discharge summary. Please see below for the latest assessment towards goals. OT Equipment Recommendations  Equipment Needed: No  Other: defer to next level of care    Assessment   Performance deficits / Impairments: Decreased functional mobility ; Decreased ADL status; Decreased high-level IADLs; Decreased endurance; Decreased cognition; Decreased balance  Assessment: Pt is currently functioning below occupational baseline and demo the deficits listed above. Pt is currently requiring CGA for transfers and mobility however is still requiring cueing for safety.  pt would benefit from continued skilled OT services to address these deficits and increase IND, safety, and ease with all occupational pursuits  Treatment Diagnosis: Above deficits associated with PNA  Prognosis: Good  OT Education: OT Role; Plan of Care; Transfer Training  Patient Education: pt verbalized understanding, reinforce as needed  REQUIRES OT FOLLOW UP: Yes  Activity Tolerance  Activity Tolerance: Patient limited by fatigue; Patient Tolerated treatment well  Activity Tolerance: .supine: QZ=743/35, seated: 132/29; nursing aware, HR 78, SpO2 not registering on monitor; nursing aware and is going to replace finger monitor; pt is not SOB, comfortable in chair at end of session  Safety Devices  Safety Devices in place: Yes  Type of devices: All fall risk precautions in place; Gait belt; Nurse notified; Bed alarm in place; Call light within reach; Left in bed         Patient Diagnosis(es): The primary encounter diagnosis was Acute respiratory failure with hypoxemia ([facility] [facility] Diagnoses of Pneumonia due to infectious organism, unspecified laterality, unspecified part of lung, ESRD (end stage renal disease) ([facility] [address] and Demand ischemia of myocardium ([facility] [address] [facility] were also pertinent to this visit. has a past medical history of Blood transfusion, Chronic pain, Diabetes mellitus ([facility] [address] Dialysis, Guillain [place] syndrome, Hemodialysis patient ([facility] [address] Low blood pressure, Pancreatitis, Peripheral Neuropathy, Pneumonia, and Renal failure.   has a past surgical history that includes Kidney transplant (9/1994); Cholecystectomy; Tunneled venous port placement; Dialysis fistula creation; Tunneled venous catheter placement; and Toe Surgery (10-8-2010). Restrictions  Restrictions/Precautions  Restrictions/Precautions: Fall Risk, Isolation (high fall risk; Droplet Plus isolation (COVID +))  Position Activity Restriction  Other position/activity restrictions: 49 y.o. male  with PMHx of ESRD on HD (M,W,F), GB syndrome, diabetes mellitus and peripheral neuropathy presented with shortness of breath. Patient reported that he has been experiencing shortness of breath, nonproductive cough, congestion, generalized body aches and pain, nausea and vomiting for the past 3 days. Per patient's report, nausea and vomiting has resolved. Patient stated that he had full run of HD yesterday. Of note, patient is not vaccinated for Covid and persistently refusing Covid testing. Patient denied any fevers, chills, chest pain, palpitations, dizziness, recent sick contact, travel. On admission, patient was hypoxic satting around 85% on room air; started on supplemental oxygen. Initial diagnostic level showed leukopenia as well as lymphopenia. Procalcitonin elevated to 3.87. Troponin: 0.10, BNP 8, 609, BUN: 29, creatinine 5.0, anion gap: 28, bicarb 16, hemoglobin: 11.2, platelets: 78. CXR showed left-sided airspace disease suggestive of pneumonia/pulmonary edema. Subjective   General  Chart Reviewed: Yes  Patient assessed for rehabilitation services?: Yes  Response to previous treatment: Patient with no complaints from previous session  Family / Caregiver Present: No  Diagnosis: PNA  Subjective  Subjective: Pt supine in bed upon arrival with RN finishing ADLs, pleasant and agreeable to OT treatment session  Vital Signs  Patient Currently in Pain: Denies     Objective    ADL  Additional Comments: Pt declined additional ADLs, RN assisted with LB dressing/bathing and UB dressing prior to start of therapy session  Balance  Sitting Balance: Stand by assistance  Standing Balance: Contact guard assistance  Standing Balance  Time: 1-2 minutes total  Activity: functional transfer/mobility  Comment: use of RW, CGA for steadying assist  Functional Mobility  Functional - Mobility Device: Rolling Walker  Activity: Other  Assist Level: Contact guard assistance  Functional Mobility Comments: Pt ambulated 3' from EOB>chair with use of RW and CGA; BP fluctuating after transfer therefore no more standing tasks complete this date. RN aware. Bed mobility  Supine to Sit: Stand by assistance  Sit to Supine: Unable to assess  Scooting: Stand by assistance  Comment: HOB elevated  Transfers  Stand Step Transfers: Contact guard assistance  Sit to stand: Contact guard assistance  Stand to sit: Contact guard assistance  Transfer Comments: verbal cues for safe hand placement  Cognition  Overall Cognitive Status: Exceptions  Arousal/Alertness: Appropriate responses to stimuli  Following Commands:  Follows one step commands consistently  Memory: Decreased short term memory  Safety Judgement: Decreased awareness of need for assistance; Decreased awareness of need for safety  Problem Solving: Assistance required to identify errors made; Assistance required to generate solutions; Decreased awareness of errors  Insights: Decreased awareness of deficits  Initiation: Requires cues for some  Sequencing: Requires cues for some  Perception  Overall Perceptual Status: [facility]     Plan   Plan  Times per week: 3-5  Times per day: Daily  Current Treatment Recommendations: Strengthening, Endurance Training, Balance Training, Functional Mobility Training, Safety Education & Training, Home Management Training, Equipment Evaluation, Education, & procurement, Self-Care / ADL, Patient/Caregiver Education & Training    G-Code     OutComes Score                                                  AM-PAC Score        AM-PAC Inpatient Daily Activity Raw Score: 14 (11/22/21 1104)  AM-PAC Inpatient ADL T-Scale Score : 33.39 (11/22/21 1104)  ADL Inpatient CMS 0-100% Score: 59.67 (11/22/21 1104)  ADL Inpatient CMS G-Code Modifier : CK (11/22/21 1104)    Goals  Short term goals  Time Frame for Short term goals: discharge  Short term goal 1: UB ADL min A-- not met, progressing 11/22  Short term goal 2: LB ADL mod A-- not met, progressing 11/22  Short term goal 3: Grooming set up-- not met, progressing 11/22  Short term goal 4: Feeding set up-- not met, progressing 11/22  Short term goal 5: Fxl transfers SBA-- not met, progressing 11/22  Long term goals  Time Frame for Long term goals : LTG=STG       Therapy Time   Individual Concurrent Group Co-treatment   Time In       0954   Time Out       6109   Minutes       31   Timed Code Treatment Minutes: [address], [address] [place] [id number]

## 2021-11-22 NOTE — PROGRESS NOTES
Dr. Marco Drew notified of low BP. Orders for 250mL bolus of NS. BP rechecked, and MAP less than 60 continuously. Dr. Kendrick Mccray notified of continuous low BP after scheduled midodrine, and stated to give PRN midodrine.

## 2021-11-22 NOTE — PROGRESS NOTES
Physical Therapy  Facility/Department: [address]  Daily Treatment Note  NAME: Jacqui Lovett  : 1970  MRN: [id number]    Date of Service: 2021    Discharge Recommendations: Jacqui Lovett scored a 15/24 on the AM-PAC short mobility form. Current research shows that an AM-PAC score of 17 or less is typically not associated with a discharge to the patient's home setting. Based on the patient's AM-PAC score and their current functional mobility deficits, it is recommended that the patient have 3-5 sessions per week of Physical Therapy at d/c to increase the patient's independence. Please see assessment section for further patient specific details. If patient discharges prior to next session this note will serve as a discharge summary. Please see below for the latest assessment towards goals. PT Equipment Recommendations  Equipment Needed: No  Other: Defer to next level of care    Assessment   Body structures, Functions, Activity limitations: Decreased functional mobility ; Decreased balance; Decreased safe awareness; Decreased cognition; Decreased endurance; Decreased posture; Decreased strength  Assessment: Pt was pleasant upon arrival and requested to get up to chair. Pt did not report any symptoms but BP became high at the end of session while sitting in chair and pt was left to rest with nursing aware.  Pt is still experiencing functional deficits and would continue to benefit from skilled PT  Treatment Diagnosis: Generalized weakness and decreased tolerance to activity  Prognosis: Good  PT Education: Goals; PT Role; Plan of Care; Transfer Training; General Safety  Patient Education: Pt verbalized understanding, will require repetition due to acute confusion; pt more alert today  Barriers to Learning: Cognition  REQUIRES PT FOLLOW UP: Yes  Activity Tolerance  Activity Tolerance: Patient Tolerated treatment well; Patient limited by fatigue; Patient limited by endurance  Activity Tolerance: .supine: GZ=938/35, seated: 132/29; nursing aware, HR 78, SpO2 not registering on monitor; nursing aware and is going to replace finger monitor; pt is not SOB, comfortable in chair at end of session; extreme extra time needed to monitor BP throughout the session     Patient Diagnosis(es): The primary encounter diagnosis was Acute respiratory failure with hypoxemia ([facility] [address] Diagnoses of Pneumonia due to infectious organism, unspecified laterality, unspecified part of lung, ESRD (end stage renal disease) ([facility] [address] and Demand ischemia of myocardium ([facility] [address] were also pertinent to this visit. has a past medical history of Blood transfusion, Chronic pain, Diabetes mellitus ([facility] [address] Dialysis, Guillain [place] syndrome, Hemodialysis patient ([facility] [address] Low blood pressure, Pancreatitis, Peripheral Neuropathy, Pneumonia, and Renal failure.   has a past surgical history that includes Kidney transplant (9/1994); Cholecystectomy; Tunneled venous port placement; Dialysis fistula creation; Tunneled venous catheter placement; and Toe Surgery (10-8-2010). Restrictions  Restrictions/Precautions  Restrictions/Precautions: Fall Risk, Isolation (high fall risk; Droplet Plus isolation (COVID +))  Position Activity Restriction  Sternal Precautions: .supine: UV=248/35, seated: 132/29; nursing aware, HR 78, SpO2 not registering on monitor; nursing aware and is going to replace finger monitor; pt is not SOB, comfortable in chair at end of session  Other position/activity restrictions: 49 y.o. male  with PMHx of ESRD on HD (M,W,F), GB syndrome, diabetes mellitus and peripheral neuropathy presented with shortness of breath. Patient reported that he has been experiencing shortness of breath, nonproductive cough, congestion, generalized body aches and pain, nausea and vomiting for the past 3 days. Per patient's report, nausea and vomiting has resolved. Patient stated that he had full run of HD yesterday.   Of note, patient is not vaccinated for Covid goals: Before discharge  Short term goal 1: Pt will complete bed mobility indep  Short term goal 2: Pt will complete sit<>stand indep  Short term goal 3: Pt will transfer bed<>chair with LRAD and SBA  Short term goal 4: Pt will propel manual w/c 50 ft over level tile floor with use of (B) LE indep  Patient Goals   Patient goals : [facility]  No goals met    Plan    Plan  Times per week: 3-5x  Times per day: Daily  Current Treatment Recommendations: Strengthening, Transfer Training, Endurance Training, Patient/Caregiver Education & Training, Wheelchair Mobility Training, Balance Training, Gait Training, Functional Mobility Training, Safety Education & Training, Equipment Evaluation, Education, & procurement  Safety Devices  Type of devices:  All fall risk precautions in place, Gait belt, Patient at risk for falls, Call light within reach, Nurse notified, Chair alarm in place, Left in chair  Restraints  Initially in place: No    Therapy Time   Individual Concurrent Group Co-treatment   Time In       1021   Time Out       1052   Minutes       31   Timed Code Treatment Minutes: [address], SPT  Jerica Holley, PT    This evaluation/treatment was observed and supervised by Jerica Holley, [address] [place] [address]

## 2021-11-22 NOTE — PLAN OF CARE
Problem: Falls - Risk of:  Goal: Will remain free from falls  Description: Will remain free from falls  11/22/2021 1610 by Tona Peters RN  Outcome: Ongoing  11/22/2021 0240 by Reid Hoang RN  Outcome: Ongoing  Goal: Absence of physical injury  Description: Absence of physical injury  11/22/2021 1610 by Tona Peters RN  Outcome: Ongoing  11/22/2021 0240 by Reid Hoang RN  Outcome: Ongoing

## 2021-11-23 NOTE — PROGRESS NOTES
No changes noted in assessment. Pt resting between care but states he wasn't able to fall asleep and that he needs a higher dose of melatonin. Will pass onto day shift to address. Denies needs at present. Will continue to monitor.

## 2021-11-23 NOTE — PROGRESS NOTES
Occupational Therapy  Facility/Department: [address]  Daily Treatment Note  NAME: Suman Newton  : 1970  MRN: [id number]    Date of Service: 2021    Discharge Recommendations: Suman Newton scored a 14/24 on the AM-PAC ADL Inpatient form. Current research shows that an AM-PAC score of 17 or less is typically not associated with a discharge to the patient's home setting. Based on the patient's AM-PAC score and their current ADL deficits, it is recommended that the patient have 3-5 sessions per week of Occupational Therapy at d/c to increase the patient's independence. Please see assessment section for further patient specific details. If patient discharges prior to next session this note will serve as a discharge summary. Please see below for the latest assessment towards goals. OT Equipment Recommendations  Equipment Needed: No  Other: defer to next level of care    Assessment   Performance deficits / Impairments: Decreased functional mobility ; Decreased ADL status; Decreased high-level IADLs; Decreased endurance; Decreased cognition; Decreased balance  Assessment: Pt is currently functioning below occupational baseline and demo the deficits listed above. Pt is currently requiring CGA for transfers and mobility however is still requiring cueing for safety. pt would benefit from continued skilled OT services to address these deficits and increase IND, safety, and ease with all occupational pursuits  Treatment Diagnosis: Above deficits associated with PNA  Prognosis: Good  Decision Making: Medium Complexity  Assistance / Modification: RW / 2 person  OT Education: OT Role; Plan of Care; Transfer Training; Energy Conservation; ADL Adaptive Strategies  Patient Education: pt verbalized understanding, reinforce as needed  REQUIRES OT FOLLOW UP: Yes  Activity Tolerance  Activity Tolerance: Patient Tolerated treatment well  Safety Devices  Safety Devices in place: Yes  Type of devices:  All fall risk precautions in place; Gait belt; Patient at risk for falls; Call light within reach; Left in chair; Chair alarm in place; Nurse notified  Restraints  Initially in place: No         Patient Diagnosis(es): The primary encounter diagnosis was Acute respiratory failure with hypoxemia ([facility] [address] Diagnoses of Pneumonia due to infectious organism, unspecified laterality, unspecified part of lung, ESRD (end stage renal disease) ([facility] [address] and Demand ischemia of myocardium ([facility] [address] were also pertinent to this visit. has a past medical history of Blood transfusion, Chronic pain, Diabetes mellitus ([facility] [address] Dialysis, Guillain [place] syndrome, Hemodialysis patient ([facility] [address] Low blood pressure, Pancreatitis, Peripheral Neuropathy, Pneumonia, and Renal failure.   has a past surgical history that includes Kidney transplant (9/1994); Cholecystectomy; Tunneled venous port placement; Dialysis fistula creation; Tunneled venous catheter placement; and Toe Surgery (10-8-2010). Restrictions  Restrictions/Precautions  Restrictions/Precautions: Fall Risk, Isolation  Position Activity Restriction  Sternal Precautions: .supine: DA=211/35, seated: 132/29; nursing aware, HR 78, SpO2 not registering on monitor; nursing aware and is going to replace finger monitor; pt is not SOB, comfortable in chair at end of session  Other position/activity restrictions: 49 y.o. male  with PMHx of ESRD on HD (M,W,F), GB syndrome, diabetes mellitus and peripheral neuropathy presented with shortness of breath. Patient reported that he has been experiencing shortness of breath, nonproductive cough, congestion, generalized body aches and pain, nausea and vomiting for the past 3 days. Per patient's report, nausea and vomiting has resolved. Patient stated that he had full run of HD yesterday. Of note, patient is not vaccinated for Covid and persistently refusing Covid testing.   Patient denied any fevers, chills, chest pain, palpitations, dizziness, recent sick contact, travel. On admission, patient was hypoxic satting around 85% on room air; started on supplemental oxygen. Initial diagnostic level showed leukopenia as well as lymphopenia. Procalcitonin elevated to 3.87. Troponin: 0.10, BNP 8, 609, BUN: 29, creatinine 5.0, anion gap: 28, bicarb 16, hemoglobin: 11.2, platelets: 78. CXR showed left-sided airspace disease suggestive of pneumonia/pulmonary edema. Subjective   General  Chart Reviewed: Yes  Patient assessed for rehabilitation services?: Yes  Response to previous treatment: Patient with no complaints from previous session  Family / Caregiver Present: No  Diagnosis: PNA  Subjective  Subjective: Patient seated in recliner upon arrival, agreeable to therapy co-treatment session  Pain Assessment  Pain Assessment: 0-10  Pain Level: 0  Vital Signs  Patient Currently in Pain: No     Orientation  Orientation  Overall Orientation Status: Within Functional Limits    Objective    ADL  Grooming: Stand by assistance; Increased time to complete; Setup (SBA for brushing teeth seated in recliner, SBA for applying deodorant)  UE Bathing: Stand by assistance; Setup; Verbal cueing; Increased time to complete (SBA for washing chest, arms, and face with warm wipes)  LE Bathing: Stand by assistance; Setup (SBA for washing scrotal area seated on bedside commode)  UE Dressing: Minimal assistance; Setup (Ayleen for donning/doffing gown)  LE Dressing: Maximum assistance; Increased time to complete; Setup (maxA of 2 for donning/doffing brief, patient incontinent of bowels)  Toileting: Setup; Increased time to complete; Maximum assistance (maxA of 2 for toileting tasks, maxA for toilet hygiene, maxA for clothing management, patient voided of stool)  Additional Comments: Patient with minimal verbal cueing needed for hand placement and initiation of functional ADLs. Patient with increased time needed to complete for thoroughness. Patient incontinent of bowels.       Balance  Sitting Balance: Stand by assistance  Standing Balance: Dependent/Total (Ayleen of 2)  Standing Balance  Time: ~2-3 minutes  Activity: Functional transfers, functional mobility, ADL completion  Comment: Patient with use of RW for support, patient with Ayleen of 2 for balance  Functional Mobility  Functional - Mobility Device: Rolling Walker  Activity: Other  Assist Level: Dependent/Total (modA of 2)  Functional Mobility Comments: Patient ambulated ~5 ft from recliner to bedside commode to recliner. Patient with modA of 2 for functional mobility tasks, patient with use of RW for support. Patient with minimal verbal and physical cueing needed for hand placement and walker management for increased safety awareness with functional mobility tasks. Toilet Transfers  Toilet - Technique: Ambulating; To right  Equipment Used: Standard bedside commode  Toilet Transfer: Moderate assistance; 2 Person assistance  Toilet Transfers Comments: Patient with minimal verbal cueing needed for hand placement and walker management for increased safety awareness with toilet transfers. Transfers  Sit to stand: Moderate assistance; 2 Person assistance  Stand to sit: Moderate assistance; 2 Person assistance  Transfer Comments: Patient with minimal verbal cueing needed for hand placement and walker management for increased safety awareness with functional transfers. Vision  Patient Visual Report: No visual complaint reported. Cognition  Overall Cognitive Status: Exceptions  Arousal/Alertness: Appropriate responses to stimuli  Following Commands: Follows multistep commands with increased time; Follows multistep commands with repitition  Attention Span: Attends with cues to redirect;  Difficulty attending to directions  Memory: Appears intact  Safety Judgement: Decreased awareness of need for assistance; Decreased awareness of need for safety  Problem Solving: Assistance required to identify errors made; Assistance required to generate solutions; Assistance required to implement solutions; Assistance required to correct errors made  Insights: Decreased awareness of deficits  Initiation: Requires cues for some  Sequencing: Requires cues for some     Perception  Overall Perceptual Status: [address]  Times per week: 3-5  Times per day: Daily  Current Treatment Recommendations: Strengthening, Endurance Training, Balance Training, Functional Mobility Training, Safety Education & Training, Home Management Training, Equipment Evaluation, Education, & procurement, Self-Care / ADL, Patient/Caregiver Education & Training    AM-PAC Score   AM-PAC Inpatient Daily Activity Raw Score: 14 (11/23/21 1415)  AM-PAC Inpatient ADL T-Scale Score : 33.39 (11/23/21 1415)  ADL Inpatient CMS 0-100% Score: 59.67 (11/23/21 1415)  ADL Inpatient CMS G-Code Modifier : CK (11/23/21 1415)    Goals  Short term goals  Time Frame for Short term goals: discharge  Short term goal 1: UB ADL min A-- Ayleen 11/23 GOAL MET  Short term goal 2: LB ADL mod A-- maxA of 2 11/23  Short term goal 3: Grooming set up-- SBA 11/23  Short term goal 4: Feeding set up-- ongoing 11/23  Short term goal 5: Fxl transfers SBA-- modA of 2 11/23  Long term goals  Time Frame for Long term goals : LTG=STG  Long term goal 1: NEW GOAL: UB ADLs with SBA       Therapy Time   Individual Concurrent Group Co-treatment   Time In       6908   Time Out       1325   Minutes       30        Timed Code Treatment Minutes:  30 Minutes    Total Treatment Minutes:  30 minutes       Zainab Kurtz OTR/L [id number]

## 2021-11-23 NOTE — PROGRESS NOTES
Comprehensive Nutrition Assessment    Type and Reason for Visit:  Reassess    Nutrition Recommendations/Plan:   Bosot pudding BID    Nutrition Assessment:  Pt remains nutritionally compromised AEB inadequate oral intake. Family provides food & pt usually eats one meal/day. Pt has lost 8 lb since admission; also noted less edematous. .  Will attempt to send Boost pudding with meals, as RN reported pt takes regular pudding well. Pt receives on-going encouragement by staff to increase po intake. If continues to eat less than 25% of meals, may need to consider nutrition support. Malnutrition Assessment:  Malnutrition Status: At risk for malnutrition (Comment)    Context:  Acute Illness     Findings of the 6 clinical characteristics of malnutrition:  Energy Intake:  1 - 75% or less of estimated energy requirements for 7 or more days  Weight Loss:  1 - 5% over 1 month     Body Fat Loss:  Unable to assess     Muscle Mass Loss:  Unable to assess    Fluid Accumulation:  1 - Mild Extremities   Strength:  Not Performed    Estimated Daily Nutrient Needs:  Energy (kcal):  9939-0607 kcals based on 30-33 kcals/kg/CBW (HD); Weight Used for Energy Requirements:   (con 't 72kg for est needs)     Protein (g):   g protein based on 1.2-1.5 g/kg/CBW (HD); Weight Used for Protein Requirements:  Current        Fluid (ml/day):  6258-8143 ml; Method Used for Fluid Requirements:  1 ml/kcal      Nutrition Related Findings:  LBM 11/22; +1 BLE edema      Wounds:  Diabetic Ulcer       Current Nutrition Therapies:    ADULT DIET; Regular; 3 carb choices (45 gm/meal); Low Potassium (Less than 3000 mg/day);  Low Phosphorus (Less than 1000 mg)    Anthropometric Measures:  · Height: 6' 4\" (193 cm)  · Current Body Weight: 150 lb (68 kg)   · Admission Body Weight: 158 lb 3.2 oz (71.8 kg) (obtained 11/12; actual weight)    · Usual Body Weight: 202 lb 13.2 oz (92 kg) (obtained 12/2/2020 per past encounters; weight hx is difficult to assess d/t fluid imbalances r/t ESRD on HD)     · Ideal Body Weight: 202 lbs; % Ideal Body Weight 74.3 %   · BMI: 18.3  · Adjusted Body Weight:  ; No Adjustment   · Adjusted BMI:      · BMI Categories: Underweight (BMI less than 18.5)       Nutrition Diagnosis:   · Inadequate oral intake related to inadequate protein-energy intake as evidenced by weight loss, intake 0-25%, intake 26-50%, poor intake prior to admission    · Increased nutrient needs related to increase demand for energy/nutrients as evidenced by wounds      Nutrition Interventions:   Food and/or Nutrient Delivery:  Continue Current Diet, Start Oral Nutrition Supplement  Nutrition Education/Counseling:  Education not indicated   Coordination of Nutrition Care:  Continue to monitor while inpatient    Goals:  patient will accept and consume 75% or greater of meals and suuplements offered during admission       Nutrition Monitoring and Evaluation:   Behavioral-Environmental Outcomes:  None Identified   Food/Nutrient Intake Outcomes:  Food and Nutrient Intake, Supplement Intake  Physical Signs/Symptoms Outcomes:  Biochemical Data, Skin, Weight, Fluid Status or Edema     Discharge Planning:     Too soon to determine     Electronically signed by Naya Traore RD, LD on 11/23/21 at 9:32 AM EST    Contact: [phone]

## 2021-11-23 NOTE — PROGRESS NOTES
[facility] PROGRESS NOTE    11/23/2021 2:41 PM        Name: Nicci Stevens . Admitted: 11/11/2021  Primary Care Provider: Venkat Pal MD (Tel: [phone])                        Subjective:  . No acute events overnight. Resting well. Pain control. Diet ok. Labs reviewed  Denies any chest pain sob.      Reviewed interval ancillary notes    Current Medications  insulin glargine (LANTUS;BASAGLAR) injection pen 10 Units, Nightly  melatonin tablet 3 mg, Nightly PRN  dexamethasone (PF) (DECADRON) injection 6 mg, Daily  insulin lispro (1 Unit Dial) 0-6 Units, TID WC  insulin lispro (1 Unit Dial) 0-3 Units, Nightly  pantoprazole (PROTONIX) injection 40 mg, Daily  lidocaine PF 1 % injection 5 mL, Once  sodium chloride flush 0.9 % injection 5-40 mL, 2 times per day  sodium chloride flush 0.9 % injection 5-40 mL, PRN  0.9 % sodium chloride infusion, PRN  midodrine (PROAMATINE) tablet 2.5 mg, TID WC  sodium hypochlorite (DAKINS) 0.125 % external solution, Daily  collagenase ointment, Daily  lip balm petroleum free (PHYTOPLEX) stick, PRN  promethazine (PHENERGAN) injection 6.25 mg, Q6H PRN  albumin human 25 % IV solution 12.5 g, PRN  midodrine (PROAMATINE) tablet 5 mg, PRN  sodium chloride flush 0.9 % injection 5-40 mL, 2 times per day  sodium chloride flush 0.9 % injection 5-40 mL, PRN  0.9 % sodium chloride infusion, PRN  ondansetron (ZOFRAN-ODT) disintegrating tablet 4 mg, Q8H PRN   Or  ondansetron (ZOFRAN) injection 4 mg, Q6H PRN  polyethylene glycol (GLYCOLAX) packet 17 g, Daily PRN  acetaminophen (TYLENOL) tablet 650 mg, Q6H PRN   Or  acetaminophen (TYLENOL) suppository 650 mg, Q6H PRN  guaiFENesin-dextromethorphan (ROBITUSSIN DM) 100-10 MG/5ML syrup 5 mL, Q4H PRN  Vitamin D (CHOLECALCIFEROL) tablet 2,000 Units, Daily  glucose (GLUTOSE) 40 % oral gel 15 g, PRN  dextrose 50 % IV solution, PRN  glucagon (rDNA) injection 1 mg, PRN  dextrose 5 % solution, PRN        Objective:  /80   Pulse 67   Temp 97.9 °F (36.6 °C) (Temporal)   Resp 27   Ht 6' 4\" (1.93 m)   Wt 150 lb 9.2 oz (68.3 kg)   SpO2 91%   BMI 18.33 kg/m²   No intake or output data in the 24 hours ending 11/23/21 1441   Wt Readings from Last 3 Encounters:   11/23/21 150 lb 9.2 oz (68.3 kg)   03/24/17 205 lb 0.4 oz (93 kg)   10/31/16 218 lb 11.1 oz (99.2 kg)       General appearance:  Appears comfortable  Eyes: Sclera clear. Pupils equal.  ENT: Moist oral mucosa. Trachea midline, no adenopathy. Cardiovascular: Regular rhythm, normal S1, S2. No murmur. No edema in lower extremities  Respiratory: Not using accessory muscles. Good inspiratory effort. Clear to auscultation bilaterally, no wheeze or crackles. GI: Abdomen soft, no tenderness, not distended, normal bowel sounds  Musculoskeletal: No cyanosis in digits, neck supple  Neurology: CN 2-12 grossly intact. No speech or motor deficits  Psych: Normal affect. Alert and oriented in time, place and person  Skin: Warm, dry, normal turgor    Labs and Tests:  CBC:   Recent Labs     11/21/21  0738 11/23/21  0437   WBC 9.3 8.0   HGB 11.3* 11.0*   * 75*     BMP:    Recent Labs     11/21/21  0738 11/22/21  0654 11/23/21  0437    134* 130*   K 3.4* 3.8 4.8   CL 97* 92* 91*   CO2 25 26 22   BUN 53* 30* 40*   CREATININE 5.5* 3.8* 4.7*   GLUCOSE 41* 78 300*     Hepatic: No results for input(s): AST, ALT, ALB, BILITOT, ALKPHOS in the last 72 hours. Discussed care with family and patient             Spent 30  minutes with patient and family at bedside and on unit reviewing medical records and labs, spent greater than 50% time counseling patient and family on diagnosis and plan   Problem List  Active Problems:    Pneumonia  Resolved Problems:    * No resolved hospital problems. *       Assessment & Plan:   1.  Acute hypoxic respiratory failure  -Secondary to COVID-19'-on room air.  -Completed steroid course   hemodynamically stable.  -Awaiting placement    Diabetes  -Uncontrolled A1c of 14  Persistent hypoglycemia-  -adding low-dose Lantus back today      End-stage renal disease on hemodialysis        Medically stable for discharge patient will get dialysis tomorrow and then discharge        Diet: ADULT DIET; Regular; 3 carb choices (45 gm/meal); Low Potassium (Less than 3000 mg/day);  Low Phosphorus (Less than 1000 mg)  ADULT ORAL NUTRITION SUPPLEMENT; Lunch, Dinner; Fortified Pudding Oral Supplement  Code:Limited  DVT PPX lovenox       Kelsie Christine MD   11/23/2021 2:41 PM

## 2021-11-23 NOTE — DISCHARGE INSTR - COC
Continuity of Care Form    Patient Name: Rosina Grant   :  1970  MRN:  [id number]    Admit date:  2021  Discharge date:  2021    Code Status Order: Limited   Advance Directives:      Admitting Physician:  Trang Brown MD  PCP: Rodger Strickland MD    Discharging Nurse:   [address] Unit/Room#: [id number]/4014-73  Discharging Unit Phone Number: [phone]    Emergency Contact:   Extended Emergency Contact Information  Primary Emergency Contact: Marry Villa  Address: [address] [place] [address], [address] [place] [address] Merritt Robertson [address] Phone: [phone]  Relation: Parent  Secondary Emergency Contact: Bette Gary  Address: [address] [place] [address], [address] [place] [address]  Home Phone: [phone]  Relation: Niece/Nephew    Past Surgical History:  Past Surgical History:   Procedure Laterality Date    CHOLECYSTECTOMY      DIALYSIS FISTULA CREATION      left arm/currently non functioning    KIDNEY TRANSPLANT  1994    R-kidney    TOE SURGERY  10-8-2010    cut and realign 1st, 2nd, toe left foot , fixation 1st, 2nd toe left foot    TUNNELED VENOUS CATHETER PLACEMENT      left chest for dialysis    TUNNELED VENOUS PORT PLACEMENT         Immunization History: There is no immunization history for the selected administration types on file for this patient.     Active Problems:  Patient Active Problem List   Diagnosis Code    ESRD (end stage renal disease) ([facility] [address] N18.6    Anemia D64.9    Toe deformity M20.60    Peripheral neuropathy G62.9    Hypogonadism, male E29.1    Hyperkalemia E87.5    Acute pancreatitis K85.90    Acute on chronic renal failure (HCC) N17.9, N18.9    Chronic pancreatitis (HCC) K86.1    Pericardial effusion I31.3    Hypotension I95.9    Diabetes mellitus (HCC) E11.9    Sepsis (HCC) A41.9    Diabetic foot ulcer (HCC) E11.621, L97.509    Pancreatitis K85.90    Chronic pain G89.29    C. difficile diarrhea A04.72    Pneumonia J18.9       Isolation/Infection:   Isolation            Droplet Plus          Unreconciled Outside Infections       Enable clinical decision support by reconciling outside information with the patient's chart. .      Infection Onset Last Indicated Last Received Source    MRSA 10/26/12 10/26/12 06/18/21 [facility]          Patient Infection Status       Infection Onset Added Last Indicated Last Indicated By Review Planned Expiration Resolved Resolved By    COVID-19 11/14/21 11/14/21 11/14/21 COVID-19 11/21/21 11/28/21      Resolved    COVID-19 (Rule Out) 11/11/21 11/11/21 11/14/21 COVID-19 (Ordered)   11/14/21 Rule-Out Test Resulted            Nurse Assessment:  Last Vital Signs: BP (!) 116/36   Pulse 77   Temp 97 °F (36.1 °C) (Temporal)   Resp 21   Ht 6' 4\" (1.93 m)   Wt 150 lb 9.2 oz (68.3 kg)   SpO2 97%   BMI 18.33 kg/m²     Last documented pain score (0-10 scale): Pain Level: 0  Last Weight:   Wt Readings from Last 1 Encounters:   11/23/21 150 lb 9.2 oz (68.3 kg)     Mental Status:  oriented and alert    IV Access:  - Dialysis Catheter  - site  right and femoral, insertion date: unknown    Nursing Mobility/ADLs:  Walking   Assisted  Transfer  Assisted  Bathing  Assisted  Dressing  Assisted  Toileting  Assisted  Feeding  Assisted  Med Admin  Assisted  Med Delivery   whole    Wound Care Documentation and Therapy:  Pressure Ulcer 04/30/14  Outer;Right (Active)   Number of days: 1989       Wound 07/09/10 Other (Comment) Toe (Comment  which one) Anterior;Right toe bent down, pt states bumped. ? friction from shoe. (Active)   Number of days: 8291       Wound 07/09/10 Other (Comment) Toe (Comment  which one) Anterior; Left granulation tissue center, toe bent down permanently.  open area at joint (Active)   Number of days: 4154       Wound 08/08/14 Diabetic Ulcer Foot Dorsal white/pink tissue, drainage, diabetic ulcer (Active)   Number of days: 2663       Incision Foot Left (Active) Number of days:        Wound 11/11/21 Foot Dorsal; Right wound top of right foot 6 cm x 3 cm  (Active)   Wound Image   11/11/21 1647   Wound Etiology Diabetic 11/23/21 0433   Dressing Status Clean; Dry; Intact 11/23/21 0433   Wound Cleansed Not Cleansed 11/23/21 0433   Dressing/Treatment Collagen; Moist to dry; Roll gauze; Tape/Soft cloth adhesive tape 11/22/21 0445   Dressing Change Due 11/23/21 11/22/21 1700   Wound Length (cm) 6 cm 11/11/21 1647   Wound Width (cm) 3 cm 11/11/21 1647   Wound Depth (cm) 0.1 cm 11/11/21 1647   Wound Surface Area (cm^2) 18 cm^2 11/11/21 1647   Wound Volume (cm^3) 1.8 cm^3 11/11/21 1647   Wound Assessment Devitalized tissue; [place]/red; [facility] 11/22/21 1700   Drainage Amount Scant 11/22/21 1700   Drainage Description Yellow 11/22/21 1700   Odor None 11/22/21 1700   Louisa-wound Assessment Dry/flaky 11/22/21 1700   Margins Defined edges 11/22/21 1700   Number of days: 11        Elimination:  Continence: Bowel: Yes  Bladder: anuric   Urinary Catheter: None   Colostomy/Ileostomy/Ileal Conduit: No       Date of Last BM: 11/23/2021    Intake/Output Summary (Last 24 hours) at 11/23/2021 0950  Last data filed at 11/22/2021 1430  Gross per 24 hour   Intake 200 ml   Output --   Net 200 ml     I/O last 3 completed shifts: In: 320 [P.O.:300; I.V.:20]  Out: -     Safety Concerns: At Risk for Falls    Impairments/Disabilities:      Contractures - BUE    Nutrition Therapy:  Current Nutrition Therapy:   - Oral Diet:  General, Carb Control 3 carbs/meal (1500kcals/day), and low potassium and low phosphorus    Routes of Feeding: Oral  Liquids: No Restrictions  Daily Fluid Restriction: no  Last Modified Barium Swallow with Video (Video Swallowing Test): not done    Treatments at the Time of Hospital Discharge:   Respiratory Treatments: n/a  Oxygen Therapy:  is not on home oxygen therapy.   Ventilator:    - No ventilator support    Rehab Therapies: Physical Therapy and Occupational Therapy  Weight Bearing Status/Restrictions: No weight bearing restirctions  Other Medical Equipment (for information only, NOT a DME order):  walker, bath bench, and bedside commode  Other Treatments: n/a    Patient's personal belongings (please select all that are sent with patient):  García Saliva- watch    RN SIGNATURE:  Electronically signed by Camryn Palmer MD on 11/24/21 at 8:59 AM EST    CASE MANAGEMENT/SOCIAL WORK SECTION    Inpatient Status Date: 11/11/2021    Readmission Risk Assessment Score:  Readmission Risk              Risk of Unplanned Readmission:  26           Discharging to Facility/ Agency   Name:  [address] [place] [address]  Address: [address] [place] [address]. [place], [place]  Phone: [phone]  Fax: [phone]    Dialysis Facility (if applicable)   Name: [facility]  Address: [address]  Dialysis Schedule: Tuesday, Thursday, Saturday at 12:15 PM  Phone: [phone]  Fax:    / signature: Electronically signed by Kingsley Rothman RN on 11/23/21 at 3:56 PM EST    PHYSICIAN SECTION    Prognosis: Good    Condition at Discharge: Stable    Rehab Potential (if transferring to Rehab): Good    Recommended Labs or Other Treatments After Discharge:     Physician Certification: I certify the above information and transfer of Shahid Petersen  is necessary for the continuing treatment of the diagnosis listed and that he requires [facility] for greater 30 days.      Update Admission H&P: No change in H&P    PHYSICIAN SIGNATURE:  Electronically signed by Camryn Palmer MD on 11/24/21 at 8:58 AM EST

## 2021-11-23 NOTE — PROGRESS NOTES
PM assessment complete and documented. Meds given per MAR. Georgecath and GEO PIV patent and secure. PIV flushed easy. No needs or concerns expressed. Will continue to monitor.

## 2021-11-23 NOTE — PROGRESS NOTES
Physical Therapy  Facility/Department: [address]  Daily Treatment Note  NAME: Radha Garcia  : 1970  MRN: [id number]    Date of Service: 2021    Discharge Luna Conde scored a 13/24 on the AM-PAC short mobility form. Current research shows that an AM-PAC score of 17 or less is typically not associated with a discharge to the patient's home setting. Based on the patient's AM-PAC score and their current functional mobility deficits, it is recommended that the patient have 3-5 sessions per week of Physical Therapy at d/c to increase the patient's independence. Please see assessment section for further patient specific details. If patient discharges prior to next session this note will serve as a discharge summary. Please see below for the latest assessment towards goals. PT Equipment Recommendations  Equipment Needed: No  Other: Defer to next level of care    Assessment   Body structures, Functions, Activity limitations: Decreased functional mobility ; Decreased balance; Decreased safe awareness; Decreased cognition; Decreased endurance; Decreased posture; Decreased strength  Assessment: Pt is more alert and less confused than yesterday, still with some significant deficits below his baseline. Pt would benefit from continued skilled therapy to maximize his safety and ind with all mobility prior to d/c. Pt will require 24/7 supv/assist and further inpatient therapy to address his deficits, not ind enough to return home at this point.   Treatment Diagnosis: Generalized weakness and decreased tolerance to activity  Prognosis: Good  PT Education: Goals; PT Role; Plan of Care; Transfer Training; General Safety  Patient Education: d/c recs, pt verbalized understanding, may need reinforcement  REQUIRES PT FOLLOW UP: Yes  Activity Tolerance  Activity Tolerance: Patient Tolerated treatment well; Patient limited by fatigue; Patient limited by endurance  Activity Tolerance: HR jumping into 120's with activity but decreasing into 80s by end of tx. SpO2 t 100% on RA     Patient Diagnosis(es): The primary encounter diagnosis was Acute respiratory failure with hypoxemia ([facility] [address] Diagnoses of Pneumonia due to infectious organism, unspecified laterality, unspecified part of lung, ESRD (end stage renal disease) ([facility] [address] and Demand ischemia of myocardium ([facility] [address] were also pertinent to this visit. has a past medical history of Blood transfusion, Chronic pain, Diabetes mellitus ([facility] [address] Dialysis, Guillain Warne syndrome, Hemodialysis patient ([facility] [address] Low blood pressure, Pancreatitis, Peripheral Neuropathy, Pneumonia, and Renal failure.   has a past surgical history that includes Kidney transplant (9/1994); Cholecystectomy; Tunneled venous port placement; Dialysis fistula creation; Tunneled venous catheter placement; and Toe Surgery (10-8-2010). Restrictions  Restrictions/Precautions  Restrictions/Precautions: Fall Risk, Isolation  Position Activity Restriction  Sternal Precautions: .supine: WM=989/35, seated: 132/29; nursing aware, HR 78, SpO2 not registering on monitor; nursing aware and is going to replace finger monitor; pt is not SOB, comfortable in chair at end of session  Other position/activity restrictions: 49 y.o. male  with PMHx of ESRD on HD (M,W,F), GB syndrome, diabetes mellitus and peripheral neuropathy presented with shortness of breath. Patient reported that he has been experiencing shortness of breath, nonproductive cough, congestion, generalized body aches and pain, nausea and vomiting for the past 3 days. Per patient's report, nausea and vomiting has resolved. Patient stated that he had full run of HD yesterday. Of note, patient is not vaccinated for Covid and persistently refusing Covid testing. Patient denied any fevers, chills, chest pain, palpitations, dizziness, recent sick contact, travel.   On admission, patient was hypoxic satting around 85% on room air; started on supplemental oxygen. Initial diagnostic level showed leukopenia as well as lymphopenia. Procalcitonin elevated to 3.87. Troponin: 0.10, BNP 8, 609, BUN: 29, creatinine 5.0, anion gap: 28, bicarb 16, hemoglobin: 11.2, platelets: 78. CXR showed left-sided airspace disease suggestive of pneumonia/pulmonary edema. Subjective   General  Chart Reviewed: Yes  Family / Caregiver Present: No  Subjective  Subjective: Pt agreeable to PT/OT, reports feeling \"more back to myself\" today. Denies pain  General Comment  Comments: Pt up in chair, agreeable to PT tx. Pain Screening  Patient Currently in Pain: Denies  Vital Signs  Patient Currently in Pain: Denies       Orientation  Orientation  Overall Orientation Status: Within Functional Limits  Orientation Level: Oriented to person; Oriented to place; Oriented to situation  Cognition      Objective      Transfers  Sit to Stand: Moderate Assistance; 2 Person Assistance  Stand to sit: 2 Person Assistance; Minimal Assistance  Bed to Chair: Minimal assistance; 2 Person Assistance  Comment: Pt initial stand needing mod x2 due to posterior lean and struggling to transitioin UE to . Pt then min x2 for transfer to [facility] from [facility], and then from [facility] to [facility]  Ambulation  Ambulation?: No     Balance  Posture: Poor (flexed trunk, flexed knees, flexed hip upon standing)  Sitting - Static: Fair; +  Sitting - Dynamic: Fair; +  Standing - Static: Fair  Standing - Dynamic: Fair  Comments: Pt asking to get cleaned up, had been incontinent of bowel so transferred to [facility]. Pt able to assist with clearning front of periare while seated on BSC. Pt needing moderate assist to come to stand and then CGA for balance at  for 1-2 min for pericare which OT provided and pants management.                                                                                 AM-PAC Score  AM-PAC Inpatient Mobility Raw Score : 13 (11/23/21 1356)  AM-PAC Inpatient T-Scale Score : 36.74 (11/23/21 1356)  Mobility Inpatient CMS 0-100% Score: 64.91 (11/23/21 1356)  Mobility Inpatient CMS G-Code Modifier : CL (11/23/21 1356)          Goals  Short term goals  Time Frame for Short term goals: Before discharge- no goals met 11/23/21  Short term goal 1: Pt will complete bed mobility indep  Short term goal 2: Pt will complete sit<>stand indep  Short term goal 3: Pt will transfer bed<>chair with LRAD and SBA  Short term goal 4: Pt will propel manual w/c 50 ft over level tile floor with use of (B) LE indep  Patient Goals   Patient goals : Find my hoodie    Plan    Plan  Times per week: 3-5x  Times per day: Daily  Current Treatment Recommendations: Strengthening, Transfer Training, Endurance Training, Patient/Caregiver Education & Training, Wheelchair Mobility Training, Balance Training, Gait Training, Functional Mobility Training, Safety Education & Training, Equipment Evaluation, Education, & procurement  Safety Devices  Type of devices: Call light within reach, Left in chair, Gait belt, Chair alarm in place, All fall risk precautions in place  Restraints  Initially in place: No  Restraints: (B) wrist restraints replaced at end of session     Therapy Time   Individual Concurrent Group Co-treatment   Time In       1255   Time Out       1325   Minutes       30   Timed Code Treatment Minutes: [address], [facility]

## 2021-11-23 NOTE — PROGRESS NOTES
MD Victoriano Corea MD Scott Mu, MD                Office: [phone]                      Fax: [phone]          NEPHROLOGY ICU PROGRESS NOTE:     PATIENT NAME: Flores Miles  : 1970  MRN: [id number]         Treatment plan update. Patient will have hemodialysis treatment tomorrow. Plan to discharge patient after dialysis. ECF arrangements complete. Fluid status stable. Electrolytes are stable. Potassium level stable. IMPRESSION / RECOMMENDATION:      Admitted for:  Pneumonia [J18.9]  ESRD (end stage renal disease) ([address] [N18.6]  Demand ischemia of myocardium ([address] [I24.8]  Acute respiratory failure with hypoxemia ([address] [J96.01]  Pneumonia due to infectious organism, unspecified laterality, unspecified part of lung [J18.9]  Acute hypoxic respiratory failure - needing NIPPV   COVID r/o ** pending       Patient will have hemodialysis treatment tomorrow    1. ESRD on iHD: MWF.   - at [facility] nephrology team   - Episodes of hypotension with previous HD. But has been tolerating recent HD's. - HD today with holiday schedule. Tolerating well. - Solute status today acceptable. Access used: R fem TDC, chronic          -HCO3 better     2. Hypertension/Volume Status: euovolemic  - BP - no need for tight control:  At goal.   - EDW 79.5 kg. Now at  ?75.3kg. (see above)   - on Midodrine    3. Electrolytes/acid-base:  - Na Hyponatremia-mild and better  - Hypokalemia-higher K bath  High AG metabolic acidosis: fu w/ HD - serum HCO3 better. 4. Anemia of renal failure: at goal  - ASHUTOSH: not needed today. Follow Hgb. 5. BMD:  - Phos: WNL  - follow iPTH outpt    High risk.         Other Problems:  Hospital Problems           Last Modified POA    Pneumonia 2021 Yes        Lorena Baker MD  [facility] [address] [place]: [phone] or [employer]  Fax: [phone]    Time spent  ~ 35 minutes that included face-to-face meeting/discussion with patient's treatment team (including primary/referring team and other consultants; included coordination of care with the treatment team; and review of patient's electronic medical records and ordering appropriates tests.       ===========================================  ===========================================  This patient is COVID-19 rule out, so in a strict isolation, as per current protocol; So in order to preserve PPE supply and to avoid unnecessary exposure to prevent transmission of COVID-19; this patient was NOT examined face to face, as risk of contact outweighs the benefits and likely would not change much of my clinical management. HPI, review of system and physical exam was limited, as it was mainly obtained from chart review and the primary team. But all relevant records and diagnostic tests were reviewed, including laboratory and imaging results. Patient's care is being coordinated with the primary service. Subjective:    Seen inside HD room  COVID positive  On NC    Past medical, surgical, social and family medial history reviewed by me:  MEDICATIONS reviewed by me:  Prior to Admission Medications:  No current facility-administered medications on file prior to encounter. Current Outpatient Medications on File Prior to Encounter   Medication Sig Dispense Refill    ondansetron (ZOFRAN ODT) 4 MG disintegrating tablet Take 1 tablet by mouth every 8 hours as needed for Nausea 20 tablet 0    pantoprazole (PROTONIX) 40 MG tablet Take 40 mg by mouth 2 times daily       Promethazine HCl 6.25 MG/5ML SOLN Take 12.5 mg by mouth 2 times daily as needed.  insulin detemir (LEVEMIR FLEXPEN) 100 UNIT/ML injection Inject 50 Units into the skin nightly       temazepam (RESTORIL) 30 MG capsule Take 30 mg by mouth nightly as needed.          Medications Prior to Admission: ondansetron (ZOFRAN ODT) 4 MG disintegrating tablet, Take 1 tablet by mouth every 8 hours as needed for Nausea  pantoprazole (PROTONIX) 40 MG tablet, Take 40 mg by mouth 2 times daily   Promethazine HCl 6.25 MG/5ML SOLN, Take 12.5 mg by mouth 2 times daily as needed. insulin detemir (LEVEMIR FLEXPEN) 100 UNIT/ML injection, Inject 50 Units into the skin nightly   temazepam (RESTORIL) 30 MG capsule, Take 30 mg by mouth nightly as needed. [DISCONTINUED] fentaNYL (DURAGESIC) 50 MCG/HR, Place 1 patch onto the skin every 72 hours  [DISCONTINUED] oxyCODONE (OXY-IR) 30 MG immediate release tablet, Take 30 mg by mouth every 4 hours as needed for Pain. [DISCONTINUED] ALPRAZolam (XANAX) 1 MG tablet, Take 2 mg by mouth 2 times daily.      Inpatient Medications:  Scheduled Meds:   dexamethasone  6 mg IntraVENous Daily    insulin lispro  0-6 Units SubCUTAneous TID WC    insulin lispro  0-3 Units SubCUTAneous Nightly    pantoprazole  40 mg IntraVENous Daily    lidocaine 1 % injection  5 mL IntraDERmal Once    sodium chloride flush  5-40 mL IntraVENous 2 times per day    midodrine  2.5 mg Oral TID WC    sodium hypochlorite   Irrigation Daily    collagenase   Topical Daily    sodium chloride flush  5-40 mL IntraVENous 2 times per day    Vitamin D  2,000 Units Oral Daily     Continuous Infusions:   sodium chloride      sodium chloride 25 mL (11/14/21 1546)    dextrose 100 mL/hr (11/21/21 0840)     PRN Meds:.melatonin, sodium chloride flush, sodium chloride, lip balm petroleum free, promethazine, albumin human, midodrine, sodium chloride flush, sodium chloride, ondansetron **OR** ondansetron, polyethylene glycol, acetaminophen **OR** acetaminophen, guaiFENesin-dextromethorphan, glucose, dextrose, glucagon (rDNA), dextrose       REVIEW OF SYSTEMS:  Review of systems not obtained due to patient factors     PHYSICAL EXAM:  Patient Vitals for the past 24 hrs:   BP Temp Temp src Pulse Resp SpO2 Height Weight   11/23/21 0922 -- -- -- -- -- -- 6' 4\" (1.93 m) --   11/23/21 0900 (!) 113/25 97.7 °F (36.5 °C) Temporal 73 17 91 % -- --   11/23/21 0433 (!) 116/36 97 °F (36.1 °C) Temporal 77 21 97 % -- 150 lb 9.2 oz (68.3 kg)   11/23/21 0034 (!) 101/45 97.3 °F (36.3 °C) Temporal 78 22 100 % -- --   11/22/21 2034 (!) 101/31 97.4 °F (36.3 °C) Temporal 91 19 100 % -- --   11/22/21 1800 (!) 125/38 -- -- 81 28 100 % -- --   11/22/21 1730 (!) 117/48 -- -- 72 24 100 % -- --   11/22/21 1700 (!) 112/24 97.3 °F (36.3 °C) Temporal 88 17 -- -- --   11/22/21 1630 (!) 134/42 -- -- 95 28 -- -- --   11/22/21 1600 (!) 201/109 -- -- 72 17 -- -- --   11/22/21 1530 (!) 208/115 -- -- 71 16 -- -- --   11/22/21 1500 (!) 208/104 -- -- 72 17 -- -- --   11/22/21 1430 (!) 195/96 -- -- 79 21 -- -- --   11/22/21 1400 (!) 119/30 -- -- 90 29 -- -- --   11/22/21 1330 -- -- -- 86 23 -- -- --   11/22/21 1300 (!) 113/49 -- -- 73 21 -- -- --   11/22/21 1200 (!) 99/58 -- -- 71 24 -- -- --       Intake/Output Summary (Last 24 hours) at 11/23/2021 1151  Last data filed at 11/22/2021 1430  Gross per 24 hour   Intake 200 ml   Output --   Net 200 ml          Physical exam:    In-person bedside physical examination deferred. Pursuant to the emergency declaration under the [facility], [address] [place] [address] authority and the [employer] Act, this clinical encounter was conducted to provide necessary medical care. (Also consistent with new provisions and guidance offered by [facility] on March 18, 2020 in setting of COVID 19 outbreak and in order to preserve personal protective equipment in accordance with the flexibilities announced by CMS on March 30, 2020)   References: https://[facility]. [facility]/Portals/0/Resources/COVID-19/3_18%20Telemed%20Guidance%20Updated%20March%2018. pdf?[email or web address]                      https://[facility]. [facility]/Portals/0/Resources/COVID-19/3_18%20Telemed%20Guidance%20Updated%20March%2018. pdf?iug=[email or web address] http://[employer]/. pdf    However, I did visually inspect the patient and observed the following:      Vitals signs reviewed. Constitutional:       General: not in acute distress. Appearance:   ill-appearing. HENT:      Head: Normocephalic and atraumatic. Nose: Nose normal.      Mouth/Throat:      Mouth: Mucous membranes are dry . Eyes:      General: No scleral icterus. Conjunctiva/sclera: Conjunctivae normal.   Neck:      Musculoskeletal: Normal range of motion and neck supple. Cardiovascular:      Rate and Rhythm: Normal rate. Pulmonary:      Effort: Pulmonary effort is  Ab-normal.   Abdominal:      General: There is  distension. Musculoskeletal:      Right lower leg: no edema. Left lower leg: no edema. Skin:     Coloration: Skin is not jaundiced. Neurological:      General:  Deferred     DATA:  Diagnostic tests reviewed for today's visit:    Recent Labs     11/21/21  0738 11/23/21 0437   WBC 9.3 8.0   HCT 35.0* 33.6*   * 75*     Iron Saturation:  No components found for: PERCENTFE  FERRITIN:    Lab Results   Component Value Date    FERRITIN 1,623.0 11/12/2021     IRON:    Lab Results   Component Value Date    IRON 106 11/12/2021     TIBC:    Lab Results   Component Value Date    TIBC 133 11/12/2021       Recent Labs     11/21/21  0738 11/22/21  0654 11/23/21  0437    134* 130*   K 3.4* 3.8 4.8   CL 97* 92* 91*   CO2 25 26 22   BUN 53* 30* 40*   CREATININE 5.5* 3.8* 4.7*     Recent Labs     11/21/21  0738 11/22/21  0654 11/23/21  0437   CALCIUM 7.9* 7.4* 7.1*     No results for input(s): PH, PCO2, PO2 in the last 72 hours.     Invalid input(s): Daisy Arnulfo    ABG:  No results found for: PH, PCO2, PO2, HCO3, BE, THGB, TCO2, O2SAT  VBG:    Lab Results   Component Value Date    PHVEN 7.143 11/13/2021    HOJ4VWA 28.5 11/13/2021    BEVEN -17.9 11/13/2021    Q4WGKMOY 96 11/13/2021           BELOW MENTIONED RADIOLOGY STUDY RESULTS REVIEWED BY ME:    XR CHEST PORTABLE    Result Date: 11/11/2021  EXAMINATION: ONE XRAY VIEW OF THE CHEST 11/11/2021 1:21 pm COMPARISON: None. HISTORY: ORDERING SYSTEM PROVIDED HISTORY: SOB TECHNOLOGIST PROVIDED HISTORY: Reason for exam:->SOB Reason for Exam: SOB Acuity: Acute Type of Exam: Initial FINDINGS: Exaggerated kyphotic positioning and patient rotation. Patient's chin obscures the right lung apex. Heart size indeterminate. Airspace disease in the mid and lower left lung. Strandy opacity in the right lung base     Limited study. Left-sided airspace disease may represent pneumonia or asymmetric edema.   Atelectasis present in the right base

## 2021-11-23 NOTE — PLAN OF CARE
Problem: Falls - Risk of:  Goal: Will remain free from falls  Description: Will remain free from falls  Outcome: Ongoing     Problem: Pain:  Goal: Pain level will decrease  Description: Pain level will decrease  Outcome: Ongoing     Problem: Skin Integrity:  Goal: Will show no infection signs and symptoms  Description: Will show no infection signs and symptoms  Outcome: Ongoing     Problem: Fluid Volume:  Goal: Will show no signs or symptoms of fluid imbalance  Description: Will show no signs or symptoms of fluid imbalance  Outcome: Ongoing     Problem: Nutrition  Goal: Optimal nutrition therapy  11/23/2021 1340 by Yesica Langley RN  Outcome: Ongoing  11/23/2021 0933 by Tia Pereira RD, LD  Outcome: Ongoing     Problem: Airway Clearance - Ineffective  Goal: Achieve or maintain patent airway  Outcome: Ongoing     Problem: Gas Exchange - Impaired  Goal: Absence of hypoxia  Outcome: Ongoing     Problem: Breathing Pattern - Ineffective  Goal: Ability to achieve and maintain a regular respiratory rate  Outcome: Ongoing     Problem:  Body Temperature -  Risk of, Imbalanced  Goal: Complications related to the disease process, condition or treatment will be avoided or minimized  Outcome: Ongoing     Problem: Isolation Precautions - Risk of Spread of Infection  Goal: Prevent transmission of infection  Outcome: Ongoing     Problem: Nutrition Deficits  Goal: Optimize nutritional status  Outcome: Ongoing     Problem: Risk for Fluid Volume Deficit  Goal: Maintain normal serum potassium, sodium, calcium, phosphorus, and pH  Outcome: Ongoing     Problem: Loneliness or Risk for Loneliness  Goal: Demonstrate positive use of time alone when socialization is not possible  Outcome: Ongoing     Problem: Fatigue  Goal: Verbalize increase energy and improved vitality  Outcome: Ongoing     Problem: Patient Education: Go to Patient Education Activity  Goal: Patient/Family Education  Outcome: Ongoing

## 2021-11-23 NOTE — PLAN OF CARE
Nutrition Problem #1: Inadequate oral intake  Intervention: Food and/or Nutrient Delivery: Continue Current Diet, Start Oral Nutrition Supplement  Nutritional Goals: patient will accept and consume 75% or greater of meals and suuplements offered during admission

## 2021-11-24 NOTE — FLOWSHEET NOTE
Treatment time: 3 hours  Net UF: 0 ml     Pre weight: 70.9 kg   Post weight: 70.9 kg  EDW: 76 kg     Access used: Right Femoral CVC  Access function: Good with  ml/min     Medications or blood products given: None     Regular outpatient schedule: [facility], TTS     Summary of response to treatment: Tolerated tx well.  No HD related complaints or complications.      Copy of dialysis treatment record placed in chart, to be scanned into EMR.       11/24/21 1010 11/24/21 1330   Treatment   Time On  --  1015   Time Off  --  1030   Vital Signs   BP (!) 116/42 (!) 115/48   Temp 97 °F (36.1 °C) 97.6 °F (36.4 °C)   Pulse 57 58   Resp 18 18   Dialysis Bath   K+ (Potassium) 2  --    Ca+ (Calcium) 2.5  --    Na+ (Sodium) 137  --    HCO3 (Bicarb) 37  --

## 2021-11-24 NOTE — PROGRESS NOTES
MD Jigna Wall MD Gregory Nip, MD                                  Office: [phone]                 Fax: [phone]          [facility]                    NEPHROLOGY  HEMO-DIALYSIS PROGRESS NOTE:     PATIENT NAME: Suman Newton  : 1970  MRN: [id number]        Indication for Dialysis  ESRD  Patient seen on dialysis treatment. Tolerating treatment  Fairly well    Vitals:    21 0800   BP: (!) 108/50   Pulse: 59   Resp: 14   Temp: 97.6 °F (36.4 °C)   SpO2: 98%       Neck. JVD visible  Cardiac No pericardial rub. Flow murmur. Chest: Bilateral Rales. No rhonchi  Ext :  Edema mild    Labs Reviewed  by me   Labs   Lab Results   Component Value Date    CREATININE 6.1 (HH) 2021    BUN 60 (H) 2021     (L) 2021    K 5.4 (H) 2021    CL 89 (L) 2021    CO2 18 (L) 2021     Lab Results   Component Value Date    WBC 6.0 2021    HGB 10.3 (L) 2021    HCT 32.3 (L) 2021    MCV 71.9 (L) 2021    PLT 65 (L) 2021       Dialysis Treatment and Prescription reviewed          RX:  See dialysis flowsheet for specifics on access, blood flow rate, dialysate baths, duration of dialysis, anticoagulation and other technical information. COMMENTS:  stable on dialysis. Continue to Target dry weight and clearance. Monitor closely for any hypotension    Dialysis treatment plan and dialysis orders discussed with dialysis RN Tanisha      Tolerating dialysis well, with no complications. Hypotension on dialysis-stable to improved. Good flow access. Anemia. Stable. Continue Aransep as per protocol. Fluid Overload. Stable  Fluid removal as tolerated with dialysis. Stable from Renal.  Stable from renal for discharge  Continue out patient Dialysis treatments.

## 2021-11-24 NOTE — PROGRESS NOTES
Goldie Hospitalist NP @8125:  \"Pt is requesting something for pain only has Tylenol and says that it will not help. Can I please have at least 1x order for pain med? thanks. Also Pt's BP looks low due to vascular problems from dialysis, if it actually becomes low am I able to give prn Midodrine that is ordered for dialysis? \"    Response:   \"Ok on giving midodrine early if needed\"  \"Order placed\"

## 2021-11-24 NOTE — PROGRESS NOTES
Shift assessment completed. Patient alert and oriented, increased appetite as evidenced by eating 75% of his breakfast. Loose stool noted, and liberty care performed with briefs change. Patient assisted to chair with walked and x1 assist, as requested. Verbalized eagerness for discharge to rehab.

## 2021-11-24 NOTE — PROGRESS NOTES
Received a call from the admission nurse at [facility] ([facility]) care around 1630.  Report given

## 2021-11-24 NOTE — PROGRESS NOTES
Called [facility] multiple times. No one is able to correctly transfer me to the nurses station on top of being hung up on multiple times. Report unable to be given.

## 2021-11-24 NOTE — PLAN OF CARE
Problem: Falls - Risk of:  Goal: Will remain free from falls  Description: Will remain free from falls  11/24/2021 1040 by Chetan Camacho RN  Outcome: Ongoing  11/24/2021 0316 by Anuja Velazquez RN  Outcome: Ongoing     Problem: Pain:  Goal: Pain level will decrease  Description: Pain level will decrease  11/24/2021 1040 by Chetan Camacho RN  Outcome: Ongoing  11/24/2021 0316 by Anuja Velazquez RN  Outcome: Ongoing     Problem: Skin Integrity:  Goal: Will show no infection signs and symptoms  Description: Will show no infection signs and symptoms  11/24/2021 1040 by Chetan Camacho RN  Outcome: Ongoing  11/24/2021 0316 by Anuja Velazquez RN  Outcome: Ongoing     Problem: Fluid Volume:  Goal: Will show no signs or symptoms of fluid imbalance  Description: Will show no signs or symptoms of fluid imbalance  11/24/2021 1040 by Chetan Camacho RN  Outcome: Ongoing  11/24/2021 0316 by Anuja Velazquez RN  Outcome: Ongoing     Problem: Nutrition  Goal: Optimal nutrition therapy  11/24/2021 1040 by Chetan Camacho RN  Outcome: Ongoing  11/24/2021 0316 by Anuja Velazquez RN  Outcome: Ongoing     Problem: Airway Clearance - Ineffective  Goal: Achieve or maintain patent airway  11/24/2021 1040 by Chetan Camacho RN  Outcome: Ongoing  11/24/2021 0316 by Anuja Velazquez RN  Outcome: Ongoing     Problem: Gas Exchange - Impaired  Goal: Absence of hypoxia  11/24/2021 1040 by Chetan Camacho RN  Outcome: Ongoing  11/24/2021 0316 by Anuja Velazquez RN  Outcome: Ongoing     Problem: Breathing Pattern - Ineffective  Goal: Ability to achieve and maintain a regular respiratory rate  11/24/2021 1040 by Chetan Camacho RN  Outcome: Ongoing  11/24/2021 0316 by Anuja Velazquez RN  Outcome: Ongoing     Problem:  Body Temperature -  Risk of, Imbalanced  Goal: Complications related to the disease process, condition or treatment will be avoided or minimized  11/24/2021 1040 by Chetan Camacho RN  Outcome: Ongoing  11/24/2021 6302 by Tana Bailey RN  Outcome: Ongoing     Problem: Isolation Precautions - Risk of Spread of Infection  Goal: Prevent transmission of infection  11/24/2021 1040 by Jaylene Holland RN  Outcome: Ongoing  11/24/2021 0316 by Tana Bailey RN  Outcome: Ongoing     Problem: Nutrition Deficits  Goal: Optimize nutritional status  11/24/2021 1040 by Jaylene Holland RN  Outcome: Ongoing  11/24/2021 0316 by Tana Bailey RN  Outcome: Ongoing     Problem: Risk for Fluid Volume Deficit  Goal: Maintain normal serum potassium, sodium, calcium, phosphorus, and pH  11/24/2021 1040 by Jaylene Holland RN  Outcome: Ongoing  11/24/2021 0316 by Tana Bailey RN  Outcome: Ongoing     Problem: Loneliness or Risk for Loneliness  Goal: Demonstrate positive use of time alone when socialization is not possible  11/24/2021 1040 by Jaylene Holland RN  Outcome: Ongoing  11/24/2021 0316 by Tana Bailey RN  Outcome: Ongoing     Problem: Fatigue  Goal: Verbalize increase energy and improved vitality  11/24/2021 1040 by Jaylene Holland RN  Outcome: Ongoing  11/24/2021 0316 by Tana Bailey RN  Outcome: Ongoing     Problem: Patient Education: Go to Patient Education Activity  Goal: Patient/Family Education  11/24/2021 1040 by Jaylene Holland RN  Outcome: Ongoing  11/24/2021 0316 by Tana Bailey RN  Outcome: Ongoing

## 2021-11-24 NOTE — CARE COORDINATION
Discharge Plan:     Patient discharged to:    [address] [facility]. [place], [place]    SW/DC [facility] faxed, [address] and TIMOTHY to: [phone]    Narcotic Prescriptions faxed were: N/A    RN: will call report to:  [phone]       Medical Transport with: [address] up time: 1400    Family advised of discharge?: Yes    HENS Submitted?:  Yes    The patient will have dialysis on Tuesday, Thursday, and Saturday at [facility] at 12:15 PM. Veronika Portillo will provide the transportation to and from dialysis. All discharge needs met per case management.     GAGE Velez RN  Case Manger    Phone: [phone]

## 2021-11-24 NOTE — PROGRESS NOTES
Physical/Occupational Therapy  Chandan Vu    Attempted to see pt for PT/OT treatment. Patient currently off the floor to HD. Will hold therapy at this time and will follow up with pt as schedule permits. D/C order noted. Thanks, Raj Self, PT, DPT [id number], [facility].  [address]

## 2021-11-24 NOTE — PROGRESS NOTES
No new changes at this time. Pt is satisfied with pain control. Call light within reach, will continue to monitor.

## 2021-11-24 NOTE — PROGRESS NOTES
Assessment completed and documented. VSS. Pt on RA. meds given per MAR. Pt rated chronic back 4/10, scheduled lidocaine given. Pt sitting on edge of bed eating breakfast. Plan for dialysis and then discharge to [facility] ([facility]) care. Will continue to monitor.

## 2021-11-24 NOTE — PLAN OF CARE
Problem: Falls - Risk of:  Goal: Will remain free from falls  Description: Will remain free from falls  11/24/2021 0316 by Penny Gardner RN  Outcome: Ongoing  11/23/2021 1340 by Bre Simpson RN  Outcome: Ongoing  Goal: Absence of physical injury  Description: Absence of physical injury  11/24/2021 0316 by Penny Gardner RN  Outcome: Ongoing  11/23/2021 1340 by Bre Simpson RN  Outcome: Ongoing     Problem: Pain:  Goal: Pain level will decrease  Description: Pain level will decrease  11/24/2021 0316 by Penny Gardner RN  Outcome: Ongoing  11/23/2021 1340 by Bre Simpson RN  Outcome: Ongoing  Goal: Control of acute pain  Description: Control of acute pain  11/24/2021 0316 by Penny Gardner RN  Outcome: Ongoing  11/23/2021 1340 by Bre Simpson RN  Outcome: Ongoing  Goal: Control of chronic pain  Description: Control of chronic pain  11/24/2021 0316 by Penny Gadrner RN  Outcome: Ongoing  11/23/2021 1340 by Bre Simpson RN  Outcome: Ongoing     Problem: Skin Integrity:  Goal: Will show no infection signs and symptoms  Description: Will show no infection signs and symptoms  11/24/2021 0316 by Penny Gardner RN  Outcome: Ongoing  11/23/2021 1340 by Bre Simpson RN  Outcome: Ongoing  Goal: Absence of new skin breakdown  Description: Absence of new skin breakdown  11/24/2021 0316 by Penny Gardner RN  Outcome: Ongoing  11/23/2021 1340 by Bre Simpson RN  Outcome: Ongoing     Problem: Fluid Volume:  Goal: Will show no signs or symptoms of fluid imbalance  Description: Will show no signs or symptoms of fluid imbalance  11/24/2021 0316 by Penny Gardner RN  Outcome: Ongoing  11/23/2021 1340 by Bre Simpson RN  Outcome: Ongoing     Problem: Nutrition  Goal: Optimal nutrition therapy  11/24/2021 0316 by Penny Gardner RN  Outcome: Ongoing  11/23/2021 1340 by Bre Simpson RN  Outcome: Ongoing  Goal: Understanding of nutritional guidelines  11/24/2021 0316 by Estefani Chou Sam Casey RN  Outcome: Ongoing  11/23/2021 1340 by Peter Chand RN  Outcome: Ongoing     Problem: Airway Clearance - Ineffective  Goal: Achieve or maintain patent airway  11/24/2021 0316 by Rory Erickson RN  Outcome: Ongoing  11/23/2021 1340 by Peter Chand RN  Outcome: Ongoing     Problem: Gas Exchange - Impaired  Goal: Absence of hypoxia  11/24/2021 0316 by Rory Erickson RN  Outcome: Ongoing  11/23/2021 1340 by Peter Chand RN  Outcome: Ongoing  Goal: Promote optimal lung function  11/24/2021 0316 by Rory Erickson RN  Outcome: Ongoing  11/23/2021 1340 by Peter Chand RN  Outcome: Ongoing     Problem: Breathing Pattern - Ineffective  Goal: Ability to achieve and maintain a regular respiratory rate  11/24/2021 0316 by Rory Erickson RN  Outcome: Ongoing  11/23/2021 1340 by Peter Chand RN  Outcome: Ongoing     Problem:  Body Temperature -  Risk of, Imbalanced  Goal: Ability to maintain a body temperature within defined limits  11/24/2021 0316 by Rory Erickson RN  Outcome: Ongoing  11/23/2021 1340 by Peter Chand RN  Outcome: Ongoing  Goal: Will regain or maintain usual level of consciousness  11/24/2021 0316 by Rory Erickson RN  Outcome: Ongoing  11/23/2021 1340 by Peter Chand RN  Outcome: Ongoing  Goal: Complications related to the disease process, condition or treatment will be avoided or minimized  11/24/2021 0316 by Rory Erickson RN  Outcome: Ongoing  11/23/2021 1340 by Peter Chand RN  Outcome: Ongoing     Problem: Isolation Precautions - Risk of Spread of Infection  Goal: Prevent transmission of infection  11/24/2021 0316 by Rory Erickson RN  Outcome: Ongoing  11/23/2021 1340 by Peter Chand RN  Outcome: Ongoing     Problem: Nutrition Deficits  Goal: Optimize nutritional status  11/24/2021 0316 by Rory Erickson RN  Outcome: Ongoing  11/23/2021 1340 by Peter Chand RN  Outcome: Ongoing     Problem: Risk for Fluid Volume Deficit  Goal: Maintain normal heart rhythm  11/24/2021 0316 by Abdiel Momin RN  Outcome: Ongoing  11/23/2021 1340 by Violeta Powell RN  Outcome: Ongoing  Goal: Maintain absence of muscle cramping  11/24/2021 0316 by Abdiel Momin RN  Outcome: Ongoing  11/23/2021 1340 by Violeta Powell RN  Outcome: Ongoing  Goal: Maintain normal serum potassium, sodium, calcium, phosphorus, and pH  11/24/2021 0316 by Abdiel Momin RN  Outcome: Ongoing  11/23/2021 1340 by Violeta Powell RN  Outcome: Ongoing     Problem: Loneliness or Risk for Loneliness  Goal: Demonstrate positive use of time alone when socialization is not possible  11/24/2021 0316 by Abdiel Momin RN  Outcome: Ongoing  11/23/2021 1340 by Violeta Powell RN  Outcome: Ongoing     Problem: Fatigue  Goal: Verbalize increase energy and improved vitality  11/24/2021 0316 by Abdiel Momin RN  Outcome: Ongoing  11/23/2021 1340 by Violeta Powell RN  Outcome: Ongoing     Problem: Patient Education: Go to Patient Education Activity  Goal: Patient/Family Education  11/24/2021 0316 by Abdiel Momin RN  Outcome: Ongoing  11/23/2021 1340 by Violeta Powell RN  Outcome: Ongoing

## 2021-11-24 NOTE — PROGRESS NOTES
Removed pts IV, gathered belongings, changed pts dressing, and cleaned and changed diaper. Transport in route to take pt to [facility].

## 2021-11-29 NOTE — DISCHARGE SUMMARY
[facility] DISCHARGE SUMMARY    Patient Demographics    Patient. Arlette Kapadia  Date of Birth. 1970  MRN. [id number]     Primary care provider. Yusra Strong MD  (Tel: [phone])    Admit date: 11/11/2021    Discharge date (blank if same as Note Date): 11/24/2021  Note Date: 11/29/2021     Reason for Hospitalization. Chief Complaint   Patient presents with    Shortness of Breath     Came in by [facility] from home with c/o SOB and no appetite, has dialysis at [facility] 3x week. Last treatment was yesterday 11/10/2021. RA 85%           Problem-based Hospital Course. Acute hypoxic respiratory failure secondary to COVID-19 pneumonia  -Patient was treated with steroid completed course.  -On room air. Uncontrolled diabetes with brittle  -Very sensitive to changes in dose A1c 14 patient was discharged on Lantus. End-stage renal disease on hemodialysis    Consults. IP CONSULT TO PRIMARY CARE PROVIDER  IP CONSULT TO PULMONOLOGY  IP CONSULT TO NEPHROLOGY  IP CONSULT TO DIETITIAN  IP CONSULT TO PALLIATIVE CARE  IP CONSULT TO ONCOLOGY    Physical examination on discharge day. BP (!) 115/48   Pulse 58   Temp 97.6 °F (36.4 °C)   Resp 18   Ht 6' 4\" (1.93 m)   Wt 156 lb 4.9 oz (70.9 kg)   SpO2 98%   BMI 19.03 kg/m²   General appearance. Alert. Looks comfortable. HEENT. Sclera clear. Moist mucus membranes. Cardiovascular. Regular rate and rhythm, normal S1, S2. No murmur. Respiratory. Not using accessory muscles. Clear to auscultation bilaterally, no wheeze. Gastrointestinal. Abdomen soft, non-tender, not distended, normal bowel sounds  Neurology. Facial symmetry. No speech deficits. Moving all extremities equally. Extremities. No edema in lower extremities. Skin.  Warm, dry, normal turgor    Condition at time of discharge stable     Medication instructions provided to patient at discharge. Medication List      START taking these medications    insulin glargine 100 UNIT/ML injection pen  Commonly known as: LANTUS;BASAGLAR  Inject 10 Units into the skin nightly     midodrine 2.5 MG tablet  Commonly known as: PROAMATINE  Take 1 tablet by mouth 3 times daily (with meals)  Notes to patient: Use: to help increase blood pressure  Side effects: headaches, tingling         CONTINUE taking these medications    ondansetron 4 MG disintegrating tablet  Commonly known as: Zofran ODT  Take 1 tablet by mouth every 8 hours as needed for Nausea     pantoprazole 40 MG tablet  Commonly known as: PROTONIX     Promethazine HCl 6.25 MG/5ML Soln     temazepam 30 MG capsule  Commonly known as: RESTORIL        STOP taking these medications    ALPRAZolam 1 MG tablet  Commonly known as: XANAX     fentaNYL 50 MCG/HR  Commonly known as: DURAGESIC     Levemir FlexPen 100 UNIT/ML injection vial  Generic drug: insulin detemir     oxyCODONE 30 MG immediate release tablet  Commonly known as: OXY-IR        ASK your doctor about these medications    guaiFENesin-dextromethorphan 100-10 MG/5ML syrup  Commonly known as: ROBITUSSIN DM  Take 5 mLs by mouth every 4 hours as needed for Cough  Ask about: Should I take this medication? Where to Get Your Medications      These medications were sent to [address], [address] [place] [address]    Phone: [phone]   · guaiFENesin-dextromethorphan 100-10 MG/5ML syrup  · insulin glargine 100 UNIT/ML injection pen  · midodrine 2.5 MG tablet         Discharge recommendations given to patient. Follow Up. pcp in 1 week   Disposition. Skilled nursing facility  Activity. activity as tolerated  Diet: No diet orders on file      Spent 45  minutes in discharge process.     Signed:  Kelsie Christine MD     11/29/2021 12:19 PM

## 2021-12-01 NOTE — TELEPHONE ENCOUNTER
Writer contacted Dr. Mary Marshall to inform of 30 day readmission risk. Dr. Mary Marshall informed writer there is no decision on disposition at this time.

## 2021-12-01 NOTE — ED NOTES
Bedside glucose on departure 67. Pt given 3 8oz orange juice and a turkey sand witch.  MD notified     Paras Rizvi RN  11/30/21 7044

## 2021-12-01 NOTE — TELEPHONE ENCOUNTER
[facility] attempted to contact pt regarding ED follow up. Attempt unsuccessful. Unable to leave voicemail for pt to contact office or this writer.

## 2021-12-01 NOTE — ED PROVIDER NOTES
Primary Care Physician: Cesilia Voss MD   Attending Physician: Tree Nichole MD     History   Chief Complaint   Patient presents with    Hypoglycemia     Pt brought in per [facility], pt was finished at dialysis waiting on transport when he became less responsive, staff checked bs and it was 40. Given oral glucose and glucagon, pt AAO x 3 upon arrival.         HARLEY Lopez is a 49 y.o. male history of diabetes, Hai Barré, end-stage renal disease on hemodialysis who presents complaining of low blood sugars. Patient stated that he did not eat this morning. Blood sugars were in the 40s patient was given glucagon. He was alert oriented and back to his baseline during my evaluation. He denied any fevers chills nausea vomiting chest pain or shortness of breath he does take Metformin.     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    Hemodialysis patient ([facility]     Low blood pressure     Pancreatitis     Peripheral Neuropathy     Pneumonia 11/11/2021    Renal failure 1992    on dialysis 3x/wk  (T, Th, Sat)        Past Surgical History:   Procedure Laterality Date    CHOLECYSTECTOMY      DIALYSIS FISTULA CREATION      left arm/currently non functioning    KIDNEY TRANSPLANT  9/1994    R-kidney    TOE SURGERY  10-8-2010    cut and realign 1st, 2nd, toe left foot , fixation 1st, 2nd toe left foot    TUNNELED VENOUS CATHETER PLACEMENT      left chest for dialysis    TUNNELED VENOUS PORT PLACEMENT          Family History   Problem Relation Age of Onset    Diabetes Mother         Social History     Socioeconomic History    Marital status: Single     Spouse name: None    Number of children: None    Years of education: None    Highest education level: None   Occupational History    None   Tobacco Use    Smoking status: Never Smoker  Smokeless tobacco: Never Used   Substance and Sexual Activity    Alcohol use: No    Drug use: No    Sexual activity: None   Other Topics Concern    None   Social History Narrative    None     Social Determinants of Health     Financial Resource Strain:     Difficulty of Paying Living Expenses: Not on file   Food Insecurity:     Worried About Running Out of Food in the Last Year: Not on file    Marko of Food in the Last Year: Not on file   Transportation Needs:     Lack of Transportation (Medical): Not on file    Lack of Transportation (Non-Medical): Not on file   Physical Activity:     Days of Exercise per Week: Not on file    Minutes of Exercise per Session: Not on file   Stress:     Feeling of Stress : Not on file   Social Connections:     Frequency of Communication with Friends and Family: Not on file    Frequency of Social Gatherings with Friends and Family: Not on file    Attends Nondenominational Services: Not on file    Active Member of [address] [place] [address] or Organizations: Not on file    Attends Club or Organization Meetings: Not on file    Marital Status: Not on file   Intimate Partner Violence:     Fear of Current or Ex-Partner: Not on file    Emotionally Abused: Not on file    Physically Abused: Not on file    Sexually Abused: Not on file   Housing Stability:     Unable to Pay for Housing in the Last Year: Not on file    Number of [place] in the Last Year: Not on file    Unstable Housing in the Last Year: Not on file        Review of Systems   10 total systems reviewed and found to be negative unless otherwise noted in HPI     Physical Exam   /80   Pulse 84   Temp 96.4 °F (35.8 °C) (Oral)   Resp 15   Ht 6' 4\" (1.93 m)   Wt 170 lb (77.1 kg)   SpO2 95%   BMI 20.69 kg/m²      CONSTITUTIONAL: Chronically ill looking in no acute distress   HEAD: atraumatic, normocephalic   EYES: PERRL, No injection, discharge or scleral icterus. ENT: Moist mucous membranes.     NECK: Normal ROM, NO LAD   CARDIOVASCULAR: Regular rate and rhythm. No murmurs or gallop. PULMONARY/CHEST: Airway patent. No retractions. Breath sounds clear with good air entry bilaterally. ABDOMEN: Soft, Non-distended and non-tender, without guarding or rebound. SKIN: Acyanotic, warm, dry   MUSCULOSKELETAL:swelling, but no  tenderness or deformity   NEUROLOGICAL: Awake and oriented x 3. Pulses intact. Grossly nonfocal   Nursing note and vitals reviewed.      ED Course & Medical Decision Making   Medications - No data to display   Labs Reviewed   CBC WITH AUTO DIFFERENTIAL - Abnormal; Notable for the following components:       Result Value    RBC 3.75 (*)     Hemoglobin 8.7 (*)     Hematocrit 26.5 (*)     MCV 70.7 (*)     MCH 23.1 (*)     RDW 19.2 (*)     Platelets 73 (*)     Lymphocytes Absolute 0.5 (*)     Microcytes Occasional (*)     Hypochromia 1+ (*)     Schistocytes Occasional (*)     Acanthocytes 1+ (*)     Paola Cells 1+ (*)     Target Cells Occasional (*)     All other components within normal limits    Narrative:     Performed at:  [facility] [address], [place], [address]   Phone [phone]   COMPREHENSIVE METABOLIC PANEL W/ REFLEX TO MG FOR LOW K - Abnormal; Notable for the following components:    Sodium 135 (*)     Chloride 94 (*)     Glucose 100 (*)     BUN 32 (*)     CREATININE 3.7 (*)     GFR Non- 17 (*)     GFR  21 (*)     Calcium 7.9 (*)     Albumin/Globulin Ratio 0.9 (*)     Alkaline Phosphatase 143 (*)     All other components within normal limits    Narrative:     Performed at:  [facility] [address], [place], [address]   Phone [phone]   POCT GLUCOSE - Abnormal; Notable for the following components:    POC Glucose 156 (*)     All other components within normal limits    Narrative:     Performed at:  [facility] [address], [place], [facility] Phone [phone]   POCT GLUCOSE    Narrative:     Performed at:  [facility] [address], [address]   Phone [phone]   POCT GLUCOSE      No orders to display      XR CHEST PORTABLE    Result Date: 11/12/2021  EXAM: XR Chest, 1 View EXAM DATE/TIME: 11/12/2021 1:46 pm CLINICAL HISTORY: ORDERING SYSTEM PROVIDED  resp distress  TECHNOLOGIST PROVIDED HISTORY: Reason for exam:->resp distress  Reason for Exam: resp distress  Acuity: Acute  Type of Exam: Initial TECHNIQUE: Frontal view of the chest. COMPARISON: 11/11/2021 FINDINGS: Limitations:  A suboptimal inspiration and patient rotation limits the study. Lungs and pleural spaces:  Patchy opacity in the left lateral mid lung field and left lower lung field and right infrahilar lower lung suggesting probable pneumonia/pneumonitis other also may be some basilar atelectasis. Findings in the left lateral mid lung field have worsened since the prior study. No pneumothorax. Heart:  No acute findings. No cardiomegaly. Mediastinum:  No acute findings. Bones/joints:  No acute findings. Patchy opacity in the left lateral mid lung field and left lower lung field and right infrahilar lower lung suggesting probable pneumonia/pneumonitis other also may be some basilar atelectasis. Findings in the left lateral mid lung field have worsened since the prior study. Findings are somewhat exaggerated by a less than optimal inspiration. XR CHEST PORTABLE    Result Date: 11/11/2021  EXAMINATION: ONE XRAY VIEW OF THE CHEST 11/11/2021 1:21 pm COMPARISON: None. HISTORY: ORDERING SYSTEM PROVIDED HISTORY: SOB TECHNOLOGIST PROVIDED HISTORY: Reason for exam:->SOB Reason for Exam: SOB Acuity: Acute Type of Exam: Initial FINDINGS: Exaggerated kyphotic positioning and patient rotation. Patient's chin obscures the right lung apex. Heart size indeterminate. Airspace disease in the mid and lower left lung.   Strandy opacity in the this report. If there are any discrepancies, please contact me directly.         Nsehniitooh Agustin Rodriguez MD  11/30/21 1564

## 2021-12-11 PROBLEM — G40.901 STATUS EPILEPTICUS (HCC): Status: ACTIVE | Noted: 2021-01-01

## 2021-12-11 NOTE — ED NOTES
Report called to Quinton Bray at [facility]. ICU for room 8961.      Silvano Ochoa RN  12/11/21 7940

## 2021-12-11 NOTE — ED PROVIDER NOTES
Emergency Department Encounter    Patient: Juan Leyva  MRN: [id number]  : 1970  Date of Evaluation: 2021  ED Provider:  Adriana Cohen MD      Triage Chief Complaint:   Altered Mental Status (pt arrived via EMS from [address] [place] [address] d/t AMS; upon arrival noted seizure activity and labored breathing; EMS admin narcan and glucagon)      [place]:  Juan Leyva is a 49 y.o. male that presents from [address] [place] [address], with questionable seizure activity, end-stage renal disease prior history of Guillain-Barré end-stage renal disease chronic pancreatitis, blood transfusions anemia of chronic disease, unknown if trauma glucose was 65 , patient had possible persistent seizure activity with vomitus and aspiration hypoxia.   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    Hemodialysis patient ([address]     Low blood pressure     Pancreatitis     Peripheral Neuropathy     Pneumonia 2021    Renal failure     on dialysis 3x/wk  (, , Sat)    Status epilepticus ([address] 2021     Past Surgical History:   Procedure Laterality Date    CHOLECYSTECTOMY      DIALYSIS FISTULA CREATION      left arm/currently non functioning    KIDNEY TRANSPLANT  1994    R-kidney    TOE SURGERY  10-8-2010    cut and realign 1st, 2nd, toe left foot , fixation 1st, 2nd toe left foot    TUNNELED VENOUS CATHETER PLACEMENT      left chest for dialysis    TUNNELED VENOUS PORT PLACEMENT       Family History   Problem Relation Age of Onset    Diabetes Mother      Social History     Socioeconomic History    Marital status: Single     Spouse name: Not on file    Number of children: Not on file    Years of education: Not on file    Highest education level: Not on file Occupational History    Not on file   Tobacco Use    Smoking status: Never Smoker    Smokeless tobacco: Never Used   Substance and Sexual Activity    Alcohol use: No    Drug use: No    Sexual activity: Not on file   Other Topics Concern    Not on file   Social History Narrative    Not on file     Social Determinants of Health     Financial Resource Strain:     Difficulty of Paying Living Expenses: Not on file   Food Insecurity:     Worried About Running Out of Food in the Last Year: Not on file    Marko of Food in the Last Year: Not on file   Transportation Needs:     Lack of Transportation (Medical): Not on file    Lack of Transportation (Non-Medical):  Not on file   Physical Activity:     Days of Exercise per Week: Not on file    Minutes of Exercise per Session: Not on file   Stress:     Feeling of Stress : Not on file   Social Connections:     Frequency of Communication with Friends and Family: Not on file    Frequency of Social Gatherings with Friends and Family: Not on file    Attends Christianity Services: Not on file    Active Member of [address] [place] [address] or Organizations: Not on file    Attends Club or Organization Meetings: Not on file    Marital Status: Not on file   Intimate Partner Violence:     Fear of Current or Ex-Partner: Not on file    Emotionally Abused: Not on file    Physically Abused: Not on file    Sexually Abused: Not on file   Housing Stability:     Unable to Pay for Housing in the Last Year: Not on file    Number of [place] in the Last Year: Not on file    Unstable Housing in the Last Year: Not on file     Current Facility-Administered Medications   Medication Dose Route Frequency Provider Last Rate Last Admin    ketamine (KETALAR) 100 MG/ML injection             rocuronium (ZEMURON) 50 MG/5ML injection             phenylephrine (VAZCULEP) 10 MG/ML injection             phenylephrine (VAZCULEP) 10 MG/ML injection             propofol injection  5-50 mcg/kg/min IntraVENous Titrated Fredi Lopez MD 9.3 mL/hr at 12/11/21 1547 20 mcg/kg/min at 12/11/21 1547    cefepime (MAXIPIME) 1000 mg IVPB minibag  1,000 mg IntraVENous O92P Fredi Lopez  mL/hr at 12/11/21 1048 1,000 mg at 12/11/21 1048    fentaNYL 10 mcg/mL infusion  12.5-200 mcg/hr IntraVENous Continuous Fredi Lopez MD 23.9 mL/hr at 12/11/21 1226 125 mcg/hr at 12/11/21 1226    ketamine (KETALAR) injection 200 mg  200 mg IntraVENous Once Fredi Lopez MD        norepinephrine (LEVOPHED) 16 mg in sodium chloride 0.9 % 250 mL infusion  2-100 mcg/min IntraVENous Continuous Jarrett Alfaro MD   Held at 12/11/21 1235     Current Outpatient Medications   Medication Sig Dispense Refill    Dextromethorphan-guaiFENesin  MG/5ML SYRP Take 5 mLs by mouth every 4 hours as needed for Cough      bisacodyl (DULCOLAX) 10 MG suppository Place 10 mg rectally daily      ferrous sulfate (IRON 325) 325 (65 Fe) MG tablet Take 325 mg by mouth 2 times daily      levothyroxine (SYNTHROID) 25 MCG tablet Take 25 mcg by mouth Daily      loperamide (LOPERAMIDE A-D) 2 MG tablet Take 2 mg by mouth as needed for Diarrhea After each loose stool      magnesium hydroxide (MILK OF MAGNESIA) 400 MG/5ML suspension Take 30 mLs by mouth daily as needed for Constipation      Multiple Vitamins-Minerals (THERAPEUTIC MULTIVITAMIN-MINERALS) tablet Take 1 tablet by mouth daily      ondansetron (ZOFRAN) 4 MG tablet Take 4 mg by mouth every 8 hours as needed for Nausea or Vomiting      sevelamer (RENVELA) 800 MG tablet Take 1 tablet by mouth 3 times daily (with meals)      insulin glargine (LANTUS;BASAGLAR) 100 UNIT/ML injection pen Inject 10 Units into the skin nightly 5 pen 3    pantoprazole (PROTONIX) 40 MG tablet Take 40 mg by mouth 2 times daily       temazepam (RESTORIL) 30 MG capsule Take 30 mg by mouth nightly as needed.       midodrine (PROAMATINE) 2.5 MG tablet Take 1 tablet by mouth 3 times daily (with meals) 90 tablet 3    ondansetron (ZOFRAN ODT) 4 MG disintegrating tablet Take 1 tablet by mouth every 8 hours as needed for Nausea 20 tablet 0    Promethazine HCl 6.25 MG/5ML SOLN Take 12.5 mg by mouth 2 times daily as needed. Allergies   Allergen Reactions    Flu Virus Vaccine      Caused paralysis       Nursing Notes Reviewed    Physical Exam:    General appearance:  unresponsive, possible seizure activity  Skin: Cool extremities. Dry. No signs of trauma, right Vas-Cath was noted  Eye: Pupils are fixed and dilated  Ears, nose, mouth and throat:  Oral mucosa without foreign body  Neck:  Trachea midline. Extremity:  no trauma, cool extremities  Heart: no peripheral pulse, or cardiac activity  Perfusion:  cool extremities, delayed cap refill  Respiratory: Crackles bilaterally with oral pharyngeal vomitus   abdominal:  Non distended.    no signs of trauma  Neurologic: Possible seizure activity, and decorticate posturing    I have reviewed and interpreted all of the currently available lab results from this visit (if applicable):  Results for orders placed or performed during the hospital encounter of 12/11/21   CBC Auto Differential   Result Value Ref Range    WBC 2.7 (L) 4.0 - 11.0 K/uL    RBC 3.42 (L) 4.20 - 5.90 M/uL    Hemoglobin 8.0 (L) 13.5 - 17.5 g/dL    Hematocrit 25.0 (L) 40.5 - 52.5 %    MCV 73.0 (L) 80.0 - 100.0 fL    MCH 23.3 (L) 26.0 - 34.0 pg    MCHC 31.9 31.0 - 36.0 g/dL    RDW 23.7 (H) 12.4 - 15.4 %    Platelets 131 (L) 777 - 450 K/uL    MPV 8.3 5.0 - 10.5 fL    Neutrophils % 75.6 %    Lymphocytes % 14.1 %    Monocytes % 4.9 %    Eosinophils % 4.2 %    Basophils % 1.2 %    Neutrophils Absolute 2.0 1.7 - 7.7 K/uL    Lymphocytes Absolute 0.4 (L) 1.0 - 5.1 K/uL    Monocytes Absolute 0.1 0.0 - 1.3 K/uL    Eosinophils Absolute 0.1 0.0 - 0.6 K/uL    Basophils Absolute 0.0 0.0 - 0.2 K/uL   Basic Metabolic Panel w/ Reflex to MG   Result Value Ref Range    Sodium 132 (L) 136 - 145 mmol/L    Potassium reflex Magnesium 4.6 3.5 - 5.1 mmol/L    Chloride 90 (L) 99 - 110 mmol/L    CO2 27 21 - 32 mmol/L    Anion Gap 15 3 - 16    Glucose 35 (LL) 70 - 99 mg/dL    BUN 37 (H) 7 - 20 mg/dL    CREATININE 4.8 (H) 0.9 - 1.3 mg/dL    GFR Non-African American 13 (A) >60    GFR  16 (A) >60    Calcium 9.0 8.3 - 10.6 mg/dL   Troponin   Result Value Ref Range    Troponin 0.07 (H) <0.01 ng/mL   Lactic Acid, Plasma   Result Value Ref Range    Lactic Acid 1.6 0.4 - 2.0 mmol/L   Blood Gas, Venous   Result Value Ref Range    pH, Vinod 7.494 (H) 7.350 - 7.450    pCO2, Vinod 36.4 (L) 40.0 - 50.0 mmHg    pO2, Vinod 155.0 (H) 25.0 - 40.0 mmHg    HCO3, Venous 27.9 23.0 - 29.0 mmol/L    Base Excess, Vinod 4.4 (H) -3.0 - 3.0 mmol/L    O2 Sat, Vinod 97 Not Established %    Carboxyhemoglobin 4.3 (H) 0.0 - 1.5 %    MetHgb, Vinod 0.8 <1.5 %    TC02 (Calc), Vinod 65 Not Established mmol/L    O2 Content, Vinod 12 Not Established VOL %    O2 Therapy Unknown     Hemoglobin, Vinod, Reduced 3 %   Ammonia   Result Value Ref Range    Ammonia 45 16 - 60 umol/L   Hepatic Function Panel   Result Value Ref Range    Total Protein 7.5 6.4 - 8.2 g/dL    Albumin 3.4 3.4 - 5.0 g/dL    Alkaline Phosphatase 287 (H) 40 - 129 U/L    ALT 13 10 - 40 U/L    AST 20 15 - 37 U/L    Total Bilirubin 0.5 0.0 - 1.0 mg/dL    Bilirubin, Direct <0.2 0.0 - 0.3 mg/dL    Bilirubin, Indirect see below 0.0 - 1.0 mg/dL   Protime-INR   Result Value Ref Range    Protime 12.1 9.9 - 12.7 sec    INR 1.07 0.88 - 1.12   POCT Glucose   Result Value Ref Range    POC Glucose 116 (H) 70 - 99 mg/dl    Performed on ACCU-CHEK    POCT Glucose   Result Value Ref Range    POC Glucose 107 (H) 70 - 99 mg/dl    Performed on ACCU-CHEK    EKG 12 Lead   Result Value Ref Range    Ventricular Rate 93 BPM    Atrial Rate 93 BPM    P-R Interval 166 ms    QRS Duration 104 ms    Q-T Interval 432 ms    QTc Calculation (Bazett) 537 ms    P Axis 104 degrees    R Axis 21 degrees    T Axis 130 degrees    Diagnosis cardiac dysrhythmias here in the emergency department, trend troponins as necessary and hemodynamic parameters. 6: Hypoglycemia. Patient's glucose did drop to 35, prior to the patient's arrival at the time of seizures his glucose was 68. Amp of D50 was formed repeat glucose is 107. Old insulin therapy this point time, no obvious septicemia currently. #7: Leukopenia anemia thrombocytopenia. Hemoglobin stable at 8, does not require blood transfusion currently. White count is slightly low, no obvious rectal hyperthermia, continue trending white count and temperature. Total critical care time provided today was 120  minutes. This excludes seperately billable procedures and family discussion time. Critical care time provided for obtaining history, conducting a physical exam, performing and monitoring interventions, ordering, collecting and interpreting tests, and establishing medical decision-making. There was a potential for life/limb threatening pathology requiring close evaluation and intervention with concern for patient decompensation. Dispo: ICU admit    Condition: Critical    Clinical Impression:  1. Altered mental status, unspecified altered mental status type    2. Seizure ([address]    3. ESRD (end stage renal disease) on dialysis [facility])      Disposition referral (if applicable):  No follow-up provider specified. Disposition medications (if applicable):  New Prescriptions    No medications on file       Comment: Please note this report has been produced using speech recognition software and may contain errors related to that system including errors in grammar, punctuation, and spelling, as well as words and phrases that may be inappropriate. Efforts were made to edit the dictations.       Maite Winslow MD  94/92/96 [id number]

## 2021-12-11 NOTE — ED NOTES
Writer spoke with nephrologist in reference to utilizing dialysis lines as needed d/t medical condition.      Cherylene Biles, RN  12/11/21 1855

## 2021-12-11 NOTE — ED TRIAGE NOTES
Pt arrived via EMS from [place] [address]. EMS admin narcan and glucagon. Upon arrival seizures noted. Versed given @ 0599.

## 2021-12-11 NOTE — CONSULTS
MD Bennett Castaneda MD Renold Riling, MD                Office: [phone]                      Fax: [phone]          NEPHROLOGY INITIAL CONSULT NOTE:     PATIENT NAME: Yang Gonzalez  : 1970  MRN: [id number]  REASON FOR CONSULT: I am asked to see this patient in consultation for my opinion regarding management of ESRD. My recommendations will be communicated by way of shared medical record. IMPRESSION / RECOMMENDATION:      Admitted for:  No admission diagnoses are documented for this encounter. Acute hypoxic respiratory failure - needing intubated  possible seizure - EEG needed - as per hospitalist needs transfer to a tertiary care center. 1. ESRD on iHD: MWF.   - follows w/ [facility] team     -No urgent need for dialysis today   -sBP in to 70s, ok to give more NS bolus x1L,   -start Levophed to goal SBP >90 or MAP >65   -- It is reasonable to use his Rt femoral TDC for a quick access for pressor support, as failed CVC insertion by ER team,     - overall, poor prognosis, appropriate for palliative -as was discussed on last admission by me too.         2. Hypertension/Volume Status:  - hypovolemia currently - hold BP meds , needing IVFs   - EDW obtain outpt records -> 72 kg on admission  - Na Hyponatremia- chronic   (see above)     3. Electrolytes/acid-base:  K: WNL  High AG metabolic acidosis: controlled     4. Anemia of renal failure: below goal  - check iron stores  - ASHUTOSH: w/ HD     5. BMD:  - Phos: f/u in am labs -  - follow iPTH outpt    D/W ER team, nurse, Maite Winslow MD  D/W hospitalist - Dr Maurice Lim      : Other supportive care :   - Check daily renal function panel with electrolytes-phosphorus  - Strict monitoring of I/Os, daily weight  - Renal feeds/diet  - Current medications reviewed. - Nephrotoxic medications have been discontinued. - Dose adjusted and appropriate.       - Dose meds for eGFR <15 mL/min/1.73m2.    - Avoid heavy opioids due to renal failure - may use very low dose dilaudid / fentanyl with close monitoring of CNS and respiratory depression. Other Problems:  Patient Active Problem List   Diagnosis    [facility]    Anemia    Toe deformity    Peripheral neuropathy    Hypogonadism, male    Hyperkalemia    Acute pancreatitis    Acute on chronic renal failure (HCC)    Chronic pancreatitis (HCC)    Pericardial effusion    Hypotension    Diabetes mellitus ([facility]    Sepsis ([facility]    Diabetic foot ulcer (HCC)    Pancreatitis    Chronic pain    C. difficile diarrhea    Pneumonia       Please refer to orders. Multiple complex problems. High risk  Discussed with patient, 's treatment team    Thank you for allowing me to participate in this patient's care. Please do not hesitate to contact me with any questions/concerns. We will follow along with you. Elsa Robles MD  [facility] [address] [place] [address] [place]: [phone] or [employer]  Fax: [phone]    Time spent  ~ 35 minutes that included face-to-face meeting/discussion with patient's treatment team (including primary/referring team and other consultants; included coordination of care with the treatment team; and review of patient's electronic medical records and ordering appropriates tests.       ===========================================  ===========================================     CHIEF COMPLAINT:   Chief Complaint   Patient presents with    Altered Mental Status     pt arrived via EMS from [facility] d/t [facility]; upon arrival noted seizure activity and labored breathing; EMS admin narcan and glucagon     History Obtained From:  treatment team + Electronic Medical Records.      HPI: Mr. Sampson is a 49 y.o. 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 We are called for ESRD care. Pt needed to be intubated as w/ ?seizure and possible aspiration hypoxia. In ER. Re: ESRD  · Duration (when): Chronic   · Location (where): kidneys  · Severity (ex: CKD stage): End stage  · Timing (ex: continuous, intermittent): intermittent HD  · Context (ex: related to condition): presumed DM / HTN related.   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    [facility]     Low blood pressure     Pancreatitis     Peripheral Neuropathy     Pneumonia 11/11/2021    Renal failure 1992    on dialysis 3x/wk  (T, Th, Sat)     PAST SURGICAL HISTORY:   Past Surgical History:   Procedure Laterality Date    CHOLECYSTECTOMY      DIALYSIS FISTULA CREATION      left arm/currently non functioning    KIDNEY TRANSPLANT  9/1994    R-kidney    TOE SURGERY  10-8-2010    cut and realign 1st, 2nd, toe left foot , fixation 1st, 2nd toe left foot    TUNNELED VENOUS CATHETER PLACEMENT      left chest for dialysis    TUNNELED VENOUS PORT PLACEMENT       FAMILY HISTORY:   Family History   Problem Relation Age of Onset    Diabetes Mother      SOCIAL HISTORY:   Social History     Socioeconomic History    Marital status: Single     Spouse name: None    Number of children: None    Years of education: None    Highest education level: None   Occupational History    None   Tobacco Use    Smoking status: Never Smoker    Smokeless tobacco: Never Used   Substance and Sexual Activity    Alcohol use: No    Drug use: No    Sexual activity: None   Other Topics Concern    None   Social History Narrative    None     Social Determinants of Health     Financial Resource Strain:     Difficulty of Paying Living Expenses: Not on file   Food Insecurity:     Worried About Running Out of Food in the Last Year: Not on file    Marko of Food in the Last Year: Not on file   Transportation Needs:     Lack of Transportation (Medical): Not on file    Lack of Transportation (Non-Medical):  Not on file   Physical Activity:     Days of Exercise per Week: Not on file    Minutes of Exercise per Session: Not on file   Stress:     Feeling of Stress : Not on file   Social Connections:     Frequency of Communication with Friends and Family: Not on file    Frequency of Social Gatherings with Friends and Family: Not on file    Attends Church Services: Not on file    Active Member of [address] [place] [address] [employer] or Organizations: Not on file    Attends Club or Organization Meetings: Not on file    Marital Status: Not on file   Intimate Partner Violence:     Fear of Current or Ex-Partner: Not on file    Emotionally Abused: Not on file    Physically Abused: Not on file    Sexually Abused: Not on file   Housing Stability:     Unable to Pay for Housing in the Last Year: Not on file    Number of [place] in the Last Year: Not on file    Unstable Housing in the Last Year: Not on file          MEDICATIONS reviewed by me:  Prior to Admission Medications:  No current facility-administered medications on file prior to encounter. Current Outpatient Medications on File Prior to Encounter   Medication Sig Dispense Refill    Dextromethorphan-guaiFENesin  MG/5ML SYRP Take 5 mLs by mouth every 4 hours as needed for Cough      bisacodyl (DULCOLAX) 10 MG suppository Place 10 mg rectally daily      ferrous sulfate (IRON 325) 325 (65 Fe) MG tablet Take 325 mg by mouth 2 times daily      levothyroxine (SYNTHROID) 25 MCG tablet Take 25 mcg by mouth Daily      loperamide (LOPERAMIDE A-D) 2 MG tablet Take 2 mg by mouth as needed for Diarrhea After each loose stool      magnesium hydroxide (MILK OF MAGNESIA) 400 MG/5ML suspension Take 30 mLs by mouth daily as needed for Constipation      Multiple Vitamins-Minerals (THERAPEUTIC MULTIVITAMIN-MINERALS) tablet Take 1 tablet by mouth daily      ondansetron (ZOFRAN) 4 MG tablet Take 4 mg by mouth every 8 hours as needed for Nausea or Vomiting      sevelamer (RENVELA) 800 MG tablet Take 1 tablet by mouth 3 times daily (with meals)      insulin glargine (LANTUS;BASAGLAR) 100 UNIT/ML injection pen Inject 10 Units into the skin nightly 5 pen 3    pantoprazole (PROTONIX) 40 MG tablet Take 40 mg by mouth 2 times daily       temazepam (RESTORIL) 30 MG capsule Take 30 mg by mouth nightly as needed.  midodrine (PROAMATINE) 2.5 MG tablet Take 1 tablet by mouth 3 times daily (with meals) 90 tablet 3    ondansetron (ZOFRAN ODT) 4 MG disintegrating tablet Take 1 tablet by mouth every 8 hours as needed for Nausea 20 tablet 0    Promethazine HCl 6.25 MG/5ML SOLN Take 12.5 mg by mouth 2 times daily as needed. Not in a hospital admission.      Inpatient Medications:  Scheduled Meds:   ketamine        rocuronium        propofol        phenylephrine        phenylephrine        propofol        cefepime  1,000 mg IntraVENous Q12H    vancomycin  15 mg/kg IntraVENous Once    fentaNYL        ketamine  200 mg IntraVENous Once     Continuous Infusions:   propofol 10 mcg/kg/min (12/11/21 0845)    fentaNYL 100 mcg/hr (12/11/21 1033)     PRN Meds:. Allergies: Flu virus vaccine    REVIEW OF SYSTEMS:  Review of systems not obtained due to patient factors- intubated     PHYSICAL EXAM:  Patient Vitals for the past 24 hrs:   BP Pulse Resp SpO2 Height Weight   12/11/21 0835 (!) 152/126 93 23 100 % -- --   12/11/21 0830 (!) 149/116 92 24 100 % -- --   12/11/21 0825 (!) 135/109 89 21 100 % -- --   12/11/21 0820 (!) 149/114 87 22 100 % -- --   12/11/21 0808 -- 89 22 100 % -- --   12/11/21 0733 101/86 85 20 99 % 5' 10\" (1.778 m) 170 lb (77.1 kg)   12/11/21 0728 -- 82 24 96 % -- --   12/11/21 0727 -- 83 29 (!) 89 % -- --     No intake or output data in the 24 hours ending 12/11/21 1118       Physical exam:       Vitals signs reviewed. Constitutional: Poorly responsive, Intubated and thin habitus  Eyes: Conjunctiva clear and Noscleral icterus  Ear, Nose, and Throat: dry oral mucosa; ET to vent  Neck: Trachea midline, No jugular venous distension  Cardiovascular: Regular rate and ryhthm, normal S1 and S2,    Respiratory: Mechanically ventilated; Lung sounds notable for synchronous vent-associated breath sounds  Abdomen:   distended. Normal bowel sounds. Skin: no rash, bruises on visible body area  Musculoskeletal: No clubbing or cyanosis of digits. and Normocephalic. Extremitties: trace peripheral edema, no deformities. Neurologic:Unable to obtain as intubated/sedated. Psychiatric: Unable to obtain as intubated/sedated.           DATA:  Diagnostic tests reviewed for today's visit:    Recent Labs     12/11/21  0736   WBC 2.7*   HCT 25.0*   *     Iron Saturation:  No components found for: PERCENTFE  FERRITIN:    Lab Results   Component Value Date    FERRITIN 1,623.0 11/12/2021     IRON:    Lab Results   Component Value Date    IRON 106 11/12/2021     TIBC:    Lab Results   Component Value Date    TIBC 133 11/12/2021       Recent Labs     12/11/21  0736   *   K 4.6   CL 90*   CO2 27   BUN 37*   CREATININE 4.8*     Recent Labs     12/11/21  0736   CALCIUM 9.0     No results for input(s): PH, PCO2, PO2 in the last 72 hours. Invalid input(s): Donnalee Stager    ABG:  No results found for: PH, PCO2, PO2, HCO3, BE, THGB, TCO2, O2SAT  VBG:    Lab Results   Component Value Date    PHVEN 7.494 12/11/2021    HQR7XED 36.4 12/11/2021    BEVEN 4.4 12/11/2021    Y4HMJOBQ 97 12/11/2021           BELOW MENTIONED RADIOLOGY STUDY RESULTS REVIEWED BY ME:    XR CHEST PORTABLE    Result Date: 11/11/2021  EXAMINATION: ONE XRAY VIEW OF THE CHEST 11/11/2021 1:21 pm COMPARISON: None. HISTORY: ORDERING SYSTEM PROVIDED HISTORY: SOB TECHNOLOGIST PROVIDED HISTORY: Reason for exam:->SOB Reason for Exam: SOB Acuity: Acute Type of Exam: Initial FINDINGS: Exaggerated kyphotic positioning and patient rotation. Patient's chin obscures the right lung apex. Heart size indeterminate. Airspace disease in the mid and lower left lung. Strandy opacity in the right lung base     Limited study. Left-sided airspace disease may represent pneumonia or asymmetric edema.   Atelectasis present in the right base

## 2021-12-11 NOTE — ED NOTES
0725-versed 5mg  0732-Keppra 100 mg  0732-Ketamine 200-IM  0733-Proprapol 100 mg  0733-Rocuronium 60mg  0733-Phenylephrine 1ml       Cherylene Biles, RN  12/11/21 0154       Cherylene Biles, RN  12/11/21 8536

## 2021-12-12 NOTE — PROGRESS NOTES
Initial Pulmonary & Critical Care Consult Note      Reason for Consult: Status epilepticus   Requesting Physician: Salome Bruner    Subjective:   CHIEF COMPLAINT / HPI:                Elin Reddy is a 49 y.o. male with PMH as below notable for T1DM, ESRD on HD, Pancreatitis, Covid Pna (November), Guillan barre who presented with AMS. Patient was unresponsive and EMS administered Narcan and glucagon. On admission patient was noted to have seizures and was given, Versed, Keppra and Ketamine. Patient presented one episode of emesis with aspiration. Patient was intubated with propofol and had no new seizure episodes since. Patient was hypoglycemic(Glu 35) , D50 and glucagon were given. Trop 0.07. Hb 8. VBG 7.49/36/155. Patient was started con cvEEG. Patient baseline condition is neurologically intact, uses a wheelchair. Patient was admitted to the ICU for further workup and management of status epilepticus. Overnight patient pupils remained pinpoint. open his eyes with voice and squeezes R hand. Patient was hypotensive overnight, propofol was stopped. EEG showed diffuse encephalopathy, No seizures.         Past Medical History:      Diagnosis Date    Blood transfusion     Chronic pain     Diabetes mellitus ([address]     Dialysis     tues-thur-sat    Guillain Virgie syndrome     2002 after flu shot    Hemodialysis patient ([address]     Low blood pressure     Pancreatitis     Peripheral Neuropathy     Pneumonia 11/11/2021    Renal failure 1992    on dialysis 3x/wk  (T, Th, Sat)    Status epilepticus ([address] 12/11/2021      Past Surgical History:        Procedure Laterality Date    CHOLECYSTECTOMY      DIALYSIS FISTULA CREATION      left arm/currently non functioning    KIDNEY TRANSPLANT  9/1994    R-kidney    TOE SURGERY  10-8-2010    cut and realign 1st, 2nd, toe left foot , fixation 1st, 2nd toe left foot    TUNNELED VENOUS CATHETER PLACEMENT      left chest for dialysis    TUNNELED VENOUS PORT PLACEMENT       Current Medications:     sodium chloride  1,000 mL IntraVENous Once    magnesium sulfate  2,000 mg IntraVENous Once    sodium chloride flush  5-40 mL IntraVENous 2 times per day    heparin (porcine)  5,000 Units SubCUTAneous 3 times per day    levetiracetam  500 mg IntraVENous Q12H    levothyroxine  25 mcg Oral Daily    pantoprazole  40 mg IntraVENous Daily    insulin lispro  0-6 Units SubCUTAneous TID WC    insulin lispro  0-3 Units SubCUTAneous Nightly    cefepime  500 mg IntraVENous Q24H    vancomycin (VANCOCIN) intermittent dosing (placeholder)   Other RX Placeholder        Social History:     reports that he has never smoked. He has never used smokeless tobacco. He reports that he does not drink alcohol and does not use drugs. Family History:   Family History   Problem Relation Age of Onset    Diabetes Mother        REVIEW OF SYSTEMS:    A 10 point review of systems was conducted, significant findings as noted in HPI.     Objective:   PHYSICAL EXAM:    Vitals:   T-max:  Patient Vitals for the past 8 hrs:   BP Temp Temp src Pulse Resp SpO2   12/12/21 0735 (!) 81/49 -- -- 100 10 100 %   12/12/21 0730 (!) 81/46 -- -- 101 25 100 %   12/12/21 0725 (!) 81/47 -- -- 101 24 100 %   12/12/21 0720 (!) 78/47 -- -- 100 25 100 %   12/12/21 0715 (!) 74/42 -- -- 98 23 100 %   12/12/21 0710 (!) 67/40 -- -- 95 21 100 %   12/12/21 0705 (!) 64/36 -- -- 93 20 100 %   12/12/21 0700 (!) 63/36 -- -- 93 10 100 %   12/12/21 0600 97/61 98 °F (36.7 °C) Axillary 102 20 98 %   12/12/21 0500 (!) 98/58 -- -- 94 26 97 %   12/12/21 0401 -- -- -- 87 24 98 %   12/12/21 0400 (!) 91/53 96.5 °F (35.8 °C) Temporal 87 23 98 %   12/12/21 0300 (!) 84/56 -- -- 87 24 100 %   12/12/21 0200 (!) 84/54 92.7 °F (33.7 °C) Temporal 81 29 96 %   12/12/21 0100 (!) 88/57 -- -- 74 23 100 %   12/12/21 0001 -- -- -- 68 25 100 %   12/12/21 0000 (!) 88/59 (!) 89.5 °F (31.9 °C) Temporal 68 28 100 %       Intake/Output Summary (Last 24 hours) at 12/12/2021 0752  Last data filed at 12/12/2021 0500  Gross per 24 hour   Intake 875.58 ml   Output 0 ml   Net 875.58 ml       Physical Exam  Constitutional:       Appearance: He is cachectic. He is ill-appearing. Comments: Patient is sedated and intubated. HENT:      Head: Atraumatic. Comments: Bitemporal wasting      Mouth/Throat:      Comments: Poor oral hygiene  Eyes:      Conjunctiva/sclera: Conjunctivae normal.      Pupils: Pupils are equal, round, and reactive to light. Comments: Pupils pinpoint    Cardiovascular:      Rate and Rhythm: Normal rate and regular rhythm. Pulses: Normal pulses. Heart sounds: Normal heart sounds. Pulmonary:      Comments: Patient intubated. Mechanical breath sounds present bilateral.  Abdominal:      General: Abdomen is flat. Bowel sounds are normal.      Palpations: Abdomen is soft. Comments: large midline hernia, stretch marks throughout abdomen. Musculoskeletal:      Comments: bypass graft in LUE, AV fistula in RUE   Skin:     Comments: skin tight and discolored in b/l LE consistent with PAD   Neurological:      Comments: Patient is sedated. Vent Settings: Vent Mode: PRVC Rate Set: 22 bmp/Vt Ordered: 400 mL/ /FiO2 : 30 %  PEEP5  No results for input(s): PHART, ZLO5APS, PO2ART in the last 72 hours.     IV:   sodium chloride      fentaNYL 25 mcg/hr (12/12/21 0500)    propofol Stopped (12/12/21 0714)    dextrose         LABS:    CBC:   Recent Labs     12/11/21  0736 12/12/21  0540   WBC 2.7*  --    HGB 8.0* 6.5*   HCT 25.0* 19.8*   *  --    MCV 73.0*  --      Renal:    Recent Labs     12/11/21  0736 12/12/21  0450   * 132*   K 4.6 4.3   CL 90* 92*   CO2 27 22   BUN 37* 40*   CREATININE 4.8* 5.0*   GLUCOSE 35* 71   CALCIUM 9.0 7.2*   MG  --  1.60*   ANIONGAP 15 18*     Hepatic:   Recent Labs     12/11/21  0736   AST 20   ALT 13   BILITOT 0.5   BILIDIR <0.2   PROT 7.5   LABALBU 3.4   ALKPHOS 287* needed  -Nephro following   -avoid Nephrotoxic meds  -monitor I/Os strictly    Renal osteodystrophy  CT head findings of osteolytic lesions and also elevated alk phos. -F/u PTH, Vit D     GI:   Consider starting tube feeds  Diet NPO     GERD  - PPI     Endocrine    T1DM  Last A1c 14(11/12/2021). Patient hypoglicemic, takes at home. Lantus 10 units  -LDSS  -hypoglicemia protocol   -f/u BG levels     Hypothyroidism  Last TSH 1.4(12/23/2010)  -Continue home meds    Code Status: Limited  FEN: Diet NPO  PPX: Heparin sc  DISPO: ICU     Shasta Keating MD, PGY-1  12/12/21  7:52 AM    This patient will be staffed and discussed with  Fabrice Harris MD.      Patient seen, examined and discussed with the resident and I agree with the assessment and plan. Briefly, this is a 49 y.o. male with hypoglycemia, seizure and ESRD    Vent Mode: PS Rate Set: 22 bmp/Vt Ordered: 400 mL/ /FiO2 : 30 %  PEEP 5  Recent Labs     12/12/21  1039   PHART 7.401   SXQ4DSN 37.4   PO2ART 80.1     Patient intubated, off sedation for my evaluation. He presented to [facility] hypoglycemic to the 30s and seizure. He's growing strep in his blood, source is likely one of his wounds. He hasn't had more seizures since his blood sugar was corrected, but he also has been on keppra. Seizures were very likely the result of hypoglycemia, which was likely the result of sepsis  He has required continuous glucose replacement. If we place a corpak or he passes a bedside swallow we could start feeding him. Did well on an SBT this morning and had a good cough, so I extubated him. Patient's mother stated that he's supposed to be a DNR/DNI, so we have updated this. Will try to get his [facility] docs. Will ask palliative to see this patient as well. Critical care time spent reviewing labs/films, examining patient, collaborating with other physicians but excluding procedures for life threatening organ failure is 35 minutes.       Elba Feldman, MD

## 2021-12-12 NOTE — PROGRESS NOTES
Patient's pupils are now very small and sluggish, and are deviating to the right. ICU residents made aware. No new orders at this time.

## 2021-12-12 NOTE — H&P
Internal Medicine  PGY 1  History & Physical      CC Seizure    History Obtained From:  patient, electronic medical record    HISTORY OF PRESENT ILLNESS:  Mr. Maria Elena Ortiz is a 49 yom with PMH T1DM, ESRD on iHD, pancreatitis, covid PNA (hospitalized in November 2021), Darvin Liz who presented to [facility] ED from care facility due to altered mental status. Upon arrival to ED patient noted to have seizures and was given versed then keppra then ketamine and then intubated and sedated on propofol. Has not reseized since. Etiology of seizures thought to be due to hypoglycemia (BG 35). Vitals stable. Current vent setting FiO2 50, RR22, , PEEP 5. Not requiring vasopresosrs. Sedated on propofol and fentanyl. Labs significant for hypoglycemia (given D50 and glucagon and corrected promptly), troponin 0.07, alk phos 287, Hgb 8, platelet 023, post intubation VBG pH 7.494, pCO2 36.4, pO2 155. Patient admitted to ICU due since intubated also for continuous EEG. Neurology and nephrology consulted from ED. No emergent need for HD. Spoke with mother on the phone who confirms patient's limited code status: no to intubation. States patient's cognition is normal and he is neurologically intact. Cannot walk, uses wheelchair.      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    Hemodialysis patient ([address]     Low blood pressure     Pancreatitis     Peripheral Neuropathy     Pneumonia 11/11/2021    Renal failure 1992    on dialysis 3x/wk  (T, Th, Sat)    Status epilepticus ([address] 12/11/2021   ·     Past Surgical History:        Procedure Laterality Date    CHOLECYSTECTOMY      DIALYSIS FISTULA CREATION      left arm/currently non functioning    KIDNEY TRANSPLANT  9/1994    R-kidney    TOE SURGERY  10-8-2010    cut and realign 1st, 2nd, toe left foot , fixation 1st, 2nd toe left foot    TUNNELED VENOUS CATHETER PLACEMENT      left chest for dialysis    TUNNELED VENOUS PORT PLACEMENT     ·     Medications Priorto Admission:    · Medications Prior to Admission: Dextromethorphan-guaiFENesin  MG/5ML SYRP, Take 5 mLs by mouth every 4 hours as needed for Cough  · bisacodyl (DULCOLAX) 10 MG suppository, Place 10 mg rectally daily  · ferrous sulfate (IRON 325) 325 (65 Fe) MG tablet, Take 325 mg by mouth 2 times daily  · levothyroxine (SYNTHROID) 25 MCG tablet, Take 25 mcg by mouth Daily  · loperamide (LOPERAMIDE A-D) 2 MG tablet, Take 2 mg by mouth as needed for Diarrhea After each loose stool  · magnesium hydroxide (MILK OF MAGNESIA) 400 MG/5ML suspension, Take 30 mLs by mouth daily as needed for Constipation  · Multiple Vitamins-Minerals (THERAPEUTIC MULTIVITAMIN-MINERALS) tablet, Take 1 tablet by mouth daily  · ondansetron (ZOFRAN) 4 MG tablet, Take 4 mg by mouth every 8 hours as needed for Nausea or Vomiting  · sevelamer (RENVELA) 800 MG tablet, Take 1 tablet by mouth 3 times daily (with meals)  · insulin glargine (LANTUS;BASAGLAR) 100 UNIT/ML injection pen, Inject 10 Units into the skin nightly  · midodrine (PROAMATINE) 2.5 MG tablet, Take 1 tablet by mouth 3 times daily (with meals)  · ondansetron (ZOFRAN ODT) 4 MG disintegrating tablet, Take 1 tablet by mouth every 8 hours as needed for Nausea  · pantoprazole (PROTONIX) 40 MG tablet, Take 40 mg by mouth 2 times daily   · Promethazine HCl 6.25 MG/5ML SOLN, Take 12.5 mg by mouth 2 times daily as needed. · temazepam (RESTORIL) 30 MG capsule, Take 30 mg by mouth nightly as needed. Allergies:  Flu virus vaccine    Social History:   · TOBACCO:   reports that he has never smoked. He has never used smokeless tobacco.  · ETOH:   reports no history of alcohol use.   · DRUGS : None  · Patient currently lives in care facility [facility])   ·   Family History:       Problem Relation Age of Onset    Diabetes Mother    ·     Review of Systems    ROS: Unable to obtain     Physical Exam     Vitals:    12/11/21 1852   Resp: 29     Const: chronically ill-appearing, intubated and sedated, cachectic   Head: bitemporal wasting, poor oral hygiene   Neck: no thyromegaly, no traheal deviation, peripheral IV in L neck   Cardiac: RRR, no m/r/g  Pulm: CTAB   Abdo: large midline hernia, stretch marks throughout abdomen, soft, nondistended abdomen   Neuro: patient sedated and does not respond to pain, pupils pinpoint  Skin: skin tight and discolored in b/l LE consistent with PAD   Ext: bypass graft in LUE, AV fistula in RUE, LE findings as described above       DATA:    Labs:  CBC:   Recent Labs     12/11/21 0736   WBC 2.7*   HGB 8.0*   HCT 25.0*   *       BMP:   Recent Labs     12/11/21 0736   *   K 4.6   CL 90*   CO2 27   BUN 37*   CREATININE 4.8*   GLUCOSE 35*     LFT's:   Recent Labs     12/11/21 0736   AST 20   ALT 13   BILITOT 0.5   ALKPHOS 287*     Troponin:   Recent Labs     12/11/21 0736   TROPONINI 0.07*     BNP:No results for input(s): BNP in the last 72 hours. ABGs: No results for input(s): PHART, AYX1AAA, PO2ART in the last 72 hours. INR:   Recent Labs     12/11/21 0736   INR 1.07       U/A:No results for input(s): NITRITE, COLORU, PHUR, LABCAST, WBCUA, RBCUA, MUCUS, TRICHOMONAS, YEAST, BACTERIA, CLARITYU, SPECGRAV, LEUKOCYTESUR, UROBILINOGEN, BILIRUBINUR, BLOODU, GLUCOSEU, AMORPHOUS in the last 72 hours. Invalid input(s): Tylor Proctor    No orders to display           ASSESSMENT AND PLAN:  Mr. Zoe Velasquez is a 49 yom with PMH T1DM, ESRD on iHD, pancreatitis, covid PNA (hospitalized in November 2021), Bruce Milner who presented to [facility] ED from care facility due to altered mental status. Etiology of seizures likely hypoglycemia but cannot rule out PNA.      Seizures   Got 2g Keppra in ED.   - sedated on propofol  - continuous EEG   - Keppra 500 BID   - CK   - BC x2   - Neuro consulted   - Neurochecks Q4H     Respiratory failure   Concern for airway protection upon arrival to ED, so intubated. - on minimal ventilator settings  - will wean as tolerate and extubate when able   - abg in AM    Multifocal PNA  - vanc and cefepime  - respiratory cultures  - blood cultures   - strep pneumo and legionella     ESRD  - nephrology consulted  - iHD       T1DM  - takes 10 units Lantus at home   - hypoglycemia protocol   - will add sliding scale insulin if needed  - gluc checks QID     Hypothyroidism  - home synthroid     GERD  - PPI      Renal osteodystrophy  CT head findings of osteolytic lesions and also elevated alk phos.      Will discuss with attending physician Dr. Laura Her    Code Status: Limited code   FEN: NPO  PPX: Heparin   DISPO: IP    Berto Lang MD PGY-1   12/11/2021,  7:31 PM

## 2021-12-12 NOTE — CONSULTS
Consult received  Full note to follow    Cristiane Pardo MD  12/12/2021    [facility] [address]  Office : [phone]  Fax :[phone]

## 2021-12-12 NOTE — PROGRESS NOTES
ampicillin-sulbactam (UNASYN)  ordered for patient. This medication is renally eliminated. Will change to every 12 hour dosing per renal dose adjustment policy. Estimated Creatinine Clearance: 18 mL/min (A) (based on SCr of 5 mg/dL (H)). Pharmacy will continue to monitor renal function and adjust dose as necessary. Please call with any questions. Thanks!   Shaun Tsang, PharmD

## 2021-12-12 NOTE — PROGRESS NOTES
Hospitalist Progress Note      PCP: Yusra Strong MD    Date of Admission: 12/11/2021    Chief Complaint: [address] Course: Arlette Kapadia is a 49 y.o. male with PMH as below notable for T1DM, ESRD on HD, Pancreatitis, Covid Pna (November), Guillan barre who presented with AMS. Patient had withnessed seizures and found to have hypoglycemia.      Subjective:   Patient not seen today due to covid rule out status, is under the care of the ICU team.    Medications:  Reviewed    Infusion Medications    dextrose 50 mL/hr at 12/12/21 1434    sodium chloride      fentaNYL Stopped (12/12/21 1056)    propofol Stopped (12/12/21 0714)     Scheduled Medications    [START ON 12/13/2021] levetiracetam  1,000 mg IntraVENous Daily    [START ON 12/14/2021] levetiracetam  500 mg IntraVENous Once per day on Tue Thu Sat    midodrine  5 mg Oral BID WC    ampicillin-sulbactam  1,500 mg IntraVENous Q12H    sodium chloride flush  5-40 mL IntraVENous 2 times per day    heparin (porcine)  5,000 Units SubCUTAneous 3 times per day    levothyroxine  25 mcg Oral Daily    pantoprazole  40 mg IntraVENous Daily    [Held by provider] insulin lispro  0-6 Units SubCUTAneous TID WC    [Held by provider] insulin lispro  0-3 Units SubCUTAneous Nightly    vancomycin (VANCOCIN) intermittent dosing (placeholder)   Other RX Placeholder     PRN Meds: sodium chloride flush, sodium chloride, ondansetron **OR** ondansetron, polyethylene glycol, acetaminophen **OR** acetaminophen, glucose, dextrose, glucagon (rDNA), heparin (porcine)      Intake/Output Summary (Last 24 hours) at 12/12/2021 1744  Last data filed at 12/12/2021 1057  Gross per 24 hour   Intake 1175.58 ml   Output 0 ml   Net 1175.58 ml       Exam:    BP (!) 81/57   Pulse 79   Temp 97.4 °F (36.3 °C) (Temporal)   Resp 18   SpO2 99%     Deferred    Labs:   Recent Labs     12/11/21  0736 12/12/21  0540 12/12/21  1120   WBC 2.7*  --   --    HGB 8.0* 6.5* 6.9*   HCT 25.0* 19.8* 21.3*   *  --   --      Recent Labs     12/11/21  0736 12/12/21  0450   * 132*   K 4.6 4.3   CL 90* 92*   CO2 27 22   BUN 37* 40*   CREATININE 4.8* 5.0*   CALCIUM 9.0 7.2*   PHOS  --  6.9*     Recent Labs     12/11/21  0736   AST 20   ALT 13   BILIDIR <0.2   BILITOT 0.5   ALKPHOS 287*     Recent Labs     12/11/21  0736   INR 1.07     Recent Labs     12/11/21  0736 12/11/21 2045   CKTOTAL  --  130   TROPONINI 0.07*  --        Studies:  MRI BRAIN WO CONTRAST    (Results Pending)       Assessment/Plan:    Active Hospital Problems    Diagnosis Date Noted    Status epilepticus ([facility] [G40.901] 12/11/2021     Sepsis  Strep bacteremia, possible PNA  Likely secondary to wounds  Covid testing sent however patient actually had Covid recently so may test positive  On Unasyn and vancomycin  MRSA probe    Hypoglycemia  Likely due to sepsis  Has been on D10    Diabetes  Hx of brittle diabetes    Seizures  Received Keppra, Versed, Ketamine  Intubated for airway protection  Was then extubated later today  Neurology following  MRI pending  Continue keppra  CK    Acute on chronic anemia, microcytic  Hx of pancytopenia   Close follow Hgb  FOBT    ESRD on HD    DVT Prophylaxis: heparin for now  Diet: Diet NPO  Code Status: Limited    PT/OT Eval Status:     Dispo - Inpatient    Ayanna Carr DO

## 2021-12-12 NOTE — PROGRESS NOTES
MECHANICAL VENTILATION WEANING PROTOCOL    PRE-TRIAL PATIENT ASSESSMENT - COMPLETED     Ventilatory Assessment:    PARAMETER CRITERIA FOR WEANING   Spontaneous Cough:  Yes    Sputum Characteristics:  · Sputum Amount: Small  · Tenacity: Thick  · Sputum Color: White SPONTANEOUS COUGH With small to moderate  Amount of secretions   FiO2 : 30 % FIO2 less than or equal to 50%     PEEP less than or equal to 8   Progressive Mobility Protocol  No     ABG:  Lab Results   Component Value Date    PHART 7.499 11/15/2021    SUI8ICX 36.8 11/15/2021    PO2ART <30.0 11/15/2021    E4ZDEGSZ 39.1 11/15/2021    DUB3VJN 28.6 11/15/2021    BEART 5.1 11/15/2021     HGB/WBC:  Lab Results   Component Value Date    HGB 6.5 12/12/2021    WBC 2.7 12/11/2021        Vital Signs:    PARAMETER CRITERIA FOR WEANING Meets Criteria   Pulse: 94 Within patient's normal limits / stable Yes   Resp: 15 Less than or equal to 30 Yes   BP: (!) 93/58 Within patient's normal limits / minimal pressors (Hemodynamically Stable) No   SpO2: 100 % Greater than or equal to 90% Yes   End Tidal CO2: 30 (%) Within patient's normal limits Yes   Temp: 98 °F (36.7 °C) Less than 38. 5oC / 101. 3oF Yes     [x]    Based on this assessment and the [facility] Ventilator Weaning Protocol, this patient  IS being placed on a Spontaneous Breathing Trial (SBT) at this time.  []    Based on this assessment and the [facility] Ventilator Weaning Protocol, this patient  IS NOT being placed on a Spontaneous Breathing Trial (SBT) at this time. []    Patient  IS NOT being placed on a Spontaneous Breathing Trial (SBT) at this time because of factors not previously addressed.   Those factors include      Vital Capacity (VC) = 523    Maximum Inspiratory Force (MIF) = -13    SBT - Initiated at  846    Ventilator Settings:  CPAP - 5 cmH2O, PS - 8 cmH2O(if using settings other than CPAP 5/PS 8, please explain reasons for settings here):       1 Minute SBT Respiratory Parameters:   VE: 8.3 L   RR: 16 b/m   VT: 519 mL (average VT = VE/RR)   RSBI: 31 (RR/VT in liters)   ETCO2: 32 cmH2O   SPO2: 100 %   If on sedation, amount and type Fentanyl 75    [x]   RR is less than 35, RSBI is less than 100, patient's vitals signs are stable, and patient is in no apparent distress; therefore, patient is being left on SBT for up to 1 hour. []   RR is greater than 35, RSBI is greater than 100, VS's are unstable, or patient is in distress; therefore, patient is being placed back on previous settings. SBT - Concluded at  1047. Weaning Parameters/VS's at conclusion of SBT:   VE: 6.3 L   RR: 12 b/m   VT: 525 mL (average VT = VE/RR)   RSBI: 23 (RR/VT in liters)   ETCO2: 33 cmH2O   SPO2: 100 %    If on sedation, amount and type Fentanyl 75    [x]   Patient tolerated SBT for full 60 minutes with acceptable weaning parameters and vital signs and showed no signs or distress. []   Patient tolerated SBT for full 60 minutes, but had unacceptable weaning parameters or vital signs, and/or signs of distress. []   Patient was unable to tolerate SBT for 60 minutes and was placed back on previous settings.             COMMENTS:

## 2021-12-12 NOTE — PROGRESS NOTES
Patient has been successfully weaned from Mechanical Ventilation. RSBI before extubation was    with EtCO2 of 31 and SpO2 of 98 on 30% FiO2. Patient extubated and placed on 4 liters/min via nasal cannula. Post extubation SpO2 is 94% with HR  84 bpm and RR 18 breaths/min. Patient had strong cough that was productive of white sputum. Extubation Well tolerated by patient. Pearl Galicia

## 2021-12-12 NOTE — CONSULTS
Office: [phone]       Fax: [phone]      Nephrology Initial Consult Note        Patient's Name: Ching Dixon  Date of Visit: 12/12/2021    Reason for Consult:  ESRD management  Requesting Physician:  Dwayne Coy DO  PCP: William Winn MD    Chief Complaint:  seizure     History of Present Illness:      Ching Dixon is a 49 y.o. male with PMHx of hypertension, diabetes mellitus, recent COVID, ESRD who was admitted on 12/11/2021 with seizure. Intubated at OSH. 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    Hemodialysis patient ([address]     Low blood pressure     Pancreatitis     Peripheral Neuropathy     Pneumonia 11/11/2021    Renal failure 1992    on dialysis 3x/wk  (T, Th, Sat)    Status epilepticus ([address] 12/11/2021       Past Surgical History:   Procedure Laterality Date    CHOLECYSTECTOMY      DIALYSIS FISTULA CREATION      left arm/currently non functioning    KIDNEY TRANSPLANT  9/1994    R-kidney    TOE SURGERY  10-8-2010    cut and realign 1st, 2nd, toe left foot , fixation 1st, 2nd toe left foot    TUNNELED VENOUS CATHETER PLACEMENT      left chest for dialysis    TUNNELED VENOUS PORT PLACEMENT         Family History   Problem Relation Age of Onset    Diabetes Mother         reports that he has never smoked. He has never used smokeless tobacco. He reports that he does not drink alcohol and does not use drugs. Medications:        Allergies:  Flu virus vaccine  Scheduled Meds:   [START ON 12/13/2021] levetiracetam  1,000 mg IntraVENous Daily    [START ON 12/14/2021] levetiracetam  500 mg IntraVENous Once per day on Tue Thu Sat    sodium chloride flush  5-40 mL IntraVENous 2 times per day    heparin (porcine)  5,000 Units SubCUTAneous 3 times per day    levothyroxine  25 mcg Oral Daily    pantoprazole  40 mg IntraVENous Daily    [Held by provider] insulin lispro  0-6 Units SubCUTAneous TID WC    [Held by provider] insulin lispro  0-3 Units SubCUTAneous Nightly    cefepime  500 mg IntraVENous Q24H    vancomycin (VANCOCIN) intermittent dosing (placeholder)   Other RX Placeholder     Continuous Infusions:   sodium chloride      fentaNYL Stopped (12/12/21 1056)    propofol Stopped (12/12/21 0714)    dextrose 100 mL/hr (12/12/21 1048)       Review of Systems:     All systems reviewed but were negative except as mentioned in HPI    Objective:       Vitals:  BP (!) 86/56   Pulse 88   Temp 98 °F (36.7 °C) (Axillary)   Resp 12   SpO2 99%   Constitutional:  awake, NAD  HEENT:  MMM, No icterus  Neck: no bruits, No JVD  Cardiovascular:  S1, S2 reg  Respiratory: fwe coarse sounds  Abdomen:  +BS, soft, NT, ND  Ext: + lower extremity edema  Psychiatric: mood and affect appropriate  Skin: no rash, turgor wnl  Access: right groin catheter. Previous AVF/AVG sites noted  CNS: lethargic, no agitation    Data:     Labs:  Hepatic:   Recent Labs     12/11/21  0736   AST 20   ALT 13   BILITOT 0.5   ALKPHOS 287*     BNP: No results for input(s): BNP in the last 72 hours. ABGs:   Recent Labs     12/12/21  1039   PHART 7.401   PO2ART 80.1   HXZ3NTV 37.4       IMAGING:  MRI BRAIN WO CONTRAST    (Results Pending)       Assessment :       1. ESRD     Access: Temp cath   Volume: Hypervolemic       2. Electrolytes/Acid base  Hypomagnesemia and No Dyskalemia    Recent Labs     12/12/21  0450   *   K 4.3   CO2 22   MG 1.60*       3. BMD  -Hyperphosphatemia,     Recent Labs     12/12/21  0450   CALCIUM 7.2*       4. HTN  -Blood pressure low    BP Readings from Last 1 Encounters:   12/12/21 (!) 86/56       5.  Anemia  -anemia of CKD    Recent Labs     12/12/21  0540   HGB 6.5*     6. DM    6. seizures    PLAN :       - Maintenance HD. No absolute indication for RRT today  - minimize IVF  - MBD management  - Anemia management. Transfusion per guidelines  - No BP meds  - give midodrine  - Dose meds to GFR<10    Spoke to RN    Thank you for allowing us to participate in care of [employer] . We will continue to follow. Feel free to contact me with any questions.       Pavan Bean MD  12/12/2021    [facility] [address] [place] [address]  Office : [phone]  Fax :[phone] Yes

## 2021-12-12 NOTE — PROGRESS NOTES
CONTINUOUS EEG    Name:  Chandan Vu  Medical Record Number:  [id number]  Age: 49 y.o. Gender: male  : 1970  Today's Date:  2021  Room:  [address] [place] [address]  Vital Signs   Resp 28           Continuous EEG Testing Start Time:      Continuous EEG Testing End Time:       Comments: Damon Koch at [facility] contacted  to begin monitoring. Impedence on all leads are within normal limits. Plan of Care: Begin monitoring.     Electronically signed by Svetlana Lan on 2021 at 8:43 PM

## 2021-12-12 NOTE — PROGRESS NOTES
CONTINUOUS VIDEO EEG MONITORING    DATE OF SERVICE: 12/11/2021 20:23 TO 12/12/2021 20:23    NAME: Josefa Lee   YOB: 1970  SEX: male  MEDICAL RECORD [facility]:[id number]    EEG-CCTV study:   The patient is a 49 y.o.y old male monitored at the request of the team for altered mental status and concern for subclinical seizures. EEG-VIDEO MONITORING METHODOLOGY:   Time-locked EEG-video monitoring was performed using the 32-channel [employer]umi[employer] monitoring system. Analyses of the monitoring data were performed using the following techniques:   1. Review of the relevant EEG-video data. 2. Review of events detected by the computer system in detail. 3. Review of clinical seizures, with both detailed review of EEG and video and playback using multiple montages. A variety of referential and bipolar montages were used. CLINICAL AND EEG ANALYSIS:  Video EEG recording was reviewed in real time by a technologist, and then reviewed at least twice a day when available on the  with maximum possible sampling. Results of the monitoring were related to the treating team frequently throughout the study, at least once a day to help guide treatment via verbal or written communication as brief notes or direct text messages or emails. Updates and response to treatment was communicated as requested by the requesting physician or the team.    12/11/2021 - 12/12/2021    Seizures - none  Push buttons - none  Background - Continuous generalized slow, with frontally dominant semi-rhythmic theta/delta. Some superimposed faster frequencies      CLINICAL INTERPRETATION: This is an abnormal video EEG study  1. Generalized background abnormalities indicative of an underlying diffuse encephalopathy of non-specific etiology   2. No focal or epileptiform abnormalities. No seizures. No push button events.

## 2021-12-12 NOTE — PROGRESS NOTES
Clinical Pharmacy Progress Note    Vancomycin - Management by Pharmacy    Consult Date(s): 12/11/21  Consulting Provider(s): Lucas Phan MD    Assessment / Plan    HAP - Vancomycin   Concurrent Antimicrobials: cefepime    Day of Vanc Therapy: 2   Current Dosing Method: Intermittent Dosing by Levels   Therapeutic Goal: ~ 15 mg/L   Current Dose / Frequency: Intermittent   Plan / Rationale:   o Patient is ESRD on HD (T/Th/Sat), unsure of schedule while inpatient at this time. o Vancomycin 1250 mg (~15 mg/kg) IV given x1 12/11; random level this AM = 10.7 mcg/mL  o Will give 1500mg IV x1 to target a level of ~15 mcg/mL  o Level ordered for tomorrow   Will continue to monitor clinical condition and make adjustments to regimen as appropriate. Thanks for consulting pharmacy! Please call with questions [address] D. Pharmacy Resident   12/12/2021 12:17 PM      Interval update: Pinpoint pupils overnight. Opens eyes to voice and can squeeze R hand. Continuous EEG shows diffuse encephalopathy. Subjective/Objective: Mr. Mary Hong is a 49 y.o. male with a PMHx significant for T1DM, ESRD on IHD, pancreatitis, covid PNA (in Nov 2021), Petar Stuart, admitted from assisted living facility for AMS with seizures upon presentation to ED. Pharmacy has been consulted to manage vancomycin. Height:   Ht Readings from Last 1 Encounters:   12/11/21 5' 10\" (1.778 m)     Weight:   Wt Readings from Last 1 Encounters:   12/11/21 170 lb (77.1 kg)       Current & Prior Antimicrobial Regimen(s):   Cefepime 500 mg Q24H (12/12-current)   Vancomycin intermittent    Level(s) / Doses:  Date Time Dose Level / Type of Level Interpretation   12/12 04:50 1250mg IV x1 Random = 10.7 mcg/mL Will give 1500mg IV x1   12/13 0600  Random = ordered     Note: Serum levels collected for AUC-based dosing may be high if collected in close proximity to the dose administered.  This is not necessarily an indicator of toxicity. Cultures & Sensitivities:    Date Site Micro Susceptibility / Result   12/11  Blood x2 Streptococcus species DNA detected    12/11 COVID In process    12/11 Strep pna antigen Active    12/11 Legionella Active    12/11/21 Respiratory Collected    12/12 MRSA nasal Active      Labs / Ancillary Data:    Estimated Creatinine Clearance: 18 mL/min (A) (based on SCr of 5 mg/dL (H)).     Recent Labs     12/11/21  0736 12/12/21  0450   CREATININE 4.8* 5.0*   BUN 37* 40*   WBC 2.7*  --        Additional Lab Values / Findings of Note: None

## 2021-12-12 NOTE — CONSULTS
Neurology Consult Note  Reason for Consult: \"seizure, suspect secondary to hypoglycemia\"  Chief complaint: Cece Silverio MD;Ayanna Roland asked me to see Diana Bourgeois in consultation for evaluation of seizure    History of Present Illness:  Diana Bourgeois is a 49 y.o. 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 He is unable to provide history, thus history is obtained via chart review. He was brought to [facility] yesterday morning with altered mental status, and upon arrival had a witnessed generalized seizure. He was given Keppra, Versed, and propofol, intubated, and transferred to [facility]. Of note, his glucose was 35 mg/dL during his ER stay. No further seizures noted since his arrival though he has been profoundly anemic. He has been afebrile but very hypotensive.     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    Hemodialysis [facility] ([facility]     Low blood pressure     Pancreatitis     Peripheral Neuropathy     Pneumonia 11/11/2021    Renal failure 1992    on dialysis 3x/wk  (T, Th, Sat)    Status epilepticus ([facility] [facility] 12/11/2021     Past Surgical History:   Procedure Laterality Date    CHOLECYSTECTOMY      DIALYSIS FISTULA CREATION      left arm/currently non functioning    KIDNEY TRANSPLANT  9/1994    R-kidney    TOE SURGERY  10-8-2010    cut and realign 1st, 2nd, toe left foot , fixation 1st, 2nd toe left foot    TUNNELED VENOUS CATHETER PLACEMENT      left chest for dialysis    TUNNELED VENOUS PORT PLACEMENT       Medications Prior to Admission:   Dextromethorphan-guaiFENesin  MG/5ML SYRP, Take 5 mLs by mouth every 4 hours as needed for Cough  bisacodyl (DULCOLAX) 10 MG suppository, Place 10 mg rectally daily  ferrous sulfate (IRON 325) 325 (65 Fe) MG tablet, Take 325 mg by mouth 2 times daily  levothyroxine (SYNTHROID) 25 MCG tablet, Take 25 mcg by mouth Daily  loperamide (LOPERAMIDE A-D) 2 MG tablet, Take 2 mg by mouth as needed for Diarrhea After each loose stool  magnesium hydroxide (MILK OF MAGNESIA) 400 MG/5ML suspension, Take 30 mLs by mouth daily as needed for Constipation  Multiple Vitamins-Minerals (THERAPEUTIC MULTIVITAMIN-MINERALS) tablet, Take 1 tablet by mouth daily  ondansetron (ZOFRAN) 4 MG tablet, Take 4 mg by mouth every 8 hours as needed for Nausea or Vomiting  sevelamer (RENVELA) 800 MG tablet, Take 1 tablet by mouth 3 times daily (with meals)  insulin glargine (LANTUS;BASAGLAR) 100 UNIT/ML injection pen, Inject 10 Units into the skin nightly  midodrine (PROAMATINE) 2.5 MG tablet, Take 1 tablet by mouth 3 times daily (with meals)  ondansetron (ZOFRAN ODT) 4 MG disintegrating tablet, Take 1 tablet by mouth every 8 hours as needed for Nausea  pantoprazole (PROTONIX) 40 MG tablet, Take 40 mg by mouth 2 times daily   Promethazine HCl 6.25 MG/5ML SOLN, Take 12.5 mg by mouth 2 times daily as needed. temazepam (RESTORIL) 30 MG capsule, Take 30 mg by mouth nightly as needed. Allergies   Allergen Reactions    Flu Virus Vaccine      Caused paralysis     Family History   Problem Relation Age of Onset    Diabetes Mother      Social History     Tobacco Use   Smoking Status Never Smoker   Smokeless Tobacco Never Used     Social History     Substance and Sexual Activity   Drug Use No     Social History     Substance and Sexual Activity   Alcohol Use No       ROS:  Unable to assess given intubation, sedation    Exam:  Blood pressure (!) 79/47, pulse 92, temperature 98 °F (36.7 °C), temperature source Axillary, resp. rate 16, SpO2 100 %.   Constitutional    Vital signs: BP, HR, and RR reviewed   General Alert, no distress, well-nourished  Eyes: unable to visualize fundi Cardiovascular: pulses symmetric in all 4 extremities. No peripheral edema. Psychiatric: cooperative with examination, no  psychotic behavior noted. Neurologic  Mental status: eyes open to voice  orientation unable to assess given intubation & sedation   General fund of knowledge unable to assess given intubation & sedation   Memory unable to assess given intubation & sedation   Attention limited by sedation and encephalopathy. Opens eyes to voice briefly and attempts to follow commands   Language intubated; makes no attempt to speak   Comprehension attempts to follow commands (moves hands when asked to squeeze) but does not do so clearly. Cranial nerves:   CN2: blinks to threat bilaterally   CN 3,4,6: extraocular muscles intact, pupils equal round and reactive 3 mm bilaterally  CN5: unable to assess given intubation & sedation  CN7: face symmetric somewhat obscured by ETT  CN8: He appears to hear my voice but limited assessment given intubation and sedation   CN9: unable to assess given intubation & sedation  CN11: unable to assess given intubation & sedation  CN12: unable to assess given intubation & sedation  Strength: weak movement in distal upper limbs ( weakly); no movement in lower limbs.  Somewhat limited by sedation  Deep tendon reflexes: absent  Sensory: does not respond to pain in upper limbs or lower limbs  Cerebellar/coordination: unable to assess given encephalopathy and sedation  Tone: normal in all 4 extremities  Gait: wheelchair bound at baseline    Labs   12/11/2021 07:36   Glucose 35 (LL)   Creatinine 5.0 mg/dL   U/L  LFTs okay     12/11/2021 07:36 12/12/2021 05:40   Hemoglobin Quant 8.0 (L) 6.5 (LL)   Hematocrit 25.0 (L) 19.8 (LL)      11/14/2021 12:20   SARS-CoV-2 Detected (A)     Blood culture preliminary gram + cocci in chains and/or pairs     Studies  cEEG 12/11/2021 20:23 - 12/12/2021 7:30  Seizures - none  Push buttons - none  Background - Continuous generalized slow, with frontally dominant semi-rhythmic theta/delta. Some superimposed faster frequencies  CLINICAL INTERPRETATION: This is an abnormal video EEG study  1. Generalized background abnormalities indicative of an underlying diffuse encephalopathy of non-specific etiology   2. No focal or epileptiform abnormalities. No seizures. No push button events. CT head 12/11/21  1. No acute gross intracranial abnormality, significantly limited by motion   artifact. 2. Innumerable sclerotic lesions throughout the calvarium either due to   osseous metastatic disease or renal osteodystrophy. Scheduled Medications   sodium chloride  1,000 mL IntraVENous Once    magnesium sulfate  2,000 mg IntraVENous Once    sodium chloride flush  5-40 mL IntraVENous 2 times per day    heparin (porcine)  5,000 Units SubCUTAneous 3 times per day    levetiracetam  500 mg IntraVENous Q12H    levothyroxine  25 mcg Oral Daily    pantoprazole  40 mg IntraVENous Daily    insulin lispro  0-6 Units SubCUTAneous TID WC    insulin lispro  0-3 Units SubCUTAneous Nightly    cefepime  500 mg IntraVENous Q24H    vancomycin (VANCOCIN) intermittent dosing (placeholder)   Other RX Placeholder     Impression:  Jessica Pratt is a 49 y.o. male with a history of recent COVID-19 (Nov 2021), ESRD, and GBS (wheelchairbound at baseline), who presented with witnessed seizure in the setting of hypoglycemia. cEEG overnight without seizures. Recommendations:  - Continue cEEG for now. If no seizures by the morning can d/c.  - Avoid propofol and other sedatives as able  - Continue Keppra - will adjust to reflect dialysis. Doubtful he will need this long term in the context of hypoglycemic seizure  - Would advise alternative to cefepime in this patient with ESRD as this can provoke encephalopathy and seizure  - Will obtain MRI brain w/o nolan  - Management of anemia and other critical illness (bacteremia?) to ICU team. Profoundly anemic today.  Blood culture #1 preliminary positive. A copy of this note was provided for Dr Aramis Shelton MD;Ayanna Raza*. Assessment and planning including emergent interventions necessary were discussed with RN Betsy Haley at 8:30 AM      Veronika Leblanc NP  Neurology and Neurocritical care  [facility] Neurology line: [phone]  [employer]: [address] [facility] NPs    I spent 60 minutes in the care of this patient. Over 50% of that time was in face-to-face counseling regarding disease process, diagnostic testing, preventative measures, and answering patient and family questions.

## 2021-12-12 NOTE — PROGRESS NOTES
Clinical Pharmacy Progress Note    Vancomycin - Management by Pharmacy    Consult Date(s): 12/11/21  Consulting Provider(s): Susan Garcia MD    Assessment / Plan    HAP - Vancomycin  Concurrent Antimicrobials: cefepime 500 mg Q24H  Day of Vanc Therapy: 1  Current Dosing Method: Intermittent Dosing by Levels  Therapeutic Goal: ~ 15 mg/L  Current Dose / Frequency: Intermittent  Plan / Rationale: Patient is ESRD on HD. Vancomycin 1250 mg (~15 mg/kg) IV given in ED at [facility] at 1118 this morning, unlikely to need another dose tonight. Will order intermittent dosing with random level for tomorrow morning. Will continue to monitor clinical condition and make adjustments to regimen as appropriate. Thank you for [address] [employer] [address] [place] - PharmD Candidate 2023  [address] [address]  12/11/2021 10:17 PM        Interval update:     Subjective/Objective: Mr. Espinoza Campbell is a 49 y.o. male with a PMHx significant for T1DM, ESRD on IHD, pancreatitis, covid PNA (in Nov 2021), Padmini Codey, admitted from assisted living facility for AMS with seizures upon presentation to ED. Pharmacy has been consulted to manage vancomycin. Height:   Ht Readings from Last 1 Encounters:   12/11/21 5' 10\" (1.778 m)     Weight:   Wt Readings from Last 1 Encounters:   12/11/21 170 lb (77.1 kg)       Current & Prior Antimicrobial Regimen(s):  Cefepime 500 mg Q24H  Vancomycin intermittent    Level(s) / Doses:    Date Time Dose Level / Type of Level Interpretation                 Note: Serum levels collected for AUC-based dosing may be high if collected in close proximity to the dose administered. This is not necessarily an indicator of toxicity. Cultures & Sensitivities:    Date Site Micro Susceptibility / Result   12/11/21 Respiratory  In process           Labs / Ancillary Data:    Estimated Creatinine Clearance: 19 mL/min (A) (based on SCr of 4.8 mg/dL (H)).     Recent Labs     12/11/21  0736   CREATININE 4.8*   BUN 37* WBC 2.7*       Additional Lab Values / Findings of Note: None None

## 2021-12-13 NOTE — PROGRESS NOTES
Clinical Pharmacy Progress Note    Vancomycin - Management by Pharmacy    Consult Date(s): 12/11/21  Consulting Provider(s): Cahpin Rust MD    Assessment / Plan    HAP - Vancomycin   Concurrent Antimicrobials: Unasyn - day #2    Day of Vanc Therapy: #3   Current Dosing Method: Intermittent Dosing by Levels   Therapeutic Goal: ~15 mg/L   Current Dose / Frequency: Intermittent dosing with HD   Plan / Rationale:   o Patient has ESRD on HD (T/Th/Sat) at baseline. Currently ordered to receive HD on a M/W/F schedule here.   o Random vancomycin level this morning was 21.4 mcg/mL. Vancomycin 1.25g IV x 1 dose ordered to be given after HD today. o Plan to obtain a level prior to next HD session on Wednesday.  Will continue to monitor clinical condition and make adjustments to regimen as appropriate. Thanks for consulting pharmacy! Raoul RodriguezD, [facility]  Wireless: [phone]  12/13/2021 1:55 PM      Interval update: Patient was extubated yesterday. Blood cultures are growing Streptococcus with final culture and sensitivity pending. Subjective/Objective: Mr. Arlette Kapadia is a 49 y.o. male with a PMHx significant for T1DM, ESRD on IHD, pancreatitis, covid PNA (in Nov 2021), Darvin Liz, admitted from assisted living facility for AMS with seizures upon presentation to ED. Pharmacy has been consulted to manage vancomycin.     Height:   Ht Readings from Last 1 Encounters:   12/11/21 5' 10\" (1.778 m)     Weight:   Wt Readings from Last 1 Encounters:   12/13/21 179 lb 14.3 oz (81.6 kg)       Current & Prior Antimicrobial Regimen(s):    (12/11-12/12)   Ampicillin/sulbactam (12/12-current)   Vancomycin (12/11-current)    Level(s) / Doses:  Date Time Dose Level / Type of Level Interpretation   12/12 04:50 1250mg IV x1 Random = 10.7 mcg/mL Re-dose with 1500mg IV x1   12/13 04:45 1500mg IV x 1 Random = 21.4 mcg/mL Re-dose with 1250 mg IV x 1   Note: Serum levels collected for AUC-based dosing may be high if collected in close proximity to the dose administered. This is not necessarily an indicator of toxicity. Cultures & Sensitivities:    Date Site Micro Susceptibility / Result   12/11  Blood x2 Streptococcus species Pending   12/11 COVID Positive    12/11 Strep pneumoniae antigen Ordered    12/11 Legionella antigen Ordered    12/11 Respiratory Collected    12/12 MRSA nasal Ordered      Labs / Ancillary Data:    Estimated Creatinine Clearance: 16 mL/min (A) (based on SCr of 5.6 mg/dL [facility])).     Recent Labs     12/11/21  0736 12/12/21  0450 12/13/21  0445   CREATININE 4.8* 5.0* 5.6*   BUN 37* 40* 39*   WBC 2.7*  --  3.4*

## 2021-12-13 NOTE — PROGRESS NOTES
[id number] [facility] Wound Ostomy Continence Nurse  Consult Note       NAME:  Rogelio Singh  MEDICAL RECORD NUMBER:  [id number]  AGE: 49 y.o. GENDER: male  : 1970  TODAY'S DATE:  2021    Subjective:    Reason for WOCN Evaluation and Assessment: R Dorsal Foot - Diabetic      Rogelio Singh is a 49 y.o. male referred by:   [] Physician  [x] Nursing  [] Other:     Wound Identification:  Wound Type: diabetic  Contributing Factors: diabetes, decreased mobility, shear force, arterial insufficiency and Peripheral Neuropathy, dialysis    Wound History: Mr. Niyah Nash is a 49 yom with PMH T1DM, ESRD on iHD, pancreatitis, covid PNA (hospitalized in 2021), Anupam Mary who presented to [facility] ED from care facility due to altered mental status. Upon arrival to ED patient noted to have seizures and was given versed then keppra then ketamine and then intubated and sedated on propofol. Has not reseized since. Etiology of seizures thought to be due to hypoglycemia (BG 35). Vitals stable. Current vent setting FiO2 50, RR22, , PEEP 5. Not requiring vasopresosrs. Sedated on propofol and fentanyl. Labs significant for hypoglycemia (given D50 and glucagon and corrected promptly), troponin 0.07, alk phos 287, Hgb 8, platelet 522, post intubation VBG pH 7.494, pCO2 36.4, pO2 155. Patient admitted to ICU due since intubated also for continuous EEG. Neurology and nephrology consulted from ED. No emergent need for HD. Spoke with mother on the phone who confirms patient's limited code status: no to intubation. States patient's cognition is normal and he is neurologically intact.  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    Hemodialysis patient [facility])     Low blood pressure     Pancreatitis     Peripheral Neuropathy     Pneumonia 11/11/2021    Renal failure 1992    on dialysis 3x/wk  (T, Th, Sat)    Status epilepticus ([address] 12/11/2021       PAST SURGICAL HISTORY    Past Surgical History:   Procedure Laterality Date    CHOLECYSTECTOMY      DIALYSIS FISTULA CREATION      left arm/currently non functioning    KIDNEY TRANSPLANT  9/1994    R-kidney    TOE SURGERY  10-8-2010    cut and realign 1st, 2nd, toe left foot , fixation 1st, 2nd toe left foot    TUNNELED VENOUS CATHETER PLACEMENT      left chest for dialysis    TUNNELED VENOUS PORT PLACEMENT         FAMILY HISTORY    Family History   Problem Relation Age of Onset    Diabetes Mother        SOCIAL HISTORY    Social History     Tobacco Use    Smoking status: Never Smoker    Smokeless tobacco: Never Used   Substance Use Topics    Alcohol use: No    Drug use: No       ALLERGIES    Allergies   Allergen Reactions    Flu Virus Vaccine      Caused paralysis       MEDICATIONS    No current facility-administered medications on file prior to encounter.      Current Outpatient Medications on File Prior to Encounter   Medication Sig Dispense Refill    Dextromethorphan-guaiFENesin  MG/5ML SYRP Take 5 mLs by mouth every 4 hours as needed for Cough      bisacodyl (DULCOLAX) 10 MG suppository Place 10 mg rectally daily      ferrous sulfate (IRON 325) 325 (65 Fe) MG tablet Take 325 mg by mouth 2 times daily      levothyroxine (SYNTHROID) 25 MCG tablet Take 25 mcg by mouth Daily      loperamide (LOPERAMIDE A-D) 2 MG tablet Take 2 mg by mouth as needed for Diarrhea After each loose stool      magnesium hydroxide (MILK OF MAGNESIA) 400 MG/5ML suspension Take 30 mLs by mouth daily as needed for Constipation      Multiple Vitamins-Minerals (THERAPEUTIC MULTIVITAMIN-MINERALS) tablet Take 1 tablet by mouth daily      ondansetron (ZOFRAN) 4 MG tablet Take 4 mg by mouth every 8 hours as needed for Nausea or Vomiting      sevelamer (RENVELA) 800 MG tablet Take 1 tablet by mouth 3 times daily (with meals)      insulin glargine (LANTUS;BASAGLAR) 100 UNIT/ML injection pen Inject 10 Units into the skin nightly 5 pen 3    midodrine (PROAMATINE) 2.5 MG tablet Take 1 tablet by mouth 3 times daily (with meals) 90 tablet 3    ondansetron (ZOFRAN ODT) 4 MG disintegrating tablet Take 1 tablet by mouth every 8 hours as needed for Nausea 20 tablet 0    pantoprazole (PROTONIX) 40 MG tablet Take 40 mg by mouth 2 times daily       Promethazine HCl 6.25 MG/5ML SOLN Take 12.5 mg by mouth 2 times daily as needed.  temazepam (RESTORIL) 30 MG capsule Take 30 mg by mouth nightly as needed.          Objective    /67   Pulse 71   Temp 98 °F (36.7 °C) (Temporal)   Resp 14   Wt 179 lb 14.3 oz (81.6 kg)   SpO2 100%   BMI 25.81 kg/m²     LABS:  WBC:    Lab Results   Component Value Date    WBC 3.4 12/13/2021     H/H:    Lab Results   Component Value Date    HGB 7.0 12/13/2021    HCT 21.1 12/13/2021     PTT:    Lab Results   Component Value Date    APTT 36.8 11/11/2021   [APTT}  PT/INR:    Lab Results   Component Value Date    PROTIME 12.1 12/11/2021    INR 1.07 12/11/2021     HgBA1c:    Lab Results   Component Value Date    LABA1C 14.0 11/12/2021       Assessment: R Dorsal Foot - dry and intact scab   Yang Risk Score:      Patient Active Problem List   Diagnosis Code    ESRD (end stage renal disease) ([facility] N18.6    Anemia D64.9    Toe deformity M20.60    Peripheral neuropathy G62.9    Hypogonadism, male E29.1    Hyperkalemia E87.5    Acute pancreatitis K85.90    Acute on chronic renal failure (HCC) N17.9, N18.9    Chronic pancreatitis (HCC) K86.1    Pericardial effusion I31.3    Hypotension I95.9    Diabetes mellitus (HCC) E11.9    Sepsis (HCC) A41.9    Diabetic foot ulcer (HCC) E11.621, L97.872    Pancreatitis K85.90    Chronic pain G89.29    C. difficile diarrhea A04.72    Pneumonia J18.9    Status epilepticus ([facility] [address] G40.901       Measurements:  Pressure Ulcer 04/30/14  Outer;Right (Active)   Number of days: 7266       Wound 07/09/10 Other (Comment) Toe (Comment  which one) Anterior;Right toe bent down, pt states bumped. ? friction from shoe. (Active)   Number of days: 3111       Wound 07/09/10 Other (Comment) Toe (Comment  which one) Anterior; Left granulation tissue center, toe bent down permanently. open area at joint (Active)   Number of days: 4174       Wound 08/08/14 Diabetic Ulcer Foot Dorsal white/pink tissue, drainage, diabetic ulcer (Active)   Number of days: 2683       Incision Foot Left (Active)   Number of days:        Wound 11/11/21 Foot Dorsal; Right (Active)   Wound Image   12/13/21 1450   Wound Etiology Diabetic 12/13/21 1450   Dressing Status Dry; Intact 12/13/21 1450   Wound Cleansed Not Cleansed 12/13/21 1450   Dressing/Treatment Hydrating gel; Foam 12/13/21 1450   Dressing Change Due 12/14/21 12/13/21 1450   Wound Length (cm) 6 cm 12/13/21 1450   Wound Width (cm) 3.5 cm 12/13/21 1450   Wound Depth (cm) 0.1 cm 12/13/21 1450   Wound Surface Area (cm^2) 21 cm^2 12/13/21 1450   Change in Wound Size % (l*w) -16.67 12/13/21 1450   Wound Volume (cm^3) 2.1 cm^3 12/13/21 1450   Wound Healing % -17 12/13/21 1450   Wound Assessment Dry 12/13/21 1450   Drainage Amount None 12/13/21 1450   Drainage Description Yellow 12/12/21 0400   Odor None 12/13/21 1450   Louisa-wound Assessment Dry/flaky 12/13/21 1450   Margins Attached edges; Defined edges 12/13/21 1450   Number of days: 31   R Dorsal Foot:      Response to treatment:  Well tolerated by patient.      Pain Assessment:  Severity:  0 / 10  Quality of pain: N/A  Wound Pain Timing/Severity: none  Premedicated: No    Plan   Plan of Care: Wound 11/11/21 Foot Dorsal; Right-Dressing/Treatment: Hydrating gel, Foam   Recommendation R Dorsal Foot: clean with NS, dry, apply wound gel daily, foam dressing  Reposition pt every 2 hours  Call Wound Care for deterioration [phone]    Specialty Bed Required : Yes   [x] Low Air Loss   [x] Pressure Redistribution  [] Fluid Immersion  [] Bariatric  [] Total Pressure Relief  [] Other:     Current Diet: ADULT DIET;  Regular; 4 carb choices (60 gm/meal)  Dietician consult:  No    Discharge Plan:  Placement for patient upon discharge: skilled nursing    Patient appropriate for Outpatient [address]: Yes    Referrals:  [x]   [] [facility]  [] Supplies  [] Other    Patient/Caregiver Teaching:  Level of patient/caregiver understanding able to:   [] Indicates understanding       [] Needs reinforcement  [] Unsuccessful      [] Verbal Understanding  [] Demonstrated understanding       [] No evidence of learning  [] Refused teaching         [] N/A       Electronically signed by Eboni Chanel RN, [facility] on 12/13/2021 at 2:53 PM

## 2021-12-13 NOTE — PROGRESS NOTES
Hospitalist Progress Note      PCP: Sanya Roberts MD    Date of Admission: 12/11/2021    Chief Complaint: [address] Course: Cherri Mullins is a 49 y.o. male with PMH as below notable for T1DM, ESRD on HD, Pancreatitis, Covid Pna (November), Guillan barre who presented with AMS. Patient had withnessed seizures and found to have hypoglycemia. Subjective:   Seen and examined   no overnight events per nursing  transferring to floors today  No new compliants    Medications:  Reviewed    Infusion Medications    sodium chloride      fentaNYL Stopped (12/12/21 1056)    propofol Stopped (12/12/21 0714)     Scheduled Medications    vancomycin  1,250 mg IntraVENous Once in dialysis    levetiracetam  1,000 mg IntraVENous Daily    [START ON 12/14/2021] levetiracetam  500 mg IntraVENous Once per day on Tue Thu Sat    midodrine  5 mg Oral BID WC    ampicillin-sulbactam  1,500 mg IntraVENous Q12H    sodium chloride flush  5-40 mL IntraVENous 2 times per day    heparin (porcine)  5,000 Units SubCUTAneous 3 times per day    levothyroxine  25 mcg Oral Daily    pantoprazole  40 mg IntraVENous Daily    [Held by provider] insulin lispro  0-6 Units SubCUTAneous TID WC    [Held by provider] insulin lispro  0-3 Units SubCUTAneous Nightly    vancomycin (VANCOCIN) intermittent dosing (placeholder)   Other RX Placeholder     PRN Meds: sodium chloride flush, sodium chloride, ondansetron **OR** ondansetron, polyethylene glycol, acetaminophen **OR** acetaminophen, glucose, dextrose, glucagon (rDNA), heparin (porcine)      Intake/Output Summary (Last 24 hours) at 12/13/2021 0936  Last data filed at 12/12/2021 1057  Gross per 24 hour   Intake 300 ml   Output --   Net 300 ml       Exam:    BP 94/78   Pulse 69   Temp 97 °F (36.1 °C)   Resp 15   Wt 180 lb 12.4 oz (82 kg)   SpO2 99%   BMI 25.94 kg/m²     General appearance: Ill-appearing  HEENT Normal cephalic, atraumatic without obvious deformity.   Pupils equal, round, and reactive to light. Extra ocular muscles intact. Conjunctivae/corneas clear. Neck: Supple, No jugular venous distention/bruits. Trachea midline without thyromegaly  Lungs: diminished breath sounds with scattered Rales at bases bilaterally  Heart: Regular rate and rhythm with Normal S1/S2 without murmurs, rubs   Abdomen: Soft, non-tender or non-distended without rigidity or guarding and positive bowel sounds all four quadrants. Extremities: Contracted right upper extremity; range of motion could not be obtained as patient is wheelchair-bound  Skin: Skin color, texture, turgor normal.  No rashes or lesions. Neurologic: Alert and oriented X 3, neurovascularly intact with sensory/motor intact upper extremities/lower extremities, bilaterally. Cranial nerves: II-XII intact, grossly non-focal.  Psychiatry: Appropriate affect. Not agitated  Skin: Dry, scaly, scattered wounds on bilateral feet  Brisk capillary refill, peripheral pulses palpable          Labs:   Recent Labs     12/11/21  0736 12/11/21  0736 12/12/21  0540 12/12/21  1120 12/13/21  0445   WBC 2.7*  --   --   --  3.4*   HGB 8.0*   < > 6.5* 6.9* 7.0*   HCT 25.0*   < > 19.8* 21.3* 21.1*   *  --   --   --  92*    < > = values in this interval not displayed.      Recent Labs     12/11/21  0736 12/12/21  0450 12/13/21  0445   * 132* 135*   K 4.6 4.3 4.4   CL 90* 92* 97*   CO2 27 22 22   BUN 37* 40* 39*   CREATININE 4.8* 5.0* 5.6*   CALCIUM 9.0 7.2* 6.8*   PHOS  --  6.9*  --      Recent Labs     12/11/21  0736   AST 20   ALT 13   BILIDIR <0.2   BILITOT 0.5   ALKPHOS 287*     Recent Labs     12/11/21  0736   INR 1.07     Recent Labs     12/11/21  0736 12/11/21  2045   CKTOTAL  --  130   TROPONINI 0.07*  --        Studies:  MRI BRAIN WO CONTRAST    (Results Pending)       Assessment/Plan:    Active Hospital Problems    Diagnosis Date Noted    Status epilepticus ([facility] [G40.901] 12/11/2021     Sepsis  Strep bacteremia, possible PNA  Likely secondary to wounds  Covid testing sent however patient actually had Covid recently so may test positive  On Unasyn and vancomycin  MRSA probe    Hypoglycemia  Likely due to sepsis  Has been on D10    Diabetes  Hx of brittle diabetes    Seizures  Received Keppra, Versed, Ketamine  Intubated for airway protection  Was then extubated on 12/12/21  Neurology following  MRI pending  Continue keppra  CK    Acute on chronic anemia, microcytic  Hx of pancytopenia   Close follow Hgb  FOBT    ESRD on HD    DVT Prophylaxis: heparin for now  Diet: ADULT DIET;  Regular; 4 carb choices (60 gm/meal)  Code Status: Limited    PT/OT Eval Status:     Dispo - Inpatient

## 2021-12-13 NOTE — PROGRESS NOTES
CONTINUOUS EEG    Name:  Shahid Petersen  Medical Record Number:  [id number]  Age: 49 y.o. Gender: male  : 1970  Today's Date:  2021  Room:  [address] [place] [address]  Vital Signs   /76   Pulse 75   Temp 98 °F (36.7 °C) (Temporal)   Resp 20   Wt 179 lb 14.3 oz (81.6 kg)   SpO2 100%   BMI 25.81 kg/m²           Continuous EEG Testing Start Time:      Continuous EEG Testing End Time:       Comments: Per Yuniel Thomas pt test is completed. Chyna Bundy at [facility] contacted [phone] via telephone. Plan of Care: Leads removed.     Electronically signed by Leslie Miller on 2021 at 5:11 PM

## 2021-12-13 NOTE — PROGRESS NOTES
Initial Chief Complaint/Reason for Consult: Seizure, suspect secondary to hypoglycemia    Interval History:  Awake, alert and oriented. History of Present Illness:  Wesley Monique is a 49 y.o. male with a history of diabetes, recent COVID-23 (positive on 11/14/21), Guillain Blackville Syndrome (wheelchair bound), ESRD (on HD), who presents with witnessed seizure from [facility]. He is unable to provide history, thus history is obtained via chart review.     He was brought to [facility] yesterday morning with altered mental status, and upon arrival had a witnessed generalized seizure. He was given Keppra, Versed, and propofol, intubated, and transferred to [facility]. Of note, his glucose was 35 mg/dL during his ER stay. No further seizures noted since his arrival though he has been profoundly anemic. He has been afebrile but very hypotensive. Review of Systems:   Constitutional- No weight loss or fevers   Eyes- No diplopia. No photophobia. Ears/nose/throat- No dysphagia. No Dysarthria   Cardiovascular- No palpitations. No chest pain   Respiratory- No dyspnea. No Cough   Gastrointestinal- No Abdominal pain. No Vomiting. Genitourinary- No incontinence. No urinary retention   Musculoskeletal- No myalgia. No arthralgia   Skin- No rash. No easy bruising. Psychiatric- No depression. No anxiety   Endocrine- No diabetes. No thyroid issues. Hematologic- No bleeding difficulty. No fatigue   Neurologic- No weakness. No Headache.     Current Medications:    Current Facility-Administered Medications:     vancomycin (VANCOCIN) 1,250 mg in dextrose 5 % 250 mL IVPB, 1,250 mg, IntraVENous, Once in dialysis, Mary Jane Rushing MD, Last Rate: 166.7 mL/hr at 12/13/21 1213, 1,250 mg at 12/13/21 1213    levETIRAcetam (KEPPRA) 500 mg in sodium chloride 0.9 % 100 mL IVPB, 500 mg, IntraVENous, Q MWF, Katelyn Pratt, APRN - CNP    levETIRAcetam (KEPPRA) 1,000 mg in sodium chloride 0.9 % 100 mL IVPB, 1,000 mg, IntraVENous, Daily, Carleen Chou, APRN - CNP, Stopped at 12/13/21 1034    midodrine (PROAMATINE) tablet 5 mg, 5 mg, Oral, BID WC, Prabhakar Garcia MD, 5 mg at 12/13/21 1017    ampicillin-sulbactam (UNASYN) 1500 mg IVPB minibag, 1,500 mg, IntraVENous, Q12H, Jose M Walker MD, Stopped at 12/13/21 0459    sodium chloride flush 0.9 % injection 5-40 mL, 5-40 mL, IntraVENous, 2 times per day, Tracy Burdick DO, 10 mL at 12/13/21 1017    sodium chloride flush 0.9 % injection 5-40 mL, 5-40 mL, IntraVENous, PRN, Tracy Burdick DO    0.9 % sodium chloride infusion, 25 mL, IntraVENous, PRN, Tracy Burdick DO    ondansetron (ZOFRAN-ODT) disintegrating tablet 4 mg, 4 mg, Oral, Q8H PRN **OR** ondansetron (ZOFRAN) injection 4 mg, 4 mg, IntraVENous, Q6H PRN, Tracy Burdick DO    polyethylene glycol (GLYCOLAX) packet 17 g, 17 g, Oral, Daily PRN, Tracy Burdick DO    acetaminophen (TYLENOL) tablet 650 mg, 650 mg, Oral, Q6H PRN **OR** acetaminophen (TYLENOL) suppository 650 mg, 650 mg, Rectal, Q6H PRN, Tracy Burdick DO    heparin (porcine) injection 5,000 Units, 5,000 Units, SubCUTAneous, 3 times per day, Tracy Burdick DO, 5,000 Units at 12/13/21 0427    fentaNYL (SUBLIMAZE) 1,000 mcg in sodium chloride 0.9% 100 mL infusion, 12.5-200 mcg/hr, IntraVENous, Continuous, Tracy Burdick DO, Stopped at 12/12/21 1056    propofol injection, 5-50 mcg/kg/min, IntraVENous, Titrated, Tracy Burdick DO, Stopped at 12/12/21 0714    glucose (GLUTOSE) 40 % oral gel 15 g, 15 g, Oral, PRN, Arvilla Frock, DO    dextrose 50 % IV solution, 12.5 g, IntraVENous, PRN, Arvilla Frock, DO, 12.5 g at 12/12/21 1043    glucagon (rDNA) injection 1 mg, 1 mg, IntraMUSCular, PRN, González Sierra DO    levothyroxine (SYNTHROID) tablet 25 mcg, 25 mcg, Oral, Daily, Tawanda Sampson MD, 25 mcg at 12/13/21 0510    pantoprazole (PROTONIX) injection 40 mg, 40 mg, IntraVENous, Daily, Tawanda Sampson MD, 40 mg at 12/13/21 1017   [Held by provider] insulin lispro (1 Unit Dial) 0-6 Units, 0-6 Units, SubCUTAneous, TID WC, Hoang Parker MD    [Held by provider] insulin lispro (1 Unit Dial) 0-3 Units, 0-3 Units, SubCUTAneous, Nightly, Hoang Parker MD    heparin (porcine) injection 10,000 Units, 10,000 Units, IntraCATHeter, PRN, Hoang Parker MD, 2,600 Units at 12/12/21 1126    vancomycin (VANCOCIN) intermittent dosing (placeholder), , Other, RX Placeholder, Hoang Parker MD      Physical Exam  Constitutional  BP (!) 140/124   Pulse 76   Temp 97.6 °F (36.4 °C)   Resp 16   Wt 179 lb 14.3 oz (81.6 kg)   SpO2 100%   BMI 25.81 kg/m²     General: Alert, no distress, well-nourished  Respiratory: Respirations unlabored, no accessory muscle use  Cardiovascular: Rate and rhythm regular  Psychiatric: cooperative with examination, no  psychotic behavior noted. Neurologic  Mental status:   orientation to person, place, time, and situation   Attention intact as able to attend well to the exam     Language fluent in conversation   Comprehension intact; follows simple commands    Cranial nerves:   CN2: Visual Fields full w/o extinction on confrontational testing  CN 3,4,6: pupils equal and reactive to light, extraocular muscles intact,  CN5: facial sensation symmetric   CN7:face symmetric without dysarthria,   CN8: hearing symmetric to finger rub   CN9: palate elevated symmetrically  CN11: trap full strength on shoulder shrug  CN12: tongue midline with protrusion  Motor Exam:   R  L    Deltoid 5  5   Biceps 5 5   Triceps 5 5   Wrist extension  5 5   Interossei 5 5      R  L    Hip flexion  4  4   Hip extension  4 4   Knee flexion  4 4   Knee extension  4 4   Ankle dorsiflexion  0 0   Ankle plantar flexion  2 2       Sensory: Some numbness in his lower extremities that he states is baseline.   No sensory extinction on double simultaneous stimulation  Cerebellar/coordination: finger nose finger normal without ataxia  Gait: held for patient safety          Images:    CT Head WO Contrast    Impression   1. No acute gross intracranial abnormality, significantly limited by motion   artifact. 2. Innumerable sclerotic lesions throughout the calvarium either due to   osseous metastatic disease or renal osteodystrophy. cvEEG read:    12/12/2021 - 12/13/2021     Seizures - none  Push buttons - none  Background - Continuous generalized slow, with frontally dominant semi-rhythmic theta/delta. Some superimposed faster frequencies     CLINICAL INTERPRETATION: This is an abnormal video EEG study  1. Generalized background abnormalities indicative of an underlying diffuse encephalopathy of non-specific etiology   2. No focal or epileptiform abnormalities. No seizures. No push button events. Assessment:  Josefa Lee is a 49 y.o. male with a history of recent COVID-19 (Nov 2021), ESRD, and GBS (wheelchairbound at baseline), who presented with witnessed seizure in the setting of hypoglycemia. cEEG overnight without seizures. He is alert and oriented at his baseline mental status today.     Plan:  - discontinue cvEEG  - MRI brain WO Contrast ordered, await results  - Continue Keppra for now, will need to follow up with neurology as an outpatient to determine length of Keppra therapy   - OK to transfer to floor from neuro standpoint    LOUISA Dawson-CNP  Neurology & Neurocritical Care   Neurology Line: [phone]  [employer]: [facility] & Neuro Critical [facility] NPs  12/13/21

## 2021-12-13 NOTE — CARE COORDINATION
Case Management Assessment           Initial Evaluation                Date / Time of Evaluation: 12/13/2021 2:34 PM                 Assessment Completed by: AMY Main    Patient Name: Rogelio Singh     YOB: 1970  Diagnosis: Status epilepticus [facility]) [F88.410]     Date / Time: 12/11/2021  6:50 PM    Patient Admission Status: Inpatient    If patient is discharged prior to next notation, then this note serves as note for discharge by case management.      Current PCP: [place] Callahan MD  Clinic Patient: No    Chart Reviewed: Yes  Patient/ Family Interviewed: No    Initial assessment completed at bedside with: Admissions at [facility] [place] [facility]    Hospitalization in the last 30 days: No    Emergency Contacts:  Extended Emergency Contact Information  Primary Emergency Contact: Marry Villa  Address: [address] [place] [address], [address] [place] [address] Phone: [phone]  Relation: Parent  Secondary Emergency Contact: Gill Stokes  Address: [address] [place] [address], [address] [place] [address]  Home Phone: [phone]  Relation: Niece/Nephew    Advance Directives:   Code Status: Full [address]: No  Financial  Payor: MEDICARE / Plan: MEDICARE PART A AND B / Product Type: *No Product type* /     Pre-cert required for SNF: No    Pharmacy    [email or web address] [address] [place] [place] [address] [phone] - F [phone]  [address] [place] [address] Drive [address] [facility]  Phone: [phone] Fax: [phone]    [address] [place] [address] [phone] [address] [phone]  John Tavera [address] [facility] [address]  Phone: [phone] Fax: [phone]    [facility] [address] [facility], [address] [facility] [phone] [address] [phone]  [address] [place] [address]  Phone: [phone] Fax: [phone]      Potential assistance Purchasing Medications:    Does Patient want to participate in local refill/ meds to beds program?:      Meds To Beds General Rules:  1. Can ONLY be done Monday- Friday between 8:30am-5pm  2. Prescription(s) must be in pharmacy by 3pm to be filled same day  3. Copy of patient's insurance/ prescription drug card and patient face sheet must be sent along with the prescription(s)  4. Cost of Rx cannot be added to hospital bill. If financial assistance is needed, please contact unit  or ;  or  CANNOT provide pharmacy voucher for patients co-pays  5.  Patients can then  the prescription on their way out of the hospital at discharge, or pharmacy can deliver to the bedside if staff is available. (payment due at time of pick-up or delivery - cash, check, or card accepted)     Able to afford home medications/ co-pay costs: Yes    ADLS  Support Systems:      PT AM-PAC:   /24  OT AM-PAC:   /24    [place]: [facility]F-[facility]  Steps:       Durable Medical Equipment  DME Provider:   Equipment:     Home Oxygen and Respiratory Equipment  Has HOME OXYGEN prior to admission: No  [address]: Not Applicable  Other Respiratory Equipment:       Dialysis  Active with HD/PD prior to admission: Yes  Nephrologist:     HD Center:  Saul Mao  Address: [address] [place] [address]  Phone: [phone]    DISCHARGE PLAN:  Disposition: SNF at [address] [place] [address] vs ARU if appropriate    Transportation PLAN for discharge: EMS transportation     Factors facilitating achievement of predicted outcomes: care at facility    Barriers to discharge: medical clearance  Additional Case Management Notes:   SW called and spoke with Admissions at [address] [place] [address] and informed that Pt is skilled level of care at their facility and can return once medically stable but that Pt has mentioned that he may want to go to [facility] ARU to \"get more intense therapy to be able to return home. \" Pt just extubated yesterday so will need PT/OT evals to help assist with appropriate DC plan. Also, Pt is ESRD and receives HD. SW/CM will follow. The Plan for Transition of Care is related to the following treatment goals of Status epilepticus ([facility] [G40.901]    The Patient and/or patient representative Deon Him and his family were provided with a choice of provider and agrees with the discharge plan Yes    Freedom of choice list was provided with basic dialogue that supports the patient's individualized plan of care/goals and shares the quality data associated with the providers.  Yes    Care Transition patient: No    Agnes Estevez  Case Management Department  Ph: [phone]

## 2021-12-13 NOTE — FLOWSHEET NOTE
Pt tolerated tx fair. No meds or blood products given. Net Fld Removed: 500 mls    Pre WT: 82kg  Post WT: 81.5kg  EDW: TBD    R femoral CVC w/f good blood flow. Report given to RN.   Copy of tx sheets on chart for scanning into EMR.       12/13/21 0640 12/13/21 1000   Vital Signs   BP 94/78 94/61   Temp 97 °F (36.1 °C) 97.6 °F (36.4 °C)   Pulse 69 74   Resp 15 18   Weight 180 lb 12.4 oz (82 kg) 179 lb 14.3 oz (81.6 kg)   Weight Method Bed scale Bed scale

## 2021-12-13 NOTE — PROGRESS NOTES
CONTINUOUS VIDEO EEG MONITORING    DATE OF SERVICE: 12/12/2021 20:23 TO 12/13/2021 08:00    NAME: Rosina Grant   YOB: 1970  SEX: male  MEDICAL RECORD EXIOTR:[id number]    EEG-CCTV study:   The patient is a 49 y.o.y old male monitored at the request of the team for altered mental status and concern for subclinical seizures. EEG-VIDEO MONITORING METHODOLOGY:   Time-locked EEG-video monitoring was performed using the 32-channel [employer] monitoring system. Analyses of the monitoring data were performed using the following techniques:   1. Review of the relevant EEG-video data. 2. Review of events detected by the computer system in detail. 3. Review of clinical seizures, with both detailed review of EEG and video and playback using multiple montages. A variety of referential and bipolar montages were used. CLINICAL AND EEG ANALYSIS:  Video EEG recording was reviewed in real time by a technologist, and then reviewed at least twice a day when available on the  with maximum possible sampling. Results of the monitoring were related to the treating team frequently throughout the study, at least once a day to help guide treatment via verbal or written communication as brief notes or direct text messages or emails. Updates and response to treatment was communicated as requested by the requesting physician or the team.    12/12/2021 - 12/13/2021      Seizures - none  Push buttons - none  Background - Continuous generalized slow, with frontally dominant semi-rhythmic theta/delta. Some superimposed faster frequencies      CLINICAL INTERPRETATION: This is an abnormal video EEG study  1. Generalized background abnormalities indicative of an underlying diffuse encephalopathy of non-specific etiology   2. No focal or epileptiform abnormalities. No seizures. No push button events.

## 2021-12-13 NOTE — PROGRESS NOTES
ICU Progress Note    Admit Date: 12/11/2021  Day: 3  Vent Day: None   sodium chloride      fentaNYL Stopped (12/12/21 1056)    propofol Stopped (12/12/21 0714)     IV Access:Peripheral  IV Fluids:None  Vasopressors:None                Antibiotics: None  Diet: Diet NPO    CC: Status epilepticus    Interval history: Patient was extubated yesterday with good tolerance, currently on RA SpO2 >93%. No new seizure episodes in the last 24 hours No acute events overnight. Patient seen and examined at been side. Patient's chart and notes were reviewed. Patient more alert, orientedx4, stated he wants to eat. Patient denies shortness of breath, chest pain, chills, nausea, vomiting He denies urinary frequency, dysuria. Patient is hemodynamically stable and afebrile. He remains on Heparin sc    HPI: Whit Swanson is a 49 y.o. male with PMH as below notable for T1DM, ESRD on HD, Pancreatitis, Covid Pna (November), Guillan barre who presented with AMS. Patient was unresponsive and EMS administered Narcan and glucagon.      On admission patient was noted to have seizures and was given, Versed, Keppra and Ketamine. Patient presented one episode of emesis with aspiration. Patient was intubated with propofol and had no new seizure episodes since. Patient was hypoglycemic(Glu 35) , D50 and glucagon were given. Trop 0.07. Hb 8. VBG 7.49/36/155. Patient was started con cvEEG. Patient baseline condition is neurologically intact, uses a wheelchair.      Patient was admitted to the ICU for further workup and management of status epilepticus.      Overnight patient pupils remained pinpoint. open his eyes with voice and squeezes R hand. Patient was hypotensive overnight, propofol was stopped. EEG showed diffuse encephalopathy, No seizures.     Medications:     Scheduled Meds:   levetiracetam  1,000 mg IntraVENous Daily    [START ON 12/14/2021] levetiracetam  500 mg IntraVENous Once per day on Tue Thu Sat    midodrine  5 mg Oral BID WC  ampicillin-sulbactam  1,500 mg IntraVENous Q12H    sodium chloride flush  5-40 mL IntraVENous 2 times per day    heparin (porcine)  5,000 Units SubCUTAneous 3 times per day    levothyroxine  25 mcg Oral Daily    pantoprazole  40 mg IntraVENous Daily    [Held by provider] insulin lispro  0-6 Units SubCUTAneous TID WC    [Held by provider] insulin lispro  0-3 Units SubCUTAneous Nightly    vancomycin (VANCOCIN) intermittent dosing (placeholder)   Other RX Placeholder     Continuous Infusions:   sodium chloride      fentaNYL Stopped (12/12/21 1056)    propofol Stopped (12/12/21 0714)     PRN Meds:sodium chloride flush, sodium chloride, ondansetron **OR** ondansetron, polyethylene glycol, acetaminophen **OR** acetaminophen, glucose, dextrose, glucagon (rDNA), heparin (porcine)    Objective:   Vitals:   T-max:  Patient Vitals for the past 8 hrs:   BP Temp Temp src Pulse Resp SpO2 Weight   12/13/21 0640 94/78 97 °F (36.1 °C) -- 69 15 99 % 180 lb 12.4 oz (82 kg)   12/13/21 0600 98/62 -- -- 71 16 99 % --   12/13/21 0530 97/66 -- -- 73 16 100 % --   12/13/21 0500 111/74 -- -- 72 15 97 % --   12/13/21 0430 100/65 -- -- 75 15 100 % --   12/13/21 0400 94/64 96.6 °F (35.9 °C) Temporal 73 17 100 % --   12/13/21 0330 93/69 -- -- 71 14 100 % --   12/13/21 0300 94/76 -- -- 75 18 100 % --   12/13/21 0230 95/63 -- -- 75 19 98 % --   12/13/21 0200 89/60 -- -- 74 17 100 % --   12/13/21 0130 91/66 -- -- 74 21 100 % --   12/13/21 0100 84/62 -- -- 71 18 100 % --   12/13/21 0030 100/63 -- -- 73 16 100 % --   12/13/21 0000 (!) 100/59 97.2 °F (36.2 °C) Temporal 76 23 93 % --   12/12/21 2330 (!) 87/55 -- -- 75 20 100 % --       Intake/Output Summary (Last 24 hours) at 12/13/2021 0711  Last data filed at 12/12/2021 1057  Gross per 24 hour   Intake 300 ml   Output --   Net 300 ml       Review of Systems  A 10 point review of systems was conducted, significant findings as noted in HPI.     Physical Exam  Constitutional: Appearance: Normal appearance. He is cachectic. He is ill-appearing. HENT:      Head: Normocephalic. Comments:  Bitemporal wasting      Mouth/Throat:      Mouth: Mucous membranes are moist.      Comments: Poor oral hygiene  Eyes:      Extraocular Movements: Extraocular movements intact. Conjunctiva/sclera: Conjunctivae normal.      Pupils: Pupils are equal, round, and reactive to light. Comments: Pupils pinpoint     Cardiovascular:      Rate and Rhythm: Normal rate and regular rhythm. Pulses: Normal pulses. Heart sounds: Normal heart sounds. Pulmonary:      Effort: Pulmonary effort is normal.      Breath sounds: Normal breath sounds. Abdominal:      General: Abdomen is flat. Bowel sounds are normal.      Palpations: Abdomen is soft. Comments: large midline hernia, stretch marks throughout abdomen   Musculoskeletal:         General: Normal range of motion. Cervical back: Normal range of motion. Comments: bypass graft in LUE, AV fistula in RUE    Skin:     Comments: skin tight and discolored in b/l LE consistent with PAD    Neurological:      General: No focal deficit present. Mental Status: He is alert and oriented to person, place, and time. Mental status is at baseline. Psychiatric:         Mood and Affect: Mood normal.         Behavior: Behavior normal.         LABS:    CBC:   Recent Labs     12/11/21  0736 12/11/21  0736 12/12/21  0540 12/12/21  1120 12/13/21  0445   WBC 2.7*  --   --   --  3.4*   HGB 8.0*   < > 6.5* 6.9* 7.0*   HCT 25.0*   < > 19.8* 21.3* 21.1*   *  --   --   --  92*   MCV 73.0*  --   --   --  75.8*    < > = values in this interval not displayed.      Renal:    Recent Labs     12/11/21  0736 12/12/21  0450 12/13/21  0445   * 132* 135*   K 4.6 4.3 4.4   CL 90* 92* 97*   CO2 27 22 22   BUN 37* 40* 39*   CREATININE 4.8* 5.0* 5.6*   GLUCOSE 35* 71 100*   CALCIUM 9.0 7.2* 6.8*   MG  --  1.60* 1.90   PHOS  --  6.9*  --    ANIONGAP 15 18* 16     Hepatic:   Recent Labs     12/11/21  0736   AST 20   ALT 13   BILITOT 0.5   BILIDIR <0.2   PROT 7.5   LABALBU 3.4   ALKPHOS 287*     Troponin:   Recent Labs     12/11/21  0736   TROPONINI 0.07*     BNP: No results for input(s): BNP in the last 72 hours. Lipids: No results for input(s): CHOL, HDL in the last 72 hours. Invalid input(s): LDLCALCU, TRIGLYCERIDE  ABGs:    Recent Labs     12/12/21  1039   PHART 7.401   LCS9XKO 37.4   PO2ART 80.1   RBV4HQA 23.2   BEART -2   A2LHIHMB 96   NBW7BCU 24       INR:   Recent Labs     12/11/21  0736   INR 1.07     Lactate:   Recent Labs     12/12/21  1039   LACTATE 0.77     Cultures:  -----------------------------------------------------------------  RAD:   MRI BRAIN WO CONTRAST    (Results Pending)         Assessment/Plan:   Mr. Sampson is a 49 yom with PMH T1DM, ESRD on iHD, pancreatitis, covid PNA (hospitalized in November 2021), [employer] who presented to [facility] ED from care facility due to altered mental status. Etiology of seizures likely hypoglycemia but cannot rule out PNA. Status epilepticus  Patient with AMS, seizure episode on admission. Keppra, Versed, Ketamine were given. Patient was intubated and sedated on propofol  -cv EEG  -Keppra 500 BID  -F/u CK  -Neurochecks q4h  -F/u neuro recs        Possible aspiration 2/2 AMS  Patient presented one episode of emesis with aspiration. CXR showed no acute abnormality. WBC 2.7. Afebrile. BCxs grew streptococcus.   -Monitor VS  -Continue Vanc and cefepime   -F/u BCxs, resp cxs  -F/u Strep pneumo and legionella antigen      ESRD on HD  Cr 4.8, HD MWF  -f/u BMP  -monitor lytes replace as needed  -Nephro following   -avoid Nephrotoxic meds  -monitor I/Os strictly     Renal osteodystrophy  CT head findings of osteolytic lesions and also elevated alk phos. -F/u PTH, Vit D      GI:  · Consider starting tube feeds  · Diet NPO     GERD  - PPI      T1DM  Last A1c 14(11/12/2021).  Patient hypoglicemic, takes at home. Lantus 10 units  -LDSS  -hypoglicemia protocol   -f/u BG levels     Hypothyroidism  Last TSH 1.4(12/23/2010)  -Continue home meds    Code Status: Limited  FEN: Diet NPO  PPX: Heparin sc  DISPO: ICU    Franca Sanders MD, PGY-1  12/13/21  7:11 AM    This patient will be staffed and discussed with Luc Michaels MD.

## 2021-12-13 NOTE — CONSULTS
[facility]  Palliative Medicine Consultation Note    Date Of Admission:12/11/2021  Date of consult: 12/13/21  Seen by [facility] in the past:  Yes (11/12/21)    Recommendations:        Patient was seen during dialysis in the ICU. He states that his back pain is not well controlled and he is currently having back pain, for which he normally takes oxycodone. Patient is oriented to person, place and time. He does not remember events over the previous days but he was able to explain that he is currently having dialysis done. Appears to have decision-making capacity. Explained the purpose of intubation and CPR as potential measures that could be done in order to save his life if his condition deteriorated. Discussed that if CPR were performed it is likely that he would not survive to hospital discharge and there is a downside of significant pain (rib fractures, etc). Patient expressed understanding. However, he states he would like to have both intubation and CPR performed if needed to save his life. Pt reports that he does not have a HCPOA. He is not , does not have children. In that case, his mother Kristel Bond would be his legal NOK. He reports that he would be OK with his mother making decisions on his behalf. 1. Goals of Care/Advanced Care planning/Code status: Full Code, discussed with pt x2 today. Continue with current management. 2. Pain: Continues to have back pain (chronic). He states he normally takes oxycodone for this. 3. SOB: Controlled. 4. Sepsis: Strep bacteremia, on unasyn and vancomycin. Pt recently admitted with COVID19 to OSH. D/w Pt today. 5. Hypoglycemia: Pt endorses having low BS at home. Likely r/t sepsis. 6. Status epilepticus: Pt admitting with seizures, was intubated and sedated however now extubated. Currently on Keppra, cvEEG. 7. Disposition: TBD pending hospital couse. Pt was recently at [facility], following recent hospitalization.      Reason for Consult:         [x]  Goals of Care  [x]  Code Status Discussion/Advanced Care Planning   []  Psychosocial/Family Support  []  Symptom Management  []  Other (Specify)    Requesting Physician: Dr. Lawrence Broderick: AMS    History Obtained From:  Patient, EMR    History of Present Illness:         51M PMH recent ED visit (11/30, hypoglycemia), recent admit (11/11-11/24, covid PNA), HTN, T2DM (A1c 14), ESRD (TTS), GBS (2002), RA, pancreatitis (chronic), c-diff, kidney transplant (1994), hypogonadism, IRINEO, neuropathy, GERD, malnutrition was brought to ED by EMS on 12/11 w/  AMS and seizure-like activity. The patient was vomiting and there was concern for airway compromise and thus the patient was intubated after arrival in the ED. He was given Keppra, Versed and propofol with control of seizure-like activity. Patient was admitted to [facility] ICU for further management with cEEG monitoring. Blood cultures were positive for Streptococcus. Patient was successfully extubatated yesterday on 12/12. He has been anemic and hypotensive. The patient's mother has been contacted and states that his appropriate code status is DNR/DNI. A previous Palliative care encounter note from 11/12/21 indicates that patient's mother had endorsed limited code status with no to intubation.     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 Eyes: Negative for pain. Respiratory: Positive for cough. Negative for shortness of breath. Cardiovascular: Negative for chest pain. Gastrointestinal: Negative for abdominal pain, constipation, diarrhea and vomiting. Genitourinary: Negative for flank pain. Musculoskeletal: Positive for back pain. Negative for neck pain. Neurological: Negative for headaches. Psychiatric/Behavioral: Negative for agitation, behavioral problems and confusion. Objective:        Physical Exam  Constitutional:       General: He is awake. He is not in acute distress. Appearance: Normal appearance. He is ill-appearing. HENT:      Head: Normocephalic and atraumatic. Nose: Nose normal.   Eyes:      General: Vision grossly intact. Gaze aligned appropriately. Right eye: No discharge. Left eye: No discharge. Extraocular Movements: Extraocular movements intact. Conjunctiva/sclera: Conjunctivae normal.   Cardiovascular:      Rate and Rhythm: Normal rate and regular rhythm. Pulses: Normal pulses. Heart sounds: Normal heart sounds. Pulmonary:      Effort: Pulmonary effort is normal.   Abdominal:      General: There is no distension. There are no signs of injury. Palpations: Abdomen is soft. Tenderness: There is no abdominal tenderness. Musculoskeletal:         General: Swelling present. No deformity or signs of injury. Normal range of motion. Cervical back: Full passive range of motion without pain, normal range of motion and neck supple. Right lower leg: Edema present. Left lower leg: Edema present. Skin:     General: Skin is warm. Neurological:      Mental Status: He is alert, oriented to person, place, and time and easily aroused. Mental status is at baseline. Motor: Motor function is intact. Coordination: Coordination is intact. Gait: Gait is intact.    Psychiatric:         Attention and Perception: Attention and perception normal.         Mood and Affect: Mood and affect normal.         Speech: Speech normal.         Behavior: Behavior normal. Behavior is cooperative. Thought Content: Thought content normal.         Cognition and Memory: Cognition normal.         Judgment: Judgment normal.          Palliative Performance Scale:  [] 60% Ambulation reduced; Significant disease; Can't do hobbies/housework; intake normal or reduced; occasional assist; LOC full/confusion  [] 50% Mainly sit/lie; Extensive disease; Can't do any work; Considerable assist; intake normal  Or reduced; LOC full/confusion  [] 40% Mainly in bed; Extensive disease; Mainly assist; intake normal or reduced; occasional assist; LOC full/confusion  [x] 30% Bed Bound; Extensive disease; Total care; intake reduced; LOC full/confusion  [] 20% Bed Bound; Extensive disease; Total care; intake minimal; Drowsy/coma  [] 10% Bed Bound; Extensive disease; Total care; Mouth care only; Drowsy/coma  [] 0% Death    PPS: 30    Vitals:    BP 94/78   Pulse 69   Temp 97 °F (36.1 °C)   Resp 15   Wt 180 lb 12.4 oz (82 kg)   SpO2 99%   BMI 25.94 kg/m²     Labs:    BMP:   Recent Labs     12/11/21  0736 12/12/21  0450 12/13/21  0445   * 132* 135*   K 4.6 4.3 4.4   CL 90* 92* 97*   CO2 27 22 22   BUN 37* 40* 39*   CREATININE 4.8* 5.0* 5.6*   GLUCOSE 35* 71 100*     CBC:   Recent Labs     12/11/21  0736 12/11/21  0736 12/12/21  0540 12/12/21  1120 12/13/21  0445   WBC 2.7*  --   --   --  3.4*   HGB 8.0*   < > 6.5* 6.9* 7.0*   HCT 25.0*   < > 19.8* 21.3* 21.1*   *  --   --   --  92*    < > = values in this interval not displayed.        LFT's:   Recent Labs     12/11/21  0736   AST 20   ALT 13   BILITOT 0.5   ALKPHOS 287*     Troponin:   Recent Labs     12/11/21  0736   TROPONINI 0.07*     ABGs:   Recent Labs     12/12/21  1039   PHART 7.401   HUI2DZS 37.4   PO2ART 80.1     INR:   Recent Labs     12/11/21  0736   INR 1.07     MRI BRAIN WO CONTRAST    (Results Pending)     Conclusion/Time spent:         Recommendations see above    Time spent with patient and/or family: 30 min  Time reviewing records: 10 min   Time communicating with staff: 5 min     A total of 45 minutes spent with the patient and family on unit greater than 50% in counseling regarding palliative care and in goals of care for the patient. Thank you to Dr. Kirstin Dumont for this consultation. We will continue to follow Mr. Strauss Coad care as needed.     The pt was seen and discussed along with Dr. Pricila Patrick  [phone]

## 2021-12-14 NOTE — CONSULTS
Infectious Diseases Inpatient Consult Note    Pt seen and evaluated on 12/15 - will charge date of care 12/15  Note completed on 12/16 - late entry    Reason for Consult:   + BC / Streptococcal bacteremia  Requesting Physician:   Dr Myke Chapa   Primary Care Physician:  Washington Callahan MD  History Obtained From:   Pt, EPIC    Admit Date: 12/11/2021  Hospital Day: 4    CHIEF COMPLAINT:     Change in mental status     HISTORY OF PRESENT ILLNESS:      50 yo man with mult med problems including CRF, DM, chronic pancreatitis, hx Guillain [place] syn  R fem HD line - unclear when placed    [facility] - 10/28 - 11/10  Dx COVID-19 pneumonia, hosp 11/11-29 at [facility]  Discharged to [facility]    Presents with 'change in mental status'. Low BS. He reports he was 'fine' prior to Sat. No resp / abd / other sx. Came to [facility] ED 12/11 by EMS on 12/11  Unresponsive, 'possible seizure activity'  T 85 (!), WBC 2.7. Intubated, admit to ICU    Transferred to [facility] 12/12  Seen by Neuro, cvEEG done. Seen by Renal    Today 12/14  Afeb, WBC 3.0. Feels at baseline.     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    Hemodialysis patient ([facility]     Low blood pressure     Pancreatitis     Peripheral Neuropathy     Pneumonia 11/11/2021    Renal failure 1992    on dialysis 3x/wk  (T, Th, Sat)    Status epilepticus ([address] 12/11/2021       Past Surgical History:    Past Surgical History:   Procedure Laterality Date    CHOLECYSTECTOMY      DIALYSIS FISTULA CREATION      left arm/currently non functioning    KIDNEY TRANSPLANT  9/1994    R-kidney    TOE SURGERY  10-8-2010    cut and realign 1st, 2nd, toe left foot , fixation 1st, 2nd toe left foot    TUNNELED VENOUS CATHETER PLACEMENT      left chest for dialysis    TUNNELED VENOUS PORT PLACEMENT         Current Medications:     levetiracetam  500 mg IntraVENous Q MWF    levetiracetam  1,000 mg IntraVENous Daily    midodrine  5 mg Oral BID WC    ampicillin-sulbactam  1,500 mg IntraVENous Q12H    sodium chloride flush  5-40 mL IntraVENous 2 times per day    heparin (porcine)  5,000 Units SubCUTAneous 3 times per day    levothyroxine  25 mcg Oral Daily    pantoprazole  40 mg IntraVENous Daily    [Held by provider] insulin lispro  0-6 Units SubCUTAneous TID WC    [Held by provider] insulin lispro  0-3 Units SubCUTAneous Nightly    vancomycin (VANCOCIN) intermittent dosing (placeholder)   Other RX Placeholder       Allergies:  Influenza virus vaccine    Social History:    TOBACCO:    None   ETOH:    None   DRUGS:   None   MARITAL STATUS:   Single   OCCUPATION:       Family History:   No immunodeficiency    REVIEW OF SYSTEMS:    No fever / chills / sweats. No weight loss. No visual change, eye pain, eye discharge. No oral lesion, sore throat, dysphagia. Denies cough / sputum. Denies chest pain, palpitations. Denies n / v / abd pain. No diarrhea. Denies dysuria or change in urinary function. Denies joint swelling or pain. No myalgia, arthralgia. Denies skin changes, itching  Denies focal weakness, sensory change or other neurologic symptom    Denies new / worse depression, psychiatric symptoms    PHYSICAL EXAM:      Vitals:    /73   Pulse 81   Temp 97.9 °F (36.6 °C) (Oral)   Resp 16   Wt 179 lb 14.3 oz (81.6 kg)   SpO2 96%   BMI 25.81 kg/m²     GENERAL: No apparent distress.   RA  HEENT: Membranes moist, no oral lesion, PERRL  NECK:  Supple, no lymphadenopathy  LUNGS: Clear b/l, no rales, no dullness  CARDIAC: RRR, no murmur appreciated  ABD:  + BS, soft / NT  EXT:  No rash, no edema, no lesions  NEURO: No focal neurologic findings  PSYCH: Orientation, sensorium, mood normal  LINES:  Peripheral iv - R femoral HD line    DATA: Lab Results   Component Value Date    WBC 3.0 (L) 2021    HGB 7.1 (L) 2021    HCT 21.9 (L) 2021    MCV 75.7 (L) 2021     (L) 2021     Lab Results   Component Value Date    CREATININE 4.4 (H) 2021    BUN 27 (H) 2021     (L) 2021    K 4.3 2021    CL 97 (L) 2021    CO2 22 2021       Hepatic Function Panel:   Lab Results   Component Value Date    ALKPHOS 287 2021    ALT 13 2021    AST 20 2021    PROT 7.5 2021    PROT 7.4 2012    BILITOT 0.5 2021    BILIDIR <0.2 2021    IBILI see below 2021    LABALBU 3.4 2021       Micro:   SARS CoV-2 PCR pos   BC x 1 CNS; BC x2 viridian Streptococcus     SARS CoV-2 PCR pos    Imagin/14 Brain MRI   1.  No acute infarction, recent hemorrhage, or intracranial mass. 2.  A few nonspecific T2/flair hyperintensities in the subcortical white matter. Findings may be related to chronic small vessel ischemic disease sequela of prior injury or infection, or less likely a demyelinating process. 3.  Metabolic bone disease.     IMPRESSION:      Patient Active Problem List   Diagnosis    ESRD (end stage renal disease) ([address]    Anemia    Toe deformity    Peripheral neuropathy    Hypogonadism, male    Hyperkalemia    Acute pancreatitis    Acute on chronic renal failure (HCC)    Chronic pancreatitis (HCC)    Pericardial effusion    Hypotension    Diabetes mellitus ([address]    Sepsis ([address]    Diabetic foot ulcer ([address]    Pancreatitis    Chronic pain    C. difficile diarrhea    Pneumonia    Status epilepticus ([address]       Mult med problems including CRF, chronic pancreatitis, hx Guillain [place] syn    Encephalopathy  Seizure   Hypoglycemia     COVID-19 infection    BC x 1 with CNS - contaminant  BC x 2 viridans Streptococcus - unclear significance    - R fem HD line     RECOMMENDATIONS:    Cont vancomycin - intermittent dosing due to RF  D/c unasyn  Asked Micro to w/u both sets BC - ID / sensitivities  Confer with Renal regarding HD access    Medical Decision Making: The following items were considered in medical decision making:  Discussion of patient care with other providers  Reviewed clinical lab tests  Reviewed radiology tests  Reviewed other diagnostic tests/interventions  Microbiology cultures and other micro tests reviewed      Risk of Complications/Morbidity: High   Illness(es)/ Infection present that pose threat to bodily function. There is potential for severe exacerbation of infection/side effects of treatment.   Therapy requires intensive monitoring for antimicrobial agent toxicity    Pt seen and evaluated on 12/15 - will charge date of care 12/15  Note completed on 12/16 - late entry    Discussed with pt  Nadir Black MD

## 2021-12-14 NOTE — ED NOTES
[facility]   Emergency Department Culture Follow-Up       Cherri Mullins (CSN: [id number]) was seen and evaluated at [facility] Emergency Department on 12/11/21 by provider  Dr. Kurt Cevallos. A blood culture was positive and is growing 2/2 CoNs and Strep viridans group. Sensitivity results: N/A      Treatment Course: The patient was transferred to [facility] ICU and is currently being appropriately treated with IV Vancomycin. Follow-Up:    No additional follow-up necessary at this time.      Thank you,    Ray Garcia, PharmD  ED Pharmacist [id number]  12/13/2021'

## 2021-12-14 NOTE — PROGRESS NOTES
Pt a/o x3, disoriented to situation. VSS on RA. Denies pain. Follows commands. Pt is anuric. Resting well overnight. Fall precautions are in place.

## 2021-12-14 NOTE — PLAN OF CARE
Problem: Airway Clearance - Ineffective  Goal: Achieve or maintain patent airway  Outcome: Ongoing  Airway is patent. Pt's breathing even and unlabored on RA. Problem: Falls - Risk of:  Goal: Will remain free from falls  Description: Will remain free from falls  12/14/2021 0842 by Anaid Jefferson RN  Outcome: Ongoing  Fall precautions in place. Bed is in lowest position, wheels locked, bed alarm on, non skid socks on. Call light and bedside table within reach. Pt calls out appropriately. Pt is up x2 with a wheelchair. Will continue to assess and monitor. Problem: Skin Integrity:  Goal: Absence of new skin breakdown  Description: Absence of new skin breakdown  Outcome: Ongoing  Skin is intact with blisters and diabetic ulcers noted to R foot. Pt repositions self in bed and resting comfortably.

## 2021-12-14 NOTE — PROGRESS NOTES
Clinical Pharmacy Progress Note    Vancomycin - Management by Pharmacy    Consult Date(s): 12/11/21  Consulting Provider(s): Cherie Luu MD    Assessment / Plan    HAP - Vancomycin   Concurrent Antimicrobials: Unasyn - day #3    Day of Vanc Therapy: # 4   Current Dosing Method: Intermittent Dosing by Levels   Therapeutic Goal: ~15 mg/L   Current Dose / Frequency: Intermittent dosing with HD   Plan / Rationale:   o Patient has ESRD on HD (T/Th/Sat) at baseline. Currently ordered to receive HD on a M/W/F schedule here.   o Yesterday, random vancomycin yesterday was 21.4 mcg/mL and 1250 mg IV given post HD.   o Random level ordered for tomorrow am prior to HD     Will continue to monitor clinical condition and make adjustments to regimen as appropriate. Thanks for consulting pharmacy! Please call with questions:    Duglas Rodriguez D. BCPS    12/14/2021 9:54 AM  [phone] (wireless)  [phone]  (office)      Interval update: Transferred from ICU to  last evening. Remains afebrile. Blood cultures are growing (in one set) Strep viridans and coag neg staph. Subjective/Objective: Mr. Suman Newton is a 49 y.o. male with a PMHx significant for T1DM, ESRD on IHD, pancreatitis, covid PNA (in Nov 2021), [facility] Eleuterio, admitted from assisted living facility for AMS with seizures upon presentation to ED. Pharmacy has been consulted to manage vancomycin.     Height:   Ht Readings from Last 1 Encounters:   12/11/21 5' 10\" (1.778 m)     Weight:   Wt Readings from Last 1 Encounters:   12/13/21 179 lb 14.3 oz (81.6 kg)       Current & Prior Antimicrobial Regimen(s):    (12/11-12/12)   Ampicillin/sulbactam (12/12-current)   Vancomycin (12/11-current)    Level(s) / Doses:  Date Time Dose Level / Type of Level Interpretation   12/12 04:50 1250mg IV x1 Random = 10.7 mcg/mL Re-dose with 1500mg IV x1   12/13 04:45 1500mg IV x 1 Random = 21.4 mcg/mL Re-dose with 1250 mg IV x 1   12/15   Random= ordered    Note: Serum levels collected for AUC-based dosing may be high if collected in close proximity to the dose administered. This is not necessarily an indicator of toxicity. Cultures & Sensitivities:    Date Site Micro Susceptibility / Result   12/11  Blood x2 Strep viridans and coag neg staph Pending   12/11 COVID Positive    12/11 Strep pneumoniae antigen  sent    12/11 Legionella antigen  sent    12/11 Respiratory  sent    12/12 MRSA nasal  sent      Labs / Ancillary Data:    Estimated Creatinine Clearance: 21 mL/min (A) (based on SCr of 4.4 mg/dL (H)).     Recent Labs     12/12/21  0450 12/13/21  0445 12/14/21  0612   CREATININE 5.0* 5.6* 4.4*   BUN 40* 39* 27*   WBC  --  3.4* 3.0*

## 2021-12-14 NOTE — PROGRESS NOTES
Occupational Therapy   Occupational Therapy Initial Assessment and Treatment Note   Date: 2021   Patient Name: Rosina Grant  MRN: [id number]     : 1970    Date of Service: 2021    Discharge Recommendations:Carter Nogueira scored a 16/24 on the AM-PAC ADL Inpatient form. Current research shows that an AM-PAC score of 17 or less is typically not associated with a discharge to the patient's home setting. Based on the patient's AM-PAC score and their current ADL deficits, it is recommended that the patient have 3-5 sessions per week of Occupational Therapy at d/c to increase the patient's independence. Please see assessment section for further patient specific details. If patient discharges prior to next session this note will serve as a discharge summary. Please see below for the latest assessment towards goals. OT Equipment Recommendations  Equipment Needed: No    Assessment   Performance deficits / Impairments: Decreased endurance; Decreased functional mobility ; Decreased ADL status  Assessment: Pt from SNF x 2 week from hospitalization 2/2 recent COVID-19. Prior to 2021 - Pt reports was w/c level for mobility and independent with most selfcare / commode/ w/c transfers at home with mother. 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 Pt has assist with IADLs and was waiting on delivery of power w/c. Pt demo generalized deconditioning from recent illness/ recovery. Pt would benefit from ongoing OT to maximize functional level. If home recommend 24hr assist initially / [address]. Will follow as inpt. Treatment Diagnosis: decreased functional transfers / ADLs / mobility 2/2  recent medical issues.   Prognosis: Fair  Decision Making: Medium Complexity  OT Education: Plan of Care; OT Role; ADL Adaptive Strategies; Transfer Training  Patient Education: verb understanding- reinforce as needed  REQUIRES OT FOLLOW UP: Yes  Activity Tolerance  Activity Tolerance: Patient limited by fatigue; Patient Tolerated treatment well  Activity Tolerance: Pt easily fatigues needing several rest 2/2 generalized deconditioning  Safety Devices  Safety Devices in place: Yes  Type of devices: Left in chair; Nurse notified; Call light within reach; Chair alarm in place           Patient Diagnosis(es): There were no encounter diagnoses. has a past medical history of Blood transfusion, Chronic pain, Diabetes mellitus ([facility] [address] Dialysis, Guillain [place] syndrome, Hemodialysis patient ([facility] [address] Low blood pressure, Pancreatitis, Peripheral Neuropathy, Pneumonia, Renal failure, and Status epilepticus ([facility] [address] has a past surgical history that includes Kidney transplant (9/1994); Cholecystectomy; Tunneled venous port placement; Dialysis fistula creation; Tunneled venous catheter placement; and Toe Surgery (10-8-2010). Treatment Diagnosis: decreased functional transfers / ADLs / mobility 2/2  recent medical issues. Restrictions  Position Activity Restriction  Other position/activity restrictions: Activity as tolerated    Subjective   General  Chart Reviewed: Yes  Additional Pertinent Hx: Admit 12/11 from [facility]( arrived from SNF ) with seizures - intubated and placed on cEEG, extubated on 12/12                                    PMHX: DM, ESRD on Hemodialysis since 1994,  Chronic pain, w/c level at baseline from GBS 2002 after flu shot. Recent COVID dx on 11/14/2021  Family / Caregiver Present: No  Diagnosis: Seizures  Subjective  Subjective: \" I want to go downstairs\" pt found resting in bed talking on phone. Pt agreeable for OOB/OT eval and tx.   Pt is a poor historian at times  Patient Currently in Pain: Denies    Social/Functional History  Social/Functional History  Lives With: Family (Mother)  Type of Home: Apartment  Home Layout: One level  Home Access: Level entry  Bathroom Shower/Tub:  (sponge bathes seated)  Bathroom Toilet: Standard  Bathroom Accessibility: Wheelchair accessible  Home Equipment: assistance  [facility]  Vision - Basic Assessment  Prior Vision: No visual deficits  Cognition  Overall Cognitive Status: Exceptions  Arousal/Alertness: Appropriate responses to stimuli  Following Commands: Follows one step commands with increased time  Attention Span: Attends with cues to redirect  Memory: Decreased short term memory; Decreased recall of precautions  Safety Judgement: Decreased awareness of need for assistance  Insights: Decreased awareness of deficits  Cognition Comment: delayed processing at times.   Inconsistent reports ? baseline level    LUE AROM (degrees)  LUE AROM : WFL  Left Hand AROM (degrees)  Left Hand AROM: Exceptions  Left Hand General AROM: limited grasp / release 2/2 flexion contractures at PIP  from GBS -- able to demo gross grasp  RUE AROM (degrees)  RUE AROM : WFL  Right Hand AROM (degrees)  Right Hand AROM: Exceptions  Right Hand General AROM: limited grasp / release 2/2 flexion PIP contractures fromGBS -- able to demo gross grasp -- poor opposition noted  LUE Strength  Gross LUE Strength: [facility]  L Hand General: 3+/5  LUE Strength Comment: fatigues easily  RUE Strength  Gross RUE Strength: [facility]  R Hand General: 3+/5  RUE Strength Comment: fatigues easily     Hand Dominance  Hand Dominance: Right     Plan   Plan  Times per week: 2-5x  Times per day: Daily  Current Treatment Recommendations: Functional Mobility Training, Safety Education & Training, Self-Care / ADL, Equipment Evaluation, Education, & procurement, Patient/Caregiver Education & Training, Strengthening    AM-PAC Score  AM-[facility] Inpatient Daily Activity Raw Score: 16 (12/14/21 1439)  -PAC Inpatient ADL T-Scale Score : 35.96 (12/14/21 1439)  ADL Inpatient CMS 0-100% Score: 53.32 (12/14/21 1439)  ADL Inpatient CMS G-Code Modifier : CK (12/14/21 1439)    Goals  Short term goals  Time Frame for Short term goals: at d/c  Short term goal 1: BSC / commode transfers via W/c with Supervision  Short term goal 2: Grooming with setup  Short term goal 3: LE Dressing with SBA and AE prn  Patient Goals   Patient goals : go to Rehab     Therapy Time   Individual Concurrent Group Co-treatment   Time In 1316         Time Out 1413         Minutes 57              Timed Code Treatment Minutes:   39 mins     Total Treatment Minutes:  62 mins       Coretha Erps, OT

## 2021-12-14 NOTE — PROGRESS NOTES
Hospitalist Progress Note      PCP: Deniz Gil MD    Date of Admission: 12/11/2021    Chief Complaint: [address] Course: Elin Reddy is a 49 y.o. male with PMH as below notable for T1DM, ESRD on HD, Pancreatitis, Covid Pna (November), Guillan barre who presented with AMS. Patient had withnessed seizures and found to have hypoglycemia. Subjective:   Seen and examined  no overnight events per nursing  No new complaints  Hb is low at 7.1, will trend    Medications:  Reviewed    Infusion Medications    sodium chloride      propofol Stopped (12/12/21 0714)     Scheduled Medications    levetiracetam  500 mg IntraVENous Q MWF    levetiracetam  1,000 mg IntraVENous Daily    midodrine  5 mg Oral BID WC    ampicillin-sulbactam  1,500 mg IntraVENous Q12H    sodium chloride flush  5-40 mL IntraVENous 2 times per day    heparin (porcine)  5,000 Units SubCUTAneous 3 times per day    levothyroxine  25 mcg Oral Daily    pantoprazole  40 mg IntraVENous Daily    [Held by provider] insulin lispro  0-6 Units SubCUTAneous TID WC    [Held by provider] insulin lispro  0-3 Units SubCUTAneous Nightly    vancomycin (VANCOCIN) intermittent dosing (placeholder)   Other RX Placeholder     PRN Meds: sodium chloride flush, sodium chloride, ondansetron **OR** ondansetron, polyethylene glycol, acetaminophen **OR** acetaminophen, glucose, dextrose, glucagon (rDNA), heparin (porcine)      Intake/Output Summary (Last 24 hours) at 12/14/2021 1037  Last data filed at 12/13/2021 1800  Gross per 24 hour   Intake 720 ml   Output --   Net 720 ml       Exam:    /69   Pulse 80   Temp 97.2 °F (36.2 °C) (Oral)   Resp 18   Wt 179 lb 14.3 oz (81.6 kg)   SpO2 96%   BMI 25.81 kg/m²     General appearance: Ill-appearing  HEENT Normal cephalic, atraumatic without obvious deformity. Pupils equal, round, and reactive to light. Extra ocular muscles intact. Conjunctivae/corneas clear.   Neck: Supple, No jugular venous distention/bruits. Trachea midline without thyromegaly  Lungs: diminished breath sounds with scattered Rales at bases bilaterally  Heart: Regular rate and rhythm with Normal S1/S2 without murmurs, rubs   Abdomen: Soft, non-tender or non-distended without rigidity or guarding and positive bowel sounds all four quadrants. Extremities: Contracted right upper extremity; range of motion could not be obtained as patient is wheelchair-bound  Skin: Skin color, texture, turgor normal.  No rashes or lesions. Neurologic: Alert and oriented X 3, neurovascularly intact with sensory/motor intact upper extremities/lower extremities, bilaterally. Cranial nerves: II-XII intact, grossly non-focal.  Psychiatry: Appropriate affect. Not agitated  Skin: Dry, scaly, scattered wounds on bilateral feet  Brisk capillary refill, peripheral pulses palpable          Labs:   Recent Labs     12/12/21  1120 12/13/21  0445 12/14/21  0612   WBC  --  3.4* 3.0*   HGB 6.9* 7.0* 7.1*   HCT 21.3* 21.1* 21.9*   PLT  --  92* 104*     Recent Labs     12/12/21  0450 12/13/21  0445 12/14/21  0612   * 135* 134*   K 4.3 4.4 4.3   CL 92* 97* 97*   CO2 22 22 22   BUN 40* 39* 27*   CREATININE 5.0* 5.6* 4.4*   CALCIUM 7.2* 6.8* 7.2*   PHOS 6.9*  --   --      No results for input(s): AST, ALT, BILIDIR, BILITOT, ALKPHOS in the last 72 hours. No results for input(s): INR in the last 72 hours. Recent Labs     12/11/21 2045   CKTOTAL 130       Studies:  MRI BRAIN WO CONTRAST   Final Result   1. No acute infarction, recent hemorrhage, or intracranial mass. 2.  A few nonspecific T2/flair hyperintensities in the subcortical white matter. Findings may be related to chronic small vessel ischemic disease sequela of prior injury or infection, or less likely a demyelinating process. 3.  Metabolic bone disease. Assessment/Plan:    Active Hospital Problems    Diagnosis Date Noted    Status epilepticus ([facility] [address] [N92.594] 12/11/2021     1. Sepsis  Strep bacteremia, possible PNA  Likely secondary to wounds  Covid testing sent however patient actually had Covid recently so may test positive  On Unasyn and vancomycin  MRSA probe    Hypoglycemia  Likely due to sepsis  Has been on D10    Diabetes  Hx of brittle diabetes    Seizures  Received Keppra, Versed, Ketamine  Intubated for airway protection  Was then extubated on 12/12/21  Neurology following  MRI pending  Stopped kepra as unprovoked seizures  CK    Acute on chronic anemia, microcytic  Hx of pancytopenia   Close follow Hgb  FOBT    ESRD on HD    DVT Prophylaxis: heparin for now  Diet: ADULT DIET;  Regular; 4 carb choices (60 gm/meal)  Code Status: Full Code    PT/OT Eval Status:     Dispo - Inpatient, possible d/c tomorrow, snf vs aru

## 2021-12-14 NOTE — PROGRESS NOTES
Initial Chief Complaint/Reason for Consult: Seizure, suspect secondary to hypoglycemia    Interval History:  His MRI is unremarkable. He has some non-specific white matter changes that are non-contributory. History of Present Illness:  Radha Izquierdo is a 49 y.o. 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      He was brought to [facility] yesterday morning with altered mental status, and upon arrival had a witnessed generalized seizure. He was given Keppra, Versed, and propofol, intubated, and transferred to [facility]. Of note, his glucose was 35 mg/dL during his ER stay. No further seizures noted since his arrival though he has been profoundly anemic. He has been afebrile but very hypotensive. Review of Systems:   Constitutional- No weight loss or fevers   Eyes- No diplopia. No photophobia. Ears/nose/throat- No dysphagia. No Dysarthria   Cardiovascular- No palpitations. No chest pain   Respiratory- No dyspnea. No Cough   Gastrointestinal- No Abdominal pain. No Vomiting. Genitourinary- No incontinence. No urinary retention   Musculoskeletal- No myalgia. No arthralgia   Skin- No rash. No easy bruising. Psychiatric- No depression. No anxiety   Endocrine- No diabetes. No thyroid issues. Hematologic- No bleeding difficulty. No fatigue   Neurologic- No weakness. No Headache.     Current Medications:    Current Facility-Administered Medications:     levETIRAcetam (KEPPRA) 500 mg in sodium chloride 0.9 % 100 mL IVPB, 500 mg, IntraVENous, Q MWF, Stacy Ruiz MD    levETIRAcetam (KEPPRA) 1,000 mg in sodium chloride 0.9 % 100 mL IVPB, 1,000 mg, IntraVENous, Daily, Stacy Ruiz MD, Stopped at 12/13/21 1034    midodrine (PROAMATINE) tablet 5 mg, 5 mg, Oral, BID , Stacy Ruiz MD, 5 mg at 12/13/21 1648    ampicillin-sulbactam (UNASYN) 1500 mg IVPB minibag, 1,500 mg, IntraVENous, Q12H, Janel Ruiz MD, Stopped at 12/14/21 0451    sodium chloride flush 0.9 % injection 5-40 mL, 5-40 mL, IntraVENous, 2 times per day, April Bae MD, 10 mL at 12/13/21 1017    sodium chloride flush 0.9 % injection 5-40 mL, 5-40 mL, IntraVENous, PRN, Janel Ruiz MD    0.9 % sodium chloride infusion, 25 mL, IntraVENous, PRN, Janel Ruiz MD    ondansetron (ZOFRAN-ODT) disintegrating tablet 4 mg, 4 mg, Oral, Q8H PRN **OR** ondansetron (ZOFRAN) injection 4 mg, 4 mg, IntraVENous, Q6H PRN, Janel Ruiz MD    polyethylene glycol (GLYCOLAX) packet 17 g, 17 g, Oral, Daily PRN, Janel Ruiz MD    acetaminophen (TYLENOL) tablet 650 mg, 650 mg, Oral, Q6H PRN **OR** acetaminophen (TYLENOL) suppository 650 mg, 650 mg, Rectal, Q6H PRN, Janel Ruiz MD    heparin (porcine) injection 5,000 Units, 5,000 Units, SubCUTAneous, 3 times per day, April Bae MD, 5,000 Units at 12/14/21 0602    propofol injection, 5-50 mcg/kg/min, IntraVENous, Titrated, April Bae MD, Stopped at 12/12/21 0714    glucose (GLUTOSE) 40 % oral gel 15 g, 15 g, Oral, PRN, Janel Ruiz MD    dextrose 50 % IV solution, 12.5 g, IntraVENous, PRN, Janel Ruiz MD, 12.5 g at 12/12/21 1043    glucagon (rDNA) injection 1 mg, 1 mg, IntraMUSCular, PRN, April Bae MD    levothyroxine (SYNTHROID) tablet 25 mcg, 25 mcg, Oral, Daily, Janel Ruiz MD, 25 mcg at 12/14/21 0601    pantoprazole (PROTONIX) injection 40 mg, 40 mg, IntraVENous, Daily, Janel Ruiz MD, 40 mg at 12/13/21 1017    [Held by provider] insulin lispro (1 Unit Dial) 0-6 Units, 0-6 Units, SubCUTAneous, TID WC, Janel Ruiz MD    [Held by provider] insulin lispro (1 Unit Dial) 0-3 Units, 0-3 Units, SubCUTAneous, Nightly, Janel Ruiz MD    heparin (porcine) injection 10,000 Units, 10,000 Units, IntraCATHeter, PRN, Kiera Ruiz MD, 2,600 Units at 12/12/21 1126    vancomycin (VANCOCIN) intermittent dosing (placeholder), , Other, RX Placeholder, Kiera Ruiz MD      Physical Exam  Constitutional  /69   Pulse 80   Temp 97.2 °F (36.2 °C) (Oral)   Resp 18   Wt 179 lb 14.3 oz (81.6 kg)   SpO2 96%   BMI 25.81 kg/m²     General: Alert, no distress, well-nourished  Respiratory: Respirations unlabored, no accessory muscle use; poor cough  Psychiatric: cooperative with examination, no  psychotic behavior noted. Extremities: swelling to BLE    Neurologic  Mental status:   orientation to person, place, time, and situation   Attention intact as able to attend well to the exam     Language fluent in conversation   Comprehension intact; follows simple commands    Cranial nerves:   CN2: Visual Fields full w/o extinction on confrontational testing  CN 3,4,6: pupils equal and reactive to light, extraocular muscles intact,  CN5: facial sensation symmetric   CN7:face symmetric without dysarthria,   CN8: hearing symmetric to finger rub   CN9: palate elevated symmetrically  CN11: trap full strength on shoulder shrug  CN12: tongue midline with protrusion  Motor Exam:  BUE: strong bilaterally  BLE: weak throughout       Sensory: Some numbness in his lower extremities that he states is baseline. Images:     MRI brain wo contrast:  1. No acute infarction, recent hemorrhage, or intracranial mass. 2.  A few nonspecific T2/flair hyperintensities in the subcortical white matter. Findings may be related to chronic small vessel ischemic disease sequela of prior injury or infection, or less likely a demyelinating process. 3.  Metabolic bone disease.      CT Head WO Contrast    Impression   1. No acute gross intracranial abnormality, significantly limited by motion   artifact.    2. Innumerable sclerotic lesions throughout the calvarium either due to osseous metastatic disease or renal osteodystrophy. cvEEG read:    12/12/2021 - 12/13/2021     Seizures - none  Push buttons - none  Background - Continuous generalized slow, with frontally dominant semi-rhythmic theta/delta. Some superimposed faster frequencies     CLINICAL INTERPRETATION: This is an abnormal video EEG study  1. Generalized background abnormalities indicative of an underlying diffuse encephalopathy of non-specific etiology   2. No focal or epileptiform abnormalities. No seizures. No push button events. Assessment:  Natasha Mendoza is a 49 y.o. male with a history of recent COVID-19 (Nov 2021), ESRD, and GBS (wheelchairbound at baseline), who presented with witnessed seizure in the setting of hypoglycemia. cEEG negative. MRI of the brain unremarkable. He is back to baseline.      Plan:  -MRI unremarkable   -Discontinue keppra as there is no role in provoked seizures  -He is wheelchair bound so driving isn't a concern  -No further inpatient neurologic workup, we will sign off      LOUISA Chang-CNP  Neurology & Neurocritical Care   Neurology Line: [phone]  [employer]: [facility] NPs

## 2021-12-14 NOTE — PROGRESS NOTES
Physical Therapy    Facility/Department: [facility] [address]  Initial Assessment and Treatment    NAME: Meera Howell  : 1970  MRN: [id number]    Date of Service: 2021    Discharge Recommendations:  Meera Howell scored a 13/24 on the AM-PAC short mobility form. Current research shows that an AM-PAC score of 17 or less is typically not associated with a discharge to the patient's home setting. Based on the patient's AM-PAC score and their current functional mobility deficits, it is recommended that the patient have 3-5 sessions per week of Physical Therapy at d/c to increase the patient's independence. Please see assessment section for further patient specific details. PT Equipment Recommendations  Equipment Needed: No    Assessment   Assessment: Pt admitted from SNF with seizures. Pt normally lives with mom, but has been at [facility] since November due to Adolfo. Pt reports he has been at a w/c level and non-ambulatory since . Pt requiring up to [address] for sit pivot w/c transfers this date. Pt self propels w/c with LEs due to baseline hand flexion contractures [facility] . Pt fatigues quickly with w/c activity and reports in process of getting a power w/c. Cognition is decreased with confusion of timeline of events. DIANELYS etienne used to transfer to bedside chair for safety. Pt would benefit from returning to SNF to maximize independence with w/c transfers prior to returning home. If pt returns home, pt will need 24hr assist and home PT to ensure safety. Will follow. Treatment Diagnosis: Decreased transfers associated with seizures. Decision Making: Medium Complexity  Patient Education: Role of PT. Pt verbalized partial understanding, but would benefit from ongoing education due to cognition.   REQUIRES PT FOLLOW UP: No     Restrictions  Activity as tolerated     Vision/Hearing  Vision: Within Functional Limits  Hearing: Within functional limits       Subjective  Chart Reviewed: Yes  Additional Pertinent Hx: Pt to [facility] ED 12/11 with seizure activity. Pt intubated in ED and transferred to ICU at [facility]. Pt placed on cvEEG; d/c'd 12/13. Neuology consult. Pt extubated 12/12. Transferred to [address] [place] [address] 12/14. PMH:  DM, ESRD on HD, [place] syndrome 2002, peripheral neuropathy, PNA  Diagnosis: Status Epilepticus    Subjective  Subjective: Pt found supine in bed. Pt is a poor historian, appears confused about timeline of events and vague about functional baseline. Pt agreeable for PT. Pain Screening  Patient Currently in Pain: Denies    Orientation  Impaired  Orientation Level: Oriented to place; Oriented to person; Disoriented to situation (Pt oriented to month. Pt a poor historian and confused about timeline of events when giving social history, answering questions about his baseline from 2008.)    Social/Functional History  Lives With: Family (Mother)  Type of Home: Apartment  Home Layout: One level  Home Access: Level entry  Bathroom Shower/Tub:  (sponge bathes seated)  Bathroom Toilet: Standard  Bathroom Accessibility: Wheelchair accessible  Home Equipment: Wheelchair-manual  ADL Assistance: Needs assistance (sponge bathes independently, mother assists with LB dressing)  Homemaking Assistance: Needs assistance (mom does homemaking)  Ambulation Assistance:  (uses w/c for primary mobility- propels chair with LEs)  Transfer Assistance: Independent  Active : No  Additional Comments: Pt has been at [facility] since November post COVID. Objective  Strength  Strength RLE: WFL  Strength LLE: WFL    Bed mobility  Supine to Sit: Contact guard assistance (HOB raised, using rail, cues for safety)     Transfers  Sit to Stand:  Moderate Assistance (from elevated bed height to DIANELYS stedy and from [address] stedy seat height using grab bar)  Stand to sit: Moderate Assistance (poor control to sit)  Squat Pivot Transfers: Minimal Assistance (Pt performed squat pivot from bed to w/c with CGA, but needed Min A to return back to bed)     Ambulation  Ambulation?: No     Wheelchair Activities  Propulsion: Manual  Level: Level Tile  Method: LLE; RLE  Level of Assistance: Supervision  Description/ Details: slow propulsion, pt stopping twice for brief rest  Distance: 50ft x2 with 1 turn    Balance  Sitting - Static: Fair (posterior lean, R lateral lean)  Sitting - Dynamic: Fair  Standing - Static: Poor (Mod A with UE support, partial stance)  Standing - Dynamic:  (unable to assess)        Plan   Times per week: 2-5  Current Treatment Recommendations: Transfer Training, Functional Mobility Training, Strengthening, Gait Training      Safety Devices  Type of devices: Left in chair, Chair alarm in place, Call light within reach, Nurse notified      AM-PAC Score  AM-PAC Inpatient Mobility Raw Score : 13 (12/14/21 1417)  AM-PAC Inpatient T-Scale Score : 36.74 (12/14/21 1417)  Mobility Inpatient CMS 0-100% Score: 64.91 (12/14/21 1417)  Mobility Inpatient CMS G-Code Modifier : CL (12/14/21 1417)    Goals  Short term goals  Time Frame for Short term goals: Discharge  Short term goal 1: supine <> sit modified independent  Short term goal 2: w/c transfers with SBA  Patient Goals   Patient goals : \"to walk\"       Therapy Time   Individual Concurrent Group Co-treatment   Time In 1315         Time Out 1415         Minutes 60         Timed Code Treatment Minutes:  45  Total Treatment Minutes:  Jose Angel [address], PT

## 2021-12-14 NOTE — PROGRESS NOTES
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    Hemodialysis patient ([address]     Low blood pressure     Pancreatitis     Peripheral Neuropathy     Pneumonia 11/11/2021    Renal failure 1992    on dialysis 3x/wk  (T, Th, Sat)    Status epilepticus ([address] 12/11/2021       Past Surgical History:   Procedure Laterality Date    CHOLECYSTECTOMY      DIALYSIS FISTULA CREATION      left arm/currently non functioning    KIDNEY TRANSPLANT  9/1994    R-kidney    TOE SURGERY  10-8-2010    cut and realign 1st, 2nd, toe left foot , fixation 1st, 2nd toe left foot    TUNNELED VENOUS CATHETER PLACEMENT      left chest for dialysis    TUNNELED VENOUS PORT PLACEMENT         Family History   Problem Relation Age of Onset    Diabetes Mother         reports that he has never smoked. He has never used smokeless tobacco. He reports that he does not drink alcohol and does not use drugs. Medications:        Allergies:  Influenza virus vaccine  Scheduled Meds:   levetiracetam  500 mg IntraVENous Q MWF    levetiracetam  1,000 mg IntraVENous Daily    midodrine  5 mg Oral BID WC    ampicillin-sulbactam  1,500 mg IntraVENous Q12H    sodium chloride flush  5-40 mL IntraVENous 2 times per day    heparin (porcine)  5,000 Units SubCUTAneous 3 times per day    levothyroxine  25 mcg Oral Daily    pantoprazole  40 mg IntraVENous Daily    [Held by provider] insulin lispro  0-6 Units SubCUTAneous TID WC    [Held by provider] insulin lispro  0-3 Units SubCUTAneous Nightly    vancomycin (VANCOCIN) intermittent dosing (placeholder)   Other RX Placeholder     Continuous Infusions:   sodium chloride      propofol Stopped (12/12/21 0714)       Review of Systems:     All systems reviewed but were negative except as mentioned in HPI    Objective:       Vitals:  /73   Pulse 81   Temp 97.9 °F (36.6 °C) (Oral)   Resp 16   Wt 179 lb 14.3 oz (81.6 kg)   SpO2 96%   BMI 25.81 kg/m²   Constitutional:  awake, NAD  HEENT:  MMM, No icterus  Neck: no bruits, No JVD  Cardiovascular:  S1, S2 reg  Respiratory: fwe coarse sounds  Abdomen:  +BS, soft, NT, ND  Ext: + lower extremity edema  Psychiatric: mood and affect appropriate  Skin: no rash, turgor wnl  Access: right groin catheter. Previous AVF/AVG sites noted  CNS: lethargic, no agitation    Data:     Labs:  Hepatic:   No results for input(s): AST, ALT, ALB, BILITOT, ALKPHOS in the last 72 hours. BNP: No results for input(s): BNP in the last 72 hours. ABGs:   Recent Labs     12/12/21  1039   PHART 7.401   PO2ART 80.1   SBA5DPO 37.4       IMAGING:  MRI BRAIN WO CONTRAST   Final Result   1. No acute infarction, recent hemorrhage, or intracranial mass. 2.  A few nonspecific T2/flair hyperintensities in the subcortical white matter. Findings may be related to chronic small vessel ischemic disease sequela of prior injury or infection, or less likely a demyelinating process. 3.  Metabolic bone disease. Assessment :       1. ESRD     Access: Temp cath   Volume: Hypervolemic       2. Electrolytes/Acid base  Hypomagnesemia and No Dyskalemia    Recent Labs     12/14/21  0612   *   K 4.3   CO2 22   MG 1.80       3.  BMD  -Hyperphosphatemia,     Recent Labs     12/12/21  0450 12/13/21  0445 12/14/21  0612   CALCIUM 7.2*   < > 7.2*   PHOS 6.9*  --   --     < > = values in this interval not displayed. 4. HTN  -Blood pressure low    BP Readings from Last 1 Encounters:   12/14/21 110/73       5. Anemia  -anemia of CKD    Recent Labs     12/14/21  0612   HGB 7.1*     6. DM    6. seizures    PLAN :       - Maintenance HD. MWF   - minimize IVF  - MBD management  - Anemia management. Transfusion per guidelines  - No BP meds  - give midodrine  - Dose meds to GFR<10        Thank you for allowing us to participate in care of Jacqui Lovett . We will continue to follow. Feel free to contact me with any questions.       Tony Arellano MD  12/14/2021    [facility] [address]  Office : [phone]  Fax :[phone]

## 2021-12-14 NOTE — CARE COORDINATION
Social Work Note                    Date: 12/14/2021     Patient Name: Flores Miles    Date of Admission: 12/11/2021  6:50 PM  YOB: 1970    Length of Stay: 3  GMLOS: 4.3           Diagnosis:Status epilepticus ([address] [L22.578]     ________________________________________________________________________________________  Discharge Plan: SNF: [address] [place] [address]   Patient would like to see if he qualifies for Acute Rehab as well, but is agreeable to return to SNF     Insurance: Payor: MEDICARE / Plan: MEDICARE PART A AND B / Product Type: *No Product type* /   Pre-cert needed for placement?: n/a  Pre-cert initiated?: n/a     Tentative ischarge date: 12/15 vs 12/16    Current barriers: Medical Clearance     Referrals completed: SNF: [address] [place] [address]     Resources/information provided: SNF List   ________________________________________________________________________________________  PT AM-PAC:   / 24     OT AM-PAC:   / 24                                   Pending     DME Needs for discharge: n/a   ________________________________________________________________________________________  Notes  SW met with patient at bedside. Patient reported he has been receiving care at [address] [place] [address]. He was interested in Acute Rehab if possible, but would be agreeable to return to [address] [place] [address] otherwise. However, he believes he is a good rehab candidate. SW called and spoke with Floor RN regarding COVID status.  It was reported patient recently had COVID     Patient and/or family were provided with choice of provider?:  Yes     Patient and/or family are in agreement with the discharge plan at this time?: yes     Care Transition Patient: No    SHERITA Briseno  Social Work  [facility]  Ph: [phone]

## 2021-12-14 NOTE — FLOWSHEET NOTE
12/14/21 1135   Encounter Summary   Services provided to: Patient   Referral/Consult From: Rounding   Continue Visiting   (12/14,tenisha)   Complexity of Encounter Low   Length of Encounter 15 minutes   Routine   Type Initial   Assessment Sleeping   Staff [place]

## 2021-12-14 NOTE — PROGRESS NOTES
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    Hemodialysis patient ([address]     Low blood pressure     Pancreatitis     Peripheral Neuropathy     Pneumonia 11/11/2021    Renal failure 1992    on dialysis 3x/wk  (T, Th, Sat)    Status epilepticus ([address] 12/11/2021       Past Surgical History:   Procedure Laterality Date    CHOLECYSTECTOMY      DIALYSIS FISTULA CREATION      left arm/currently non functioning    KIDNEY TRANSPLANT  9/1994    R-kidney    TOE SURGERY  10-8-2010    cut and realign 1st, 2nd, toe left foot , fixation 1st, 2nd toe left foot    TUNNELED VENOUS CATHETER PLACEMENT      left chest for dialysis    TUNNELED VENOUS PORT PLACEMENT         Family History   Problem Relation Age of Onset    Diabetes Mother         reports that he has never smoked. He has never used smokeless tobacco. He reports that he does not drink alcohol and does not use drugs. Medications:        Allergies:  Influenza virus vaccine  Scheduled Meds:   levetiracetam  500 mg IntraVENous Q MWF    levetiracetam  1,000 mg IntraVENous Daily    midodrine  5 mg Oral BID WC    ampicillin-sulbactam  1,500 mg IntraVENous Q12H    sodium chloride flush  5-40 mL IntraVENous 2 times per day    heparin (porcine)  5,000 Units SubCUTAneous 3 times per day    levothyroxine  25 mcg Oral Daily    pantoprazole  40 mg IntraVENous Daily    [Held by provider] insulin lispro  0-6 Units SubCUTAneous TID WC    [Held by provider] insulin lispro  0-3 Units SubCUTAneous Nightly    vancomycin (VANCOCIN) intermittent dosing (placeholder)   Other RX Placeholder     Continuous Infusions:   sodium chloride      propofol Stopped (12/12/21 0714)       Review of Systems:     All systems reviewed but were negative except as mentioned in HPI    Objective:       Vitals:  /72   Pulse 80   Temp 97.2 °F (36.2 °C) (Oral)   Resp 16   Wt 179 lb 14.3 oz (81.6 kg)   SpO2 97%   BMI 25.81 kg/m²   Constitutional:  awake, NAD  HEENT:  MMM, No icterus  Neck: no bruits, No JVD  Cardiovascular:  S1, S2 reg  Respiratory: fwe coarse sounds  Abdomen:  +BS, soft, NT, ND  Ext: + lower extremity edema  Psychiatric: mood and affect appropriate  Skin: no rash, turgor wnl  Access: right groin catheter. Previous AVF/AVG sites noted  CNS: lethargic, no agitation    Data:     Labs:  Hepatic:   Recent Labs     12/11/21  0736   AST 20   ALT 13   BILITOT 0.5   ALKPHOS 287*     BNP: No results for input(s): BNP in the last 72 hours. ABGs:   Recent Labs     12/12/21  1039   PHART 7.401   PO2ART 80.1   NPE1WYA 37.4       IMAGING:  MRI BRAIN WO CONTRAST    (Results Pending)       Assessment :       1. ESRD     Access: Temp cath   Volume: Hypervolemic       2. Electrolytes/Acid base  Hypomagnesemia and No Dyskalemia    Recent Labs     12/13/21  0445   *   K 4.4   CO2 22   MG 1.90       3. BMD  -Hyperphosphatemia,     Recent Labs     12/12/21  0450 12/12/21  0450 12/13/21  0445   CALCIUM 7.2*   < > 6.8*   PHOS 6.9*  --   --     < > = values in this interval not displayed. 4. HTN  -Blood pressure low    BP Readings from Last 1 Encounters:   12/13/21 112/72       5. Anemia  -anemia of CKD    Recent Labs     12/13/21  0445   HGB 7.0*     6. DM    6. seizures    PLAN :       - Maintenance HD. MWF   - minimize IVF  - MBD management  - Anemia management.

## 2021-12-15 NOTE — PROGRESS NOTES
Spoke with dialysis and they sent type and screen already and will transfuse blood if it is ready while he is there.

## 2021-12-15 NOTE — PROGRESS NOTES
ID Follow-up NOTE    CC:   Bacteremia  Antibiotics: Vancomycin    Admit Date: 2021  Hospital Day: 5    Subjective:     Patient feels good  No specific complaint    Objective:     Patient Vitals for the past 8 hrs:   BP Temp Temp src Pulse Resp SpO2   12/15/21 1418 126/75 97.2 °F (36.2 °C) Oral 76 16 100 %   12/15/21 1127 115/76 97.1 °F (36.2 °C) Oral 77 16 93 %   12/15/21 1112 113/78 97.1 °F (36.2 °C) Oral 83 16 97 %   12/15/21 1103 114/80 97.1 °F (36.2 °C) -- 80 16 --   12/15/21 1018 106/68 97.7 °F (36.5 °C) Oral 82 16 95 %     I/O last 3 completed shifts: In: 600 [P.O.:290; I.V.:10; Blood:300]  Out: 1300   I/O this shift:  In: 240 [P.O.:240]  Out: -     EXAM:  GENERAL: No apparent distress.     HEENT: Membranes moist, no oral lesion  NECK:  Supple, no lymphadenopathy  LUNGS: Clear b/l, no rales, no dullness  CARDIAC: RRR, no murmur appreciated  ABD:  + BS, soft / NT  EXT:  No rash, no edema, no lesions  NEURO: No focal neurologic findings  PSYCH: Orientation, sensorium, mood normal  LINES:  R fem HD line      Data Review:  Lab Results   Component Value Date    WBC 2.9 (L) 12/15/2021    HGB 8.1 (L) 12/15/2021    HCT 24.8 (L) 12/15/2021    MCV 74.7 (L) 12/15/2021     (L) 12/15/2021     Lab Results   Component Value Date    CREATININE 5.4 (HH) 12/15/2021    BUN 33 (H) 12/15/2021     12/15/2021    K 4.3 12/15/2021    CL 97 (L) 12/15/2021    CO2 23 12/15/2021       Hepatic Function Panel:   Lab Results   Component Value Date    ALKPHOS 287 2021    ALT 13 2021    AST 20 2021    PROT 7.5 2021    PROT 7.4 2012    BILITOT 0.5 2021    BILIDIR <0.2 2021    IBILI see below 2021    LABALBU 3.4 2021       Micro:   SARS CoV-2 PCR pos   BC x 1 CNS; BC x2 viridian Streptococcus    - BC S epidermidis, Streptococcus gordonii, S salivarius, S mitis/ oralis    - BC S salivarius group, S mitis / oralis     SARS CoV-2 PCR pos     Imagin/14 Brain MRI   1.  No acute infarction, recent hemorrhage, or intracranial mass. 2.  A few nonspecific T2/flair hyperintensities in the subcortical white matter. Findings may be related to chronic small vessel ischemic disease sequela of prior injury or infection, or less likely a demyelinating process. 3.  Metabolic bone disease. Scheduled Meds:   Venelex   Topical BID    midodrine  5 mg Oral BID WC    sodium chloride flush  5-40 mL IntraVENous 2 times per day    heparin (porcine)  5,000 Units SubCUTAneous 3 times per day    levothyroxine  25 mcg Oral Daily    pantoprazole  40 mg IntraVENous Daily    [Held by provider] insulin lispro  0-6 Units SubCUTAneous TID WC    [Held by provider] insulin lispro  0-3 Units SubCUTAneous Nightly    vancomycin (VANCOCIN) intermittent dosing (placeholder)   Other RX Placeholder       Continuous Infusions:   sodium chloride      sodium chloride 25 mL (12/15/21 0348)    propofol Stopped (12/12/21 0714)       PRN Meds:  sodium chloride, zolpidem, sodium chloride flush, sodium chloride, ondansetron **OR** ondansetron, polyethylene glycol, acetaminophen **OR** acetaminophen, glucose, dextrose, glucagon (rDNA), heparin (porcine)      Assessment:     Mult med problems including CRF, chronic pancreatitis, hx Guillain [place] syn     Encephalopathy  Seizure   Hypoglycemia      COVID-19 infection     BC x 1 with CNS - contaminant  BC x 2 viridans Streptococcus - unclear significance    - R fem HD line       Plan:     Cont vancomycin - intermittent dosing due to RF  Will f/u on BC results (asked Micro 12/14 to w/u both sets [facility] - ID / sensitivities)  Confer with Renal regarding HD access  Check Echo    Medical Decision Making:   The following items were considered in medical decision making:  Discussion of patient care with other providers  Reviewed clinical lab tests  Reviewed radiology tests  Reviewed other diagnostic tests/interventions  Microbiology cultures and other micro tests reviewed      Discussed with pt  Valerie Mohamud MD

## 2021-12-15 NOTE — FLOWSHEET NOTE
Pt tolerated tx. No meds given - Vanc level was 28.1 this am.  Dr Ernie Leon saw pt on machien. Net Fld removed 1000 mls    Pre WT: 82 kgs  Post WT: 81 kgs      R femoral CVC w/good blood flow. Report given to RN. Copy of tx sheets on chart f or scanning into EMR.

## 2021-12-15 NOTE — PROGRESS NOTES
Kathya Cast Wound Ostomy Continence Nurse  Follow-up Progress Note       NAME:  Diana Bourgeois  MEDICAL RECORD NUMBER:  [id number]  AGE:  49 y.o. GENDER:  male  :  1970  TODAY'S DATE:  12/15/2021    Subjective:   Wound Identification: R Buttock Stage 2  Wound Type: pressure  Contributing Factors: diabetes, chronic pressure, decreased mobility, shear force, arterial insufficiency and peripheral neuropathy, dialysis        Patient Goal of Care:  [x] Wound Healing  [] Odor Control  [] Palliative Care  [] Pain Control   [] Other:     Objective:    /75   Pulse 76   Temp 97.2 °F (36.2 °C) (Oral)   Resp 16   Wt 178 lb 9.2 oz (81 kg)   SpO2 100%   BMI 25.62 kg/m²   Yang Risk Score: Yang Scale Score: 17  Assessment: R Buttock - wound bed pink/red, loose skin   Measurements:  Pressure Ulcer 14  Outer;Right (Active)   Number of days: 8332       Wound 07/09/10 Other (Comment) Toe (Comment  which one) Anterior;Right toe bent down, pt states bumped. ? friction from shoe. (Active)   Number of days: 6037       Wound 07/09/10 Other (Comment) Toe (Comment  which one) Anterior; Left granulation tissue center, toe bent down permanently. open area at joint (Active)   Number of days: 4176       Wound 14 Diabetic Ulcer Foot Dorsal white/pink tissue, drainage, diabetic ulcer (Active)   Number of days: 2685       Incision Foot Left (Active)   Number of days:        Wound 21 Foot Dorsal; Right (Active)   Wound Image   21 145   Wound Etiology Diabetic 21 145   Dressing Status Clean; Dry; Intact 12/15/21 1118   Wound Cleansed Not Cleansed 21 145   Dressing/Treatment Hydrating gel;  Foam 21 145   Dressing Change Due 21 145   Wound Length (cm) 6 cm 21 145   Wound Width (cm) 3.5 cm 21 145   Wound Depth (cm) 0.1 cm 21 145   Wound Surface Area (cm^2) 21 cm^2 21   Change in Wound Size % (l*w) -16.67 21 1450   Wound Volume (cm^3) 2.1 cm^3 12/13/21 1450   Wound Healing % -17 12/13/21 1450   Wound Assessment Dry 12/13/21 1450   Drainage Amount None 12/13/21 1450   Drainage Description Yellow 12/12/21 0400   Odor None 12/13/21 1450   Louisa-wound Assessment Dry/flaky 12/15/21 1118   Margins Defined edges 12/15/21 1118   Number of days: 33       Wound 12/15/21 Buttocks Right (Active)   Wound Image   12/15/21 1503   Wound Etiology Pressure Stage  2 12/15/21 1503   Dressing Status New drainage noted; New dressing applied 12/15/21 1503   Wound Cleansed Not Cleansed 12/15/21 1503   Dressing/Treatment Pharmaceutical agent (see MAR); Foam 12/15/21 1503   Dressing Change Due 12/15/21 12/15/21 1503   Wound Length (cm) 6 cm 12/15/21 1503   Wound Width (cm) 2.5 cm 12/15/21 1503   Wound Depth (cm) 0.1 cm 12/15/21 1503   Wound Surface Area (cm^2) 15 cm^2 12/15/21 1503   Wound Volume (cm^3) 1.5 cm^3 12/15/21 1503   Wound Assessment Pink/red 12/15/21 1503   Drainage Amount Scant 12/15/21 1503   Drainage Description Serosanguinous 12/15/21 1503   Odor None 12/15/21 1503   Louisa-wound Assessment Dry/flaky; Fragile 12/15/21 1503   Number of days: 0   R Buttock:        Response to treatment:  Well tolerated by patient. Pain Assessment:  Severity:  0 / 10  Quality of pain: N/A  Wound Pain Timing/Severity: none  Premedicated: No  Plan:   Plan of Care: Wound 12/15/21 Buttocks Right-Dressing/Treatment: Pharmaceutical agent (see MAR), Foam (Venelex)  Wound 11/11/21 Foot Dorsal; Right-Dressing/Treatment: Hydrating gel, Foam   Recommendation: Clean with NS, dry periwound, Venelex BID, foam dressing  Reposition pt every 2 hours  Call Wound Care for deterioration [phone]    Specialty Bed Required : Yes   [x] Low Air Loss   [x] Pressure Redistribution  [] Fluid Immersion  [] Bariatric  [] Total Pressure Relief  [] Other:     Current Diet: ADULT DIET;  Regular; 4 carb choices (60 gm/meal)  Dietician consult:  No    Discharge Plan:  Placement for patient upon discharge: skilled nursing   Patient appropriate for Outpatient [address] [facility]: Yes    Referrals:  [x]   [] [facility]  [] Supplies  [] Other    Patient/Caregiver Teaching:  Level of patient/caregiver understanding able to:   [] Indicates understanding       [] Needs reinforcement  [] Unsuccessful      [] Verbal Understanding  [] Demonstrated understanding       [] No evidence of learning  [] Refused teaching         [] N/A       Electronically signed by Michelle Pichardo RN, Maci Lopez on 12/15/2021 at 3:04 PM

## 2021-12-15 NOTE — PROGRESS NOTES
Unable to give PRBCs in HD. 6S Charge RN off floor, can't find another RN. Attempted x 4 t oget a hold of Primary RN - no answer from [phone]. Blood returned to Blood Bank.

## 2021-12-15 NOTE — CONSULTS
[address] [place] [address]  GI Progress Note      Patient: Rogelio Singh  : 1970       Date:  12/15/2021    Subjective:       History of Present Illness  Patient is a 49 y.o.  male admitted with Status epilepticus ([facility] [address] Edi Menchaca who is seen in consult for acute anemia with positive FOBT. PMH as below notable for T1DM, ESRD on HD, Pancreatitis, Covid Pna (November), Guillan barre who presented with AMS. Patient was unresponsive and EMS administered Narcan and glucagon. On admission patient was noted to have seizures and was given, Versed, Keppra and Ketamine. Patient presented one episode of emesis with aspiration. Patient was intubated with propofol and had no new seizure episodes since. Patient was hypoglycemic(Glu 35) , D50 and glucagon were given. Trop 0.07. Hb 8. VBG 7.49/36/155. Patient was started con cvEEG. Patient has chronic pancreatitis history with 2 DM. Pancritis likely 2/2 immunosuppressants post renal tx. Patient baseline condition is neurologically intact, uses a wheelchair. Patient was admitted to the ICU for further workup and management of status epilepticus. The day after admission his Hb drop from 8 to 6.5. Patient did not have any episode of blood per NG tube or melena. 1 unit of RBC was transfused. Patient did not have any new episode of seizure and was transferred to the [facility]. Patient Hb was stable ~7. Today dropped to 6.7 and another unit of blood was transfused. Iron studies consistent with chronic disease anemia. INR normal. Patient FOBT was positive.              Past Medical History:   Diagnosis Date    Blood transfusion     Chronic pain     Diabetes mellitus ([facility] [address]     Dialysis     tues-thur-sat    Guillain [place] syndrome      after flu shot    Hemodialysis patient ([facility] [address]     Low blood pressure     Pancreatitis     Peripheral Neuropathy     Pneumonia 2021    Renal failure     on dialysis 3x/wk  (, , Sat)    Status epilepticus ([facility] [address] 12/11/2021      Past Surgical History:   Procedure Laterality Date    CHOLECYSTECTOMY      DIALYSIS FISTULA CREATION      left arm/currently non functioning    KIDNEY TRANSPLANT  9/1994    R-kidney    TOE SURGERY  10-8-2010    cut and realign 1st, 2nd, toe left foot , fixation 1st, 2nd toe left foot    TUNNELED VENOUS CATHETER PLACEMENT      left chest for dialysis    TUNNELED VENOUS PORT PLACEMENT        Past Endoscopic History: 9/21/18  endoscopy-esophageal varices, gastritis, gastric outlet obstruction. Admission Meds  No current facility-administered medications on file prior to encounter.      Current Outpatient Medications on File Prior to Encounter   Medication Sig Dispense Refill    Dextromethorphan-guaiFENesin  MG/5ML SYRP Take 5 mLs by mouth every 4 hours as needed for Cough      bisacodyl (DULCOLAX) 10 MG suppository Place 10 mg rectally daily      ferrous sulfate (IRON 325) 325 (65 Fe) MG tablet Take 325 mg by mouth 2 times daily      levothyroxine (SYNTHROID) 25 MCG tablet Take 25 mcg by mouth Daily      loperamide (LOPERAMIDE A-D) 2 MG tablet Take 2 mg by mouth as needed for Diarrhea After each loose stool      magnesium hydroxide (MILK OF MAGNESIA) 400 MG/5ML suspension Take 30 mLs by mouth daily as needed for Constipation      Multiple Vitamins-Minerals (THERAPEUTIC MULTIVITAMIN-MINERALS) tablet Take 1 tablet by mouth daily      ondansetron (ZOFRAN) 4 MG tablet Take 4 mg by mouth every 8 hours as needed for Nausea or Vomiting      sevelamer (RENVELA) 800 MG tablet Take 1 tablet by mouth 3 times daily (with meals)      insulin glargine (LANTUS;BASAGLAR) 100 UNIT/ML injection pen Inject 10 Units into the skin nightly 5 pen 3    midodrine (PROAMATINE) 2.5 MG tablet Take 1 tablet by mouth 3 times daily (with meals) 90 tablet 3    ondansetron (ZOFRAN ODT) 4 MG disintegrating tablet Take 1 tablet by mouth every 8 hours as needed for Nausea 20 tablet 0    pantoprazole (PROTONIX) 40 MG tablet Take 40 mg by mouth 2 times daily       Promethazine HCl 6.25 MG/5ML SOLN Take 12.5 mg by mouth 2 times daily as needed.  temazepam (RESTORIL) 30 MG capsule Take 30 mg by mouth nightly as needed. Patient denies ASA, NSAID use. Allergies  Allergies   Allergen Reactions    Influenza Virus Vaccine      Caused paralysis      Social   Social History     Tobacco Use    Smoking status: Never Smoker    Smokeless tobacco: Never Used   Substance Use Topics    Alcohol use: No        Family History   Problem Relation Age of Onset    Diabetes Mother       No family history of colon cancer, Crohn's disease, or ulcerative colitis. Review of Systems  Gastrointestinal: negative for abdominal pain, change in bowel habits, jaundice, melena, nausea, reflux symptoms and vomiting  Neurological: positive for seizures       Physical Exam    /75   Pulse 76   Temp 97.2 °F (36.2 °C) (Oral)   Resp 16   Wt 178 lb 9.2 oz (81 kg)   SpO2 100%   BMI 25.62 kg/m²   General appearance: alert, cooperative, no distress, appears stated age  Anicteric, No Jaundice  Head: Normocephalic, without obvious abnormality  Lungs: clear to auscultation bilaterally, Normal Effort  Heart: regular rate and rhythm, normal S1 and S2, no murmurs or rubs  Abdomen: soft, non-tender; bowel sounds normal; no masses,  no organomegaly  Extremities: atraumatic, no cyanosis or edema  Skin: warm and dry  Neuro: intact  AAOX3      Data Review:    Recent Labs     12/13/21 0445 12/13/21 0445 12/14/21 0612 12/14/21  1934 12/15/21  0317 12/15/21  0635 12/15/21  1048   WBC 3.4*  --  3.0*  --   --  2.9*  --    HGB 7.0*   < > 7.1*   < > 7.9* 6.7* 7.9*   HCT 21.1*   < > 21.9*   < > 24.6* 20.3* 23.9*   MCV 75.8*  --  75.7*  --   --  74.7*  --    PLT 92*  --  104*  --   --  117*  --     < > = values in this interval not displayed.      Recent Labs     12/13/21  0445 12/14/21  0612 12/15/21  0635   * 134* 136   K 4.4 4.3 4.3   CL 97* 97* 97*   CO2 22 22 23   BUN 39* 27* 33*   CREATININE 5.6* 4.4* 5.4*     No results for input(s): AST, ALT, ALB, BILIDIR, BILITOT, ALKPHOS in the last 72 hours. No results for input(s): LIPASE, AMYLASE in the last 72 hours. No results for input(s): PROTIME, INR in the last 72 hours. No results for input(s): PTT in the last 72 hours. No results for input(s): OCCULTBLD in the last 72 hours. Imaging Studies:                            Ultrasound:  none recent              CT-scan of abdomen and pelvis: none recent                 Assessment:     Active Problems:  · Status epilepticus (HCC)  · Acute anemia with +FOBT  -Drop in Hb levels from 8 to 6 in one day  -Iron studies consistent with ACD  -Neg melena, hematemesis, hemoptysis   -FOBT+    Recommendations:   Acute anemia with +FOBT  -Consider EGD/Colonoscopy  -Protonix IV   -F/u HH transfuse if <7    Thank you for the opportunity to participate in [address] [place] [address].     W/D/W/A Dr. Hammad Vegas, PGY1  12/15/2021  2:22 PM

## 2021-12-15 NOTE — PROGRESS NOTES
Hospitalist Progress Note      PCP: Erine Flores MD    Date of Admission: 12/11/2021    Chief Complaint: [address] Course: Radha Garcia is a 49 y.o. male with PMH as below notable for T1DM, ESRD on HD, Pancreatitis, Covid Pna (November), Guillan barre who presented with AMS. Patient had withnessed seizures and found to have hypoglycemia. Subjective:   Seen and examined  Overnight with multiple episodes of blood in stool  GI consulted  Getting a unit of blood    Medications:  Reviewed    Infusion Medications    sodium chloride      sodium chloride 25 mL (12/15/21 0348)    propofol Stopped (12/12/21 0714)     Scheduled Medications    Venelex   Topical BID    midodrine  5 mg Oral BID WC    sodium chloride flush  5-40 mL IntraVENous 2 times per day    heparin (porcine)  5,000 Units SubCUTAneous 3 times per day    levothyroxine  25 mcg Oral Daily    pantoprazole  40 mg IntraVENous Daily    [Held by provider] insulin lispro  0-6 Units SubCUTAneous TID WC    [Held by provider] insulin lispro  0-3 Units SubCUTAneous Nightly    vancomycin (VANCOCIN) intermittent dosing (placeholder)   Other RX Placeholder     PRN Meds: sodium chloride, zolpidem, sodium chloride flush, sodium chloride, ondansetron **OR** ondansetron, polyethylene glycol, acetaminophen **OR** acetaminophen, glucose, dextrose, glucagon (rDNA), heparin (porcine)      Intake/Output Summary (Last 24 hours) at 12/15/2021 1740  Last data filed at 12/15/2021 1340  Gross per 24 hour   Intake 600 ml   Output 1300 ml   Net -700 ml       Exam:    /75   Pulse 76   Temp 97.2 °F (36.2 °C) (Oral)   Resp 16   Wt 178 lb 9.2 oz (81 kg)   SpO2 100%   BMI 25.62 kg/m²     General appearance: Ill-appearing  HEENT Normal cephalic, atraumatic without obvious deformity. Pupils equal, round, and reactive to light. Extra ocular muscles intact. Conjunctivae/corneas clear. Neck: Supple, No jugular venous distention/bruits.   Trachea midline without thyromegaly  Lungs: diminished breath sounds with scattered Rales at bases bilaterally  Heart: Regular rate and rhythm with Normal S1/S2 without murmurs, rubs   Abdomen: Soft, non-tender or non-distended without rigidity or guarding and positive bowel sounds all four quadrants. Extremities: Contracted right upper extremity; range of motion could not be obtained as patient is wheelchair-bound  Skin: Skin color, texture, turgor normal.  No rashes or lesions. Neurologic: Alert and oriented X 3, neurovascularly intact with sensory/motor intact upper extremities/lower extremities, bilaterally. Cranial nerves: II-XII intact, grossly non-focal.  Psychiatry: Appropriate affect. Not agitated  Skin: Dry, scaly, scattered wounds on bilateral feet  Brisk capillary refill, peripheral pulses palpable          Labs:   Recent Labs     12/13/21 0445 12/13/21 0445 12/14/21  0612 12/14/21  1934 12/15/21  0635 12/15/21  1048 12/15/21  1457   WBC 3.4*  --  3.0*  --  2.9*  --   --    HGB 7.0*   < > 7.1*   < > 6.7* 7.9* 8.1*   HCT 21.1*   < > 21.9*   < > 20.3* 23.9* 24.8*   PLT 92*  --  104*  --  117*  --   --     < > = values in this interval not displayed. Recent Labs     12/13/21 0445 12/14/21  0612 12/15/21  0635   * 134* 136   K 4.4 4.3 4.3   CL 97* 97* 97*   CO2 22 22 23   BUN 39* 27* 33*   CREATININE 5.6* 4.4* 5.4*   CALCIUM 6.8* 7.2* 7.3*     No results for input(s): AST, ALT, BILIDIR, BILITOT, ALKPHOS in the last 72 hours. No results for input(s): INR in the last 72 hours. No results for input(s): Todd Shorts in the last 72 hours. Studies:  MRI BRAIN WO CONTRAST   Final Result   1. No acute infarction, recent hemorrhage, or intracranial mass. 2.  A few nonspecific T2/flair hyperintensities in the subcortical white matter. Findings may be related to chronic small vessel ischemic disease sequela of prior injury or infection, or less likely a demyelinating process.    3.  Metabolic bone disease. Assessment/Plan:    Active Hospital Problems    Diagnosis Date Noted    Status epilepticus ([facility] [address] [S03.054] 12/11/2021     1. Sepsis  Strep bacteremia, possible PNA  Likely secondary to wounds  Covid testing sent however patient actually had Covid recently so may test positive  On Unasyn and vancomycin  MRSA probe    Hypoglycemia  Likely due to sepsis  Has been on D10    Diabetes  Hx of brittle diabetes    Seizures  Received Keppra, Versed, Ketamine  Intubated for airway protection  Was then extubated on 12/12/21  Neurology following  MRI pending  Stopped kepra as unprovoked seizures  CK    Acute on chronic anemia, microcytic  Hx of pancytopenia   Close follow Hgb  FOBT positive  GI Consulted, planning for scope    ESRD on HD    DVT Prophylaxis: heparin  Diet: ADULT DIET;  Clear Liquid  Code Status: Full Code    PT/OT Eval Status:     Dispo - Inpatient, pending improvement in GI bleed

## 2021-12-15 NOTE — PLAN OF CARE
Problem: Gas Exchange - Impaired  Goal: Absence of hypoxia  Outcome: Ongoing  Note: O2 saturations WNL     Problem: Falls - Risk of:  Goal: Will remain free from falls  Description: Will remain free from falls  Outcome: Ongoing  Note: Fall precautions are in place. Pt is in bed with bed alarm on. Using call light appropriately. Call light and belongings are within reach.

## 2021-12-15 NOTE — PROGRESS NOTES
Secure message sent to Dr. Gia Stuart that h/h 3.7/20.3 and patient in dialysis and Dr. Enrique Arriola ordered a type and screen.

## 2021-12-15 NOTE — PROGRESS NOTES
Physician Progress Note      PATIENT:               Alexis Ng  CSN #:                  [id number]  :                       1970  ADMIT DATE:       2021 6:50 PM  [address] DATE:  RESPONDING  PROVIDER #:        Vangie Sands MD          QUERY TEXT:    Pt admitted with AMS 2/2 presumed seizures and has diffuse encephalopathy   documented throughout the chart. If possible, please document in progress   notes and discharge summary further specificity regarding the type of   encephalopathy:    The medical record reflects the following:  Risk Factors: 49 y.o. male with PNA, ESRD, DM type 1, possible aspiration, +   Covid in  and + Covid 21  Clinical Indicators: AMS upon [facility] presentation prior to transfer to   [facility], already intubated and sedated  Treatment: Neurolgy consult, Keppra, safety precautions  Options provided:  -- Encephalopathy likely postictal r/t seizures  -- Metabolic encephalopathy  -- Encephalopathy r/t meds  -- Other - I will add my own diagnosis  -- Disagree - Not applicable / Not valid  -- Disagree - Clinically unable to determine / Unknown  -- Refer to Clinical Documentation Reviewer    PROVIDER RESPONSE TEXT:    This patient has encephalopathy likely postictal r/t seizures.     Query created by: Humberto Haque on 2021 5:20 PM      Electronically signed by:  Vangie Sands MD 12/15/2021 1:30 PM

## 2021-12-15 NOTE — PROGRESS NOTES
Clinical Pharmacy Progress Note    Vancomycin - Management by Pharmacy    Consult Date(s): 12/11/21  Consulting Provider(s): Casandra Shin MD    Assessment / Plan    HAP - Vancomycin   Concurrent Antimicrobials: Unasyn - day #4    Day of Vanc Therapy: # 4   Current Dosing Method: Intermittent Dosing by Levels   Therapeutic Goal: ~15 mg/L   Current Dose / Frequency: Intermittent dosing with HD   Plan / Rationale:   o On HD MWF  (home schedule is TTS)  o Last HD was 12/13; vancomycin 1250 mg given after HD after level 21.4 mg/L  o Random level this am = 28.1mg/L. Will not need re-dose after HD today  o Plan to obtain level prior to Friday HD    Will continue to monitor clinical condition and make adjustments to regimen as appropriate. Thanks for consulting pharmacy! Please call with questions:    Rashel Silveira. D. BCPS    12/15/2021 7:17 AM  [phone] (wireless)  [phone]  (office)      Interval update:  Remains afebrile. Blood cultures are growing Strep viridans and coag neg staph. Plan to discharge to SNF today or tomorrow    Subjective/Objective: Mr. Tammy Alvarado is a 49 y.o. male with a PMHx significant for T1DM, ESRD on IHD, pancreatitis, covid PNA (in Nov 2021), Juliana Few, admitted from assisted living facility for AMS with seizures upon presentation to ED. Pharmacy has been consulted to manage vancomycin.     Height:   Ht Readings from Last 1 Encounters:   12/11/21 5' 10\" (1.778 m)     Weight:    Wt Readings from Last 1 Encounters:   12/13/21 179 lb 14.3 oz (81.6 kg)       Current & Prior Antimicrobial Regimen(s):    (12/11-12/12)   Ampicillin/sulbactam (12/12-current)   Vancomycin (12/11-current)    Level(s) / Doses:  Date Time Dose Level / Type of Level Interpretation   12/12 04:50 1250mg IV x1 Random = 10.7 mcg/mL Re-dose with 1500mg IV x1   12/13 04:45 1500mg IV x 1 Random = 21.4 mcg/mL Re-dose with 1250 mg IV x 1   12/15 06:35  Random= 28.1mcg/mL No dose with HD today   Note: Serum levels collected for AUC-based dosing may be high if collected in close proximity to the dose administered. This is not necessarily an indicator of toxicity. Cultures & Sensitivities:    Date Site Micro Susceptibility / Result   12/11  Blood x2 Strep viridans and coag neg staph Pending   12/11 COVID Positive    12/11 Strep pneumoniae antigen  sent    12/11 Legionella antigen  sent    12/11 Respiratory  sent    12/12 MRSA nasal  sent      Labs / Ancillary Data:    Estimated Creatinine Clearance: 21 mL/min (A) (based on SCr of 4.4 mg/dL (H)).     Recent Labs     12/13/21  0445 12/14/21  0612   CREATININE 5.6* 4.4*   BUN 39* 27*   WBC 3.4* 3.0*

## 2021-12-15 NOTE — PROGRESS NOTES
PROCEDURE:  Patient seen on hemodialysis at 9:11 AM    PHYSICIAN:  Cielo Ann MD    INDICATION:  End-stage renal disease    Wt Readings from Last 3 Encounters:   12/15/21 180 lb 12.4 oz (82 kg)   12/11/21 170 lb (77.1 kg)   11/30/21 170 lb (77.1 kg)     Temp Readings from Last 3 Encounters:   12/15/21 97.9 °F (36.6 °C) (Oral)   11/30/21 96.4 °F (35.8 °C) (Oral)   11/24/21 97.6 °F (36.4 °C)     BP Readings from Last 3 Encounters:   12/15/21 (!) 101/54   12/11/21 115/82   11/30/21 110/72     Pulse Readings from Last 3 Encounters:   12/15/21 78   12/11/21 72   11/30/21 84      In: 50 [P.O.:50]  Out: -      CBC:   Recent Labs     12/13/21 0445 12/13/21 0445 12/14/21  0612 12/14/21  0612 12/14/21  1934 12/15/21  0317 12/15/21  0635   WBC 3.4*  --  3.0*  --   --   --  2.9*   HGB 7.0*   < > 7.1*   < > 7.2* 7.9* 6.7*   PLT 92*  --  104*  --   --   --  117*    < > = values in this interval not displayed. BMP:    Recent Labs     12/13/21 0445 12/14/21  0612 12/15/21  0635   * 134* 136   K 4.4 4.3 4.3   CL 97* 97* 97*   CO2 22 22 23   BUN 39* 27* 33*   CREATININE 5.6* 4.4* 5.4*   GLUCOSE 100* 91 78     BMD:   Lab Results   Component Value Date    CALCIUM 7.3 (L) 12/15/2021    CAION 0.97 (L) 12/12/2021    PHOS 6.9 (H) 12/12/2021       Iron:     Lab Results   Component Value Date    IRON 106 11/12/2021    TIBC 133 (L) 11/12/2021    FERRITIN 1,623.0 (H) 11/12/2021            RX:  See dialysis flowsheet for specifics on access, blood flow rate, dialysate baths, duration of dialysis, anticoagulation and other technical information.     COMMENTS:      Cielo Ann MD, MD  Cell - [phone]  Pager -  [phone]

## 2021-12-15 NOTE — CARE COORDINATION
Social Work Note                    Date: 12/15/2021     Patient Name: Leonardo Lopez    Date of Admission: 12/11/2021  6:50 PM  YOB: 1970    Length of Stay: 4  GMLOS: 4.3           Diagnosis:Status epilepticus ([address] [M42.198]     ________________________________________________________________________________________  Discharge Plan: SNF: [address]: Payor: Marie Graves / Plan: MEDICARE PART A AND B / Product Type: *No Product type* /   Pre-cert needed for placement?: n/a  Pre-cert initiated?: n/a     Tentative ischarge date: 12/16 vs 12/17     Current barriers: Medical Clearance, GI consult     Referrals completed: SNF: [facility]     Resources/information provided: SNF List   ________________________________________________________________________________________  PT AM-PAC: 15 / 24     OT AM-PAC: 16 / 24                                   Pending     DME Needs for discharge: n/a   ________________________________________________________________________________________  Notes  SW rounded on this date. HD today. Blood products provided today in HD. GI consulted, possible leoncio for EGD/Colonoscopy. No rehab consult placed. Plan for patient is to return to SNF at this time.      Patient and/or family were provided with choice of provider?:  Yes     Patient and/or family are in agreement with the discharge plan at this time?: yes     Care Transition Patient: No    SHERITA Dutton  Social Work  [facility]  Ph: [phone]

## 2021-12-15 NOTE — PROGRESS NOTES
Pt a/o x3, disoriented to situation. VSS. Denies pain. Two large BMs this shift. Mepilex to coccyx changed with each BM. Fall precautions are in place.

## 2021-12-16 NOTE — PROGRESS NOTES
Occupational Therapy  Facility/Department: [facility] 5T ORTHO/NEURO  Daily Treatment Note  NAME: Peace Cardoza  : 1970  MRN: [id number]    Date of Service: 2021    Discharge Recommendations:    Peace Cardoza scored a 17/24 on the AM-PAC ADL Inpatient form. Current research shows that an AM-PAC score of 17 or less is typically not associated with a discharge to the patient's home setting. Based on the patient's AM-PAC score and their current ADL deficits, it is recommended that the patient have 5-7 sessions per week of Occupational Therapy at d/c to increase the patient's independence. At this time, this patient demonstrates the endurance, and/or tolerance for 3 hours of therapy each day, with a treatment frequency of 5-7x/wk. Please see assessment section for further patient specific details. If patient discharges prior to next session this note will serve as a discharge summary. Please see below for the latest assessment towards goals. OT Equipment Recommendations  Equipment Needed: No  Other: defer to next level of care. Assessment   Performance deficits / Impairments: Decreased endurance; Decreased functional mobility ; Decreased ADL status  Assessment: Patient demonstrating increased strength and activity tolerance from previous session, with abiltiy to complete stand step transfer with RW from bed<>chair and min-mod assist x1. Patient with inconsistent reports of baseline function, at one time stating he was walking at [facility] for functional distances and at another point stating he is w/c bound and is waiting for power chair to be delivered. Patient hoping to d/c to ARU; per chart, plan is to return to SNF. Would benefit from continued IP OT services in order to maximize functional indpendence with ADLs and functional transfers. Cont OT per POC. Treatment Diagnosis: decreased functional transfers / ADLs / mobility 2/2  recent medical issues.   Prognosis: Fair  OT Education: Plan of Care; OT Role; ADL Adaptive Strategies; Transfer Training  REQUIRES OT FOLLOW UP: Yes  Activity Tolerance  Activity Tolerance: Patient Tolerated treatment well  Safety Devices  Safety Devices in place: Yes  Type of devices: Left in chair; Nurse notified; Call light within reach; Chair alarm in place         Patient Diagnosis(es): There were no encounter diagnoses. has a past medical history of Blood transfusion, Chronic pain, Diabetes mellitus ([facility] Dialysis, Guillain [place] syndrome, Hemodialysis patient ([facility] Low blood pressure, Pancreatitis, Peripheral Neuropathy, Pneumonia, Renal failure, and Status epilepticus ([facility] [address] [facility] has a past surgical history that includes Kidney transplant (9/1994); Cholecystectomy; Tunneled venous port placement; Dialysis fistula creation; Tunneled venous catheter placement; and Toe Surgery (10-8-2010). Restrictions  Position Activity Restriction  Other position/activity restrictions: Activity as tolerated  Subjective   General  Chart Reviewed: Yes  Additional Pertinent Hx: Admit 12/11 from [facility]( arrived from SNF ) with seizures - intubated and placed on cEEG, extubated on 12/12                                    PMHX: DM, ESRD on Hemodialysis since 1994,  Chronic pain, w/c level at baseline from GBS 2002 after flu shot. Recent COVID dx on 11/14/2021  Family / Caregiver Present: No  Diagnosis: Seizures  Subjective  Subjective: Patient in bed upon arrival, agreeable to OT OT services. Reporting that he hopes he will be able to go to [facility] at d/c; however, per chart, plan is to return to SNF. Vital Signs  Patient Currently in Pain: No   Orientation  Orientation  Overall Orientation Status: Within Functional Limits (per conversation, not formally assessed)  Objective    ADL  Grooming: Setup (oral care while seated; able to open and manage toothbrush, paste and rinse water)  LE Dressing: Verbal cueing;  Moderate assistance (v/c for modified technique for increased success, able to thread BLE with ++ time and multiple attempts. Assist required for socks and to pull up over hips)  Toileting:  (declined need)  Bed mobility  Supine to Sit: Stand by assistance (HOB elevated, use of rail, increased time to complete)  Transfers  Stand Step Transfers: Minimal assistance (stand step transfer with RW)  Sit to stand: Moderate assistance (- min assist from recliner. V/c for hand placement for improved leverage and increased safety)  Stand to sit: Minimal assistance  Cognition  Arousal/Alertness: Appropriate responses to stimuli  Following Commands:  Follows one step commands with increased time  Attention Span: Attends with cues to redirect  Memory: Appears intact  Safety Judgement: Decreased awareness of need for assistance  Insights: Decreased awareness of deficits     Plan   Plan  Times per week: 2-5x  Times per day: Daily  Current Treatment Recommendations: Functional Mobility Training, Safety Education & Training, Self-Care / ADL, Equipment Evaluation, Education, & procurement, Patient/Caregiver Education & Training, Strengthening    AM-PAC Score        AM-[facility] Inpatient Daily Activity Raw Score: 17 (12/16/21 [facility])  AM-PAC Inpatient ADL T-Scale Score : 37.26 (12/16/21 [facility])  ADL Inpatient CMS 0-100% Score: 50.11 (12/16/21 [facility])  ADL Inpatient CMS G-Code Modifier : CK (12/16/21 [facility])    Goals  Short term goals  Time Frame for Short term goals: at d/c  Short term goal 1: BSC / commode transfers via W/c with Supervision  Short term goal 2: Grooming with setup - goal met 12/16  Short term goal 3: LE Dressing with SBA and AE prn  Short term goal 4: New goal, added 12/16:  Patient will complete grooming task at RW level with MIN assist  Patient Goals   Patient goals : go to Rehab       Therapy Time   Individual Concurrent Group Co-treatment   Time In 0817         Time Out 0858         Minutes [id number] [facility], OTR/L #[id number]

## 2021-12-16 NOTE — PROGRESS NOTES
Clinical Pharmacy Progress Note    Vancomycin - Management by Pharmacy    Consult Date(s): 12/11/21  Consulting Provider(s): Yosi Hensley MD    Assessment / Plan    1. HAP - Vancomycin   Concurrent Antimicrobials: None    Day of Vanc Therapy: #5 -- continue through 12/29 per ID.  Current Dosing Method: Intermittent Dosing by Levels   Therapeutic Goal: ~15 mg/L   Current Dose / Frequency: Intermittent dosing with HD   Plan / Rationale:   o On HD MWF  (home schedule is TTS). o HD yesterday; no vanc dose given as AM level yesterday was 28.1 mcg/mL. o Planning for random level 12/17 AM.  Will re-dose tomorrow if level < 17-20 mcg/mL.  Will continue to monitor clinical condition and make adjustments to regimen as appropriate. Thanks for consulting pharmacy! Please call with questions:  Rolande Collet PharmD., BCPS   12/16/2021 1:58 PM  Wireless: [phone]      Interval update:  GI consulted for dropping hgb; planning for EGD + colonoscopy tomorrow. Per ID, will continue vanc with HD through 12/29. Subjective/Objective: Mr. Edna López is a 49 y.o. male with a PMHx significant for T1DM, ESRD on IHD, pancreatitis, covid PNA (in Nov 2021), [facility], admitted from assisted living facility for AMS with seizures upon presentation to ED. Pharmacy has been consulted to manage vancomycin.     Height:   Ht Readings from Last 1 Encounters:   12/11/21 5' 10\" (1.778 m)     Weight:    Wt Readings from Last 1 Encounters:   12/15/21 178 lb 9.2 oz (81 kg)       Current & Prior Antimicrobial Regimen(s):    (12/11-12/12)    (12/12-12/15)   Vancomycin (12/11-current)    Level(s) / Doses:  Date Time Dose Level / Type of Level Interpretation   12/12 04:50 1250mg IV x1 Random = 10.7 mcg/mL Re-dose with 1500mg IV x1   12/13 04:45 1500mg IV x 1 Random = 21.4 mcg/mL Re-dose with 1250 mg IV x 1   12/15 06:35  Random= 28.1mcg/mL No dose with HD today   12/17 0600  Random = ordered    Note: Serum levels collected for AUC-based dosing may be high if collected in close proximity to the dose administered. This is not necessarily an indicator of toxicity. Cultures & Sensitivities:    Date Site Micro Susceptibility / Result   12/11  Blood #1 Strep salivarious      Strep mitis R = TCN, I = Amp  S = vanc, ceftriaxone, levofloxacin, linezolid  R = Amp, ceftriaxone  S = vanc, linezolid, TCN, Levofloxacin   12/11 Blood #2 Strep salivarious  Strep mitis  Staph epi  Strep gordonii As above  As above  Pending  Pending   12/11 COVID Positive      Labs / Ancillary Data:    Estimated Creatinine Clearance: 20 mL/min (A) (based on SCr of 4.6 mg/dL (H)).     Recent Labs     12/14/21  0612 12/15/21  0635 12/16/21  0318   CREATININE 4.4* 5.4* 4.6*   BUN 27* 33* 25*   WBC 3.0* 2.9* 3.8*

## 2021-12-16 NOTE — PROGRESS NOTES
Physician Progress Note      PATIENT:               Chery North  CSN #:                  [id number]  :                       1970  ADMIT DATE:       2021 6:50 PM  [address] DATE:  RESPONDING  PROVIDER #:        Payton Clark MD          QUERY TEXT:    Patient admitted  with AMS 2/2 seizure. Noted documentation of intubation   for airway protection in H&P throughout present and respiratory failure in   H&P through . Please indicate one of the following and document in the   medical record: The medical record reflects the following:  Risk Factors: 49 y.o. male with AMS, seizure, PNA, ESRD, DM type1  Clinical Indicators: Admitted from [facility] ED already intubated, on proprofol  [place]  , [place]   Options provided:  -- Intubated for Acute Respiratory Failure as evidenced by, Please document   evidence. -- Intubated for airway protection only, Acute Respiratory Failure ruled out   after study  -- Other - I will add my own diagnosis  -- Disagree - Not applicable / Not valid  -- Disagree - Clinically unable to determine / Unknown  -- Refer to Clinical Documentation Reviewer    PROVIDER RESPONSE TEXT:    This patient was intubated for airway protection only, Acute Respiratory   Failure has been ruled out after study.     Query created by: Gen Zabala on 2021 4:53 PM      Electronically signed by:  Payton Clark MD 2021 9:51 AM

## 2021-12-16 NOTE — CARE COORDINATION
Social Work Note                    Date: 12/16/2021     Patient Name: Shahid Petersen    Date of Admission: 12/11/2021  6:50 PM  YOB: 1970    Length of Stay: 5  GMLOS: 4.3           Diagnosis:Status epilepticus ([address] [O58.839]     ________________________________________________________________________________________  Discharge Plan: SNF: [address] [place] [address] Drive  vs. ARU: Referral to [facility]       HD T,TH,Sat at [facility]. Insurance: Payor: MEDICARE / Plan: MEDICARE PART A AND B / Product Type: *No Product type* /   Pre-cert needed for placement?: n/a  Pre-cert initiated?: n/a     Tentative ischarge date: 12/16   Current barriers: Medical Clearance, GI consult     Referrals completed: SNF: [facility]  ARU: multiple    Resources/information provided: SNF List   ________________________________________________________________________________________  PT AM-PAC: 13 / 24     OT AM-PAC: 16 / 24                                      DME Needs for discharge: n/a   ________________________________________________________________________________________  Notes  SW rounded on this date and spoke with patient he is agreeable to SNF return, but would like to see if 1. [facility] 2.  [facility]; would accept him for acute rehab     Referral placed to [facility] at 5:02 pm    Referral placed to [facility] at 5:08 pm     Patient and/or family were provided with choice of provider?:  Yes     Patient and/or family are in agreement with the discharge plan at this time?: yes     Care Transition Patient: No    SHERITA Rossi  Social Work  [facility]  Ph: [phone]

## 2021-12-16 NOTE — PLAN OF CARE
Problem: Falls - Risk of:  Goal: Will remain free from falls  Description: Will remain free from falls  Outcome: Ongoing   Pt has been free from falls this shift, bed alarm on, bed in lowest position, 2/4 side rails up, nonskid socks on, wheels locked, bedside table and call light in reach. Encouraged pt to call out if needed anything. Problem: Skin Integrity:  Goal: Will show no infection signs and symptoms  Description: Will show no infection signs and symptoms  Outcome: Ongoing   Pt is being turned and repositioned q2h with pillow support. Pt has scattered abrasions. Stage 2 on coccyx, venelex ointment applied. Specialty mattress ordered. Will continue to assess skin integrity.

## 2021-12-16 NOTE — PROGRESS NOTES
Pt alert and oriented x4, VSS, IV ns locked in left neck. Neuro checks are WDL. Rectal tube came out and pt refusing new one be placed. Pt had a couple of loose/soft bms. Bed alarm on, bedside table and call light in reach.

## 2021-12-16 NOTE — PROGRESS NOTES
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    Hemodialysis patient ([address]     Low blood pressure     Pancreatitis     Peripheral Neuropathy     Pneumonia 11/11/2021    Renal failure 1992    on dialysis 3x/wk  (T, Th, Sat)    Status epilepticus ([address] 12/11/2021       Past Surgical History:   Procedure Laterality Date    CHOLECYSTECTOMY      DIALYSIS FISTULA CREATION      left arm/currently non functioning    KIDNEY TRANSPLANT  9/1994    R-kidney    TOE SURGERY  10-8-2010    cut and realign 1st, 2nd, toe left foot , fixation 1st, 2nd toe left foot    TUNNELED VENOUS CATHETER PLACEMENT      left chest for dialysis    TUNNELED VENOUS PORT PLACEMENT         Family History   Problem Relation Age of Onset    Diabetes Mother         reports that he has never smoked. He has never used smokeless tobacco. He reports that he does not drink alcohol and does not use drugs. Medications:        Allergies:  Influenza virus vaccine  Scheduled Meds:   polyethylene glycol  4,000 mL Oral Once    Venelex   Topical BID    midodrine  5 mg Oral BID WC    sodium chloride flush  5-40 mL IntraVENous 2 times per day    heparin (porcine)  5,000 Units SubCUTAneous 3 times per day    levothyroxine  25 mcg Oral Daily    pantoprazole  40 mg IntraVENous Daily    [Held by provider] insulin lispro  0-6 Units SubCUTAneous TID WC    [Held by provider] insulin lispro  0-3 Units SubCUTAneous Nightly    vancomycin (VANCOCIN) intermittent dosing (placeholder)   Other RX Placeholder     Continuous Infusions:   sodium chloride      sodium chloride 25 mL (12/15/21 1718)       Review of Systems:     All systems reviewed but were negative except as mentioned in HPI    Objective:       Vitals:  /81   Pulse 71   Temp 97.3 °F (36.3 °C) (Axillary)   Resp 16   Wt 178 lb 9.2 oz (81 kg)   SpO2 96%   BMI 25.62 kg/m²   Constitutional:  awake, NAD  HEENT:  MMM, No icterus  Neck: no bruits, No JVD  Cardiovascular:  S1, S2 reg  Respiratory: fwe coarse sounds  Abdomen:  +BS, soft, NT, ND  Ext: + lower extremity edema  Psychiatric: mood and affect appropriate  Skin: no rash, turgor wnl  Access: right groin catheter. Previous AVF/AVG sites noted  CNS: lethargic, no agitation    Data:     Labs:  Hepatic:   No results for input(s): AST, ALT, ALB, BILITOT, ALKPHOS in the last 72 hours. BNP: No results for input(s): BNP in the last 72 hours. ABGs:   Recent Labs     12/14/21  2158   PHART 7.449   PO2ART 73.0*   [id number] 37.2       IMAGING:  MRI BRAIN WO CONTRAST   Final Result   1. No acute infarction, recent hemorrhage, or intracranial mass. 2.  A few nonspecific T2/flair hyperintensities in the subcortical white matter. Findings may be related to chronic small vessel ischemic disease sequela of prior injury or infection, or less likely a demyelinating process. 3.  Metabolic bone disease. Assessment :       1. ESRD     Access: Temp cath   Volume: Hypervolemic       2. Electrolytes/Acid base  Hypomagnesemia and No Dyskalemia    Recent Labs     12/16/21  0318   *   K 4.3   CO2 23   MG 1.90       3. BMD  -Hyperphosphatemia,     Recent Labs     12/16/21  0318   CALCIUM 7.7*       4. HTN  -Blood pressure low    BP Readings from Last 1 Encounters:   12/16/21 133/81       5.  Anemia  -anemia of CKD    Recent Labs 12/16/21  1119   HGB 7.5*     6. DM    6. seizures    PLAN :       - Maintenance HD. MWF   - minimize IVF  - MBD management  - Anemia management. Transfusion per guidelines  - No BP meds  - give midodrine  - Dose meds to GFR<10        Thank you for allowing us to participate in care of Diana Bourgeois . We will continue to follow. Feel free to contact me with any questions.       Tico Arreguin MD  12/16/2021    [facility] [address] [place] [address]  Office : [phone]  Fax :[phone]

## 2021-12-16 NOTE — PROGRESS NOTES
Physical Therapy  Facility/Department: [facility] 5T ORTHO/NEURO  Daily Treatment Note  NAME: Bairon Choi  : 1970  MRN: [id number]    Date of Service: 2021    Discharge Recommendations:  Bairon Choi scored a 13/24 on the AM-PAC short mobility form. Current research shows that an AM-PAC score of 17 or less is typically not associated with a discharge to the patient's home setting. Based on the patient's AM-PAC score and their current functional mobility deficits, it is recommended that the patient have 3-5 sessions per week of Physical Therapy at d/c to increase the patient's independence. Please see assessment section for further patient specific details. If patient discharges prior to next session this note will serve as a discharge summary. Please see below for the latest assessment towards goals. Patient would benefit from continued therapy after discharge   PT Equipment Recommendations  Equipment Needed:  (defer)    Assessment   Assessment: Pt able to perform stand pivot transfer today with RW & min to mod A. Progressing with endurance. Pt motivated & cooperative. Recommend further inpt PT upon D/C. Will follow per plan of care. Treatment Diagnosis: Decreased transfers associated with seizures. PT Education: Goals; Transfer Training; General Safety; Plan of Care  REQUIRES PT FOLLOW UP: Yes  Activity Tolerance  Activity Tolerance: Patient Tolerated treatment well     Patient Diagnosis(es): There were no encounter diagnoses. has a past medical history of Blood transfusion, Chronic pain, Diabetes mellitus ([address] Dialysis, Guillain [place] syndrome, Hemodialysis patient ([address] Low blood pressure, Pancreatitis, Peripheral Neuropathy, Pneumonia, Renal failure, and Status epilepticus ([address] has a past surgical history that includes Kidney transplant (1994); Cholecystectomy; Tunneled venous port placement; Dialysis fistula creation;  Tunneled venous catheter placement; and Toe Surgery (10-8-2010). Restrictions  Position Activity Restriction  Other position/activity restrictions: Activity as tolerated  Subjective   General  Chart Reviewed: Yes  Additional Pertinent Hx: Pt to [facility] ED 12/11 with seizure activity. Pt intubated in ED and transferred to ICU at [facility]. Pt placed on cvEEG; d/c'd 12/13. Neuology consult. Pt extubated 12/12. Transferred to [address] [place] [address] 12/14. PMH:  DM, ESRD on HD, Guilliam [place] syndrome 2002, peripheral neuropathy, PNA  Family / Caregiver Present: No  Subjective  Subjective: Pt supine in bed & agreeable to PT. Pain Screening  Patient Currently in Pain: Denies  Vital Signs  Patient Currently in Pain: Denies       Orientation     Cognition      Objective   Bed mobility  Supine to Sit: Stand by assistance (HOB elevated)  Scooting: Stand by assistance (seated at EOB or in chair)  Transfers  Sit to Stand: Moderate Assistance (from elevated bed, w/c, bs chair. verbal cues required.)  Stand to sit: Moderate Assistance (verbal cues required. poor eccentric control.)  Stand Pivot Transfers: Minimal Assistance; Moderate Assistance (bed->w/c, then w/c->bs chair with RW.)  Ambulation  Ambulation? :  (pt took a few steps with RW during each pivot transfer as above.  pt flexed at trunk, hips, & knees)  Propulsion 1  Level: Level Tile  Method: LLE; RLE  Level of Assistance: Supervision  Distance: 150 ft with 4 turns     Balance  Sitting - Static: Good  Standing - Static: Fair (pt stood for ~1 min x 2 with RW & CGA to min A)  Standing - Dynamic: Poor  Exercises  Quad Sets: x 10 bilat  Hip Flexion: x 15 bilat  Knee Long Arc Quad: x 15 bilat  Comments: bilat gastroc & HS stretches for 30 sec x 2                        G-Code     OutComes Score                                                     AM-PAC Score  AM-PAC Inpatient Mobility Raw Score : 13 (12/16/21 1626)  AM-PAC Inpatient T-Scale Score : 36.74 (12/16/21 1626)  Mobility Inpatient CMS 0-100% Score: 64.91 (12/16/21 1626)  Mobility Inpatient CMS G-Code Modifier : CL (12/16/21 1626)          Goals  Short term goals  Time Frame for Short term goals: Discharge  Short term goal 1: supine <> sit modified independent  Short term goal 2: w/c transfers with SBA  Patient Goals   Patient goals : \"to walk\"    Plan    Plan  Times per week: 2-5  Current Treatment Recommendations: Transfer Training, Functional Mobility Training, Strengthening, Gait Training  Safety Devices  Type of devices: Left in chair, Chair alarm in place, Call light within reach, Nurse notified     Therapy Time   Individual Concurrent Group Co-treatment   Time In 1459         Time Out 1543         Minutes [address] [place] [address], PT

## 2021-12-17 NOTE — PROGRESS NOTES
Physical Therapy  Attempt Note/No Treatment  Pt off floor for EGD. Will follow up per plan of care.     Lockie Bosworth, [facility], OTR/L #[id number]

## 2021-12-17 NOTE — CARE COORDINATION
Social Work Note                    Date: 12/17/2021     Patient Name: Sparkle Gonzales    Date of Admission: 12/11/2021  6:50 PM  YOB: 1970    Length of Stay: 6  GMLOS: 4.3           Diagnosis:Status epilepticus ([address] [N23.860]     ________________________________________________________________________________________  Discharge Plan. ARU: [facility].   [address]  Report: [phone]  Fax: [phone]    DC Saturday at 1:00 pm via [place] ambulance ([phone])     HD T,TH,Sat at [facility]. Insurance: Payor: MEDICARE / Plan: MEDICARE PART A AND B / Product Type: *No Product type* /   Pre-cert needed for placement?: n/a  Pre-cert initiated?: n/a     Tentative ischarge date: 12/16   Current barriers: Medical Clearance, GI consult     Referrals completed: SNF: [facility]  ARU: multiple    Resources/information provided: SNF List   ________________________________________________________________________________________  PT AM-PAC: 13 / 24     OT AM-PAC: 16 / 24                                      DME Needs for discharge: n/a   ________________________________________________________________________________________  Notes  9:15 AM  IBETH received call from Enrique at [facility] at 9:48 am He looks appropriate at this time. Vandana Began have a [place] bed available tomorrow. IBETH called and LVM with Carla in admissions at [facility]. At 10:05 HEIKE went to [place]. UPDATE: 10:53 Bina Brooks returned call, Clinical looks good. Waiting to hear back from [facility] if they can take another dialysis. Will know within the hour. [facility] returned call patient was a patient from them before. There were some concerns regarding a private vs a Semi-private before. It would be a private room this time.  SW to speak with patient     UPDATE: 1:03 pm   Patient has been accepted at [facility]. They can acccept patient tomorrow after morning HD.      Patient and/or family were provided with choice of provider?:  Yes     Patient and/or family are in agreement with the discharge plan at this time?: yes     Care Transition Patient: No    SHERITA Lo  Social Work  The [address]  Ph: [phone]

## 2021-12-17 NOTE — PROGRESS NOTES
Hospitalist Progress Note      PCP: Venkat Pal MD    Date of Admission: 12/11/2021    Chief Complaint: [address] Course: Nicci Stevens is a 49 y.o. male with PMH as below notable for T1DM, ESRD on HD, Pancreatitis, Covid Pna (November), Guillan barre who presented with AMS. Patient had withnessed seizures and found to have hypoglycemia. Subjective:   Seen and examined  For colonoscopy and endoscopy today  Overnight with hypoglycemia which has been corrected    Medications:  Reviewed    Infusion Medications    sodium chloride      sodium chloride 25 mL (12/15/21 0348)     Scheduled Medications    Venelex   Topical BID    midodrine  5 mg Oral BID WC    sodium chloride flush  5-40 mL IntraVENous 2 times per day    heparin (porcine)  5,000 Units SubCUTAneous 3 times per day    levothyroxine  25 mcg Oral Daily    pantoprazole  40 mg IntraVENous Daily    [Held by provider] insulin lispro  0-6 Units SubCUTAneous TID WC    [Held by provider] insulin lispro  0-3 Units SubCUTAneous Nightly    vancomycin (VANCOCIN) intermittent dosing (placeholder)   Other RX Placeholder     PRN Meds: zolpidem, sodium chloride, perflutren lipid microspheres, sodium chloride flush, sodium chloride, ondansetron **OR** ondansetron, polyethylene glycol, acetaminophen **OR** acetaminophen, glucose, dextrose, glucagon (rDNA), heparin (porcine)      Intake/Output Summary (Last 24 hours) at 12/17/2021 1250  Last data filed at 12/17/2021 0809  Gross per 24 hour   Intake 10 ml   Output --   Net 10 ml       Exam:    BP (!) 146/92   Pulse 68   Temp 97.9 °F (36.6 °C) (Oral)   Resp 16   Wt 178 lb 9.2 oz (81 kg)   SpO2 100%   BMI 25.62 kg/m²     General appearance: Ill-appearing  HEENT Normal cephalic, atraumatic without obvious deformity. Pupils equal, round, and reactive to light. Extra ocular muscles intact. Conjunctivae/corneas clear. Neck: Supple, No jugular venous distention/bruits.   Trachea midline without thyromegaly  Lungs: diminished breath sounds with scattered Rales at bases bilaterally  Heart: Regular rate and rhythm with Normal S1/S2 without murmurs, rubs   Abdomen: Soft, non-tender or non-distended without rigidity or guarding and positive bowel sounds all four quadrants. Extremities: Contracted right upper extremity; range of motion could not be obtained as patient is wheelchair-bound  Skin: Skin color, texture, turgor normal.  No rashes or lesions. Neurologic: Alert and oriented X 3, neurovascularly intact with sensory/motor intact upper extremities/lower extremities, bilaterally. Cranial nerves: II-XII intact, grossly non-focal.  Psychiatry: Appropriate affect. Not agitated  Skin: Dry, scaly, scattered wounds on bilateral feet  Brisk capillary refill, peripheral pulses palpable          Labs:   Recent Labs     12/15/21  0635 12/15/21  1048 12/16/21  0318 12/16/21  0318 12/16/21  1119 12/16/21  2023 12/17/21  0651   WBC 2.9*  --  3.8*  --   --   --  2.8*   HGB 6.7*   < > 7.8*   < > 7.5* 7.9* 7.7*   HCT 20.3*   < > 23.6*   < > 23.0* 23.9* 23.1*   *  --  122*  --   --   --  94*    < > = values in this interval not displayed. Recent Labs     12/15/21  0635 12/16/21  0318 12/17/21  0651    135* 135*   K 4.3 4.3 4.3   CL 97* 97* 97*   CO2 23 23 22   BUN 33* 25* 26*   CREATININE 5.4* 4.6* 4.8*   CALCIUM 7.3* 7.7* 7.7*     No results for input(s): AST, ALT, BILIDIR, BILITOT, ALKPHOS in the last 72 hours. No results for input(s): INR in the last 72 hours. No results for input(s): Curlie Lat in the last 72 hours. Studies:  MRI BRAIN WO CONTRAST   Final Result   1. No acute infarction, recent hemorrhage, or intracranial mass. 2.  A few nonspecific T2/flair hyperintensities in the subcortical white matter. Findings may be related to chronic small vessel ischemic disease sequela of prior injury or infection, or less likely a demyelinating process. 3.  Metabolic bone disease. Assessment/Plan:    Active Hospital Problems    Diagnosis Date Noted    Status epilepticus ([facility] [J11.946] 12/11/2021     1. Sepsis  Strep viridans bacteremia, possible PNA  Likely secondary to wounds  Was On Unasyn and vancomycin, now only on vancomycin with dialysis  Discharge continue for 2 weeks  Echo with  Thick valves, cannot exclude vegetation, discussed with Dr. Jose Menezes, SONJA is not needed, continue antibiotics as above    2.  Hypoglycemia  Likely due to sepsis  Has been on D10   will adjust insulin at d/c    Hx of brittle diabetes    Seizures  Received Keppra, Versed, Ketamine  Intubated for airway protection  Was then extubated on 12/12/21  Neurology following  MRI pending  Stopped kepra as unprovoked seizures  CK    Acute on chronic anemia, microcytic  Hx of pancytopenia   FOBT positive  GI Consulted, planning for scopes today  Transfuse for less than 8    ESRD on HD    DVT Prophylaxis: heparin  Diet: Diet NPO  Code Status: Full Code    PT/OT Eval Status:     Dispo - Inpatient, pending GI work-up today

## 2021-12-17 NOTE — PROCEDURES
[address] [place] [address]/[facility]  Colonoscopy Note    Patient: Parag Jacobsen  : 1970  Acct#:     Procedure: Colonoscopy     Date:  2021    Surgeon:  Deep Wilson MD    Referring Physician:  Erica Mnoge MD    Anesthesia: IV propofol, per anesthesia    EBL: <50 mL    Indications: This is a 49y.o. year old male who presents today with iron deficiency anemia. Procedure: An informed consent was obtained from the patient after explanation of indications, benefits, possible risks and complications of the procedure. The patient was then taken to the endoscopy suite, placed in the left lateral decubitus position, and the above IV anesthesia was administered. A digital rectal examination was performed and revealed negative without mass, lesions or tenderness. The Olympus PCFQ-H190 video colonoscope was placed in the patient's rectum under digital direction and advanced to the cecum. The cecum was identified by characteristic anatomy and ballottment. The prep was fair. Findings: The colon was grossly normal.     The scope was then withdrawn into the rectum and retroflexed. The retroflexed view of the anal verge and rectum demonstrates small hemorrhoids. The scope was straightened, the colon was decompressed and the scope was withdrawn from the patient. The patient tolerated the procedure well and was taken to the PACU in good condition. Biopsies: no       Impression:   1. Fair prep. 2. Grossly normal colon. Recommendations:  Return to hospital floor.      Dileep López MD  [facility]
difficult. Evidence of a previous gastrojejunostomy. Medium sliding hiatal hernia. One column of medium esophageal varices in the lower esophagus. Recommendations:   Colonoscopy today. Repeat CT A/P to evaluate compression.        Raciel Deal MD  [facility] [place]

## 2021-12-17 NOTE — PROGRESS NOTES
Hospitalist Progress Note      PCP: Jesse Lopez MD    Date of Admission: 12/11/2021    Chief Complaint: [address] Course: Jacqui Lovett is a 49 y.o. male with PMH as below notable for T1DM, ESRD on HD, Pancreatitis, Covid Pna (November), Guillan barre who presented with AMS. Patient had withnessed seizures and found to have hypoglycemia. Subjective:   Seen and examined  Overnight with multiple episodes of blood in stool  GI consulted  Getting a unit of blood    Medications:  Reviewed    Infusion Medications    sodium chloride      sodium chloride 25 mL (12/15/21 0348)     Scheduled Medications    Venelex   Topical BID    midodrine  5 mg Oral BID WC    sodium chloride flush  5-40 mL IntraVENous 2 times per day    heparin (porcine)  5,000 Units SubCUTAneous 3 times per day    levothyroxine  25 mcg Oral Daily    pantoprazole  40 mg IntraVENous Daily    [Held by provider] insulin lispro  0-6 Units SubCUTAneous TID WC    [Held by provider] insulin lispro  0-3 Units SubCUTAneous Nightly    vancomycin (VANCOCIN) intermittent dosing (placeholder)   Other RX Placeholder     PRN Meds: zolpidem, sodium chloride, perflutren lipid microspheres, sodium chloride flush, sodium chloride, ondansetron **OR** ondansetron, polyethylene glycol, acetaminophen **OR** acetaminophen, glucose, dextrose, glucagon (rDNA), heparin (porcine)      Intake/Output Summary (Last 24 hours) at 12/16/2021 1947  Last data filed at 12/15/2021 2015  Gross per 24 hour   Intake 60 ml   Output --   Net 60 ml       Exam:    /63   Pulse 69   Temp 97.6 °F (36.4 °C) (Axillary)   Resp 18   Wt 178 lb 9.2 oz (81 kg)   SpO2 96%   BMI 25.62 kg/m²     General appearance: Ill-appearing  HEENT Normal cephalic, atraumatic without obvious deformity. Pupils equal, round, and reactive to light. Extra ocular muscles intact. Conjunctivae/corneas clear. Neck: Supple, No jugular venous distention/bruits.   Trachea midline

## 2021-12-17 NOTE — ANESTHESIA PRE PROCEDURE
Department of Anesthesiology  Preprocedure Note       Name:  Suman Newton   Age:  49 y.o.  :  1970                                          MRN:  [id number]         Date:  2021      Surgeon: Courtney Goldsmith):  Fifi Raymundo MD    Procedure: Procedure(s):  EGD ESOPHAGOGASTRODUODENOSCOPY  COLONOSCOPY DIAGNOSTIC    Medications prior to admission:   Prior to Admission medications    Medication Sig Start Date End Date Taking?  Authorizing Provider   Dextromethorphan-guaiFENesin  MG/5ML SYRP Take 5 mLs by mouth every 4 hours as needed for Cough    Historical Provider, MD   bisacodyl (DULCOLAX) 10 MG suppository Place 10 mg rectally daily    Historical Provider, MD   ferrous sulfate (IRON 325) 325 (65 Fe) MG tablet Take 325 mg by mouth 2 times daily    Historical Provider, MD   levothyroxine (SYNTHROID) 25 MCG tablet Take 25 mcg by mouth Daily    Historical Provider, MD   loperamide (LOPERAMIDE A-D) 2 MG tablet Take 2 mg by mouth as needed for Diarrhea After each loose stool    Historical Provider, MD   magnesium hydroxide (MILK OF MAGNESIA) 400 MG/5ML suspension Take 30 mLs by mouth daily as needed for Constipation    Historical Provider, MD   Multiple Vitamins-Minerals (THERAPEUTIC MULTIVITAMIN-MINERALS) tablet Take 1 tablet by mouth daily    Historical Provider, MD   ondansetron (ZOFRAN) 4 MG tablet Take 4 mg by mouth every 8 hours as needed for Nausea or Vomiting    Historical Provider, MD   sevelamer (RENVELA) 800 MG tablet Take 1 tablet by mouth 3 times daily (with meals)    Historical Provider, MD   insulin glargine (LANTUS;BASAGLAR) 100 UNIT/ML injection pen Inject 10 Units into the skin nightly 21   Kelsie Christine MD   midodrine (PROAMATINE) 2.5 MG tablet Take 1 tablet by mouth 3 times daily (with meals) 21   Deniz Drew MD   ondansetron (ZOFRAN ODT) 4 MG disintegrating tablet Take 1 tablet by mouth every 8 hours as needed for Nausea 3/24/17   Shyann Salinas, APRN - CNP pantoprazole (PROTONIX) 40 MG tablet Take 40 mg by mouth 2 times daily     Historical Provider, MD   Promethazine HCl 6.25 MG/5ML SOLN Take 12.5 mg by mouth 2 times daily as needed. Historical Provider, MD   temazepam (RESTORIL) 30 MG capsule Take 30 mg by mouth nightly as needed.     Historical Provider, MD       Current medications:    Current Facility-Administered Medications   Medication Dose Route Frequency Provider Last Rate Last Admin    zolpidem (AMBIEN) tablet 10 mg  10 mg Oral Nightly PRN Ronel Tapia MD   10 mg at 12/16/21 2109    Venelex ointment   Topical BID Aurelio Askew MD   Given at 12/17/21 1044    0.9 % sodium chloride infusion   IntraVENous PRN Ronel Tapia MD        perflutren lipid microspheres (DEFINITY) injection 1.65 mg  1.5 mL IntraVENous ONCE PRN Darwin Hargrove MD        midodrine (PROAMATINE) tablet 5 mg  5 mg Oral BID  Catherine Ruiz MD   5 mg at 12/16/21 1718    sodium chloride flush 0.9 % injection 5-40 mL  5-40 mL IntraVENous 2 times per day Ruby Adames MD   10 mL at 12/17/21 0809    sodium chloride flush 0.9 % injection 5-40 mL  5-40 mL IntraVENous PRN Catherine Ruiz MD        0.9 % sodium chloride infusion  25 mL IntraVENous PRN Ruby Adames  mL/hr at 12/15/21 0348 25 mL at 12/15/21 0348    ondansetron (ZOFRAN-ODT) disintegrating tablet 4 mg  4 mg Oral Q8H PRN Catherine Ruiz MD        Or    ondansetron [facility]) injection 4 mg  4 mg IntraVENous Q6H PRN Catherine Ruiz MD        polyethylene glycol (GLYCOLAX) packet 17 g  17 g Oral Daily PRN Ruby Adames MD        acetaminophen (TYLENOL) tablet 650 mg  650 mg Oral Q6H PRN Ruby Adames MD        Or   [facility] acetaminophen (TYLENOL) suppository 650 mg  650 mg Rectal Q6H PRN Ruby Adames MD        heparin (porcine) injection 5,000 Units  5,000 Units SubCUTAneous 3 times per day Ruby Adames MD   5,000 Units at 12/16/21 2109    glucose (GLUTOSE) 40 % oral gel 15 g  15 g Oral PRN Chris Ruiz MD        dextrose 50 % IV solution  12.5 g IntraVENous PRN Jessica Pillai MD   12.5 g at 12/17/21 0809    glucagon (rDNA) injection 1 mg  1 mg IntraMUSCular PRN Chris Ruiz MD        levothyroxine (SYNTHROID) tablet 25 mcg  25 mcg Oral Daily Chris Ruiz MD   25 mcg at 12/17/21 0700    pantoprazole (PROTONIX) injection 40 mg  40 mg IntraVENous Daily Chris Ruiz MD   40 mg at 12/17/21 0142    [Held by provider] insulin lispro (1 Unit Dial) 0-6 Units  0-6 Units SubCUTAneous TID WC Jessica Pillai MD       [place] [employer] by provider] insulin lispro (1 Unit Dial) 0-3 Units  0-3 Units SubCUTAneous Nightly Chris Ruiz MD        heparin (porcine) injection 10,000 Units  10,000 Units IntraCATHeter PRN Jessica Pillai MD   2,600 Units at 12/12/21 1126    vancomycin (VANCOCIN) intermittent dosing (placeholder)   Other RX Placeholder Jessica Pillai MD           Allergies:     Allergies   Allergen Reactions    Influenza Virus Vaccine      Caused paralysis       Problem List:    Patient Active Problem List   Diagnosis Code    ESRD (end stage renal disease) ([facility] N18.6    Anemia D64.9    Toe deformity M20.60    Peripheral neuropathy G62.9    Hypogonadism, male E29.1    Hyperkalemia E87.5    Acute pancreatitis K85.90    Acute on chronic renal failure (HCC) N17.9, N18.9    Chronic pancreatitis (HCC) K86.1    Pericardial effusion I31.3    Hypotension I95.9    Diabetes mellitus (HCC) E11.9    Sepsis (HCC) A41.9    Diabetic foot ulcer ([facility] E11.621, L97.509    Pancreatitis K85.90    Chronic pain G89.29    C. difficile diarrhea A04.72    Pneumonia J18.9    Status epilepticus ([facility]) G40.901       Past Medical History:        Diagnosis Date    Blood transfusion     Chronic pain     Diabetes mellitus ([facility]     Dialysis [facility]    Guillain [place] syndrome     2002 after flu shot    Hemodialysis patient ([facility]     Low blood pressure     Pancreatitis     Peripheral Neuropathy     Pneumonia 11/11/2021    Renal failure 1992    on dialysis 3x/wk  (T, Th, Sat)    Status epilepticus ([facility] 12/11/2021       Past Surgical History:        Procedure Laterality Date    CHOLECYSTECTOMY      DIALYSIS FISTULA CREATION      left arm/currently non functioning    KIDNEY TRANSPLANT  9/1994    R-kidney    TOE SURGERY  10-8-2010    cut and realign 1st, 2nd, toe left foot , fixation 1st, 2nd toe left foot    TUNNELED VENOUS CATHETER PLACEMENT      left chest for dialysis    TUNNELED VENOUS PORT PLACEMENT         Social History:    Social History     Tobacco Use    Smoking status: Never Smoker    Smokeless tobacco: Never Used   Substance Use Topics    Alcohol use: No                                Counseling given: Not Answered      Vital Signs (Current):   Vitals:    12/16/21 2310 12/17/21 0442 12/17/21 0730 12/17/21 1045   BP: (!) 154/88 137/81 (!) 142/77 (!) 146/92   Pulse: 70 76 69 68   Resp: 16 16 16 16   Temp: 97.5 °F (36.4 °C) 97.6 °F (36.4 °C)  97.9 °F (36.6 °C)   TempSrc: Oral Oral Axillary Oral   SpO2: 96% 100% 100% 100%   Weight:                                                  BP Readings from Last 3 Encounters:   12/17/21 (!) 146/92   12/11/21 115/82   11/30/21 110/72       NPO Status:                                                                                 BMI:   Wt Readings from Last 3 Encounters:   12/15/21 178 lb 9.2 oz (81 kg)   12/11/21 170 lb (77.1 kg)   11/30/21 170 lb (77.1 kg)     Body mass index is 25.62 kg/m².     CBC:   Lab Results   Component Value Date    WBC 2.8 12/17/2021    RBC 3.02 12/17/2021    HGB 8.0 12/17/2021    HCT 24.5 12/17/2021    MCV 76.5 12/17/2021    RDW 24.5 12/17/2021    PLT 94 12/17/2021       CMP:   Lab Results   Component Value Date     12/17/2021    K 4.3 12/17/2021    K 4.6 12/11/2021    CL 97 12/17/2021    CO2 22 12/17/2021    BUN 26 12/17/2021    CREATININE 4.8 12/17/2021    GFRAA 16 12/17/2021    GFRAA 12 06/09/2013    GFRAA 12 06/09/2013    AGRATIO 0.9 11/30/2021    LABGLOM 13 12/17/2021    GLUCOSE 47 12/17/2021    PROT 7.5 12/11/2021    PROT 7.4 04/12/2012    CALCIUM 7.7 12/17/2021    BILITOT 0.5 12/11/2021    ALKPHOS 287 12/11/2021    AST 20 12/11/2021    ALT 13 12/11/2021       POC Tests:   Recent Labs     12/17/21  1100   POCGLU 76       Coags:   Lab Results   Component Value Date    PROTIME 12.1 12/11/2021    INR 1.07 12/11/2021    APTT 36.8 11/11/2021       HCG (If Applicable): No results found for: PREGTESTUR, PREGSERUM, HCG, HCGQUANT     ABGs:   Lab Results   Component Value Date    PHART 7.449 12/14/2021    PO2ART 73.0 12/14/2021    XNE1CSB 37.2 12/14/2021    RUR8DIL 26 12/14/2021    BEART 1.9 12/14/2021    F2DTBYZK 92 12/14/2021        Type & Screen (If Applicable):  No results found for: LABABO, LABRH    Drug/Infectious Status (If Applicable):  No results found for: HIV, HEPCAB    COVID-19 Screening (If Applicable):   Lab Results   Component Value Date    COVID19 Detected 12/11/2021           Anesthesia Evaluation  Patient summary reviewed and Nursing notes reviewed no history of anesthetic complications:   Airway: Mallampati: II  TM distance: >3 FB   Neck ROM: full  Mouth opening: > = 3 FB Dental:          Pulmonary:   (+) pneumonia:                             Cardiovascular:Negative CV ROS                      Neuro/Psych:   (+) neuromuscular disease (Hx of Guillain-[place] Syndrome):,             GI/Hepatic/Renal:   (+) renal disease: ESRD and dialysis,          ROS comment: Chronic pancreatitis. Endo/Other:    (+) DiabetesType II DM, , .                 Abdominal:             Vascular: negative vascular ROS. Other Findings:             Anesthesia Plan      general     ASA 3    (64-year-old male presents for esophagogastroduodenoscopy and colonoscopy. Plan general anesthesia with ASA standard monitors. Questions answered. Patient agreeable with anesthetic plan.  )  Induction: intravenous. Anesthetic plan and risks discussed with patient. Plan discussed with CRNA.     Attending anesthesiologist reviewed and agrees with Elijah Oconnor MD   12/17/2021

## 2021-12-17 NOTE — PROGRESS NOTES
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    Hemodialysis patient ([address]     Low blood pressure     Pancreatitis     Peripheral Neuropathy     Pneumonia 11/11/2021    Renal failure 1992    on dialysis 3x/wk  (T, Th, Sat)    Status epilepticus ([address] 12/11/2021       Past Surgical History:   Procedure Laterality Date    CHOLECYSTECTOMY      DIALYSIS FISTULA CREATION      left arm/currently non functioning    KIDNEY TRANSPLANT  9/1994    R-kidney    TOE SURGERY  10-8-2010    cut and realign 1st, 2nd, toe left foot , fixation 1st, 2nd toe left foot    TUNNELED VENOUS CATHETER PLACEMENT      left chest for dialysis    TUNNELED VENOUS PORT PLACEMENT         Family History   Problem Relation Age of Onset    Diabetes Mother         reports that he has never smoked. He has never used smokeless tobacco. He reports that he does not drink alcohol and does not use drugs. Medications:        Allergies:  Influenza virus vaccine  Scheduled Meds:   vancomycin  750 mg IntraVENous Once    Venelex   Topical BID    midodrine  5 mg Oral BID WC    sodium chloride flush  5-40 mL IntraVENous 2 times per day    heparin (porcine)  5,000 Units SubCUTAneous 3 times per day    levothyroxine  25 mcg Oral Daily    pantoprazole  40 mg IntraVENous Daily    [Held by provider] insulin lispro  0-6 Units SubCUTAneous TID WC    [Held by provider] insulin lispro  0-3 Units SubCUTAneous Nightly    vancomycin (VANCOCIN) intermittent dosing (placeholder)   Other RX Placeholder     Continuous Infusions:   sodium chloride      sodium chloride 25 mL (12/15/21 0348)       Review of Systems:     All systems reviewed but were negative except as mentioned in HPI    Objective:       Vitals:  BP (!) 142/77   Pulse 69   Temp 97.6 °F (36.4 °C) (Oral)   Resp 16   Wt 178 lb 9.2 oz (81 kg)   SpO2 100%   BMI 25.62 kg/m²   Constitutional:  awake, NAD  HEENT:  MMM, No icterus  Neck: no bruits, No JVD  Cardiovascular:  S1, S2 reg  Respiratory: fwe coarse sounds  Abdomen:  +BS, soft, NT, ND  Ext: + lower extremity edema  Psychiatric: mood and affect appropriate  Skin: no rash, turgor wnl  Access: right groin catheter. Previous AVF/AVG sites noted  CNS: lethargic, no agitation    Data:     Labs:  Hepatic:   No results for input(s): AST, ALT, ALB, BILITOT, ALKPHOS in the last 72 hours. BNP: No results for input(s): BNP in the last 72 hours. ABGs:   Recent Labs     12/14/21  2158   PHART 7.449   PO2ART 73.0*   XLG3RRS 37.2       IMAGING:  MRI BRAIN WO CONTRAST   Final Result   1. No acute infarction, recent hemorrhage, or intracranial mass. 2.  A few nonspecific T2/flair hyperintensities in the subcortical white matter. Findings may be related to chronic small vessel ischemic disease sequela of prior injury or infection, or less likely a demyelinating process. 3.  Metabolic bone disease. Assessment :       1. ESRD     Access: Temp cath   Volume: Hypervolemic       2. Electrolytes/Acid base  Hypomagnesemia and No Dyskalemia    Recent Labs     12/17/21  0651   *   K 4.3   CO2 22   MG 1.80       3. BMD  -Hyperphosphatemia,     Recent Labs     12/17/21  0651   CALCIUM 7.7*       4. HTN  -Blood pressure low    BP Readings from Last 1 Encounters:   12/17/21 (!) 142/77       5.  Anemia  -anemia of CKD    Recent Labs 12/17/21  0651   HGB 7.7*     6. DM    6. seizures    PLAN :       - Maintenance HD. MWF - held today sec to colon prep   - HD in am   - minimize IVF  - MBD management  - Anemia management. Transfusion per guidelines  - No BP meds  - give midodrine  - Dose meds to GFR<10        Thank you for allowing us to participate in care of Peace Cardoza . We will continue to follow. Feel free to contact me with any questions.       Minh Degroot MD  12/17/2021    [facility] [address] [place] [address]  Office : [phone]  Fax :[phone]

## 2021-12-17 NOTE — PROGRESS NOTES
Patient alert and oriented x4. Glucose 42. Dextrose 50% IV solution administered. Glucose rechecked and was 69. Another dose of dextrose 50% administered. Will recheck.      0841--glucose up to 84

## 2021-12-17 NOTE — PROGRESS NOTES
Clinical Pharmacy Progress Note    Vancomycin - Management by Pharmacy    Consult Date(s): 12/11/21  Consulting Provider(s): Marjorie Oconnell MD    Assessment / Plan    1. Bacteremia  - Vancomycin   Concurrent Antimicrobials: None    Day of Vanc Therapy: #7 -- continue through 12/29 per ID.  Current Dosing Method: Intermittent Dosing by Levels   Therapeutic Goal: ~15 mg/L   Current Dose / Frequency: Intermittent dosing with HD   Plan / Rationale:   o On HD MWF  (home schedule is TTS). o HD 12/15; no vanc dose given as AM level yesterday was 28.1 mcg/mL. o Random level today = 20.6 mcg/mL. Will re-dose today with a small dose of  750mg IV x1. Expect to remain therapeutic through the weekend. o Plan to obtain a random level 12/20 AM (not ordered), with goal of sheduling doses with HD at that time.  Will continue to monitor clinical condition and make adjustments to regimen as appropriate. Thanks for consulting pharmacy! Please call with questions:  Margaret Perez PharmD., [facility]   12/17/2021 9:55 AM  Wireless: [phone]      Interval update:  Hgb stable overnight; planning for EGD + colonoscopy today. Subjective/Objective: Mr. Ching Dixon is a 49 y.o. male with a PMHx significant for T1DM, ESRD on IHD, pancreatitis, covid PNA (in Nov 2021), [facility], admitted from assisted living facility for AMS with seizures upon presentation to ED. Started on Vancomycin + Cefepime for possible HAP; however, blood cultures subsequently returned with Strep and Staph. ID consulted and antibiotics changed to vancomycin alone. GI consulted 12/15 for dropping hgb. Pharmacy has been consulted to manage vancomycin.     Height:   Ht Readings from Last 1 Encounters:   12/11/21 5' 10\" (1.778 m)     Weight:    Wt Readings from Last 1 Encounters:   12/15/21 178 lb 9.2 oz (81 kg)       Current & Prior Antimicrobial Regimen(s):    (12/11-12/12)    (12/12-12/15)   Vancomycin (12/11-current)    Level(s) / Doses:  Date Time Dose Level / Type of Level Interpretation   12/12 04:50 1250mg IV x1 Random = 10.7 mcg/mL Re-dose with 1500mg IV x1   12/13 04:45 1500mg IV x 1 Random = 21.4 mcg/mL Re-dose with 1250 mg IV x 1   12/15 06:35  Random= 28.1mcg/mL No dose with HD today   12/17 0600  Random = 20.6 mcg/mL Re-dose with 750mg IV x1   Note: Serum levels collected for AUC-based dosing may be high if collected in close proximity to the dose administered. This is not necessarily an indicator of toxicity. Cultures & Sensitivities:    Date Site Micro Susceptibility / Result   12/11  Blood #1 Strep salivarious      Strep mitis R = TCN, I = Amp  S = vanc, ceftriaxone, levofloxacin, linezolid  R = Amp, ceftriaxone  S = vanc, linezolid, TCN, Levofloxacin   12/11 Blood #2 Strep salivarious  Strep mitis  Staph epi  Strep gordonii As above  As above  Pending  Pending   12/11 COVID Positive      Labs / Ancillary Data:    No estimate of CrCL, as patient is ESRD on HD.       Recent Labs     12/15/21  0635 12/16/21  0318 12/17/21  0651   CREATININE 5.4* 4.6* 4.8*   BUN 33* 25* 26*   WBC 2.9* 3.8* 2.8*

## 2021-12-17 NOTE — PROGRESS NOTES
ID Follow-up NOTE    CC:   Bacteremia  Antibiotics: Vancomycin    Admit Date: 12/11/2021  Hospital Day: 7    Subjective:     Patient feels good  No specific complaint    Objective:     Patient Vitals for the past 8 hrs:   BP Temp Temp src Pulse Resp SpO2   12/17/21 1045 (!) 146/92 -- Oral 68 16 100 %   12/17/21 0730 (!) 142/77 -- Axillary 69 16 100 %   12/17/21 0442 137/81 97.6 °F (36.4 °C) Oral 76 16 100 %     No intake/output data recorded. I/O this shift:  In: 10 [I.V.:10]  Out: -     EXAM:  GENERAL: No apparent distress.   RA  HEENT: Membranes moist, no oral lesion  NECK:  Supple, no lymphadenopathy  LUNGS: Clear b/l, no rales, no dullness  CARDIAC: RRR, no murmur appreciated  ABD:  + BS, soft / NT  EXT:  No rash, no edema, no lesions  NEURO: No focal neurologic findings  PSYCH: Orientation, sensorium, mood normal  LINES:  R fem HD line      Data Review:  Lab Results   Component Value Date    WBC 2.8 (L) 12/17/2021    HGB 7.7 (L) 12/17/2021    HCT 23.1 (L) 12/17/2021    MCV 76.5 (L) 12/17/2021    PLT 94 (L) 12/17/2021     Lab Results   Component Value Date    CREATININE 4.8 (H) 12/17/2021    BUN 26 (H) 12/17/2021     (L) 12/17/2021    K 4.3 12/17/2021    CL 97 (L) 12/17/2021    CO2 22 12/17/2021       Hepatic Function Panel:   Lab Results   Component Value Date    ALKPHOS 287 12/11/2021    ALT 13 12/11/2021    AST 20 12/11/2021    PROT 7.5 12/11/2021    PROT 7.4 04/12/2012    BILITOT 0.5 12/11/2021    BILIDIR <0.2 12/11/2021    IBILI see below 12/11/2021    LABALBU 3.4 12/11/2021       Micro:  12/11 SARS CoV-2 PCR pos  12/11 BC x 1 CNS; BC x2 viridian Streptococcus    - BC S epidermidis, Streptococcus gordonii, S salivarius, S mitis/ oralis    - BC S salivarius group, S mitis / oralis   Streptococcus mitis / Streptococcus oralis Streptococcus salivarius ssp salivarius    BACTERIAL SUSCEPTIBILITY PANEL BY AIRAM BACTERIAL SUSCEPTIBILITY PANEL BY AIRAM   ampicillin >=16 mcg/mL Resistant 0.5 mcg/mL Intermediate cefTRIAXone 4 mcg/mL Resistant 0.25 mcg/mL Sensitive   levofloxacin 1 mcg/mL Sensitive 1 mcg/mL Sensitive   linezolid <=2 mcg/mL Sensitive <=2 mcg/mL Sensitive   tetracycline 0.5 mcg/mL Sensitive >=16 mcg/mL Resistant   vancomycin 0.5 mcg/mL Sensitive 0.5 mcg/mL Sensitive      SARS CoV-2 PCR pos     Imagin/14 Brain MRI   1.  No acute infarction, recent hemorrhage, or intracranial mass. 2.  A few nonspecific T2/flair hyperintensities in the subcortical white matter. Findings may be related to chronic small vessel ischemic disease sequela of prior injury or infection, or less likely a demyelinating process. 3.  Metabolic bone disease. ECHO:   TTE  Summary   Limited echo   Normal left ventricular size. Mild concentric left ventricular hypertrophy. Normal left ventricular systolic function with an estimated ejection   fraction of 55%-60%. Mild-moderate mitral regurgitation. Thickening of aortic valve leaflet, cannot exclude vegetation. Tricuspid valve appears to lack complete coaptation. Tricuspid valve leaflet thickened, cannot exclude vegetation. Severe tricuspid regurgitation. Trivial pulmonic regurgitation present. Estimated pulmonary artery systolic pressure is at 26 mmHg assuming a right   atrial pressure of 3 mmHg.     Scheduled Meds:   vancomycin  750 mg IntraVENous Once    Venelex   Topical BID    midodrine  5 mg Oral BID WC    sodium chloride flush  5-40 mL IntraVENous 2 times per day    heparin (porcine)  5,000 Units SubCUTAneous 3 times per day    levothyroxine  25 mcg Oral Daily    pantoprazole  40 mg IntraVENous Daily    [Held by provider] insulin lispro  0-6 Units SubCUTAneous TID WC    [Held by provider] insulin lispro  0-3 Units SubCUTAneous Nightly    vancomycin (VANCOCIN) intermittent dosing (placeholder)   Other RX Placeholder       Continuous Infusions:   sodium chloride      sodium chloride 25 mL (12/15/21 0348)       PRN Meds:  zolpidem, sodium chloride, perflutren lipid microspheres, sodium chloride flush, sodium chloride, ondansetron **OR** ondansetron, polyethylene glycol, acetaminophen **OR** acetaminophen, glucose, dextrose, glucagon (rDNA), heparin (porcine)      Assessment:     Mult med problems including CRF, chronic pancreatitis, hx Guillain [place] syn  CRF access R fem line x 6 mo, placed in [place], hx access problems / thrombosed vessels per pt     Encephalopathy  Seizure   Hypoglycemia      COVID-19 infection     BC x 1 with CNS - contaminant    BC x 2 multiple Streptococcal sp (S mitis/oralis, S salivaris, S gordonii)    - TTE with thickened valves   - has tunnel R fem HD line in place    Plan:     Cont vancomycin - dose at HD x 2 weeks, through 12/29  Will ask renal if HD line change to changed / exchanged    Medical Decision Making:   The following items were considered in medical decision making:  Discussion of patient care with other providers  Reviewed clinical lab tests  Reviewed radiology tests  Reviewed other diagnostic tests/interventions  Microbiology cultures and other micro tests reviewed      Discussed with pt  Hugh Bernardo MD

## 2021-12-17 NOTE — DISCHARGE INSTR - COC
Continuity of Care Form    Patient Name: Yahir Marie   :  1970  MRN:  [id number]    Admit date:  2021  Discharge date:  ***    Code Status Order: Full Code   Advance Directives:      Admitting Physician:  Lorena Cid MD  PCP: Haley Rai MD    Discharging Nurse: [facility] Unit/Room#: 2993/6665-72  Discharging Unit Phone Number: ***    Emergency Contact:   Extended Emergency Contact Information  Primary Emergency Contact: Marry Villa  Address: [address] [place], [place] [address]           [address] [facility] [address] [address] Phone: [phone]  Relation: Parent  Secondary Emergency Contact: Jassi Reyes  Address: [address] [place] [address] [address] [facility] [address]  Home Phone: [phone]  Relation: Niece/Nephew    Past Surgical History:  Past Surgical History:   Procedure Laterality Date    CHOLECYSTECTOMY      DIALYSIS FISTULA CREATION      left arm/currently non functioning    KIDNEY TRANSPLANT  1994    R-kidney    TOE SURGERY  10-8-2010    cut and realign 1st, 2nd, toe left foot , fixation 1st, 2nd toe left foot    TUNNELED VENOUS CATHETER PLACEMENT      left chest for dialysis    TUNNELED VENOUS PORT PLACEMENT         Immunization History: There is no immunization history for the selected administration types on file for this patient.     Active Problems:  Patient Active Problem List   Diagnosis Code    ESRD (end stage renal disease) ([facility] N18.6    Anemia D64.9    Toe deformity M20.60    Peripheral neuropathy G62.9    Hypogonadism, male E29.1    Hyperkalemia E87.5    Acute pancreatitis K85.90    Acute on chronic renal failure (HCC) N17.9, N18.9    Chronic pancreatitis (HCC) K86.1    Pericardial effusion I31.3    Hypotension I95.9    Diabetes mellitus (HCC) E11.9    Sepsis (HCC) A41.9    Diabetic foot ulcer (HCC) E11.621, L97.509    Pancreatitis K85.90    Chronic pain G89.29    C. difficile diarrhea A04.72 Pneumonia J18.9    Status epilepticus ([facility] G40.901       Isolation/Infection:   Isolation            Contact          Patient Infection Status       Infection Onset Added Last Indicated Last Indicated By Review Planned Expiration Resolved Resolved By    [facility] 10/26/12 12/12/21 12/12/21 Kee Denney, RN        Added from external infection; from [address]    Resolved    COVID-19 21 COVID-19   21 Kee Denney RN    Original diagnosis with COVID was 2021; has been over 20 days; doesn't need isolation    COVID-19 (Rule Out) 21 COVID-19 (Ordered)   21 Rule-Out Test Resulted    COVID-19 21 COVID-19   21     COVID-19 (Rule Out) 21 COVID-19 (Ordered)   21 Rule-Out Test Resulted            Nurse Assessment:  Last Vital Signs: BP (!) 146/92   Pulse 68   Temp 97.9 °F (36.6 °C) (Oral)   Resp 16   Wt 178 lb 9.2 oz (81 kg)   SpO2 100%   BMI 25.62 kg/m²     Last documented pain score (0-10 scale): Pain Level: 0  Last Weight:   Wt Readings from Last 1 Encounters:   12/15/21 178 lb 9.2 oz (81 kg)     Mental Status:  oriented and alert    IV Access:  - Dialysis Catheter  - site  femoral, insertion date: ***    Nursing Mobility/ADLs:  Walking   Dependent  Transfer  Assisted  Bathing  Assisted  Dressing  Assisted  Toileting  Assisted  Feeding  Assisted  Med Admin  Assisted  Med Delivery   whole    Wound Care Documentation and Therapy:  Pressure Ulcer 14  Outer;Right (Active)   Number of days: 2787       Wound 07/09/10 Other (Comment) Toe (Comment  which one) Anterior;Right toe bent down, pt states bumped. ? friction from shoe. (Active)   Number of days: 8002       Wound 07/09/10 Other (Comment) Toe (Comment  which one) Anterior; Left granulation tissue center, toe bent down permanently.  open area at joint (Active)   Number of days: 4178       Wound 14 Diabetic Ulcer Foot Dorsal white/pink tissue, drainage, diabetic ulcer (Active)   Number of days: 2687       Incision Foot Left (Active)   Number of days:        Wound 11/11/21 Foot Dorsal; Right (Active)   Wound Image   12/13/21 1450   Wound Etiology Diabetic 12/17/21 1200   Dressing Status Clean; Dry; Intact 12/17/21 1200   Wound Cleansed Not Cleansed 12/13/21 1450   Dressing/Treatment Foam 12/17/21 1200   Dressing Change Due 12/14/21 12/13/21 1450   Wound Length (cm) 6 cm 12/13/21 1450   Wound Width (cm) 3.5 cm 12/13/21 1450   Wound Depth (cm) 0.1 cm 12/13/21 1450   Wound Surface Area (cm^2) 21 cm^2 12/13/21 1450   Change in Wound Size % (l*w) -16.67 12/13/21 1450   Wound Volume (cm^3) 2.1 cm^3 12/13/21 1450   Wound Healing % -17 12/13/21 1450   Wound Assessment Pink/red; Dry 12/17/21 1200   Drainage Amount None 12/17/21 1200   Drainage Description Yellow 12/12/21 0400   Odor None 12/17/21 1200   Louisa-wound Assessment Dry/flaky 12/17/21 1200   Margins Defined edges 12/17/21 1200   Number of days: 35       Wound 12/15/21 Buttocks Right (Active)   Wound Image   12/15/21 1503   Wound Etiology Pressure Stage  2 12/17/21 1200   Dressing Status New drainage noted; New dressing applied 12/15/21 1503   Wound Cleansed Cleansed with saline 12/16/21 2000   Dressing/Treatment Pharmaceutical agent (see MAR); Foam 12/17/21 1200   Dressing Change Due 12/15/21 12/15/21 1503   Wound Length (cm) 6 cm 12/15/21 1503   Wound Width (cm) 2.5 cm 12/15/21 1503   Wound Depth (cm) 0.1 cm 12/15/21 1503   Wound Surface Area (cm^2) 15 cm^2 12/15/21 1503   Wound Volume (cm^3) 1.5 cm^3 12/15/21 1503   Wound Assessment Pink/red 12/17/21 1200   Drainage Amount None 12/17/21 1200   Drainage Description Serosanguinous 12/15/21 1503   Odor None 12/17/21 1200   Louisa-wound Assessment Dry/flaky; Fragile 12/15/21 1503   Number of days: 1        Elimination:  Continence:    Bowel: No  Bladder:anuric  Urinary Catheter: None   Colostomy/Ileostomy/Ileal Conduit: No       Date of Last BM: ***    Intake/Output Summary (Last 24 hours) at 12/17/2021 1340  Last data filed at 12/17/2021 0809  Gross per 24 hour   Intake 10 ml   Output --   Net 10 ml     No intake/output data recorded. Safety Concerns: At Risk for Falls    Impairments/Disabilities:      Contractures - ***    Nutrition Therapy:  Current Nutrition Therapy:   - Oral Diet:  Renal    Routes of Feeding: Oral  Liquids: No Restrictions  Daily Fluid Restriction: no  Last Modified Barium Swallow with Video (Video Swallowing Test): not done    Treatments at the Time of Hospital Discharge:   Respiratory Treatments: ***  Oxygen Therapy:  is not on home oxygen therapy. Ventilator:    - No ventilator support    Rehab Therapies: Physical Therapy and Occupational Therapy  Weight Bearing Status/Restrictions: No weight bearing restirctions  Other Medical Equipment (for information only, NOT a DME order):  wheelchair  Other Treatments: ***    Patient's personal belongings (please select all that are sent with patient):  {[facility] DME Belongings:[id number]}    RN SIGNATURE:  Electronically signed by Beba Salas RN on 12/18/21 at 3:59 PM EST    CASE MANAGEMENT/SOCIAL WORK SECTION    Inpatient Status Date: 12/11/2021     Readmission Risk Assessment Score:  Readmission Risk              Risk of Unplanned Readmission:  33           Discharging to Facility/ [address]   [address] [place] [address]  Report: [phone]  Fax: [phone]    / signature: Electronically signed by SHERITA Jay on 12/17/21 at 1:40 PM EST    PHYSICIAN SECTION    Prognosis: Fair    Condition at Discharge: Stable    Rehab Potential (if transferring to Rehab):  Fair    Recommended Labs or Other Treatments After Discharge: PTOT nursing     Physician Certification: I certify the above information and transfer of Chandan Vu  is necessary for the continuing treatment of the diagnosis listed and that he requires Intermediate Nursing Care for less 30 days.      Update Admission H&P: No change in H&P    PHYSICIAN SIGNATURE:  Electronically signed by Jaimie Dodge MD on 12/18/21 at 2:26 PM EST

## 2021-12-17 NOTE — ANESTHESIA POSTPROCEDURE EVALUATION
Department of Anesthesiology  Postprocedure Note    Patient: Anupama Talbot  MRN: [id number]  YOB: 1970  Date of evaluation: 12/17/2021  Time:  4:42 PM     Procedure Summary     Date: 12/17/21 Room / Location: [facility]    Anesthesia Start: 1534 Anesthesia Stop: 1625    Procedures:       EGD ESOPHAGOGASTRODUODENOSCOPY (N/A )      COLONOSCOPY DIAGNOSTIC (N/A ) Diagnosis: (ANEMIA, FOBT+)    Surgeons: Tara Ramirez MD Responsible Provider: Aman Broderick MD    Anesthesia Type: general ASA Status: 4          Anesthesia Type: general    Justine Phase I: Justine Score: 10    Justine Phase II: Justine Score: 8    Last vitals: Reviewed and per EMR flowsheets.        Anesthesia Post Evaluation    Patient location during evaluation: PACU  Patient participation: complete - patient participated  Level of consciousness: awake and alert  Pain score: 0  Airway patency: patent  Nausea & Vomiting: no nausea and no vomiting  Complications: no  Cardiovascular status: hemodynamically stable  Respiratory status: acceptable  Hydration status: euvolemic

## 2021-12-17 NOTE — PROGRESS NOTES
[facility] GI  Gastroenterology Progress Note    Tammy Alvarado is a 49 y.o. male patient. Active Problems:    Status epilepticus ([address]  Resolved Problems:    * No resolved hospital problems. *      SUBJECTIVE:    No complaints     ROS:    Gastrointestinal ROS: no abdominal pain, change in bowel habits, or black or bloody stools    Physical    VITALS:  /63   Pulse 69   Temp 97.6 °F (36.4 °C) (Axillary)   Resp 18   Wt 178 lb 9.2 oz (81 kg)   SpO2 96%   BMI 25.62 kg/m²   TEMPERATURE:  Current - Temp: 97.6 °F (36.4 °C); Max - Temp  Av.4 °F (36.3 °C)  Min: 97.3 °F (36.3 °C)  Max: 97.7 °F (36.5 °C)    NAD  RRR, Nl s1s2  Integ: no jaundice  Abdomen soft, ND, NT, no HSM, Bowel sounds normal.    Data    Data Review:    Recent Labs     21  0621  1934 12/15/21  0635 12/15/21  1048 12/15/21  2122 12/16/21  0318 21  1119   WBC 3.0*  --  2.9*  --   --  3.8*  --    HGB 7.1*   < > 6.7*   < > 8.3* 7.8* 7.5*   HCT 21.9*   < > 20.3*   < > 25.5* 23.6* 23.0*   MCV 75.7*  --  74.7*  --   --  76.4*  --    *  --  117*  --   --  122*  --     < > = values in this interval not displayed. Recent Labs     21  0612 12/15/21  0635 21   * 136 135*   K 4.3 4.3 4.3   CL 97* 97* 97*   CO2 22 23 23   BUN 27* 33* 25*   CREATININE 4.4* 5.4* 4.6*     No results for input(s): AST, ALT, ALB, BILIDIR, BILITOT, ALKPHOS in the last 72 hours. No results for input(s): LIPASE, AMYLASE in the last 72 hours. No results for input(s): PROTIME, INR in the last 72 hours. No results for input(s): PTT in the last 72 hours. Impression:   1. Anemia  2. Guaiac positive stools  3. Strep viridans bacteremia  4. FOBT +      Plan:  1. Clear liquid diet  2.   EGD and colonoscopy on 2021 if patient able to prep to to evaluate for malignancy, peptic ulcer disease    Martine Fisher MD  [facility]

## 2021-12-18 NOTE — DISCHARGE SUMMARY
Hospital Discharge Summary    Patient's PCP: Lane Nur MD  Admit Date: 12/11/2021   Discharge Date: 12/18/2021    Admitting Physician: Dr. Harvey Ackerman MD  Discharge Physician: Dr. Harvey Ackerman MD     Consults:   IP CONSULT TO NEUROLOGY  IP CONSULT TO NEPHROLOGY  IP CONSULT TO [address] TO CRITICAL CARE  IP CONSULT TO PALLIATIVE CARE  IP CONSULT TO INFECTIOUS DISEASES  IP CONSULT TO GI  IP CONSULT TO GENERAL SURGERY    Brief HPI: Mr. Alvarez Friday is a 49 yom with PMH T1DM, ESRD on iHD, pancreatitis, covid PNA (hospitalized in November 2021), Ki Jefferson who presented to [facility] ED from care facility due to altered mental status. Upon arrival to ED patient noted to have seizures and was given versed then keppra then ketamine and then intubated and sedated on propofol. Has not reseized since. Etiology of seizures thought to be due to hypoglycemia (BG 35).     Patient admitted to ICU due since intubated also for continuous EEG.    Neurology and nephrology consulted from ED.      Spoke with mother on the phone who confirms patient's limited code status: no to intubation. States patient's cognition is normal and he is neurologically intact. Cannot walk, uses wheelchair. Brief hospital course:   1. Sepsis due to Strep bacteremia, possible PNA  -Likely secondary to wounds  Treated with  Unasyn and vancomycin  -will be on vancomycin with HD through 12/29     2. Hypoglycemia  Likely due to sepsis   Decrease lantus dose at d/c     3. Seizures  Received Keppra, Versed, Ketamine  Intubated for airway protection  Was then extubated on 12/12/21  Neurology consulted, MRI unremarkable, stopped keppra as likely seizure provoked by hypoglycemia     3. Acute on chronic anemia, microcytic  Hx of pancytopenia     4.  Multiple bloody BMs while in hospital  FOBT positive  GI Consulted,  S/p EGD and colonoscopy,  Colon grossly normal, EGD withSevere extrinsic compression of the first and second portion of the duodenum with associated edema. One column of medium esophageal varices in lower esophagus.      5. Duodenal obstruction, as seen on EGD  gen surgery consulted  Outpatient followup. Per surgery note:-  - No signs of full gastric outlet obstruction; evidence of contrast in small bowel and colon   - Patient has been tolerating diet . - OK to discharge from general surgery perspective  - May require further evaluation of GJ with upper GI and small bowel follow through vs.repeat CT C/P with oral contrast if patient were to be symptomatic from obstruction  - Recommend f/u with Dr. Lizzeth Bell given complex hernia and history of GJ anastomosis    6. ESRD on HD      Invasive procedures:  As above    Discharge Diagnoses: Active Problems:    Status epilepticus ([address]  Resolved Problems:    * No resolved hospital problems. *      Physical Exam: BP (!) 156/89   Pulse 90   Temp 97.5 °F (36.4 °C) (Axillary)   Resp 16   Wt 185 lb 10 oz (84.2 kg)   SpO2 96%   BMI 26.63 kg/m²   General appearance: Ill-appearing  HEENT Normal cephalic, atraumatic without obvious deformity.  Pupils equal, round, and reactive to light.  Extra ocular muscles intact.  Conjunctivae/corneas clear. Neck: Supple, No jugular venous distention/bruits.  Trachea midline without thyromegaly  Lungs: diminished breath sounds with scattered Rales at bases bilaterally  Heart: Regular rate and rhythm with Normal S1/S2 without murmurs, rubs   Abdomen: Soft, non-tender or non-distended without rigidity or guarding and positive bowel sounds all four quadrants. Extremities: Contracted right upper extremity; range of motion could not be obtained as patient is wheelchair-bound  Skin: Skin color, texture, turgor normal.  No rashes or lesions.   Neurologic: Alert and oriented X 3, neurovascularly intact with sensory/motor intact upper extremities/lower extremities, bilaterally.  Cranial nerves: II-XII intact, grossly non-focal.  Psychiatry: Appropriate affect. Not agitated  Skin: Dry, scaly, scattered wounds on bilateral feet  Brisk capillary refill, peripheral pulses palpable        Significant diagnostic studies that may require follow up:  Echo Limited    Result Date: 12/16/2021  Transthoracic Echocardiography Report (TTE)  Demographics   Patient Name       Ki Thompson   Date of Study      12/16/2021         Gender              Male   Patient Number     [id number]         Date of Birth       1970   Visit Number       [id number]          Age                 49 year(s)   Accession Number   [id number]         Room Number         5505   Corporate ID       [id number]           Sonographer         Mike Calle RDCS   Ordering Physician Zak Morrell MD           Physician           Steven Cobb MD  Procedure Type of Study   TTE procedure:ECHOCARDIOGRAM LIMITED. Procedure Date Date: 12/16/2021 Start: 01:35 PM Study Location: [address] Echo Lab Technical Quality: Adequate visualization Additional Indications:Bactermial; rule out vegetation. Patient Status: Routine Height: 70 inches Weight: 178 pounds BSA: 1.99 m2 BMI: 25.54 kg/m2 BP: 131/85 mmHg  Conclusions   Summary  Limited echo  Normal left ventricular size. Mild concentric left ventricular hypertrophy. Normal left ventricular systolic function with an estimated ejection  fraction of 55%-60%. Mild-moderate mitral regurgitation. Thickening of aortic valve leaflet, cannot exclude vegetation. Tricuspid valve appears to lack complete coaptation. Tricuspid valve leaflet thickened, cannot exclude vegetation. Severe tricuspid regurgitation. Trivial pulmonic regurgitation present. Estimated pulmonary artery systolic pressure is at 26 mmHg assuming a right  atrial pressure of 3 mmHg.    Signature ------------------------------------------------------------------  Electronically signed by Elvira Constantino MD (Interpreting  physician) on 12/16/2021 at 04:09 PM  ------------------------------------------------------------------   Findings   Left Ventricle  Normal left ventricular size. Mild concentric left ventricular hypertrophy. Normal left ventricular systolic function with an estimated ejection  fraction of 55%-60%. Mitral Valve  Mitral valve is structurally normal.  Mild-moderate mitral regurgitation. No evidence of mitral stenosis. Aortic Valve  Thickening of valve leaflets,cannot exclude vegetation. No evidence of aortic valve regurgitation. No evidence of aortic valve stenosis. Tricuspid Valve  Tricuspid valve appears to lack complete coaptation. Thickening of leaflets,  cannot exclude vegetation. Severe tricuspid regurgitation. Pulmonic Valve  The pulmonic valve is structurally normal.  Trivial pulmonic regurgitation present. No evidence of pulmonic valve stenosis. Miscellaneous  IVC size is normal (<2.1cm) and collapses > 50% with respiration consistent  with normal RA pressure (3mmHg). Estimated pulmonary artery systolic pressure is at 26 mmHg assuming a right  atrial pressure of 3 mmHg. M-Mode/2D Measurements (cm)   LV Diastolic Dimension: 8.48 cm LV Systolic Dimension: 2.93 cm  LV Septum Diastolic: 7.38 cm  LV PW Diastolic: 0.58 cm  Doppler Measurements   TR Velocity:244 cm/s  TR Gradient:23.81 mmHg  Estimated RAP:3 mmHg  Estimated RVSP: 26 mmHg      CT ABDOMEN PELVIS WO CONTRAST Additional Contrast? Oral    Result Date: 12/18/2021  CT ABDOMEN PELVIS WO CONTRAST Indication: Severe extrinsic compression of the duodenum. Comparison: CT abdomen 11/28/15 Technique:  CT of the abdomen, and pelvis was obtained without intravenous contrast. Oral contrast was administered. Coronal and sagittal reformations performed.  Up-to-date CT equipment and radiation dose reduction techniques were employed. Findings: The patient lost IV access prior to imaging and was scanned with oral contrast only. There is significant patient motion compromising the study detail. Lung bases: Moderate irregular groundglass and consolidation throughout the lung bases likely suggestive of pulmonary edema there are moderate bilateral pleural effusions. Extensive coronary atherosclerosis. Subcarinal lymphadenopathy noted. Liver: No focal abnormality identified. Gallbladder and biliary tree: Cholecystectomy. Pancreas: Severely compromised assessment. There are diffuse parenchymal calcifications consistent with chronic pancreatitis. Of the neck region of the pancreas is poorly defined. Any additional assessment precluded by the artifact. Spleen: Splenomegaly. Ill-defined hypodensity in the medial spleen is likely a splenic infarct. Adrenals: Grossly normal. Kidneys: Markedly atrophic with extensive vascular calcifications. Atrophic right lower quadrant transplant kidney. Bladder: Decompressed. Reproductive organs: No gross mass. Bowel: Retention of contrast and other ingested material within the distended stomach. There is no significant narrowing in the region of the 1st and 2nd portion of duodenum, and difficult to visualize given the degree of artifact. There is contrast material within nondilated small bowel and colonic loops. Reflux of contrast in the esophagus. Lymph nodes: No significant lymphadenopathy is seen. Peritoneum: Diffuse mesenteric edema. No significant free fluid. No free air identified. Vessels: Extensive atherosclerotic disease. Right femoral central venous catheter extending to the inferior cavoatrial junction. Abdominal wall: Diffuse anasarca. Large ventral periumbilical hernia containing multiple loops of bowel. Bones: Diffusely sclerotic and osteophytic changes in the bone consistent with renal osteodystrophy.      1.  Distended stomach with retained contrast and other ingested material. There is narrowing in the 1st-2nd portion of the duodenum but otherwise difficult to determine the cause given the extensive motion artifact. Intravenous contrast may be helpful once venous access is restored, but would still require less motion to improve the quality of the study. 2.  Evidence of chronic pancreatitis. Ill-defined pancreatic neck region. Attention on contrast-enhanced CT. 3.  Large periumbilical hernia containing multiple loops of bowel, without evidence of obstruction. 4.  Anasarca. 5.  Bilateral airspace disease suggesting pulmonary edema and moderate bilateral pleural effusions. 6.  Markedly atrophic native kidneys and atrophic renal transplant graft. Renal osteodystrophy. CT HEAD WO CONTRAST    Result Date: 12/11/2021  EXAMINATION: CT OF THE HEAD WITHOUT CONTRAST  12/11/2021 10:09 am TECHNIQUE: CT of the head was performed without the administration of intravenous contrast. Dose modulation, iterative reconstruction, and/or weight based adjustment of the mA/kV was utilized to reduce the radiation dose to as low as reasonably achievable. COMPARISON: None. HISTORY: ORDERING SYSTEM PROVIDED HISTORY: horace ruelas ich TECHNOLOGIST PROVIDED HISTORY: Reason for exam:->horace ruelas ich Has a \"code stroke\" or \"stroke alert\" been called? ->Yes Decision Support Exception - unselect if not a suspected or confirmed emergency medical condition->Emergency Medical Condition (MA) Reason for Exam: Altered Mental Status (pt arrived via EMS from [address] [place] [address] d/t [facility]; upon arrival noted seizure activity and labored breathing; EMS admin narcan and glucagon) FINDINGS: BRAIN/VENTRICLES: Motion artifact significantly degrades image quality. There is no acute gross intracerebral hemorrhage or extra-axial fluid collection. The ventricles and sulci are within normal limits. ORBITS: The orbits are unremarkable. There is a dysconjugate gaze. SINUSES: The visualized paranasal sinuses and mastoid air cells are clear.  SOFT TISSUES/SKULL:  The calvarium is intact. Innumerable sclerotic lesions are present throughout the calvarium either due to osseous metastatic disease or renal osteodystrophy. An endotracheal tube is insitu. 1. No acute gross intracranial abnormality, significantly limited by motion artifact. 2. Innumerable sclerotic lesions throughout the calvarium either due to osseous metastatic disease or renal osteodystrophy. Findings were discussed with Larry Chowdhury at 26:48 am on 12/11/2021. XR CHEST PORTABLE    Result Date: 12/11/2021  EXAMINATION: ONE XRAY VIEW OF THE CHEST 12/11/2021 10:01 am COMPARISON: 12/11/2021 HISTORY: ORDERING SYSTEM PROVIDED HISTORY: attempted central line TECHNOLOGIST PROVIDED HISTORY: Reason for exam:->attempted central line FINDINGS: No change in the lines and tubes. No change in the bilateral airspace disease likely due to multifocal pneumonia. The cardiac silhouette is stable. There is no pneumothorax or pleural effusion. 1. No significant change. XR CHEST PORTABLE    Result Date: 12/11/2021  EXAMINATION: ONE XRAY VIEW OF THE CHEST 12/11/2021 7:55 am COMPARISON: 11/12/2021 HISTORY: ORDERING SYSTEM PROVIDED HISTORY: sob, ett, , intubation TECHNOLOGIST PROVIDED HISTORY: Reason for exam:->sob, ett, , intubation Reason for Exam: Altered Mental Status (pt arrived via EMS from [address] [place] [address] d/t [facility]; upon arrival noted seizure activity and labored breathing; EMS admin narcan and glucagon) FINDINGS: The endotracheal tube is 3.7 cm above the lux. No change in the bibasilar airspace disease likely due to multifocal pneumonia. The cardiac silhouette is stable. There is no pneumothorax or pleural effusion. 1. Endotracheal tube 3.7 cm above the lux.      MRI BRAIN WO CONTRAST    Result Date: 12/14/2021  MRI of the brain without contrast Indication: seizure Comparison: CT head from outside hospital 12/11/2021 Technical Factors: Standard multiplanar multisequence MRI of the brain was performed

## 2021-12-18 NOTE — PROGRESS NOTES
Clinical Pharmacy Progress Note    Vancomycin - Management by Pharmacy    Consult Date(s): 12/11/21  Consulting Provider(s): April Ballesteros MD    Assessment / Plan    1. Bacteremia  - Vancomycin   Concurrent Antimicrobials: None    Day of Vanc Therapy: #7 -- continue through 12/29 per ID.  Current Dosing Method: Intermittent Dosing by Levels   Therapeutic Goal: ~15 mg/L   Current Dose / Frequency: Intermittent dosing with HD   Plan / Rationale:   o On HD MWF  (home schedule is TTS). o Pt did no have HD yesterday for OR but did have dose yesterday of 750mg X ONCE. Pt had HD today (3 hour session), no vancomycin was given today. Patient was covered from dose given yesterday.   o Will check a level tomorrow AM to ensure therapeutic vancomycin level. (ordered).  Will continue to monitor clinical condition and make adjustments to regimen as appropriate. Thanks for consulting pharmacy! Please call with questions:  Deon Mendoza, PharmD  PGY-1 Pharmacy Resident  [id number]  12/18/2021 11:40 AM      Interval update:  Pt had HD today, Vancomycin given last night with no HD yesterday. Continue Vancomycin with HD for 2 weeks per ID. Subjective/Objective: Mr. Anupama Talbot is a 49 y.o. male with a PMHx significant for T1DM, ESRD on IHD, pancreatitis, covid PNA (in Nov 2021), Sarah Dears, admitted from assisted living facility for AMS with seizures upon presentation to ED. Started on Vancomycin + Cefepime for possible HAP; however, blood cultures subsequently returned with Strep and Staph. ID consulted and antibiotics changed to vancomycin alone. GI consulted 12/15 for dropping hgb. Pharmacy has been consulted to manage vancomycin.     Height:   Ht Readings from Last 1 Encounters:   12/11/21 5' 10\" (1.778 m)     Weight:    Wt Readings from Last 1 Encounters:   12/18/21 185 lb 10 oz (84.2 kg)       Current & Prior Antimicrobial Regimen(s):    (12/11-12/12)    (12/12-12/15)   Vancomycin (12/11-current)    Level(s) / Doses:  Date Time Dose Level / Type of Level Interpretation   12/12 04:50 1250mg IV x1 Random = 10.7 mcg/mL Re-dose with 1500mg IV x1   12/13 04:45 1500mg IV x 1 Random = 21.4 mcg/mL Re-dose with 1250 mg IV x 1   12/15 06:35  Random= 28.1mcg/mL No dose with HD today   12/17 0600  Random = 20.6 mcg/mL Re-dose with 750mg IV x1   Note: Serum levels collected for AUC-based dosing may be high if collected in close proximity to the dose administered. This is not necessarily an indicator of toxicity. Cultures & Sensitivities:    Date Site Micro Susceptibility / Result   12/11  Blood #1 Strep salivarious      Strep mitis R = TCN, I = Amp  S = vanc, ceftriaxone, levofloxacin, linezolid  R = Amp, ceftriaxone  S = vanc, linezolid, TCN, Levofloxacin   12/11 Blood #2 Strep salivarious  Strep mitis  Staph epi  Strep gordonii As above  As above  Vancomycin Sensitive (AIRAM 2)  Pan- senestive   12/11 COVID Positive      Labs / Ancillary Data:    No estimate of CrCL, as patient is ESRD on HD.       Recent Labs     12/16/21  0318 12/17/21  0651 12/18/21  0730   CREATININE 4.6* 4.8* 5.6*   BUN 25* 26* 30*   WBC 3.8* 2.8* 2.7*

## 2021-12-18 NOTE — PROGRESS NOTES
[facility] GI  Gastroenterology Progress Note  Tea Diaz is a 49 y.o. male patient. No diagnosis found. SUBJECTIVE:      Pt not in room--is at HD     Physical    VITALS:  BP (!) 135/91   Pulse 85   Temp 98.1 °F (36.7 °C)   Resp 16   Wt 185 lb 10 oz (84.2 kg)   SpO2 100%   BMI 26.63 kg/m²   TEMPERATURE:  Current - Temp: 98.1 °F (36.7 °C); Max - Temp  Av.8 °F (36.6 °C)  Min: 97.2 °F (36.2 °C)  Max: 98.3 °F (36.8 °C)        Data      Recent Labs     21  03121  1119 21  0651 21  1146 21  1814 21  0244 21  0730   WBC 3.8*  --  2.8*  --   --   --  2.7*   HGB 7.8*   < > 7.7*   < > 8.5* 8.4* 7.8*   HCT 23.6*   < > 23.1*   < > 25.8* 25.3* 23.8*   MCV 76.4*  --  76.5*  --   --   --  76.6*   *  --  94*  --   --   --  111*    < > = values in this interval not displayed. Recent Labs     21  0651 21  0730   * 135* 136   K 4.3 4.3 4.5   CL 97* 97* 97*   CO2 23 22 24   BUN 25* 26* 30*   CREATININE 4.6* 4.8* 5.6*     No results for input(s): AST, ALT, ALB, BILIDIR, BILITOT, ALKPHOS in the last 72 hours. No results for input(s): LIPASE, AMYLASE in the last 72 hours. ASSESSMENT   1. Anemia  2. Guaiac positive stools>>colonoscopy 21 with fair prep but grossly normal.   3.  Strep viridans bacteremia  4. Duodenal obstruction-  EGD 21   Impression:    Normal third portion of the duodenum. Severe extrinsic compression of the first and second portion of the duodenum with associated edema. The area was traversed with difficulty with the gastroscope. Large amount of retained bile, medications, residual food throughout the stomach which made visualization very difficult. Evidence of a previous gastrojejunostomy. Medium sliding hiatal hernia.   One column of medium esophageal varices in the lower esophagus.         CT A/P:   1.  Distended stomach with retained contrast and other ingested material. There is narrowing in the 1st-2nd portion of the duodenum but otherwise difficult to determine the cause given the extensive motion artifact. Intravenous contrast may be helpful    once venous access is restored, but would still require less motion to improve the quality of the study. 2.  Evidence of chronic pancreatitis. Ill-defined pancreatic neck region. Attention on contrast-enhanced CT. 3.  Large periumbilical hernia containing multiple loops of bowel, without evidence of obstruction. 4.  Anasarca. 5.  Bilateral airspace disease suggesting pulmonary edema and moderate bilateral pleural effusions. 6.  Markedly atrophic native kidneys and atrophic renal transplant graft. Renal osteodystrophy. PLAN    1. Consult Gen Surg. This duodenal obstruction was seen previously 2 to 3 years ago when he underwent a gastrojejunostomy to bypass the obstruction. The cause of the obstruction was unclear but it may have related to previous attacks of pancreatitis with subsequent fibrosis and scarring.       Zulema Lopez MD  [address] [place] [address] Dr Yandel Brock [facility]

## 2021-12-18 NOTE — CARE COORDINATION
Case Management Assessment            Discharge Note                    Date / Time of Note: 12/18/2021 2:37 PM                  Discharge Note Completed by: SHERITA Ornelas, CHRISTOPHERW    Patient Name: Mary Hong   YOB: 1970  Diagnosis: Status epilepticus ([address] [O26.423]   Date / Time: 12/11/2021  6:50 PM    Current PCP: Shaan Yoder MD  Clinic patient: No    Hospitalization in the last 30 days: No    Advance Directives:  Code Status: Full Code  [place] DNR form completed and on chart: No    Financial:  Payor: Clifford Leung / Plan: MEDICARE PART A AND B / Product Type: *No Product type* /      Pharmacy:    [facility] [address] [place] [address] [phone] - f [phone]  [address] [place] [address]  [address] [place] [address]  Phone: [phone] Fax: [phone]    [address] [phone]  [facility] [place] [id number] [facility] [address]  Phone: [phone] Fax: [facility] [address] [facility], [address] [facility] [phone] - F [phone]  [address] [place] [address]  Phone: [phone] Fax: [phone]      Assistance purchasing medications?:    Assistance provided by Case Management: None at this time    Does patient want to participate in local refill/ meds to beds program?:      Meds To Beds General Rules:  1. Can ONLY be done Monday- Friday between 8:30am-5pm  2. Prescription(s) must be in pharmacy by 3pm to be filled same day  3. Copy of patient's insurance/ prescription drug card and patient face sheet must be sent along with the prescription(s)  4. Cost of Rx cannot be added to hospital bill. If financial assistance is needed, please contact unit  or ;  or  CANNOT provide pharmacy voucher for patients co-pays  5.  Patients can then  the prescription on their way out of the hospital at discharge, or pharmacy can deliver to the bedside if staff is available. (payment due at time of pick-up or delivery - cash, check, or card accepted)     Able to afford home medications/ co-pay costs: Yes    ADLS:  Current PT AM-PAC Score: 13 /24  Current OT AM-PAC Score: 17 /24      DISCHARGE Disposition: Inpatient Rehab: [place] [address] Phone: [phone] Fax: [phone]    LOC at discharge: Not Applicable  CLARISSA Completed: Yes    Notification completed in HENS/PAS?:  Not Applicable    IMM Completed:   Not Indicated    Transportation:  Transportation PLAN for discharge: EMS transportation   Mode of Transport: Ambulance stretcher - BLS  Reason for medical transport: Bed confined: Meets the following criteria 1) unable to get out of bed without assistance or ambulate, 2) unable to safely sit up in a wheelchair, 3) unable to maintain erect seating position in a chair for time needed for transport  Name of [address] [place] [address], [place] [address]: [facility]  Phone: [phone]  Time of Transport: 4;00pm    Transport form completed: Yes    Home Care:  [address] ordered at discharge: No  [address]: Not Applicable  Orders faxed: No    Durable Medical Equipment:  DME Provider: None  Equipment obtained during hospitalization:     Home Oxygen and Respiratory Equipment:  Oxygen needed at discharge?: No  [address]: Not Applicable  Portable tank available for discharge?: Not Indicated    Dialysis:  Dialysis patient: No    Dialysis Center:  Not Applicable    Additional CM Notes: Pt from [address] [place] [address], Pt to DC to [facility] faxed clinicals to [phone].     The Plan for Transition of Care is related to the following treatment goals of Status epilepticus ([facility] [G40.901]    The Patient and/or patient representative Marella Galeazzi and his family were provided with a choice of provider and agrees with the discharge plan Yes    Freedom of choice list was provided with basic dialogue that supports the patient's individualized plan of care/goals and shares the quality data associated with the providers.  Yes    Care Transitions patient: No    SHERITA Martinez, [facility]  Case Management Department  Ph: [phone]  Fax: [phone]

## 2021-12-18 NOTE — FLOWSHEET NOTE
Pt tolerated tx. No meds or blood products given. Net fld Removed: 2000 mls    Pre WT: 86.2kg   Post WT: 84.2kg  EDW: 79.5 kg(?)    R femoral CVC w/good blood flow. Report given to RN.  Copy of tx sheets sent back with pt for scanning into EMR.       12/18/21 0710 12/18/21 1011   Vital Signs   BP (!) 148/96 (!) 135/91   Temp 97.7 °F (36.5 °C) 98.1 °F (36.7 °C)   Pulse 83 85   Resp 16 16   Weight 190 lb 0.6 oz (86.2 kg) 185 lb 10 oz (84.2 kg)   Weight Method Bed scale Bed scale

## 2021-12-18 NOTE — CONSULTS
General Surgery   Resident Consult Note    Reason for consult: gastric outlet obstruction    Chief Complaint: AMS/hypoglycemia    History obtained from: patient/EMR    History of Present Illness :    Tigre Swann is a 49 y.o. male with history of T1DM, ESRD on HD, multiple episodes of pancreatitis, Guillain [place] syndrome, kidney transplant (1994), and gastric outlet obstruction s/p Bilroth 2 gastrojejunostomy (2019) who presented to [facility] ED for AMS and hypoglycemia, and for whom general surgery is consulted for gastric outlet obstruction. While inpatient, he was found to have acute anemia with positive FOBT. Patient underwent a colonsocopy (12/17) showing a grossly normal colon, and an EGD (12/17) showing evidence of previous gastrojejunostomy as well as compression of first and second portions of jejunum. Today, patient denies any nausea or vomiting. Patient reports occasional episodes of emesis related to HD. Patient denies any abdominal pain. Patient denies any fevers or chills. Patient reports he is currently having multiple episodes of loose, non-bloody stools. Patient is anuric and underwent HD today.      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    Hemodialysis patient ([address]     Low blood pressure     Pancreatitis     Peripheral Neuropathy     Pneumonia 11/11/2021    Renal failure 1992    on dialysis 3x/wk  (T, Th, Sat)    Status epilepticus ([address] 12/11/2021       Past Surgical History:           Procedure Laterality Date    CHOLECYSTECTOMY      DIALYSIS FISTULA CREATION      left arm/currently non functioning    KIDNEY TRANSPLANT  9/1994    R-kidney    TOE SURGERY  10-8-2010    cut and realign 1st, 2nd, toe left foot , fixation 1st, 2nd toe left foot    TUNNELED VENOUS CATHETER PLACEMENT      left chest for dialysis    TUNNELED VENOUS PORT PLACEMENT Allergies:  Influenza virus vaccine    Medications:   Home Meds  No current facility-administered medications on file prior to encounter. Current Outpatient Medications on File Prior to Encounter   Medication Sig Dispense Refill    Dextromethorphan-guaiFENesin  MG/5ML SYRP Take 5 mLs by mouth every 4 hours as needed for Cough      bisacodyl (DULCOLAX) 10 MG suppository Place 10 mg rectally daily      ferrous sulfate (IRON 325) 325 (65 Fe) MG tablet Take 325 mg by mouth 2 times daily      levothyroxine (SYNTHROID) 25 MCG tablet Take 25 mcg by mouth Daily      loperamide (LOPERAMIDE A-D) 2 MG tablet Take 2 mg by mouth as needed for Diarrhea After each loose stool      magnesium hydroxide (MILK OF MAGNESIA) 400 MG/5ML suspension Take 30 mLs by mouth daily as needed for Constipation      Multiple Vitamins-Minerals (THERAPEUTIC MULTIVITAMIN-MINERALS) tablet Take 1 tablet by mouth daily      ondansetron (ZOFRAN) 4 MG tablet Take 4 mg by mouth every 8 hours as needed for Nausea or Vomiting      sevelamer (RENVELA) 800 MG tablet Take 1 tablet by mouth 3 times daily (with meals)      insulin glargine (LANTUS;BASAGLAR) 100 UNIT/ML injection pen Inject 10 Units into the skin nightly 5 pen 3    midodrine (PROAMATINE) 2.5 MG tablet Take 1 tablet by mouth 3 times daily (with meals) 90 tablet 3    ondansetron (ZOFRAN ODT) 4 MG disintegrating tablet Take 1 tablet by mouth every 8 hours as needed for Nausea 20 tablet 0    pantoprazole (PROTONIX) 40 MG tablet Take 40 mg by mouth 2 times daily       Promethazine HCl 6.25 MG/5ML SOLN Take 12.5 mg by mouth 2 times daily as needed. temazepam (RESTORIL) 30 MG capsule Take 30 mg by mouth nightly as needed.        Current Meds  0.9 % sodium chloride infusion, Once  iopamidol (ISOVUE-370) 76 % injection 80 mL, ONCE PRN  zolpidem (AMBIEN) tablet 10 mg, Nightly PRN  Venelex ointment, BID  0.9 % sodium chloride infusion, PRN  perflutren lipid microspheres (DEFINITY) injection 1.65 mg, ONCE PRN  midodrine (PROAMATINE) tablet 5 mg, BID WC  sodium chloride flush 0.9 % injection 5-40 mL, 2 times per day  sodium chloride flush 0.9 % injection 5-40 mL, PRN  0.9 % sodium chloride infusion, PRN  ondansetron (ZOFRAN-ODT) disintegrating tablet 4 mg, Q8H PRN   Or  ondansetron (ZOFRAN) injection 4 mg, Q6H PRN  polyethylene glycol (GLYCOLAX) packet 17 g, Daily PRN  acetaminophen (TYLENOL) tablet 650 mg, Q6H PRN   Or  acetaminophen (TYLENOL) suppository 650 mg, Q6H PRN  [Held by provider] heparin (porcine) injection 5,000 Units, 3 times per day  glucose (GLUTOSE) 40 % oral gel 15 g, PRN  dextrose 50 % IV solution, PRN  glucagon (rDNA) injection 1 mg, PRN  levothyroxine (SYNTHROID) tablet 25 mcg, Daily  pantoprazole (PROTONIX) injection 40 mg, Daily  [Held by provider] insulin lispro (1 Unit Dial) 0-6 Units, TID WC  [Held by provider] insulin lispro (1 Unit Dial) 0-3 Units, Nightly  heparin (porcine) injection 10,000 Units, PRN  vancomycin (VANCOCIN) intermittent dosing (placeholder), RX Placeholder        Family History:   Family History   Problem Relation Age of Onset    Diabetes Mother        Social History:   TOBACCO:   reports that he has never smoked. He has never used smokeless tobacco.  ETOH:   reports no history of alcohol use. DRUGS:   reports no history of drug use.     Review of Systems:   A 14 point review of systems was conducted, significant findings as noted in HPI      Physical exam:    Vitals:    12/18/21 0324 12/18/21 0710 12/18/21 1011 12/18/21 1105   BP: (!) 151/91 (!) 148/96 (!) 135/91 (!) 156/89   Pulse: 81 83 85 90   Resp: 16 16 16 16   Temp: 97.2 °F (36.2 °C) 97.7 °F (36.5 °C) 98.1 °F (36.7 °C) 97.5 °F (36.4 °C)   TempSrc: Oral   Axillary   SpO2: 100%   96%   Weight:  190 lb 0.6 oz (86.2 kg) 185 lb 10 oz (84.2 kg)        General appearance: alert, resting in bed  Neuro: A&Ox3, no focal deficits  HENT: trachea midline, no JVD, no LAD  Eyes: PERRLA, no scleral icterus  Chest/Lungs: normal respiratory effort on RA  Cardiovascular: RRR  Abdomen: soft, non-tender, non-distended, no guarding/rigidity; hernia to LUQ  Skin: warm and dry  Extremities: no edema , no cyanosis    Labs:    CBC:   Recent Labs     12/16/21  0318 12/16/21  1119 12/17/21  0651 12/17/21  1146 12/17/21  1814 12/18/21  0244 12/18/21  0730   WBC 3.8*  --  2.8*  --   --   --  2.7*   HGB 7.8*   < > 7.7*   < > 8.5* 8.4* 7.8*   HCT 23.6*   < > 23.1*   < > 25.8* 25.3* 23.8*   MCV 76.4*  --  76.5*  --   --   --  76.6*   *  --  94*  --   --   --  111*    < > = values in this interval not displayed. BMP:   Recent Labs     12/16/21  0318 12/17/21  0651 12/18/21  0730   * 135* 136   K 4.3 4.3 4.5   CL 97* 97* 97*   CO2 23 22 24   BUN 25* 26* 30*   CREATININE 4.6* 4.8* 5.6*     PT/INR: No results for input(s): PROTIME, INR in the last 72 hours. APTT: No results for input(s): APTT in the last 72 hours. Liver Profile:  Lab Results   Component Value Date    AST 20 12/11/2021    ALT 13 12/11/2021    BILIDIR <0.2 12/11/2021    BILITOT 0.5 12/11/2021    ALKPHOS 287 12/11/2021    GGT 35 07/12/2010     Lab Results   Component Value Date    CHOL 141 05/23/2011    HDL 30 05/23/2011    TRIG 72 11/27/2015     UA: No results found for: NITRITE, COLORU, PHUR, LABCAST, WBCUA, RBCUA, MUCUS, TRICHOMONAS, YEAST, BACTERIA, CLARITYU, SPECGRAV, LEUKOCYTESUR, UROBILINOGEN, BILIRUBINUR, BLOODU, GLUCOSEU, AMORPHOUS    Imaging:   CT ABDOMEN PELVIS WO CONTRAST Additional Contrast? Oral   Final Result      1. Distended stomach with retained contrast and other ingested material. There is narrowing in the 1st-2nd portion of the duodenum but otherwise difficult to determine the cause given the extensive motion artifact. Intravenous contrast may be helpful    once venous access is restored, but would still require less motion to improve the quality of the study. 2.  Evidence of chronic pancreatitis.  Ill-defined pancreatic neck the documented findings and plan of care.     Azael Hwakins M.D.  12/19/21   9:04 AM

## 2021-12-18 NOTE — PROGRESS NOTES
Pt A&O x4, VSS. Neuro status remains unchanged. Incontinence care provided throughout shift. Venelex applied to wound on buttocks. Tolerating diet and fluids with no issues. Fall precautions in place. Pt in bed with call light in reach.

## 2021-12-18 NOTE — PROGRESS NOTES
Report called to Ind[facility] Choi[facility] Transportation anticipated at 1600.

## 2021-12-18 NOTE — PLAN OF CARE
Problem: Fluid Volume:  Goal: Will show no signs or symptoms of fluid imbalance  Description: Will show no signs or symptoms of fluid imbalance  Outcome: Ongoing     Problem: Falls - Risk of:  Goal: Will remain free from falls  Description: Will remain free from falls  12/18/2021 1347 by Kenn Naranjo RN  Outcome: Ongoing     Problem: Skin Integrity:  Goal: Absence of new skin breakdown  Description: Absence of new skin breakdown  12/18/2021 1347 by Kenn Naranjo RN  Outcome: Ongoing

## 2021-12-18 NOTE — PROGRESS NOTES
Office: [phone]       Fax: [phone]      Nephrology  Note        Patient's Name: Radha Garcia  Date of Visit: 12/18/2021    Reason for Consult:  ESRD management  Requesting Physician:  Lo Kincaid MD  PCP: Ernie Flores MD    Chief Complaint:  seizure       Seen on dialysis. Tolerating well. 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    Hemodialysis patient ([address]     Low blood pressure     Pancreatitis     Peripheral Neuropathy     Pneumonia 11/11/2021    Renal failure 1992    on dialysis 3x/wk  (T, Th, Sat)    Status epilepticus ([address] 12/11/2021       Past Surgical History:   Procedure Laterality Date    CHOLECYSTECTOMY      DIALYSIS FISTULA CREATION      left arm/currently non functioning    KIDNEY TRANSPLANT  9/1994    R-kidney    TOE SURGERY  10-8-2010    cut and realign 1st, 2nd, toe left foot , fixation 1st, 2nd toe left foot    TUNNELED VENOUS CATHETER PLACEMENT      left chest for dialysis    TUNNELED VENOUS PORT PLACEMENT         Family History   Problem Relation Age of Onset    Diabetes Mother         reports that he has never smoked. He has never used smokeless tobacco. He reports that he does not drink alcohol and does not use drugs. Medications:        Allergies:  Influenza virus vaccine  Scheduled Meds:   Venelex   Topical BID    midodrine  5 mg Oral BID WC    sodium chloride flush  5-40 mL IntraVENous 2 times per day    heparin (porcine)  5,000 Units SubCUTAneous 3 times per day    levothyroxine  25 mcg Oral Daily    pantoprazole  40 mg IntraVENous Daily    [Held by provider] insulin lispro  0-6 Units SubCUTAneous TID WC    [Held by provider] insulin lispro  0-3 Units SubCUTAneous Nightly    vancomycin (VANCOCIN) intermittent dosing (placeholder)   Other RX Placeholder     Continuous Infusions:   sodium chloride Stopped (12/17/21 1947)    sodium chloride      sodium chloride 25 mL (12/15/21 0348)       Review of Systems:     All systems reviewed but were negative except as mentioned in HPI    Objective:       Vitals:  BP (!) 148/96   Pulse 83   Temp 97.7 °F (36.5 °C)   Resp 16   Wt 190 lb 0.6 oz (86.2 kg)   SpO2 100%   BMI 27.27 kg/m²   Constitutional:  awake, NAD  HEENT:  MMM, No icterus  Neck: no bruits, No JVD  Cardiovascular:  S1, S2 reg  Respiratory: fwe coarse sounds  Abdomen:  +BS, soft, NT, ND  Ext: + lower extremity edema  Psychiatric: mood and affect appropriate  Skin: no rash, turgor wnl  Access: right groin catheter. Previous AVF/AVG sites noted  CNS: lethargic, no agitation    Data:     Labs:  Hepatic:   No results for input(s): AST, ALT, ALB, BILITOT, ALKPHOS in the last 72 hours. BNP: No results for input(s): BNP in the last 72 hours. ABGs:   No results for input(s): PHART, PO2ART, DZT7CHH in the last 72 hours. IMAGING:  CT ABDOMEN PELVIS WO CONTRAST Additional Contrast? Oral   Final Result      1. Distended stomach with retained contrast and other ingested material. There is narrowing in the 1st-2nd portion of the duodenum but otherwise difficult to determine the cause given the extensive motion artifact. Intravenous contrast may be helpful    once venous access is restored, but would still require less motion to improve the quality of the study. 2.  Evidence of chronic pancreatitis. Ill-defined pancreatic neck region. Attention on contrast-enhanced CT. 3.  Large periumbilical hernia containing multiple loops of bowel, without evidence of obstruction. 4.  Anasarca. 5.  Bilateral airspace disease suggesting pulmonary edema and moderate bilateral pleural effusions.    6.  Markedly atrophic native kidneys and atrophic renal transplant graft. Renal osteodystrophy. MRI BRAIN WO CONTRAST   Final Result   1. No acute infarction, recent hemorrhage, or intracranial mass. 2.  A few nonspecific T2/flair hyperintensities in the subcortical white matter. Findings may be related to chronic small vessel ischemic disease sequela of prior injury or infection, or less likely a demyelinating process. 3.  Metabolic bone disease. Assessment :       1. ESRD     Access: Temp cath   Volume: Hypervolemic       2. Electrolytes/Acid base  Hypomagnesemia and No Dyskalemia    Recent Labs     12/18/21  0730      K 4.5   CO2 24   MG 1.90       3. BMD  -Hyperphosphatemia,     Recent Labs     12/18/21  0730   CALCIUM 7.5*       4. HTN  -Blood pressure low    BP Readings from Last 1 Encounters:   12/18/21 (!) 148/96       5. Anemia  -anemia of CKD    Recent Labs     12/18/21  0730   HGB 7.8*     6. DM    6. seizures    PLAN :       - getting dialysis today   - regualr days mwf. Had colonoscopy  yesterday so HD was held yesterday   - minimize IVF  - MBD management  - Anemia management. Transfusion per guidelines  - No BP meds  - give midodrine  - Dose meds to GFR<10        Thank you for allowing us to participate in care of Yang Gonzalez . We will continue to follow. Feel free to contact me with any questions.       Amarilys Conley MD  12/18/2021    [facility] [address]  Office : [phone]  Fax :[phone]

## 2021-12-18 NOTE — PROGRESS NOTES
NUTRITION NOTE   Admission Date: 12/11/2021     Type and Reason for Visit: Initial,RD Nutrition Re-Screen/LOS    NUTRITION RECOMMENDATIONS:   1. PO Diet: Continue regular; low phos diet   2. ONS: Start Nepro BID. NUTRITION ASSESSMENT:  Pt assessed for LOS x6 days. Pt currently in HD. Noted pt made NPO 12/11-12/13 and 12/15-12/17. Minimal po intakes recorded. Pt averaging ~50% po per EMR of those that are recorded. Tolerating diet per RN. Weights up since admission. No weight loss noted in the last month. No wt hx per EMR prior. RD to send renal oral nutrition supplements to increase po intakes for increased nutrition needs with ESRD on HD. Will continue to monitor per [facility]. Patient admitted d/t AMS - sepsis, hypoglycemia    PMH significant for: ESRD on HS, pancreatitis, Guillan barre    MALNUTRITION ASSESSMENT  Context of Malnutrition: Acute Illness   Malnutrition Status: Insufficient data (DANIELLE wts d/t HD, minimal po recorded)    NUTRITION DIAGNOSIS   · Increased nutrient needs related to catabolic illness as evidenced by dialysis      [address]  and/or Nutrient Delivery:  Continue Current Diet,Start Oral Nutrition Supplement  Nutrition Education/Counseling:  No recommendation at this time      The patient will still be monitored per nutrition standards of care. Consult dietitian if nutrition interventions essential to patient care is needed.      Brandee Randall, [address], [address] [place] [address] Drive:  [phone]  Office:  [phone]

## 2021-12-18 NOTE — PROGRESS NOTES
Patient picked up by [address] ambulance to be transported to [facility] rehab with belongings and in stable condition.

## 2021-12-18 NOTE — PLAN OF CARE
Problem: Falls - Risk of:  Goal: Will remain free from falls  Description: Will remain free from falls  12/18/2021 0258 by Ethel Navarro RN  Outcome: Ongoing     Problem: Skin Integrity:  Goal: Absence of new skin breakdown  Description: Absence of new skin breakdown  12/18/2021 0258 by Ethel Navarro RN  Outcome: Ongoing

## 2021-12-24 NOTE — PROGRESS NOTES
Physician Progress Note      PATIENT:               Sloan Galicia  CSN #:                  [id number]  :                       1970  ADMIT DATE:       2021 6:50 PM  [address] [place] DATE:        2021 4:30 PM  RESPONDING  PROVIDER #:        Amadeo Mathew MD          QUERY TEXT:    Patient transferred from [facility] with seizures and sepsis 2/2 PNA. Patient with recent Covid infection and noted positive Covid test on 21   and Covid positive on 21. If possible, please document in progress   notes and discharge summary if patient has an active Covid-19 infection or if   patient is testing positive for Covid-19 without a current active infection: The medical record reflects the following:  Risk Factors: 49 y.o. male with ESRD, recent covid-19 positive on 21,   then again on 21, PNA  Clinical Indicators: presented in postictal state from recent seizure  Treatment: No tx for Covid, PNA/sepsis being treated with Unasyn, vanc  Options provided:  -- Positive Covid test result without an active current Covid-19 infection  -- Active Covid-19 infection is being treated and/or evaluated on current   encounter  -- Other - I will add my own diagnosis  -- Disagree - Not applicable / Not valid  -- Disagree - Clinically unable to determine / Unknown  -- Refer to Clinical Documentation Reviewer    PROVIDER RESPONSE TEXT:    Patient is testing positive for Covid without an active Covid-19 infection.     Query created by: Liseth So on 2021 7:44 AM      Electronically signed by:  Amadeo Mathew MD 2021 9:03 PM

## 2022-01-01 ENCOUNTER — APPOINTMENT (OUTPATIENT)
Dept: CT IMAGING | Age: 52
DRG: 643 | End: 2022-01-01
Payer: COMMERCIAL

## 2022-01-01 ENCOUNTER — HOSPITAL ENCOUNTER (EMERGENCY)
Age: 52
Discharge: HOME | End: 2022-02-03
Attending: EMERGENCY MEDICINE
Payer: COMMERCIAL

## 2022-01-01 ENCOUNTER — APPOINTMENT (OUTPATIENT)
Dept: GENERAL RADIOLOGY | Age: 52
DRG: 643 | End: 2022-01-01
Payer: COMMERCIAL

## 2022-01-01 ENCOUNTER — HOSPITAL ENCOUNTER (INPATIENT)
Age: 52
LOS: 13 days | DRG: 643 | End: 2022-02-25
Attending: EMERGENCY MEDICINE | Admitting: FAMILY MEDICINE
Payer: COMMERCIAL

## 2022-01-01 VITALS
OXYGEN SATURATION: 96 % | BODY MASS INDEX: 24.13 KG/M2 | WEIGHT: 198.19 LBS | SYSTOLIC BLOOD PRESSURE: 156 MMHG | HEIGHT: 76 IN | TEMPERATURE: 95.9 F | DIASTOLIC BLOOD PRESSURE: 29 MMHG

## 2022-01-01 VITALS
RESPIRATION RATE: 16 BRPM | OXYGEN SATURATION: 98 % | TEMPERATURE: 97.8 F | SYSTOLIC BLOOD PRESSURE: 142 MMHG | HEART RATE: 86 BPM | DIASTOLIC BLOOD PRESSURE: 70 MMHG

## 2022-01-01 DIAGNOSIS — E83.51 HYPOCALCEMIA: ICD-10-CM

## 2022-01-01 DIAGNOSIS — D61.818 PANCYTOPENIA (HCC): ICD-10-CM

## 2022-01-01 DIAGNOSIS — R19.7 DIARRHEA, UNSPECIFIED TYPE: ICD-10-CM

## 2022-01-01 DIAGNOSIS — R56.9 SEIZURE (HCC): ICD-10-CM

## 2022-01-01 DIAGNOSIS — Z99.2 DIALYSIS PATIENT (HCC): ICD-10-CM

## 2022-01-01 DIAGNOSIS — E87.20 METABOLIC ACIDOSIS: Primary | ICD-10-CM

## 2022-01-01 DIAGNOSIS — I95.9 HYPOTENSION, UNSPECIFIED HYPOTENSION TYPE: ICD-10-CM

## 2022-01-01 DIAGNOSIS — L89.309 PRESSURE INJURY OF SKIN OF BUTTOCK, UNSPECIFIED INJURY STAGE, UNSPECIFIED LATERALITY: ICD-10-CM

## 2022-01-01 DIAGNOSIS — R19.7 DIARRHEA, UNSPECIFIED TYPE: Primary | ICD-10-CM

## 2022-01-01 DIAGNOSIS — J96.01 ACUTE HYPOXEMIC RESPIRATORY FAILURE (HCC): ICD-10-CM

## 2022-01-01 LAB
A/G RATIO: 0.8 (ref 1.1–2.2)
ALBUMIN SERPL-MCNC: 2.5 G/DL (ref 3.1–4.9)
ALBUMIN SERPL-MCNC: 2.5 G/DL (ref 3.1–4.9)
ALBUMIN SERPL-MCNC: 2.7 G/DL (ref 3.4–5)
ALBUMIN SERPL-MCNC: 3.1 G/DL (ref 3.4–5)
ALP BLD-CCNC: 163 U/L (ref 40–129)
ALP BLD-CCNC: 185 U/L (ref 40–129)
ALPHA-1-GLOBULIN: 0.3 G/DL (ref 0.2–0.4)
ALPHA-1-GLOBULIN: 0.4 G/DL (ref 0.2–0.4)
ALPHA-2-GLOBULIN: 0.5 G/DL (ref 0.4–1.1)
ALPHA-2-GLOBULIN: 0.5 G/DL (ref 0.4–1.1)
ALT SERPL-CCNC: 20 U/L (ref 10–40)
ALT SERPL-CCNC: 24 U/L (ref 10–40)
ANION GAP SERPL CALCULATED.3IONS-SCNC: 15 MMOL/L (ref 3–16)
ANION GAP SERPL CALCULATED.3IONS-SCNC: 16 MMOL/L (ref 3–16)
ANION GAP SERPL CALCULATED.3IONS-SCNC: 18 MMOL/L (ref 3–16)
ANION GAP SERPL CALCULATED.3IONS-SCNC: 19 MMOL/L (ref 3–16)
ANION GAP SERPL CALCULATED.3IONS-SCNC: 20 MMOL/L (ref 3–16)
ANION GAP SERPL CALCULATED.3IONS-SCNC: 22 MMOL/L (ref 3–16)
ANION GAP SERPL CALCULATED.3IONS-SCNC: 24 MMOL/L (ref 3–16)
ANION GAP SERPL CALCULATED.3IONS-SCNC: 27 MMOL/L (ref 3–16)
ANISOCYTOSIS: ABNORMAL
APTT: 51.2 SEC (ref 26.2–38.6)
AST SERPL-CCNC: 34 U/L (ref 15–37)
AST SERPL-CCNC: 34 U/L (ref 15–37)
BASE EXCESS ARTERIAL: -4.3 MMOL/L (ref -3–3)
BASE EXCESS ARTERIAL: -6 (ref -3–3)
BASE EXCESS ARTERIAL: -8.7 MMOL/L (ref -3–3)
BASE EXCESS VENOUS: -7.8 MMOL/L (ref -3–3)
BASOPHILS ABSOLUTE: 0 K/UL (ref 0–0.2)
BASOPHILS RELATIVE PERCENT: 0 %
BASOPHILS RELATIVE PERCENT: 0.9 %
BASOPHILS RELATIVE PERCENT: 1.3 %
BASOPHILS RELATIVE PERCENT: 1.4 %
BASOPHILS RELATIVE PERCENT: 1.4 %
BASOPHILS RELATIVE PERCENT: 1.5 %
BASOPHILS RELATIVE PERCENT: 1.5 %
BETA GLOBULIN: 0.8 G/DL (ref 0.9–1.6)
BETA GLOBULIN: 0.8 G/DL (ref 0.9–1.6)
BILIRUB SERPL-MCNC: 0.4 MG/DL (ref 0–1)
BILIRUB SERPL-MCNC: 0.4 MG/DL (ref 0–1)
BILIRUBIN DIRECT: <0.2 MG/DL (ref 0–0.3)
BILIRUBIN, INDIRECT: ABNORMAL MG/DL (ref 0–1)
BLOOD SMEAR REVIEW: NORMAL
BUN BLDV-MCNC: 31 MG/DL (ref 7–20)
BUN BLDV-MCNC: 31 MG/DL (ref 7–20)
BUN BLDV-MCNC: 33 MG/DL (ref 7–20)
BUN BLDV-MCNC: 37 MG/DL (ref 7–20)
BUN BLDV-MCNC: 37 MG/DL (ref 7–20)
BUN BLDV-MCNC: 39 MG/DL (ref 7–20)
BUN BLDV-MCNC: 43 MG/DL (ref 7–20)
BUN BLDV-MCNC: 44 MG/DL (ref 7–20)
BUN BLDV-MCNC: 46 MG/DL (ref 7–20)
BUN BLDV-MCNC: 47 MG/DL (ref 7–20)
BUN BLDV-MCNC: 62 MG/DL (ref 7–20)
BUN BLDV-MCNC: 69 MG/DL (ref 7–20)
BUN BLDV-MCNC: 69 MG/DL (ref 7–20)
BUN BLDV-MCNC: 72 MG/DL (ref 7–20)
BUN BLDV-MCNC: 77 MG/DL (ref 7–20)
BUN BLDV-MCNC: 92 MG/DL (ref 7–20)
BURR CELLS: ABNORMAL
BURR CELLS: ABNORMAL
CALCIUM IONIZED: 0.67 MMOL/L (ref 1.12–1.32)
CALCIUM IONIZED: 0.73 MMOL/L (ref 1.12–1.32)
CALCIUM IONIZED: 0.74 MMOL/L (ref 1.12–1.32)
CALCIUM IONIZED: 0.74 MMOL/L (ref 1.12–1.32)
CALCIUM IONIZED: 0.8 MMOL/L (ref 1.12–1.32)
CALCIUM IONIZED: 0.83 MMOL/L (ref 1.12–1.32)
CALCIUM IONIZED: 0.87 MMOL/L (ref 1.12–1.32)
CALCIUM IONIZED: 0.87 MMOL/L (ref 1.12–1.32)
CALCIUM IONIZED: 0.88 MMOL/L (ref 1.12–1.32)
CALCIUM IONIZED: 0.89 MMOL/L (ref 1.12–1.32)
CALCIUM IONIZED: 0.89 MMOL/L (ref 1.12–1.32)
CALCIUM IONIZED: 0.95 MMOL/L (ref 1.12–1.32)
CALCIUM SERPL-MCNC: 5.3 MG/DL (ref 8.3–10.6)
CALCIUM SERPL-MCNC: 5.6 MG/DL (ref 8.3–10.6)
CALCIUM SERPL-MCNC: 5.6 MG/DL (ref 8.3–10.6)
CALCIUM SERPL-MCNC: 5.7 MG/DL (ref 8.3–10.6)
CALCIUM SERPL-MCNC: 6.4 MG/DL (ref 8.3–10.6)
CALCIUM SERPL-MCNC: 6.5 MG/DL (ref 8.3–10.6)
CALCIUM SERPL-MCNC: 6.6 MG/DL (ref 8.3–10.6)
CALCIUM SERPL-MCNC: 6.7 MG/DL (ref 8.3–10.6)
CALCIUM SERPL-MCNC: 6.8 MG/DL (ref 8.3–10.6)
CALCIUM SERPL-MCNC: 6.9 MG/DL (ref 8.3–10.6)
CALCIUM SERPL-MCNC: 7.3 MG/DL (ref 8.3–10.6)
CARBOXYHEMOGLOBIN ARTERIAL: 0.1 % (ref 0–1.5)
CARBOXYHEMOGLOBIN ARTERIAL: 0.2 % (ref 0–1.5)
CARBOXYHEMOGLOBIN: 3.4 % (ref 0–1.5)
CHLORIDE BLD-SCNC: 91 MMOL/L (ref 99–110)
CHLORIDE BLD-SCNC: 91 MMOL/L (ref 99–110)
CHLORIDE BLD-SCNC: 92 MMOL/L (ref 99–110)
CHLORIDE BLD-SCNC: 92 MMOL/L (ref 99–110)
CHLORIDE BLD-SCNC: 93 MMOL/L (ref 99–110)
CHLORIDE BLD-SCNC: 94 MMOL/L (ref 99–110)
CHLORIDE BLD-SCNC: 95 MMOL/L (ref 99–110)
CHLORIDE BLD-SCNC: 97 MMOL/L (ref 99–110)
CO2: 13 MMOL/L (ref 21–32)
CO2: 15 MMOL/L (ref 21–32)
CO2: 16 MMOL/L (ref 21–32)
CO2: 16 MMOL/L (ref 21–32)
CO2: 18 MMOL/L (ref 21–32)
CO2: 19 MMOL/L (ref 21–32)
CO2: 20 MMOL/L (ref 21–32)
CO2: 20 MMOL/L (ref 21–32)
CO2: 21 MMOL/L (ref 21–32)
CO2: 22 MMOL/L (ref 21–32)
CO2: 24 MMOL/L (ref 21–32)
COPPER: 51.7 UG/DL (ref 70–140)
CREAT SERPL-MCNC: 4.6 MG/DL (ref 0.9–1.3)
CREAT SERPL-MCNC: 5.5 MG/DL (ref 0.9–1.3)
CREAT SERPL-MCNC: 5.5 MG/DL (ref 0.9–1.3)
CREAT SERPL-MCNC: 5.6 MG/DL (ref 0.9–1.3)
CREAT SERPL-MCNC: 5.6 MG/DL (ref 0.9–1.3)
CREAT SERPL-MCNC: 5.7 MG/DL (ref 0.9–1.3)
CREAT SERPL-MCNC: 6.1 MG/DL (ref 0.9–1.3)
CREAT SERPL-MCNC: 6.2 MG/DL (ref 0.9–1.3)
CREAT SERPL-MCNC: 6.2 MG/DL (ref 0.9–1.3)
CREAT SERPL-MCNC: 6.4 MG/DL (ref 0.9–1.3)
CREAT SERPL-MCNC: 7.2 MG/DL (ref 0.9–1.3)
CREAT SERPL-MCNC: 7.5 MG/DL (ref 0.9–1.3)
CREAT SERPL-MCNC: 7.6 MG/DL (ref 0.9–1.3)
CREAT SERPL-MCNC: 8.5 MG/DL (ref 0.9–1.3)
CREAT SERPL-MCNC: 8.6 MG/DL (ref 0.9–1.3)
CREAT SERPL-MCNC: 9.6 MG/DL (ref 0.9–1.3)
CULTURE, RESPIRATORY: NORMAL
EKG ATRIAL RATE: 66 BPM
EKG DIAGNOSIS: NORMAL
EKG P AXIS: 74 DEGREES
EKG P-R INTERVAL: 168 MS
EKG Q-T INTERVAL: 554 MS
EKG QRS DURATION: 92 MS
EKG QTC CALCULATION (BAZETT): 580 MS
EKG R AXIS: -15 DEGREES
EKG T AXIS: -88 DEGREES
EKG VENTRICULAR RATE: 66 BPM
EOSINOPHILS ABSOLUTE: 0.1 K/UL (ref 0–0.6)
EOSINOPHILS ABSOLUTE: 0.2 K/UL (ref 0–0.6)
EOSINOPHILS RELATIVE PERCENT: 3.5 %
EOSINOPHILS RELATIVE PERCENT: 3.6 %
EOSINOPHILS RELATIVE PERCENT: 3.9 %
EOSINOPHILS RELATIVE PERCENT: 4.2 %
EOSINOPHILS RELATIVE PERCENT: 4.7 %
EOSINOPHILS RELATIVE PERCENT: 4.7 %
EOSINOPHILS RELATIVE PERCENT: 4.8 %
EOSINOPHILS RELATIVE PERCENT: 4.9 %
EOSINOPHILS RELATIVE PERCENT: 7 %
ESTIMATED AVERAGE GLUCOSE: 142.7 MG/DL
GAMMA GLOBULIN: 1.7 G/DL (ref 0.6–1.8)
GAMMA GLOBULIN: 1.7 G/DL (ref 0.6–1.8)
GFR AFRICAN AMERICAN: 10
GFR AFRICAN AMERICAN: 11
GFR AFRICAN AMERICAN: 12
GFR AFRICAN AMERICAN: 13
GFR AFRICAN AMERICAN: 16
GFR AFRICAN AMERICAN: 7
GFR AFRICAN AMERICAN: 8
GFR AFRICAN AMERICAN: 8
GFR AFRICAN AMERICAN: 9
GFR AFRICAN AMERICAN: 9
GFR NON-AFRICAN AMERICAN: 10
GFR NON-AFRICAN AMERICAN: 11
GFR NON-AFRICAN AMERICAN: 14
GFR NON-AFRICAN AMERICAN: 6
GFR NON-AFRICAN AMERICAN: 7
GFR NON-AFRICAN AMERICAN: 7
GFR NON-AFRICAN AMERICAN: 8
GFR NON-AFRICAN AMERICAN: 9
GI BACTERIAL PATHOGENS BY PCR: NORMAL
GLUCOSE BLD-MCNC: 100 MG/DL (ref 70–99)
GLUCOSE BLD-MCNC: 101 MG/DL (ref 70–99)
GLUCOSE BLD-MCNC: 105 MG/DL (ref 70–99)
GLUCOSE BLD-MCNC: 106 MG/DL (ref 70–99)
GLUCOSE BLD-MCNC: 110 MG/DL (ref 70–99)
GLUCOSE BLD-MCNC: 111 MG/DL (ref 70–99)
GLUCOSE BLD-MCNC: 111 MG/DL (ref 70–99)
GLUCOSE BLD-MCNC: 113 MG/DL (ref 70–99)
GLUCOSE BLD-MCNC: 115 MG/DL (ref 70–99)
GLUCOSE BLD-MCNC: 122 MG/DL (ref 70–99)
GLUCOSE BLD-MCNC: 123 MG/DL (ref 70–99)
GLUCOSE BLD-MCNC: 128 MG/DL (ref 70–99)
GLUCOSE BLD-MCNC: 128 MG/DL (ref 70–99)
GLUCOSE BLD-MCNC: 133 MG/DL (ref 70–99)
GLUCOSE BLD-MCNC: 138 MG/DL (ref 70–99)
GLUCOSE BLD-MCNC: 146 MG/DL (ref 70–99)
GLUCOSE BLD-MCNC: 152 MG/DL (ref 70–99)
GLUCOSE BLD-MCNC: 154 MG/DL (ref 70–99)
GLUCOSE BLD-MCNC: 155 MG/DL (ref 70–99)
GLUCOSE BLD-MCNC: 172 MG/DL (ref 70–99)
GLUCOSE BLD-MCNC: 176 MG/DL (ref 70–99)
GLUCOSE BLD-MCNC: 179 MG/DL (ref 70–99)
GLUCOSE BLD-MCNC: 184 MG/DL (ref 70–99)
GLUCOSE BLD-MCNC: 19 MG/DL (ref 70–99)
GLUCOSE BLD-MCNC: 196 MG/DL (ref 70–99)
GLUCOSE BLD-MCNC: 207 MG/DL (ref 70–99)
GLUCOSE BLD-MCNC: 217 MG/DL (ref 70–99)
GLUCOSE BLD-MCNC: 224 MG/DL (ref 70–99)
GLUCOSE BLD-MCNC: 245 MG/DL (ref 70–99)
GLUCOSE BLD-MCNC: 28 MG/DL (ref 70–99)
GLUCOSE BLD-MCNC: 43 MG/DL (ref 70–99)
GLUCOSE BLD-MCNC: 47 MG/DL (ref 70–99)
GLUCOSE BLD-MCNC: 47 MG/DL (ref 70–99)
GLUCOSE BLD-MCNC: 48 MG/DL (ref 70–99)
GLUCOSE BLD-MCNC: 53 MG/DL (ref 70–99)
GLUCOSE BLD-MCNC: 55 MG/DL (ref 70–99)
GLUCOSE BLD-MCNC: 57 MG/DL (ref 70–99)
GLUCOSE BLD-MCNC: 58 MG/DL (ref 70–99)
GLUCOSE BLD-MCNC: 59 MG/DL (ref 70–99)
GLUCOSE BLD-MCNC: 60 MG/DL (ref 70–99)
GLUCOSE BLD-MCNC: 61 MG/DL (ref 70–99)
GLUCOSE BLD-MCNC: 63 MG/DL (ref 70–99)
GLUCOSE BLD-MCNC: 64 MG/DL (ref 70–99)
GLUCOSE BLD-MCNC: 64 MG/DL (ref 70–99)
GLUCOSE BLD-MCNC: 65 MG/DL (ref 70–99)
GLUCOSE BLD-MCNC: 66 MG/DL (ref 70–99)
GLUCOSE BLD-MCNC: 70 MG/DL (ref 70–99)
GLUCOSE BLD-MCNC: 71 MG/DL (ref 70–99)
GLUCOSE BLD-MCNC: 71 MG/DL (ref 70–99)
GLUCOSE BLD-MCNC: 72 MG/DL (ref 70–99)
GLUCOSE BLD-MCNC: 73 MG/DL (ref 70–99)
GLUCOSE BLD-MCNC: 74 MG/DL (ref 70–99)
GLUCOSE BLD-MCNC: 75 MG/DL (ref 70–99)
GLUCOSE BLD-MCNC: 76 MG/DL (ref 70–99)
GLUCOSE BLD-MCNC: 76 MG/DL (ref 70–99)
GLUCOSE BLD-MCNC: 77 MG/DL (ref 70–99)
GLUCOSE BLD-MCNC: 78 MG/DL (ref 70–99)
GLUCOSE BLD-MCNC: 79 MG/DL (ref 70–99)
GLUCOSE BLD-MCNC: 81 MG/DL (ref 70–99)
GLUCOSE BLD-MCNC: 82 MG/DL (ref 70–99)
GLUCOSE BLD-MCNC: 84 MG/DL (ref 70–99)
GLUCOSE BLD-MCNC: 84 MG/DL (ref 70–99)
GLUCOSE BLD-MCNC: 85 MG/DL (ref 70–99)
GLUCOSE BLD-MCNC: 85 MG/DL (ref 70–99)
GLUCOSE BLD-MCNC: 86 MG/DL (ref 70–99)
GLUCOSE BLD-MCNC: 86 MG/DL (ref 70–99)
GLUCOSE BLD-MCNC: 87 MG/DL (ref 70–99)
GLUCOSE BLD-MCNC: 87 MG/DL (ref 70–99)
GLUCOSE BLD-MCNC: 88 MG/DL (ref 70–99)
GLUCOSE BLD-MCNC: 89 MG/DL (ref 70–99)
GLUCOSE BLD-MCNC: 90 MG/DL (ref 70–99)
GLUCOSE BLD-MCNC: 91 MG/DL (ref 70–99)
GLUCOSE BLD-MCNC: 94 MG/DL (ref 70–99)
GLUCOSE BLD-MCNC: 95 MG/DL (ref 70–99)
GLUCOSE BLD-MCNC: 97 MG/DL (ref 70–99)
GRAM STAIN RESULT: NORMAL
HAPTOGLOBIN: 133 MG/DL (ref 30–200)
HBA1C MFR BLD: 6.6 %
HCO3 ARTERIAL: 18 MMOL/L (ref 21–29)
HCO3 ARTERIAL: 19.3 MMOL/L (ref 21–29)
HCO3 ARTERIAL: 21.2 MMOL/L (ref 21–29)
HCO3 VENOUS: 18.9 MMOL/L (ref 23–29)
HCT VFR BLD CALC: 26.3 % (ref 40.5–52.5)
HCT VFR BLD CALC: 27.4 % (ref 40.5–52.5)
HCT VFR BLD CALC: 27.5 % (ref 40.5–52.5)
HCT VFR BLD CALC: 27.6 % (ref 40.5–52.5)
HCT VFR BLD CALC: 28.4 % (ref 40.5–52.5)
HCT VFR BLD CALC: 28.6 % (ref 40.5–52.5)
HCT VFR BLD CALC: 28.9 % (ref 40.5–52.5)
HCT VFR BLD CALC: 29 % (ref 40.5–52.5)
HCT VFR BLD CALC: 29.4 % (ref 40.5–52.5)
HCT VFR BLD CALC: 29.4 % (ref 40.5–52.5)
HCT VFR BLD CALC: 30.6 % (ref 40.5–52.5)
HCT VFR BLD CALC: 30.7 % (ref 40.5–52.5)
HCT VFR BLD CALC: 30.8 % (ref 40.5–52.5)
HCT VFR BLD CALC: 31.4 % (ref 40.5–52.5)
HEMATOLOGY PATH CONSULT: NORMAL
HEMATOLOGY PATH CONSULT: YES
HEMOGLOBIN, ART, EXTENDED: 9.4 G/DL (ref 13.5–17.5)
HEMOGLOBIN, ART, EXTENDED: 9.9 G/DL (ref 13.5–17.5)
HEMOGLOBIN, VEN, REDUCED: 15 %
HEMOGLOBIN: 10.1 G/DL (ref 13.5–17.5)
HEMOGLOBIN: 10.1 G/DL (ref 13.5–17.5)
HEMOGLOBIN: 10.2 GM/DL (ref 13.5–17.5)
HEMOGLOBIN: 8.5 G/DL (ref 13.5–17.5)
HEMOGLOBIN: 8.7 G/DL (ref 13.5–17.5)
HEMOGLOBIN: 8.8 G/DL (ref 13.5–17.5)
HEMOGLOBIN: 9 G/DL (ref 13.5–17.5)
HEMOGLOBIN: 9.2 G/DL (ref 13.5–17.5)
HEMOGLOBIN: 9.3 G/DL (ref 13.5–17.5)
HEMOGLOBIN: 9.4 G/DL (ref 13.5–17.5)
HEMOGLOBIN: 9.5 G/DL (ref 13.5–17.5)
HEMOGLOBIN: 9.8 G/DL (ref 13.5–17.5)
HEMOGLOBIN: 9.9 G/DL (ref 13.5–17.5)
HYPOCHROMIA: ABNORMAL
IGA: 274 MG/DL (ref 70–400)
IGG: 1576 MG/DL (ref 700–1600)
IGM: 106 MG/DL (ref 40–230)
INR BLD: 1.35 (ref 0.88–1.12)
KAPPA, FREE LIGHT CHAINS, SERUM: 267.23 MG/L (ref 3.3–19.4)
KAPPA/LAMBDA RATIO: 1.59 (ref 0.26–1.65)
KAPPA/LAMBDA TEST COMMENT: ABNORMAL
LACTATE DEHYDROGENASE: 185 U/L (ref 100–190)
LACTATE: 4.25 MMOL/L (ref 0.4–2)
LACTIC ACID: 4.3 MMOL/L (ref 0.4–2)
LAMBDA, FREE LIGHT CHAINS, SERUM: 168.44 MG/L (ref 5.71–26.3)
LIPASE: 20 U/L (ref 13–60)
LYMPHOCYTES ABSOLUTE: 0.4 K/UL (ref 1–5.1)
LYMPHOCYTES ABSOLUTE: 0.4 K/UL (ref 1–5.1)
LYMPHOCYTES ABSOLUTE: 0.5 K/UL (ref 1–5.1)
LYMPHOCYTES ABSOLUTE: 0.6 K/UL (ref 1–5.1)
LYMPHOCYTES ABSOLUTE: 0.6 K/UL (ref 1–5.1)
LYMPHOCYTES ABSOLUTE: 0.7 K/UL (ref 1–5.1)
LYMPHOCYTES ABSOLUTE: 0.7 K/UL (ref 1–5.1)
LYMPHOCYTES RELATIVE PERCENT: 13.5 %
LYMPHOCYTES RELATIVE PERCENT: 13.5 %
LYMPHOCYTES RELATIVE PERCENT: 17.9 %
LYMPHOCYTES RELATIVE PERCENT: 23 %
LYMPHOCYTES RELATIVE PERCENT: 23.9 %
LYMPHOCYTES RELATIVE PERCENT: 24 %
LYMPHOCYTES RELATIVE PERCENT: 26.5 %
LYMPHOCYTES RELATIVE PERCENT: 28.1 %
LYMPHOCYTES RELATIVE PERCENT: 29.7 %
MAGNESIUM: 1.6 MG/DL (ref 1.8–2.4)
MAGNESIUM: 1.6 MG/DL (ref 1.8–2.4)
MAGNESIUM: 1.9 MG/DL (ref 1.8–2.4)
MCH RBC QN AUTO: 24.3 PG (ref 26–34)
MCH RBC QN AUTO: 24.5 PG (ref 26–34)
MCH RBC QN AUTO: 24.5 PG (ref 26–34)
MCH RBC QN AUTO: 24.6 PG (ref 26–34)
MCH RBC QN AUTO: 24.7 PG (ref 26–34)
MCH RBC QN AUTO: 24.8 PG (ref 26–34)
MCH RBC QN AUTO: 24.9 PG (ref 26–34)
MCH RBC QN AUTO: 25 PG (ref 26–34)
MCH RBC QN AUTO: 25.1 PG (ref 26–34)
MCH RBC QN AUTO: 25.2 PG (ref 26–34)
MCHC RBC AUTO-ENTMCNC: 31.1 G/DL (ref 31–36)
MCHC RBC AUTO-ENTMCNC: 31.6 G/DL (ref 31–36)
MCHC RBC AUTO-ENTMCNC: 31.9 G/DL (ref 31–36)
MCHC RBC AUTO-ENTMCNC: 31.9 G/DL (ref 31–36)
MCHC RBC AUTO-ENTMCNC: 32 G/DL (ref 31–36)
MCHC RBC AUTO-ENTMCNC: 32.1 G/DL (ref 31–36)
MCHC RBC AUTO-ENTMCNC: 32.3 G/DL (ref 31–36)
MCHC RBC AUTO-ENTMCNC: 32.4 G/DL (ref 31–36)
MCHC RBC AUTO-ENTMCNC: 32.6 G/DL (ref 31–36)
MCHC RBC AUTO-ENTMCNC: 32.7 G/DL (ref 31–36)
MCV RBC AUTO: 75.5 FL (ref 80–100)
MCV RBC AUTO: 76 FL (ref 80–100)
MCV RBC AUTO: 76 FL (ref 80–100)
MCV RBC AUTO: 76.1 FL (ref 80–100)
MCV RBC AUTO: 76.3 FL (ref 80–100)
MCV RBC AUTO: 76.4 FL (ref 80–100)
MCV RBC AUTO: 76.8 FL (ref 80–100)
MCV RBC AUTO: 76.8 FL (ref 80–100)
MCV RBC AUTO: 77.1 FL (ref 80–100)
MCV RBC AUTO: 77.2 FL (ref 80–100)
MCV RBC AUTO: 77.4 FL (ref 80–100)
MCV RBC AUTO: 78.1 FL (ref 80–100)
MCV RBC AUTO: 79.7 FL (ref 80–100)
MCV RBC AUTO: 79.7 FL (ref 80–100)
METHEMOGLOBIN ARTERIAL: 1.7 %
METHEMOGLOBIN ARTERIAL: 1.8 %
METHEMOGLOBIN VENOUS: 1.1 %
MICROCYTES: ABNORMAL
MICROCYTES: ABNORMAL
MONOCYTES ABSOLUTE: 0.3 K/UL (ref 0–1.3)
MONOCYTES ABSOLUTE: 0.4 K/UL (ref 0–1.3)
MONOCYTES ABSOLUTE: 0.4 K/UL (ref 0–1.3)
MONOCYTES RELATIVE PERCENT: 10 %
MONOCYTES RELATIVE PERCENT: 10 %
MONOCYTES RELATIVE PERCENT: 11.9 %
MONOCYTES RELATIVE PERCENT: 12 %
MONOCYTES RELATIVE PERCENT: 15.8 %
MONOCYTES RELATIVE PERCENT: 16.2 %
MONOCYTES RELATIVE PERCENT: 9.4 %
MONOCYTES RELATIVE PERCENT: 9.5 %
MONOCYTES RELATIVE PERCENT: 9.6 %
NEUTROPHILS ABSOLUTE: 1 K/UL (ref 1.7–7.7)
NEUTROPHILS ABSOLUTE: 1 K/UL (ref 1.7–7.7)
NEUTROPHILS ABSOLUTE: 1.3 K/UL (ref 1.7–7.7)
NEUTROPHILS ABSOLUTE: 1.4 K/UL (ref 1.7–7.7)
NEUTROPHILS ABSOLUTE: 1.6 K/UL (ref 1.7–7.7)
NEUTROPHILS ABSOLUTE: 1.7 K/UL (ref 1.7–7.7)
NEUTROPHILS ABSOLUTE: 1.9 K/UL (ref 1.7–7.7)
NEUTROPHILS ABSOLUTE: 1.9 K/UL (ref 1.7–7.7)
NEUTROPHILS ABSOLUTE: 2.3 K/UL (ref 1.7–7.7)
NEUTROPHILS RELATIVE PERCENT: 48 %
NEUTROPHILS RELATIVE PERCENT: 51.5 %
NEUTROPHILS RELATIVE PERCENT: 57 %
NEUTROPHILS RELATIVE PERCENT: 58 %
NEUTROPHILS RELATIVE PERCENT: 60.3 %
NEUTROPHILS RELATIVE PERCENT: 60.5 %
NEUTROPHILS RELATIVE PERCENT: 65.3 %
NEUTROPHILS RELATIVE PERCENT: 71.4 %
NEUTROPHILS RELATIVE PERCENT: 71.6 %
O2 CONTENT, VEN: 10 VOL %
O2 SAT, ARTERIAL: 100 % (ref 93–100)
O2 SAT, ARTERIAL: 95.5 %
O2 SAT, ARTERIAL: 97.2 %
O2 SAT, VEN: 84 %
O2 THERAPY: ABNORMAL
OVALOCYTES: ABNORMAL
PARATHYROID HORMONE INTACT: 11.1 PG/ML (ref 14–72)
PCO2 ARTERIAL: 31.6 MM HG (ref 35–45)
PCO2 ARTERIAL: 39.8 MMHG (ref 35–45)
PCO2 ARTERIAL: 41.3 MMHG (ref 35–45)
PCO2, VEN: 43 MMHG (ref 40–50)
PDW BLD-RTO: 16 % (ref 12.4–15.4)
PDW BLD-RTO: 16.6 % (ref 12.4–15.4)
PDW BLD-RTO: 16.9 % (ref 12.4–15.4)
PDW BLD-RTO: 16.9 % (ref 12.4–15.4)
PDW BLD-RTO: 17 % (ref 12.4–15.4)
PDW BLD-RTO: 17.1 % (ref 12.4–15.4)
PDW BLD-RTO: 17.2 % (ref 12.4–15.4)
PDW BLD-RTO: 17.2 % (ref 12.4–15.4)
PDW BLD-RTO: 17.3 % (ref 12.4–15.4)
PDW BLD-RTO: 17.7 % (ref 12.4–15.4)
PERFORMED ON: ABNORMAL
PERFORMED ON: NORMAL
PH ARTERIAL: 7.25 (ref 7.35–7.45)
PH ARTERIAL: 7.33 (ref 7.35–7.45)
PH ARTERIAL: 7.39 (ref 7.35–7.45)
PH VENOUS: 7.25 (ref 7.35–7.45)
PH VENOUS: 7.26 (ref 7.35–7.45)
PH VENOUS: 7.29 (ref 7.35–7.45)
PH VENOUS: 7.33 (ref 7.35–7.45)
PH VENOUS: 7.34 (ref 7.35–7.45)
PH VENOUS: 7.35 (ref 7.35–7.45)
PH VENOUS: 7.37 (ref 7.35–7.45)
PH VENOUS: 7.38 (ref 7.35–7.45)
PH VENOUS: 7.43 (ref 7.35–7.45)
PH VENOUS: 7.48 (ref 7.35–7.45)
PHOSPHORUS: 11.5 MG/DL (ref 2.5–4.9)
PLATELET # BLD: 22 K/UL (ref 135–450)
PLATELET # BLD: 23 K/UL (ref 135–450)
PLATELET # BLD: 34 K/UL (ref 135–450)
PLATELET # BLD: 35 K/UL (ref 135–450)
PLATELET # BLD: 35 K/UL (ref 135–450)
PLATELET # BLD: 39 K/UL (ref 135–450)
PLATELET # BLD: 41 K/UL (ref 135–450)
PLATELET # BLD: 50 K/UL (ref 135–450)
PLATELET # BLD: 67 K/UL (ref 135–450)
PLATELET # BLD: 80 K/UL (ref 135–450)
PLATELET # BLD: 80 K/UL (ref 135–450)
PLATELET # BLD: 90 K/UL (ref 135–450)
PLATELET # BLD: 92 K/UL (ref 135–450)
PLATELET # BLD: 96 K/UL (ref 135–450)
PLATELET SLIDE REVIEW: ABNORMAL
PMV BLD AUTO: 7.9 FL (ref 5–10.5)
PMV BLD AUTO: 8.1 FL (ref 5–10.5)
PMV BLD AUTO: 8.1 FL (ref 5–10.5)
PMV BLD AUTO: 8.2 FL (ref 5–10.5)
PMV BLD AUTO: 8.4 FL (ref 5–10.5)
PMV BLD AUTO: 8.4 FL (ref 5–10.5)
PMV BLD AUTO: 8.5 FL (ref 5–10.5)
PMV BLD AUTO: 8.6 FL (ref 5–10.5)
PMV BLD AUTO: 8.6 FL (ref 5–10.5)
PMV BLD AUTO: 8.8 FL (ref 5–10.5)
PMV BLD AUTO: 9.1 FL (ref 5–10.5)
PMV BLD AUTO: 9.1 FL (ref 5–10.5)
PO2 ARTERIAL: 163 MMHG (ref 75–108)
PO2 ARTERIAL: 184 MM HG (ref 75–108)
PO2 ARTERIAL: 98.9 MMHG (ref 75–108)
PO2, VEN: 60.7 MMHG (ref 25–40)
POC HEMATOCRIT: 30 % (ref 40.5–52.5)
POC POTASSIUM: 4 MMOL/L (ref 3.5–5.1)
POC SAMPLE TYPE: ABNORMAL
POC SODIUM: 130 MMOL/L (ref 136–145)
POLYCHROMASIA: ABNORMAL
POTASSIUM REFLEX MAGNESIUM: 5.2 MMOL/L (ref 3.5–5.1)
POTASSIUM SERPL-SCNC: 3.7 MMOL/L (ref 3.5–5.1)
POTASSIUM SERPL-SCNC: 3.9 MMOL/L (ref 3.5–5.1)
POTASSIUM SERPL-SCNC: 3.9 MMOL/L (ref 3.5–5.1)
POTASSIUM SERPL-SCNC: 4.1 MMOL/L (ref 3.5–5.1)
POTASSIUM SERPL-SCNC: 4.1 MMOL/L (ref 3.5–5.1)
POTASSIUM SERPL-SCNC: 4.2 MMOL/L (ref 3.5–5.1)
POTASSIUM SERPL-SCNC: 4.2 MMOL/L (ref 3.5–5.1)
POTASSIUM SERPL-SCNC: 4.4 MMOL/L (ref 3.5–5.1)
POTASSIUM SERPL-SCNC: 4.5 MMOL/L (ref 3.5–5.1)
POTASSIUM SERPL-SCNC: 4.5 MMOL/L (ref 3.5–5.1)
POTASSIUM SERPL-SCNC: 4.6 MMOL/L (ref 3.5–5.1)
POTASSIUM SERPL-SCNC: 4.9 MMOL/L (ref 3.5–5.1)
POTASSIUM SERPL-SCNC: 5 MMOL/L (ref 3.5–5.1)
PROCALCITONIN: 2.32 NG/ML (ref 0–0.15)
PROTHROMBIN TIME: 15.5 SEC (ref 9.9–12.7)
RBC # BLD: 3.41 M/UL (ref 4.2–5.9)
RBC # BLD: 3.56 M/UL (ref 4.2–5.9)
RBC # BLD: 3.59 M/UL (ref 4.2–5.9)
RBC # BLD: 3.61 M/UL (ref 4.2–5.9)
RBC # BLD: 3.64 M/UL (ref 4.2–5.9)
RBC # BLD: 3.64 M/UL (ref 4.2–5.9)
RBC # BLD: 3.67 M/UL (ref 4.2–5.9)
RBC # BLD: 3.78 M/UL (ref 4.2–5.9)
RBC # BLD: 3.81 M/UL (ref 4.2–5.9)
RBC # BLD: 3.87 M/UL (ref 4.2–5.9)
RBC # BLD: 3.93 M/UL (ref 4.2–5.9)
RBC # BLD: 4.01 M/UL (ref 4.2–5.9)
RBC # BLD: 4.05 M/UL (ref 4.2–5.9)
RBC # BLD: 4.09 M/UL (ref 4.2–5.9)
REASON FOR REJECTION: NORMAL
REASON FOR REJECTION: NORMAL
REJECTED TEST: NORMAL
REJECTED TEST: NORMAL
SCHISTOCYTES: ABNORMAL
SLIDE REVIEW: ABNORMAL
SODIUM BLD-SCNC: 129 MMOL/L (ref 136–145)
SODIUM BLD-SCNC: 130 MMOL/L (ref 136–145)
SODIUM BLD-SCNC: 131 MMOL/L (ref 136–145)
SODIUM BLD-SCNC: 132 MMOL/L (ref 136–145)
SODIUM BLD-SCNC: 133 MMOL/L (ref 136–145)
SODIUM BLD-SCNC: 133 MMOL/L (ref 136–145)
SODIUM BLD-SCNC: 134 MMOL/L (ref 136–145)
SODIUM BLD-SCNC: 137 MMOL/L (ref 136–145)
SPE/IFE INTERPRETATION: NORMAL
SPE/IFE INTERPRETATION: NORMAL
TCO2 ARTERIAL: 20 MMOL/L
TCO2 ARTERIAL: 43.2 MMOL/L
TCO2 ARTERIAL: 50.2 MMOL/L
TCO2 CALC VENOUS: 45 MMOL/L
TEAR DROP CELLS: ABNORMAL
TOTAL PROTEIN: 5.7 G/DL (ref 6.4–8.2)
TOTAL PROTEIN: 5.9 G/DL (ref 6.4–8.2)
TOTAL PROTEIN: 6.8 G/DL (ref 6.4–8.2)
TOTAL PROTEIN: 7.1 G/DL (ref 6.4–8.2)
TROPONIN: 0.07 NG/ML
VITAMIN D 25-HYDROXY: 25.7 NG/ML
WBC # BLD: 1.9 K/UL (ref 4–11)
WBC # BLD: 2.2 K/UL (ref 4–11)
WBC # BLD: 2.3 K/UL (ref 4–11)
WBC # BLD: 2.5 K/UL (ref 4–11)
WBC # BLD: 2.6 K/UL (ref 4–11)
WBC # BLD: 2.7 K/UL (ref 4–11)
WBC # BLD: 2.8 K/UL (ref 4–11)
WBC # BLD: 2.9 K/UL (ref 4–11)
WBC # BLD: 3.2 K/UL (ref 4–11)
WBC # BLD: 3.2 K/UL (ref 4–11)
WBC # BLD: 3.6 K/UL (ref 4–11)
WBC # BLD: 4.3 K/UL (ref 4–11)
ZINC: 57.9 UG/DL (ref 60–120)

## 2022-01-01 PROCEDURE — 6370000000 HC RX 637 (ALT 250 FOR IP): Performed by: INTERNAL MEDICINE

## 2022-01-01 PROCEDURE — 6360000002 HC RX W HCPCS: Performed by: INTERNAL MEDICINE

## 2022-01-01 PROCEDURE — 92526 ORAL FUNCTION THERAPY: CPT

## 2022-01-01 PROCEDURE — 97530 THERAPEUTIC ACTIVITIES: CPT

## 2022-01-01 PROCEDURE — 2580000003 HC RX 258: Performed by: HOSPITALIST

## 2022-01-01 PROCEDURE — 0BH17EZ INSERTION OF ENDOTRACHEAL AIRWAY INTO TRACHEA, VIA NATURAL OR ARTIFICIAL OPENING: ICD-10-PCS | Performed by: INTERNAL MEDICINE

## 2022-01-01 PROCEDURE — 2500000003 HC RX 250 WO HCPCS: Performed by: INTERNAL MEDICINE

## 2022-01-01 PROCEDURE — 82330 ASSAY OF CALCIUM: CPT

## 2022-01-01 PROCEDURE — 99291 CRITICAL CARE FIRST HOUR: CPT | Performed by: INTERNAL MEDICINE

## 2022-01-01 PROCEDURE — 90935 HEMODIALYSIS ONE EVALUATION: CPT

## 2022-01-01 PROCEDURE — 36600 WITHDRAWAL OF ARTERIAL BLOOD: CPT

## 2022-01-01 PROCEDURE — 2580000003 HC RX 258: Performed by: FAMILY MEDICINE

## 2022-01-01 PROCEDURE — 88184 FLOWCYTOMETRY/ TC 1 MARKER: CPT

## 2022-01-01 PROCEDURE — 84155 ASSAY OF PROTEIN SERUM: CPT

## 2022-01-01 PROCEDURE — 6370000000 HC RX 637 (ALT 250 FOR IP): Performed by: HOSPITALIST

## 2022-01-01 PROCEDURE — C9113 INJ PANTOPRAZOLE SODIUM, VIA: HCPCS | Performed by: INTERNAL MEDICINE

## 2022-01-01 PROCEDURE — 85027 COMPLETE CBC AUTOMATED: CPT

## 2022-01-01 PROCEDURE — 2700000000 HC OXYGEN THERAPY PER DAY

## 2022-01-01 PROCEDURE — 6360000002 HC RX W HCPCS: Performed by: HOSPITALIST

## 2022-01-01 PROCEDURE — 80048 BASIC METABOLIC PNL TOTAL CA: CPT

## 2022-01-01 PROCEDURE — 97167 OT EVAL HIGH COMPLEX 60 MIN: CPT

## 2022-01-01 PROCEDURE — 6360000002 HC RX W HCPCS: Performed by: PHYSICIAN ASSISTANT

## 2022-01-01 PROCEDURE — 94003 VENT MGMT INPAT SUBQ DAY: CPT

## 2022-01-01 PROCEDURE — 2500000003 HC RX 250 WO HCPCS: Performed by: HOSPITALIST

## 2022-01-01 PROCEDURE — 84484 ASSAY OF TROPONIN QUANT: CPT

## 2022-01-01 PROCEDURE — 92610 EVALUATE SWALLOWING FUNCTION: CPT

## 2022-01-01 PROCEDURE — 88341 IMHCHEM/IMCYTCHM EA ADD ANTB: CPT

## 2022-01-01 PROCEDURE — 84100 ASSAY OF PHOSPHORUS: CPT

## 2022-01-01 PROCEDURE — 36415 COLL VENOUS BLD VENIPUNCTURE: CPT

## 2022-01-01 PROCEDURE — 2500000003 HC RX 250 WO HCPCS: Performed by: FAMILY MEDICINE

## 2022-01-01 PROCEDURE — 97161 PT EVAL LOW COMPLEX 20 MIN: CPT

## 2022-01-01 PROCEDURE — 84295 ASSAY OF SERUM SODIUM: CPT

## 2022-01-01 PROCEDURE — 87070 CULTURE OTHR SPECIMN AEROBIC: CPT

## 2022-01-01 PROCEDURE — 94761 N-INVAS EAR/PLS OXIMETRY MLT: CPT

## 2022-01-01 PROCEDURE — 2000000000 HC ICU R&B

## 2022-01-01 PROCEDURE — 71045 X-RAY EXAM CHEST 1 VIEW: CPT

## 2022-01-01 PROCEDURE — 2500000003 HC RX 250 WO HCPCS: Performed by: EMERGENCY MEDICINE

## 2022-01-01 PROCEDURE — 85025 COMPLETE CBC W/AUTO DIFF WBC: CPT

## 2022-01-01 PROCEDURE — 2580000003 HC RX 258: Performed by: INTERNAL MEDICINE

## 2022-01-01 PROCEDURE — 82803 BLOOD GASES ANY COMBINATION: CPT

## 2022-01-01 PROCEDURE — 88311 DECALCIFY TISSUE: CPT

## 2022-01-01 PROCEDURE — 6370000000 HC RX 637 (ALT 250 FOR IP): Performed by: FAMILY MEDICINE

## 2022-01-01 PROCEDURE — 94760 N-INVAS EAR/PLS OXIMETRY 1: CPT

## 2022-01-01 PROCEDURE — 84165 PROTEIN E-PHORESIS SERUM: CPT

## 2022-01-01 PROCEDURE — 88342 IMHCHEM/IMCYTCHM 1ST ANTB: CPT

## 2022-01-01 PROCEDURE — 99232 SBSQ HOSP IP/OBS MODERATE 35: CPT | Performed by: PSYCHIATRY & NEUROLOGY

## 2022-01-01 PROCEDURE — 99223 1ST HOSP IP/OBS HIGH 75: CPT | Performed by: PSYCHIATRY & NEUROLOGY

## 2022-01-01 PROCEDURE — 99223 1ST HOSP IP/OBS HIGH 75: CPT | Performed by: HOSPITALIST

## 2022-01-01 PROCEDURE — 87505 NFCT AGENT DETECTION GI: CPT

## 2022-01-01 PROCEDURE — 83883 ASSAY NEPHELOMETRY NOT SPEC: CPT

## 2022-01-01 PROCEDURE — 99232 SBSQ HOSP IP/OBS MODERATE 35: CPT | Performed by: INTERNAL MEDICINE

## 2022-01-01 PROCEDURE — 3609027000 HC BRONCHOSCOPY: Performed by: INTERNAL MEDICINE

## 2022-01-01 PROCEDURE — 1200000000 HC SEMI PRIVATE

## 2022-01-01 PROCEDURE — 71250 CT THORAX DX C-: CPT

## 2022-01-01 PROCEDURE — 99233 SBSQ HOSP IP/OBS HIGH 50: CPT | Performed by: INTERNAL MEDICINE

## 2022-01-01 PROCEDURE — 2060000000 HC ICU INTERMEDIATE R&B

## 2022-01-01 PROCEDURE — 87116 MYCOBACTERIA CULTURE: CPT

## 2022-01-01 PROCEDURE — 83615 LACTATE (LD) (LDH) ENZYME: CPT

## 2022-01-01 PROCEDURE — 87015 SPECIMEN INFECT AGNT CONCNTJ: CPT

## 2022-01-01 PROCEDURE — 90935 HEMODIALYSIS ONE EVALUATION: CPT | Performed by: INTERNAL MEDICINE

## 2022-01-01 PROCEDURE — 83735 ASSAY OF MAGNESIUM: CPT

## 2022-01-01 PROCEDURE — 88313 SPECIAL STAINS GROUP 2: CPT

## 2022-01-01 PROCEDURE — 94002 VENT MGMT INPAT INIT DAY: CPT

## 2022-01-01 PROCEDURE — 2580000003 HC RX 258: Performed by: STUDENT IN AN ORGANIZED HEALTH CARE EDUCATION/TRAINING PROGRAM

## 2022-01-01 PROCEDURE — 2709999900 HC NON-CHARGEABLE SUPPLY: Performed by: INTERNAL MEDICINE

## 2022-01-01 PROCEDURE — 85610 PROTHROMBIN TIME: CPT

## 2022-01-01 PROCEDURE — 87102 FUNGUS ISOLATION CULTURE: CPT

## 2022-01-01 PROCEDURE — 2500000003 HC RX 250 WO HCPCS

## 2022-01-01 PROCEDURE — 82310 ASSAY OF CALCIUM: CPT

## 2022-01-01 PROCEDURE — 2500000003 HC RX 250 WO HCPCS: Performed by: STUDENT IN AN ORGANIZED HEALTH CARE EDUCATION/TRAINING PROGRAM

## 2022-01-01 PROCEDURE — 85014 HEMATOCRIT: CPT

## 2022-01-01 PROCEDURE — 88360 TUMOR IMMUNOHISTOCHEM/MANUAL: CPT

## 2022-01-01 PROCEDURE — 36556 INSERT NON-TUNNEL CV CATH: CPT

## 2022-01-01 PROCEDURE — 77012 CT SCAN FOR NEEDLE BIOPSY: CPT

## 2022-01-01 PROCEDURE — 93005 ELECTROCARDIOGRAM TRACING: CPT | Performed by: EMERGENCY MEDICINE

## 2022-01-01 PROCEDURE — 6360000002 HC RX W HCPCS: Performed by: PSYCHIATRY & NEUROLOGY

## 2022-01-01 PROCEDURE — 74176 CT ABD & PELVIS W/O CONTRAST: CPT

## 2022-01-01 PROCEDURE — 97165 OT EVAL LOW COMPLEX 30 MIN: CPT

## 2022-01-01 PROCEDURE — 36592 COLLECT BLOOD FROM PICC: CPT

## 2022-01-01 PROCEDURE — 31645 BRNCHSC W/THER ASPIR 1ST: CPT | Performed by: INTERNAL MEDICINE

## 2022-01-01 PROCEDURE — 84630 ASSAY OF ZINC: CPT

## 2022-01-01 PROCEDURE — 88305 TISSUE EXAM BY PATHOLOGIST: CPT

## 2022-01-01 PROCEDURE — 95819 EEG AWAKE AND ASLEEP: CPT

## 2022-01-01 PROCEDURE — 82525 ASSAY OF COPPER: CPT

## 2022-01-01 PROCEDURE — 6360000002 HC RX W HCPCS

## 2022-01-01 PROCEDURE — 92507 TX SP LANG VOICE COMM INDIV: CPT

## 2022-01-01 PROCEDURE — 82784 ASSAY IGA/IGD/IGG/IGM EACH: CPT

## 2022-01-01 PROCEDURE — 84132 ASSAY OF SERUM POTASSIUM: CPT

## 2022-01-01 PROCEDURE — 95822 EEG COMA OR SLEEP ONLY: CPT | Performed by: PSYCHIATRY & NEUROLOGY

## 2022-01-01 PROCEDURE — 83036 HEMOGLOBIN GLYCOSYLATED A1C: CPT

## 2022-01-01 PROCEDURE — 88185 FLOWCYTOMETRY/TC ADD-ON: CPT

## 2022-01-01 PROCEDURE — 99152 MOD SED SAME PHYS/QHP 5/>YRS: CPT

## 2022-01-01 PROCEDURE — 93010 ELECTROCARDIOGRAM REPORT: CPT | Performed by: INTERNAL MEDICINE

## 2022-01-01 PROCEDURE — 97163 PT EVAL HIGH COMPLEX 45 MIN: CPT

## 2022-01-01 PROCEDURE — 07DR3ZX EXTRACTION OF ILIAC BONE MARROW, PERCUTANEOUS APPROACH, DIAGNOSTIC: ICD-10-PCS | Performed by: INTERNAL MEDICINE

## 2022-01-01 PROCEDURE — 83690 ASSAY OF LIPASE: CPT

## 2022-01-01 PROCEDURE — 99285 EMERGENCY DEPT VISIT HI MDM: CPT

## 2022-01-01 PROCEDURE — 85730 THROMBOPLASTIN TIME PARTIAL: CPT

## 2022-01-01 PROCEDURE — 84145 PROCALCITONIN (PCT): CPT

## 2022-01-01 PROCEDURE — 80076 HEPATIC FUNCTION PANEL: CPT

## 2022-01-01 PROCEDURE — 83010 ASSAY OF HAPTOGLOBIN QUANT: CPT

## 2022-01-01 PROCEDURE — 92523 SPEECH SOUND LANG COMPREHEN: CPT

## 2022-01-01 PROCEDURE — 83970 ASSAY OF PARATHORMONE: CPT

## 2022-01-01 PROCEDURE — 82947 ASSAY GLUCOSE BLOOD QUANT: CPT

## 2022-01-01 PROCEDURE — 5A1945Z RESPIRATORY VENTILATION, 24-96 CONSECUTIVE HOURS: ICD-10-PCS | Performed by: INTERNAL MEDICINE

## 2022-01-01 PROCEDURE — 80053 COMPREHEN METABOLIC PANEL: CPT

## 2022-01-01 PROCEDURE — 87205 SMEAR GRAM STAIN: CPT

## 2022-01-01 PROCEDURE — 02HV33Z INSERTION OF INFUSION DEVICE INTO SUPERIOR VENA CAVA, PERCUTANEOUS APPROACH: ICD-10-PCS | Performed by: INTERNAL MEDICINE

## 2022-01-01 PROCEDURE — 70450 CT HEAD/BRAIN W/O DYE: CPT

## 2022-01-01 PROCEDURE — 2580000003 HC RX 258: Performed by: EMERGENCY MEDICINE

## 2022-01-01 PROCEDURE — 83605 ASSAY OF LACTIC ACID: CPT

## 2022-01-01 PROCEDURE — 82306 VITAMIN D 25 HYDROXY: CPT

## 2022-01-01 PROCEDURE — 87206 SMEAR FLUORESCENT/ACID STAI: CPT

## 2022-01-01 PROCEDURE — 5A1D70Z PERFORMANCE OF URINARY FILTRATION, INTERMITTENT, LESS THAN 6 HOURS PER DAY: ICD-10-PCS | Performed by: INTERNAL MEDICINE

## 2022-01-01 PROCEDURE — 02HV33Z INSERTION OF INFUSION DEVICE INTO SUPERIOR VENA CAVA, PERCUTANEOUS APPROACH: ICD-10-PCS | Performed by: FAMILY MEDICINE

## 2022-01-01 PROCEDURE — 0BJ08ZZ INSPECTION OF TRACHEOBRONCHIAL TREE, VIA NATURAL OR ARTIFICIAL OPENING ENDOSCOPIC: ICD-10-PCS | Performed by: INTERNAL MEDICINE

## 2022-01-01 RX ORDER — ETOMIDATE 2 MG/ML
20 INJECTION INTRAVENOUS ONCE
Status: DISCONTINUED | OUTPATIENT
Start: 2022-01-01 | End: 2022-01-01

## 2022-01-01 RX ORDER — ETOMIDATE 2 MG/ML
INJECTION INTRAVENOUS
Status: COMPLETED | OUTPATIENT
Start: 2022-01-01 | End: 2022-01-01

## 2022-01-01 RX ORDER — DEXTROSE MONOHYDRATE 100 MG/ML
INJECTION, SOLUTION INTRAVENOUS CONTINUOUS
Status: DISCONTINUED | OUTPATIENT
Start: 2022-01-01 | End: 2022-01-01

## 2022-01-01 RX ORDER — LORAZEPAM 2 MG/ML
INJECTION INTRAMUSCULAR
Status: COMPLETED
Start: 2022-01-01 | End: 2022-01-01

## 2022-01-01 RX ORDER — AZITHROMYCIN 500 MG/1
500 TABLET, FILM COATED ORAL DAILY
Qty: 5 TABLET | Refills: 0 | Status: SHIPPED | OUTPATIENT
Start: 2022-01-01 | End: 2022-01-01

## 2022-01-01 RX ORDER — SODIUM CHLORIDE 9 MG/ML
INJECTION, SOLUTION INTRAVENOUS ONCE
Status: COMPLETED | OUTPATIENT
Start: 2022-01-01 | End: 2022-01-01

## 2022-01-01 RX ORDER — TEMAZEPAM 15 MG/1
30 CAPSULE ORAL NIGHTLY PRN
Status: DISCONTINUED | OUTPATIENT
Start: 2022-01-01 | End: 2022-01-01 | Stop reason: CLARIF

## 2022-01-01 RX ORDER — SODIUM BICARBONATE 650 MG/1
650 TABLET ORAL 3 TIMES DAILY
Status: DISCONTINUED | OUTPATIENT
Start: 2022-01-01 | End: 2022-01-01

## 2022-01-01 RX ORDER — CALCIUM GLUCONATE 20 MG/ML
2000 INJECTION, SOLUTION INTRAVENOUS ONCE
Status: COMPLETED | OUTPATIENT
Start: 2022-01-01 | End: 2022-01-01

## 2022-01-01 RX ORDER — DEXTROSE MONOHYDRATE 50 MG/ML
100 INJECTION, SOLUTION INTRAVENOUS PRN
Status: DISCONTINUED | OUTPATIENT
Start: 2022-01-01 | End: 2022-01-01 | Stop reason: HOSPADM

## 2022-01-01 RX ORDER — NOREPINEPHRINE BIT/0.9 % NACL 16MG/250ML
1-100 INFUSION BOTTLE (ML) INTRAVENOUS CONTINUOUS
Status: DISCONTINUED | OUTPATIENT
Start: 2022-01-01 | End: 2022-01-01 | Stop reason: HOSPADM

## 2022-01-01 RX ORDER — MAGNESIUM SULFATE IN WATER 40 MG/ML
2000 INJECTION, SOLUTION INTRAVENOUS ONCE
Status: COMPLETED | OUTPATIENT
Start: 2022-01-01 | End: 2022-01-01

## 2022-01-01 RX ORDER — CALCIUM GLUCONATE 20 MG/ML
1000 INJECTION, SOLUTION INTRAVENOUS ONCE
Status: COMPLETED | OUTPATIENT
Start: 2022-01-01 | End: 2022-01-01

## 2022-01-01 RX ORDER — HYDROMORPHONE HCL 110MG/55ML
2 PATIENT CONTROLLED ANALGESIA SYRINGE INTRAVENOUS EVERY 4 HOURS PRN
Status: DISCONTINUED | OUTPATIENT
Start: 2022-01-01 | End: 2022-01-01 | Stop reason: HOSPADM

## 2022-01-01 RX ORDER — CALCIUM CHLORIDE 100 MG/ML
INJECTION INTRAVENOUS; INTRAVENTRICULAR
Status: COMPLETED | OUTPATIENT
Start: 2022-01-01 | End: 2022-01-01

## 2022-01-01 RX ORDER — MIDAZOLAM HYDROCHLORIDE 2 MG/2ML
INJECTION, SOLUTION INTRAMUSCULAR; INTRAVENOUS
Status: COMPLETED | OUTPATIENT
Start: 2022-01-01 | End: 2022-01-01

## 2022-01-01 RX ORDER — INSULIN LISPRO 100 [IU]/ML
0-12 INJECTION, SOLUTION INTRAVENOUS; SUBCUTANEOUS
Status: DISCONTINUED | OUTPATIENT
Start: 2022-01-01 | End: 2022-01-01

## 2022-01-01 RX ORDER — PROPOFOL 10 MG/ML
5-50 INJECTION, EMULSION INTRAVENOUS
Status: DISCONTINUED | OUTPATIENT
Start: 2022-01-01 | End: 2022-01-01

## 2022-01-01 RX ORDER — NICOTINE POLACRILEX 4 MG
15 LOZENGE BUCCAL PRN
Status: DISCONTINUED | OUTPATIENT
Start: 2022-01-01 | End: 2022-01-01 | Stop reason: HOSPADM

## 2022-01-01 RX ORDER — LORAZEPAM 2 MG/ML
1 INJECTION INTRAMUSCULAR EVERY 4 HOURS PRN
Status: DISCONTINUED | OUTPATIENT
Start: 2022-01-01 | End: 2022-01-01 | Stop reason: HOSPADM

## 2022-01-01 RX ORDER — OXYCODONE HYDROCHLORIDE 30 MG/1
30 TABLET ORAL EVERY 8 HOURS PRN
COMMUNITY

## 2022-01-01 RX ORDER — PROPOFOL 10 MG/ML
5-50 INJECTION, EMULSION INTRAVENOUS
Status: DISCONTINUED | OUTPATIENT
Start: 2022-01-01 | End: 2022-01-01 | Stop reason: HOSPADM

## 2022-01-01 RX ORDER — LORAZEPAM 0.5 MG/1
0.5 TABLET ORAL NIGHTLY PRN
Status: DISCONTINUED | OUTPATIENT
Start: 2022-01-01 | End: 2022-01-01 | Stop reason: HOSPADM

## 2022-01-01 RX ORDER — LINEZOLID 2 MG/ML
600 INJECTION, SOLUTION INTRAVENOUS EVERY 12 HOURS
Status: DISCONTINUED | OUTPATIENT
Start: 2022-01-01 | End: 2022-01-01

## 2022-01-01 RX ORDER — SEVELAMER CARBONATE 800 MG/1
800 TABLET, FILM COATED ORAL
Status: DISCONTINUED | OUTPATIENT
Start: 2022-01-01 | End: 2022-01-01

## 2022-01-01 RX ORDER — PROPOFOL 10 MG/ML
INJECTION, EMULSION INTRAVENOUS
Status: DISPENSED
Start: 2022-01-01 | End: 2022-01-01

## 2022-01-01 RX ORDER — SODIUM CHLORIDE 0.9 % (FLUSH) 0.9 %
5-40 SYRINGE (ML) INJECTION PRN
Status: DISCONTINUED | OUTPATIENT
Start: 2022-01-01 | End: 2022-01-01 | Stop reason: HOSPADM

## 2022-01-01 RX ORDER — INSULIN LISPRO 100 [IU]/ML
0-6 INJECTION, SOLUTION INTRAVENOUS; SUBCUTANEOUS NIGHTLY
Status: DISCONTINUED | OUTPATIENT
Start: 2022-01-01 | End: 2022-01-01

## 2022-01-01 RX ORDER — ERGOCALCIFEROL 1.25 MG/1
50000 CAPSULE ORAL WEEKLY
Status: DISCONTINUED | OUTPATIENT
Start: 2022-01-01 | End: 2022-01-01 | Stop reason: HOSPADM

## 2022-01-01 RX ORDER — MIDODRINE HYDROCHLORIDE 5 MG/1
5 TABLET ORAL 2 TIMES DAILY WITH MEALS
Status: DISCONTINUED | OUTPATIENT
Start: 2022-01-01 | End: 2022-01-01

## 2022-01-01 RX ORDER — LEVOTHYROXINE SODIUM 0.03 MG/1
25 TABLET ORAL DAILY
Status: DISCONTINUED | OUTPATIENT
Start: 2022-01-01 | End: 2022-01-01 | Stop reason: HOSPADM

## 2022-01-01 RX ORDER — LOPERAMIDE HYDROCHLORIDE 2 MG/1
2 TABLET ORAL 4 TIMES DAILY PRN
Qty: 20 TABLET | Refills: 0 | Status: SHIPPED | OUTPATIENT
Start: 2022-01-01 | End: 2022-01-01 | Stop reason: SDUPTHER

## 2022-01-01 RX ORDER — LIDOCAINE HYDROCHLORIDE 40 MG/ML
SOLUTION TOPICAL PRN
Status: DISCONTINUED | OUTPATIENT
Start: 2022-01-01 | End: 2022-01-01 | Stop reason: ALTCHOICE

## 2022-01-01 RX ORDER — LORAZEPAM 2 MG/ML
INJECTION INTRAMUSCULAR
Status: COMPLETED | OUTPATIENT
Start: 2022-01-01 | End: 2022-01-01

## 2022-01-01 RX ORDER — HYDROMORPHONE HYDROCHLORIDE 1 MG/ML
1 INJECTION, SOLUTION INTRAMUSCULAR; INTRAVENOUS; SUBCUTANEOUS EVERY 4 HOURS PRN
Status: DISCONTINUED | OUTPATIENT
Start: 2022-01-01 | End: 2022-01-01 | Stop reason: HOSPADM

## 2022-01-01 RX ORDER — ONDANSETRON 4 MG/1
4 TABLET, ORALLY DISINTEGRATING ORAL EVERY 8 HOURS PRN
Status: DISCONTINUED | OUTPATIENT
Start: 2022-01-01 | End: 2022-01-01 | Stop reason: HOSPADM

## 2022-01-01 RX ORDER — SODIUM CHLORIDE 0.9 % (FLUSH) 0.9 %
5-40 SYRINGE (ML) INJECTION EVERY 12 HOURS SCHEDULED
Status: DISCONTINUED | OUTPATIENT
Start: 2022-01-01 | End: 2022-01-01 | Stop reason: HOSPADM

## 2022-01-01 RX ORDER — CALCIUM CHLORIDE 100 MG/ML
1000 INJECTION INTRAVENOUS; INTRAVENTRICULAR ONCE
Status: COMPLETED | OUTPATIENT
Start: 2022-01-01 | End: 2022-01-01

## 2022-01-01 RX ORDER — ONDANSETRON 2 MG/ML
4 INJECTION INTRAMUSCULAR; INTRAVENOUS EVERY 6 HOURS PRN
Status: DISCONTINUED | OUTPATIENT
Start: 2022-01-01 | End: 2022-01-01 | Stop reason: HOSPADM

## 2022-01-01 RX ORDER — MIDAZOLAM HYDROCHLORIDE 1 MG/ML
INJECTION INTRAMUSCULAR; INTRAVENOUS
Status: COMPLETED
Start: 2022-01-01 | End: 2022-01-01

## 2022-01-01 RX ORDER — CALCIUM CARBONATE 200(500)MG
1000 TABLET,CHEWABLE ORAL 3 TIMES DAILY
Status: DISCONTINUED | OUTPATIENT
Start: 2022-01-01 | End: 2022-01-01 | Stop reason: HOSPADM

## 2022-01-01 RX ORDER — ETOMIDATE 2 MG/ML
INJECTION INTRAVENOUS
Status: COMPLETED
Start: 2022-01-01 | End: 2022-01-01

## 2022-01-01 RX ORDER — AZITHROMYCIN 500 MG/1
500 TABLET, FILM COATED ORAL DAILY
Qty: 5 TABLET | Refills: 0 | Status: SHIPPED | OUTPATIENT
Start: 2022-01-01 | End: 2022-01-01 | Stop reason: SDUPTHER

## 2022-01-01 RX ORDER — ACETAMINOPHEN 650 MG/1
650 SUPPOSITORY RECTAL EVERY 6 HOURS PRN
Status: DISCONTINUED | OUTPATIENT
Start: 2022-01-01 | End: 2022-01-01 | Stop reason: HOSPADM

## 2022-01-01 RX ORDER — LOPERAMIDE HYDROCHLORIDE 2 MG/1
2 TABLET ORAL 4 TIMES DAILY PRN
Qty: 20 TABLET | Refills: 0 | Status: SHIPPED | OUTPATIENT
Start: 2022-01-01 | End: 2022-01-01

## 2022-01-01 RX ORDER — ACETAMINOPHEN 325 MG/1
650 TABLET ORAL EVERY 6 HOURS PRN
Status: DISCONTINUED | OUTPATIENT
Start: 2022-01-01 | End: 2022-01-01 | Stop reason: HOSPADM

## 2022-01-01 RX ORDER — NOREPINEPHRINE BIT/0.9 % NACL 16MG/250ML
2-100 INFUSION BOTTLE (ML) INTRAVENOUS CONTINUOUS
Status: DISCONTINUED | OUTPATIENT
Start: 2022-01-01 | End: 2022-01-01 | Stop reason: SDUPTHER

## 2022-01-01 RX ORDER — NOREPINEPHRINE BIT/0.9 % NACL 16MG/250ML
INFUSION BOTTLE (ML) INTRAVENOUS
Status: COMPLETED
Start: 2022-01-01 | End: 2022-01-01

## 2022-01-01 RX ORDER — EPINEPHRINE 1 MG/ML
INJECTION, SOLUTION, CONCENTRATE INTRAVENOUS
Status: COMPLETED | OUTPATIENT
Start: 2022-01-01 | End: 2022-01-01

## 2022-01-01 RX ORDER — OXYCODONE HYDROCHLORIDE 15 MG/1
30 TABLET ORAL EVERY 8 HOURS PRN
Status: DISCONTINUED | OUTPATIENT
Start: 2022-01-01 | End: 2022-01-01 | Stop reason: HOSPADM

## 2022-01-01 RX ORDER — DEXTROSE MONOHYDRATE 100 MG/ML
INJECTION, SOLUTION INTRAVENOUS CONTINUOUS
Status: DISCONTINUED | OUTPATIENT
Start: 2022-01-01 | End: 2022-01-01 | Stop reason: HOSPADM

## 2022-01-01 RX ORDER — MORPHINE SULFATE 4 MG/ML
4 INJECTION, SOLUTION INTRAMUSCULAR; INTRAVENOUS
Status: DISCONTINUED | OUTPATIENT
Start: 2022-01-01 | End: 2022-01-01 | Stop reason: HOSPADM

## 2022-01-01 RX ORDER — MIDAZOLAM HYDROCHLORIDE 2 MG/2ML
2 INJECTION, SOLUTION INTRAMUSCULAR; INTRAVENOUS ONCE
Status: DISCONTINUED | OUTPATIENT
Start: 2022-01-01 | End: 2022-01-01 | Stop reason: HOSPADM

## 2022-01-01 RX ORDER — PANTOPRAZOLE SODIUM 40 MG/10ML
40 INJECTION, POWDER, LYOPHILIZED, FOR SOLUTION INTRAVENOUS DAILY
Status: DISCONTINUED | OUTPATIENT
Start: 2022-01-01 | End: 2022-01-01 | Stop reason: HOSPADM

## 2022-01-01 RX ORDER — ATROPINE SULFATE 10 MG/ML
2 SOLUTION/ DROPS OPHTHALMIC EVERY 4 HOURS PRN
Status: DISCONTINUED | OUTPATIENT
Start: 2022-01-01 | End: 2022-01-01 | Stop reason: HOSPADM

## 2022-01-01 RX ORDER — PANTOPRAZOLE SODIUM 40 MG/1
40 TABLET, DELAYED RELEASE ORAL 2 TIMES DAILY
Status: DISCONTINUED | OUTPATIENT
Start: 2022-01-01 | End: 2022-01-01

## 2022-01-01 RX ORDER — ERGOCALCIFEROL (VITAMIN D2) 200 MCG/ML
16000 DROPS ORAL WEEKLY
Status: DISCONTINUED | OUTPATIENT
Start: 2022-01-01 | End: 2022-01-01 | Stop reason: SDUPTHER

## 2022-01-01 RX ORDER — CALCIUM CARBONATE 200(500)MG
500 TABLET,CHEWABLE ORAL 3 TIMES DAILY
Status: DISCONTINUED | OUTPATIENT
Start: 2022-01-01 | End: 2022-01-01

## 2022-01-01 RX ORDER — POLYETHYLENE GLYCOL 3350 17 G/17G
17 POWDER, FOR SOLUTION ORAL DAILY PRN
Status: DISCONTINUED | OUTPATIENT
Start: 2022-01-01 | End: 2022-01-01 | Stop reason: HOSPADM

## 2022-01-01 RX ORDER — DEXTROSE MONOHYDRATE 25 G/50ML
12.5 INJECTION, SOLUTION INTRAVENOUS PRN
Status: DISCONTINUED | OUTPATIENT
Start: 2022-01-01 | End: 2022-01-01 | Stop reason: HOSPADM

## 2022-01-01 RX ORDER — NOREPINEPHRINE BIT/0.9 % NACL 16MG/250ML
2-100 INFUSION BOTTLE (ML) INTRAVENOUS CONTINUOUS
Status: DISCONTINUED | OUTPATIENT
Start: 2022-01-01 | End: 2022-01-01

## 2022-01-01 RX ORDER — LORAZEPAM 2 MG/ML
1 INJECTION INTRAMUSCULAR
Status: DISCONTINUED | OUTPATIENT
Start: 2022-01-01 | End: 2022-01-01 | Stop reason: HOSPADM

## 2022-01-01 RX ORDER — HEPARIN SODIUM 5000 [USP'U]/ML
5000 INJECTION, SOLUTION INTRAVENOUS; SUBCUTANEOUS EVERY 8 HOURS SCHEDULED
Status: DISCONTINUED | OUTPATIENT
Start: 2022-01-01 | End: 2022-01-01

## 2022-01-01 RX ORDER — HEPARIN SODIUM 1000 [USP'U]/ML
5800 INJECTION, SOLUTION INTRAVENOUS; SUBCUTANEOUS PRN
Status: DISCONTINUED | OUTPATIENT
Start: 2022-01-01 | End: 2022-01-01 | Stop reason: HOSPADM

## 2022-01-01 RX ORDER — SODIUM CHLORIDE 9 MG/ML
25 INJECTION, SOLUTION INTRAVENOUS PRN
Status: DISCONTINUED | OUTPATIENT
Start: 2022-01-01 | End: 2022-01-01 | Stop reason: HOSPADM

## 2022-01-01 RX ADMIN — PANTOPRAZOLE SODIUM 40 MG: 40 INJECTION, POWDER, FOR SOLUTION INTRAVENOUS at 12:25

## 2022-01-01 RX ADMIN — OXYCODONE HYDROCHLORIDE 30 MG: 15 TABLET ORAL at 04:32

## 2022-01-01 RX ADMIN — DEXTROSE MONOHYDRATE 12.5 G: 25 INJECTION, SOLUTION INTRAVENOUS at 06:34

## 2022-01-01 RX ADMIN — OXYCODONE HYDROCHLORIDE 30 MG: 15 TABLET ORAL at 12:09

## 2022-01-01 RX ADMIN — MIDAZOLAM HYDROCHLORIDE 2 MG: 1 INJECTION, SOLUTION INTRAMUSCULAR; INTRAVENOUS at 10:16

## 2022-01-01 RX ADMIN — MIDAZOLAM: 1 INJECTION INTRAMUSCULAR; INTRAVENOUS at 12:00

## 2022-01-01 RX ADMIN — HEPARIN SODIUM 5000 UNITS: 5000 INJECTION INTRAVENOUS; SUBCUTANEOUS at 14:35

## 2022-01-01 RX ADMIN — ANTACID TABLETS 500 MG: 500 TABLET, CHEWABLE ORAL at 11:29

## 2022-01-01 RX ADMIN — ANTACID TABLETS 1000 MG: 500 TABLET, CHEWABLE ORAL at 14:32

## 2022-01-01 RX ADMIN — HEPARIN SODIUM 5800 UNITS: 1000 INJECTION INTRAVENOUS; SUBCUTANEOUS at 10:46

## 2022-01-01 RX ADMIN — PROPOFOL 20 MCG/KG/MIN: 10 INJECTION, EMULSION INTRAVENOUS at 10:27

## 2022-01-01 RX ADMIN — SODIUM CHLORIDE 25 ML: 9 INJECTION, SOLUTION INTRAVENOUS at 09:48

## 2022-01-01 RX ADMIN — Medication 10 ML: at 09:12

## 2022-01-01 RX ADMIN — MIDODRINE HYDROCHLORIDE 5 MG: 5 TABLET ORAL at 16:58

## 2022-01-01 RX ADMIN — Medication 50 MCG/HR: at 11:09

## 2022-01-01 RX ADMIN — Medication 50 MCG/HR: at 16:20

## 2022-01-01 RX ADMIN — HEPARIN SODIUM 5000 UNITS: 5000 INJECTION INTRAVENOUS; SUBCUTANEOUS at 22:19

## 2022-01-01 RX ADMIN — DEXTROSE MONOHYDRATE 125 ML: 100 INJECTION, SOLUTION INTRAVENOUS at 16:03

## 2022-01-01 RX ADMIN — LEVOTHYROXINE SODIUM 25 MCG: 0.03 TABLET ORAL at 05:20

## 2022-01-01 RX ADMIN — Medication 10 ML: at 22:24

## 2022-01-01 RX ADMIN — EPINEPHRINE 1 MG: 1 INJECTION, SOLUTION INTRAMUSCULAR; SUBCUTANEOUS at 09:05

## 2022-01-01 RX ADMIN — Medication 10 ML: at 09:46

## 2022-01-01 RX ADMIN — HYDROMORPHONE HYDROCHLORIDE 2 MG: 2 INJECTION, SOLUTION INTRAMUSCULAR; INTRAVENOUS; SUBCUTANEOUS at 00:33

## 2022-01-01 RX ADMIN — Medication 8 MCG/MIN: at 09:59

## 2022-01-01 RX ADMIN — CALCIUM GLUCONATE 1000 MG: 20 INJECTION, SOLUTION INTRAVENOUS at 16:36

## 2022-01-01 RX ADMIN — INSULIN LISPRO 2 UNITS: 100 INJECTION, SOLUTION INTRAVENOUS; SUBCUTANEOUS at 16:51

## 2022-01-01 RX ADMIN — CALCIUM GLUCONATE 1000 MG: 20 INJECTION, SOLUTION INTRAVENOUS at 06:36

## 2022-01-01 RX ADMIN — DEXTROSE MONOHYDRATE 12.5 G: 25 INJECTION, SOLUTION INTRAVENOUS at 05:07

## 2022-01-01 RX ADMIN — PANTOPRAZOLE SODIUM 40 MG: 40 TABLET, DELAYED RELEASE ORAL at 08:33

## 2022-01-01 RX ADMIN — DEXTROSE MONOHYDRATE: 100 INJECTION, SOLUTION INTRAVENOUS at 11:54

## 2022-01-01 RX ADMIN — PANTOPRAZOLE SODIUM 40 MG: 40 TABLET, DELAYED RELEASE ORAL at 09:12

## 2022-01-01 RX ADMIN — HYDROMORPHONE HYDROCHLORIDE 2 MG: 2 INJECTION, SOLUTION INTRAMUSCULAR; INTRAVENOUS; SUBCUTANEOUS at 10:15

## 2022-01-01 RX ADMIN — Medication 50 MEQ: at 17:06

## 2022-01-01 RX ADMIN — Medication 4 MCG/MIN: at 04:14

## 2022-01-01 RX ADMIN — MIDODRINE HYDROCHLORIDE 5 MG: 5 TABLET ORAL at 08:50

## 2022-01-01 RX ADMIN — CEFEPIME HYDROCHLORIDE 1000 MG: 1 INJECTION, POWDER, FOR SOLUTION INTRAMUSCULAR; INTRAVENOUS at 19:37

## 2022-01-01 RX ADMIN — HYDROMORPHONE HYDROCHLORIDE 1 MG: 1 INJECTION, SOLUTION INTRAMUSCULAR; INTRAVENOUS; SUBCUTANEOUS at 04:23

## 2022-01-01 RX ADMIN — LORAZEPAM: 2 INJECTION INTRAMUSCULAR; INTRAVENOUS at 11:48

## 2022-01-01 RX ADMIN — LEVOTHYROXINE SODIUM 25 MCG: 0.03 TABLET ORAL at 08:53

## 2022-01-01 RX ADMIN — Medication 10 ML: at 12:01

## 2022-01-01 RX ADMIN — ERGOCALCIFEROL 50000 UNITS: 1.25 CAPSULE ORAL at 12:38

## 2022-01-01 RX ADMIN — ANTACID TABLETS 1000 MG: 500 TABLET, CHEWABLE ORAL at 08:49

## 2022-01-01 RX ADMIN — CEFEPIME HYDROCHLORIDE 1000 MG: 1 INJECTION, POWDER, FOR SOLUTION INTRAMUSCULAR; INTRAVENOUS at 20:29

## 2022-01-01 RX ADMIN — ANTACID TABLETS 500 MG: 500 TABLET, CHEWABLE ORAL at 16:34

## 2022-01-01 RX ADMIN — Medication 10 ML: at 08:29

## 2022-01-01 RX ADMIN — CALCIUM GLUCONATE 2000 MG: 20 INJECTION, SOLUTION INTRAVENOUS at 10:18

## 2022-01-01 RX ADMIN — Medication 10 ML: at 21:00

## 2022-01-01 RX ADMIN — Medication 6 MCG/MIN: at 11:21

## 2022-01-01 RX ADMIN — DEXTROSE MONOHYDRATE 125 ML: 100 INJECTION, SOLUTION INTRAVENOUS at 09:22

## 2022-01-01 RX ADMIN — LINEZOLID 600 MG: 600 INJECTION, SOLUTION INTRAVENOUS at 06:29

## 2022-01-01 RX ADMIN — HEPARIN SODIUM 5000 UNITS: 5000 INJECTION INTRAVENOUS; SUBCUTANEOUS at 06:28

## 2022-01-01 RX ADMIN — PROPOFOL 15 MCG/KG/MIN: 10 INJECTION, EMULSION INTRAVENOUS at 08:03

## 2022-01-01 RX ADMIN — OXYCODONE HYDROCHLORIDE 30 MG: 15 TABLET ORAL at 21:02

## 2022-01-01 RX ADMIN — PROPOFOL 20 MCG/KG/MIN: 10 INJECTION, EMULSION INTRAVENOUS at 10:31

## 2022-01-01 RX ADMIN — LEVOTHYROXINE SODIUM 25 MCG: 0.03 TABLET ORAL at 06:28

## 2022-01-01 RX ADMIN — DEXTROSE MONOHYDRATE 12.5 G: 25 INJECTION, SOLUTION INTRAVENOUS at 17:32

## 2022-01-01 RX ADMIN — SODIUM CHLORIDE 25 ML: 9 INJECTION, SOLUTION INTRAVENOUS at 20:52

## 2022-01-01 RX ADMIN — DEXTROSE MONOHYDRATE: 100 INJECTION, SOLUTION INTRAVENOUS at 16:14

## 2022-01-01 RX ADMIN — ANTACID TABLETS 500 MG: 500 TABLET, CHEWABLE ORAL at 21:02

## 2022-01-01 RX ADMIN — Medication 10 ML: at 08:50

## 2022-01-01 RX ADMIN — PANTOPRAZOLE SODIUM 40 MG: 40 INJECTION, POWDER, FOR SOLUTION INTRAVENOUS at 08:49

## 2022-01-01 RX ADMIN — CEFEPIME HYDROCHLORIDE 1000 MG: 1 INJECTION, POWDER, FOR SOLUTION INTRAMUSCULAR; INTRAVENOUS at 20:54

## 2022-01-01 RX ADMIN — Medication 10 ML: at 21:25

## 2022-01-01 RX ADMIN — SEVELAMER CARBONATE 800 MG: 800 TABLET, FILM COATED ORAL at 12:09

## 2022-01-01 RX ADMIN — ANTACID TABLETS 500 MG: 500 TABLET, CHEWABLE ORAL at 19:38

## 2022-01-01 RX ADMIN — HEPARIN SODIUM 5800 UNITS: 1000 INJECTION INTRAVENOUS; SUBCUTANEOUS at 19:40

## 2022-01-01 RX ADMIN — Medication 10 ML: at 19:54

## 2022-01-01 RX ADMIN — HEPARIN SODIUM 5000 UNITS: 5000 INJECTION INTRAVENOUS; SUBCUTANEOUS at 05:20

## 2022-01-01 RX ADMIN — INSULIN LISPRO 2 UNITS: 100 INJECTION, SOLUTION INTRAVENOUS; SUBCUTANEOUS at 14:17

## 2022-01-01 RX ADMIN — Medication 10 ML: at 08:53

## 2022-01-01 RX ADMIN — DEXTROSE MONOHYDRATE 12.5 G: 25 INJECTION, SOLUTION INTRAVENOUS at 11:19

## 2022-01-01 RX ADMIN — Medication 10 ML: at 21:48

## 2022-01-01 RX ADMIN — LORAZEPAM 1 MG: 2 INJECTION, SOLUTION INTRAMUSCULAR; INTRAVENOUS at 13:42

## 2022-01-01 RX ADMIN — HYDROMORPHONE HYDROCHLORIDE 2 MG: 2 INJECTION, SOLUTION INTRAMUSCULAR; INTRAVENOUS; SUBCUTANEOUS at 05:18

## 2022-01-01 RX ADMIN — LEVOTHYROXINE SODIUM 25 MCG: 0.03 TABLET ORAL at 06:48

## 2022-01-01 RX ADMIN — Medication 10 ML: at 11:02

## 2022-01-01 RX ADMIN — Medication 25 MCG/HR: at 14:28

## 2022-01-01 RX ADMIN — CALCIUM CHLORIDE 1000 MG: 100 INJECTION INTRAVENOUS; INTRAVENTRICULAR at 09:05

## 2022-01-01 RX ADMIN — ANTACID TABLETS 1000 MG: 500 TABLET, CHEWABLE ORAL at 21:47

## 2022-01-01 RX ADMIN — ANTACID TABLETS 500 MG: 500 TABLET, CHEWABLE ORAL at 08:50

## 2022-01-01 RX ADMIN — ETOMIDATE: 20 INJECTION, SOLUTION INTRAVENOUS at 11:48

## 2022-01-01 RX ADMIN — Medication 20 ML: at 08:04

## 2022-01-01 RX ADMIN — DEXTROSE MONOHYDRATE 12.5 G: 25 INJECTION, SOLUTION INTRAVENOUS at 11:47

## 2022-01-01 RX ADMIN — OXYCODONE HYDROCHLORIDE 30 MG: 15 TABLET ORAL at 17:45

## 2022-01-01 RX ADMIN — ETOMIDATE 20 MG: 20 INJECTION, SOLUTION INTRAVENOUS at 10:15

## 2022-01-01 RX ADMIN — CALCIUM GLUCONATE 1000 MG: 20 INJECTION, SOLUTION INTRAVENOUS at 10:27

## 2022-01-01 RX ADMIN — HEPARIN SODIUM 5000 UNITS: 5000 INJECTION INTRAVENOUS; SUBCUTANEOUS at 06:48

## 2022-01-01 RX ADMIN — LORAZEPAM 2 MG: 2 INJECTION, SOLUTION INTRAMUSCULAR; INTRAVENOUS at 10:14

## 2022-01-01 RX ADMIN — MIDODRINE HYDROCHLORIDE 5 MG: 5 TABLET ORAL at 08:53

## 2022-01-01 RX ADMIN — CEFEPIME HYDROCHLORIDE 1000 MG: 1 INJECTION, POWDER, FOR SOLUTION INTRAMUSCULAR; INTRAVENOUS at 20:44

## 2022-01-01 RX ADMIN — Medication 10 ML: at 08:49

## 2022-01-01 RX ADMIN — HYDROMORPHONE HYDROCHLORIDE 1 MG: 1 INJECTION, SOLUTION INTRAMUSCULAR; INTRAVENOUS; SUBCUTANEOUS at 23:21

## 2022-01-01 RX ADMIN — HEPARIN SODIUM 5000 UNITS: 5000 INJECTION INTRAVENOUS; SUBCUTANEOUS at 13:41

## 2022-01-01 RX ADMIN — ANTACID TABLETS 1000 MG: 500 TABLET, CHEWABLE ORAL at 20:42

## 2022-01-01 RX ADMIN — DEXTROSE MONOHYDRATE 12.5 G: 25 INJECTION, SOLUTION INTRAVENOUS at 02:20

## 2022-01-01 RX ADMIN — SODIUM BICARBONATE 50 MEQ: 84 INJECTION, SOLUTION INTRAVENOUS at 09:06

## 2022-01-01 RX ADMIN — MIDODRINE HYDROCHLORIDE 5 MG: 5 TABLET ORAL at 09:12

## 2022-01-01 RX ADMIN — PANTOPRAZOLE SODIUM 40 MG: 40 TABLET, DELAYED RELEASE ORAL at 21:24

## 2022-01-01 RX ADMIN — HEPARIN SODIUM 5000 UNITS: 5000 INJECTION INTRAVENOUS; SUBCUTANEOUS at 22:12

## 2022-01-01 RX ADMIN — DEXTROSE MONOHYDRATE: 100 INJECTION, SOLUTION INTRAVENOUS at 05:26

## 2022-01-01 RX ADMIN — HYDROMORPHONE HYDROCHLORIDE 2 MG: 2 INJECTION, SOLUTION INTRAMUSCULAR; INTRAVENOUS; SUBCUTANEOUS at 16:59

## 2022-01-01 RX ADMIN — DEXTROSE MONOHYDRATE: 100 INJECTION, SOLUTION INTRAVENOUS at 12:17

## 2022-01-01 RX ADMIN — SODIUM CHLORIDE 25 ML: 9 INJECTION, SOLUTION INTRAVENOUS at 20:53

## 2022-01-01 RX ADMIN — HEPARIN SODIUM 5800 UNITS: 1000 INJECTION INTRAVENOUS; SUBCUTANEOUS at 01:40

## 2022-01-01 RX ADMIN — PANTOPRAZOLE SODIUM 40 MG: 40 INJECTION, POWDER, FOR SOLUTION INTRAVENOUS at 08:50

## 2022-01-01 RX ADMIN — LINEZOLID 600 MG: 600 INJECTION, SOLUTION INTRAVENOUS at 22:57

## 2022-01-01 RX ADMIN — OXYCODONE HYDROCHLORIDE 30 MG: 15 TABLET ORAL at 19:42

## 2022-01-01 RX ADMIN — OXYCODONE HYDROCHLORIDE 30 MG: 15 TABLET ORAL at 09:15

## 2022-01-01 RX ADMIN — OXYCODONE HYDROCHLORIDE 30 MG: 15 TABLET ORAL at 14:57

## 2022-01-01 RX ADMIN — PANTOPRAZOLE SODIUM 40 MG: 40 TABLET, DELAYED RELEASE ORAL at 22:26

## 2022-01-01 RX ADMIN — PANTOPRAZOLE SODIUM 40 MG: 40 INJECTION, POWDER, FOR SOLUTION INTRAVENOUS at 08:53

## 2022-01-01 RX ADMIN — ANTACID TABLETS 500 MG: 500 TABLET, CHEWABLE ORAL at 22:24

## 2022-01-01 RX ADMIN — CALCIUM GLUCONATE 1000 MG: 20 INJECTION, SOLUTION INTRAVENOUS at 18:25

## 2022-01-01 RX ADMIN — LINEZOLID 600 MG: 600 INJECTION, SOLUTION INTRAVENOUS at 18:15

## 2022-01-01 RX ADMIN — HEPARIN SODIUM 5800 UNITS: 1000 INJECTION INTRAVENOUS; SUBCUTANEOUS at 10:57

## 2022-01-01 RX ADMIN — LEVOTHYROXINE SODIUM 25 MCG: 0.03 TABLET ORAL at 06:32

## 2022-01-01 RX ADMIN — PANTOPRAZOLE SODIUM 40 MG: 40 INJECTION, POWDER, FOR SOLUTION INTRAVENOUS at 08:28

## 2022-01-01 RX ADMIN — EPINEPHRINE 1 MG: 1 INJECTION, SOLUTION INTRAMUSCULAR; SUBCUTANEOUS at 09:08

## 2022-01-01 RX ADMIN — PROPOFOL 30 MCG/KG/MIN: 10 INJECTION, EMULSION INTRAVENOUS at 23:09

## 2022-01-01 RX ADMIN — ETOMIDATE 20 MG: 20 INJECTION, SOLUTION INTRAVENOUS at 11:55

## 2022-01-01 RX ADMIN — DEXTROSE MONOHYDRATE 12.5 G: 25 INJECTION, SOLUTION INTRAVENOUS at 16:27

## 2022-01-01 RX ADMIN — LINEZOLID 600 MG: 600 INJECTION, SOLUTION INTRAVENOUS at 05:09

## 2022-01-01 RX ADMIN — CALCIUM GLUCONATE 1000 MG: 20 INJECTION, SOLUTION INTRAVENOUS at 12:29

## 2022-01-01 RX ADMIN — ANTACID TABLETS 1000 MG: 500 TABLET, CHEWABLE ORAL at 13:57

## 2022-01-01 RX ADMIN — SODIUM BICARBONATE 650 MG: 650 TABLET ORAL at 21:25

## 2022-01-01 RX ADMIN — HEPARIN SODIUM 5800 UNITS: 1000 INJECTION INTRAVENOUS; SUBCUTANEOUS at 08:40

## 2022-01-01 RX ADMIN — LEVOTHYROXINE SODIUM 25 MCG: 0.03 TABLET ORAL at 06:29

## 2022-01-01 RX ADMIN — PROPOFOL 25 MCG/KG/MIN: 10 INJECTION, EMULSION INTRAVENOUS at 03:44

## 2022-01-01 RX ADMIN — CALCIUM GLUCONATE 1000 MG: 20 INJECTION, SOLUTION INTRAVENOUS at 13:41

## 2022-01-01 RX ADMIN — OXYCODONE HYDROCHLORIDE 30 MG: 15 TABLET ORAL at 00:39

## 2022-01-01 RX ADMIN — HYDROMORPHONE HYDROCHLORIDE 1 MG: 1 INJECTION, SOLUTION INTRAMUSCULAR; INTRAVENOUS; SUBCUTANEOUS at 16:32

## 2022-01-01 RX ADMIN — Medication 10 ML: at 20:37

## 2022-01-01 RX ADMIN — PROPOFOL 10 MCG/KG/MIN: 10 INJECTION, EMULSION INTRAVENOUS at 17:30

## 2022-01-01 RX ADMIN — SODIUM CHLORIDE 25 ML: 9 INJECTION, SOLUTION INTRAVENOUS at 18:23

## 2022-01-01 RX ADMIN — HYDROMORPHONE HYDROCHLORIDE 1 MG: 1 INJECTION, SOLUTION INTRAMUSCULAR; INTRAVENOUS; SUBCUTANEOUS at 20:50

## 2022-01-01 RX ADMIN — ANTACID TABLETS 1000 MG: 500 TABLET, CHEWABLE ORAL at 11:59

## 2022-01-01 RX ADMIN — MIDODRINE HYDROCHLORIDE 5 MG: 5 TABLET ORAL at 08:49

## 2022-01-01 RX ADMIN — INSULIN GLARGINE 5 UNITS: 100 INJECTION, SOLUTION SUBCUTANEOUS at 23:30

## 2022-01-01 RX ADMIN — DEXTROSE MONOHYDRATE: 100 INJECTION, SOLUTION INTRAVENOUS at 07:56

## 2022-01-01 RX ADMIN — PANTOPRAZOLE SODIUM 40 MG: 40 INJECTION, POWDER, FOR SOLUTION INTRAVENOUS at 07:59

## 2022-01-01 RX ADMIN — ANTACID TABLETS 1000 MG: 500 TABLET, CHEWABLE ORAL at 20:57

## 2022-01-01 RX ADMIN — MIDODRINE HYDROCHLORIDE 5 MG: 5 TABLET ORAL at 16:34

## 2022-01-01 RX ADMIN — CALCIUM GLUCONATE 1000 MG: 20 INJECTION, SOLUTION INTRAVENOUS at 09:50

## 2022-01-01 RX ADMIN — SODIUM BICARBONATE 650 MG: 650 TABLET ORAL at 09:12

## 2022-01-01 RX ADMIN — PANTOPRAZOLE SODIUM 40 MG: 40 INJECTION, POWDER, FOR SOLUTION INTRAVENOUS at 15:57

## 2022-01-01 RX ADMIN — CALCIUM GLUCONATE 1000 MG: 20 INJECTION, SOLUTION INTRAVENOUS at 08:50

## 2022-01-01 RX ADMIN — ANTACID TABLETS 500 MG: 500 TABLET, CHEWABLE ORAL at 08:53

## 2022-01-01 RX ADMIN — LORAZEPAM: 2 INJECTION, SOLUTION INTRAMUSCULAR; INTRAVENOUS at 13:25

## 2022-01-01 RX ADMIN — MIDODRINE HYDROCHLORIDE 5 MG: 5 TABLET ORAL at 10:15

## 2022-01-01 RX ADMIN — HYDROMORPHONE HYDROCHLORIDE 1 MG: 1 INJECTION, SOLUTION INTRAMUSCULAR; INTRAVENOUS; SUBCUTANEOUS at 06:29

## 2022-01-01 RX ADMIN — PANTOPRAZOLE SODIUM 40 MG: 40 INJECTION, POWDER, FOR SOLUTION INTRAVENOUS at 11:02

## 2022-01-01 RX ADMIN — CALCIUM CHLORIDE 1000 MG: 100 INJECTION INTRAVENOUS; INTRAVENTRICULAR at 11:44

## 2022-01-01 RX ADMIN — MAGNESIUM SULFATE HEPTAHYDRATE 2000 MG: 40 INJECTION, SOLUTION INTRAVENOUS at 01:17

## 2022-01-01 RX ADMIN — DEXTROSE MONOHYDRATE: 100 INJECTION, SOLUTION INTRAVENOUS at 19:06

## 2022-01-01 RX ADMIN — PROPOFOL 30 MCG/KG/MIN: 10 INJECTION, EMULSION INTRAVENOUS at 16:15

## 2022-01-01 RX ADMIN — LEVOTHYROXINE SODIUM 25 MCG: 0.03 TABLET ORAL at 04:32

## 2022-01-01 RX ADMIN — INSULIN LISPRO 1 UNITS: 100 INJECTION, SOLUTION INTRAVENOUS; SUBCUTANEOUS at 19:42

## 2022-01-01 RX ADMIN — HEPARIN SODIUM 5000 UNITS: 5000 INJECTION INTRAVENOUS; SUBCUTANEOUS at 21:24

## 2022-01-01 RX ADMIN — Medication 10 ML: at 20:42

## 2022-01-01 RX ADMIN — GLUCAGON HYDROCHLORIDE 1 MG: KIT at 18:21

## 2022-01-01 RX ADMIN — ANTACID TABLETS 1000 MG: 500 TABLET, CHEWABLE ORAL at 14:57

## 2022-01-01 RX ADMIN — OXYCODONE HYDROCHLORIDE 30 MG: 15 TABLET ORAL at 08:53

## 2022-01-01 RX ADMIN — DEXTROSE MONOHYDRATE 50 ML/HR: 50 INJECTION, SOLUTION INTRAVENOUS at 18:47

## 2022-01-01 RX ADMIN — CALCIUM GLUCONATE 1000 MG: 20 INJECTION, SOLUTION INTRAVENOUS at 22:23

## 2022-01-01 RX ADMIN — PANTOPRAZOLE SODIUM 40 MG: 40 INJECTION, POWDER, FOR SOLUTION INTRAVENOUS at 09:44

## 2022-01-01 RX ADMIN — INSULIN LISPRO 4 UNITS: 100 INJECTION, SOLUTION INTRAVENOUS; SUBCUTANEOUS at 17:40

## 2022-01-01 RX ADMIN — CALCIUM GLUCONATE 2000 MG: 20 INJECTION, SOLUTION INTRAVENOUS at 11:26

## 2022-01-01 RX ADMIN — LORAZEPAM 0.5 MG: 0.5 TABLET ORAL at 21:38

## 2022-01-01 RX ADMIN — HEPARIN SODIUM 5000 UNITS: 5000 INJECTION INTRAVENOUS; SUBCUTANEOUS at 21:05

## 2022-01-01 RX ADMIN — HYDROMORPHONE HYDROCHLORIDE 2 MG: 2 INJECTION, SOLUTION INTRAMUSCULAR; INTRAVENOUS; SUBCUTANEOUS at 23:15

## 2022-01-01 RX ADMIN — HEPARIN SODIUM 5000 UNITS: 5000 INJECTION INTRAVENOUS; SUBCUTANEOUS at 05:40

## 2022-01-01 RX ADMIN — INSULIN LISPRO 1 UNITS: 100 INJECTION, SOLUTION INTRAVENOUS; SUBCUTANEOUS at 20:37

## 2022-01-01 RX ADMIN — HEPARIN SODIUM 5000 UNITS: 5000 INJECTION INTRAVENOUS; SUBCUTANEOUS at 04:32

## 2022-01-01 RX ADMIN — CALCIUM GLUCONATE 2000 MG: 20 INJECTION, SOLUTION INTRAVENOUS at 16:09

## 2022-01-01 RX ADMIN — ANTACID TABLETS 500 MG: 500 TABLET, CHEWABLE ORAL at 16:58

## 2022-01-01 RX ADMIN — Medication 10 ML: at 20:59

## 2022-01-01 RX ADMIN — CALCIUM GLUCONATE 1000 MG: 20 INJECTION, SOLUTION INTRAVENOUS at 10:46

## 2022-01-01 RX ADMIN — CALCIUM GLUCONATE 2000 MG: 20 INJECTION, SOLUTION INTRAVENOUS at 17:14

## 2022-01-01 RX ADMIN — ANTACID TABLETS 500 MG: 500 TABLET, CHEWABLE ORAL at 16:42

## 2022-01-01 RX ADMIN — CEFEPIME HYDROCHLORIDE 1000 MG: 1 INJECTION, POWDER, FOR SOLUTION INTRAMUSCULAR; INTRAVENOUS at 22:23

## 2022-01-01 RX ADMIN — Medication 10 ML: at 08:39

## 2022-01-01 RX ADMIN — MIDODRINE HYDROCHLORIDE 5 MG: 5 TABLET ORAL at 16:32

## 2022-01-01 RX ADMIN — ANTACID TABLETS 1000 MG: 500 TABLET, CHEWABLE ORAL at 09:44

## 2022-01-01 RX ADMIN — ANTACID TABLETS 500 MG: 500 TABLET, CHEWABLE ORAL at 09:12

## 2022-01-01 RX ADMIN — CALCIUM GLUCONATE 2000 MG: 20 INJECTION, SOLUTION INTRAVENOUS at 00:09

## 2022-01-01 RX ADMIN — PROPOFOL 10 MCG/KG/MIN: 10 INJECTION, EMULSION INTRAVENOUS at 20:54

## 2022-01-01 RX ADMIN — SODIUM BICARBONATE 650 MG: 650 TABLET ORAL at 15:41

## 2022-01-01 RX ADMIN — Medication 10 ML: at 12:25

## 2022-01-01 RX ADMIN — HEPARIN SODIUM 5000 UNITS: 5000 INJECTION INTRAVENOUS; SUBCUTANEOUS at 14:57

## 2022-01-01 RX ADMIN — DEXTROSE 15 G: 15 GEL ORAL at 06:39

## 2022-01-01 RX ADMIN — CEFEPIME HYDROCHLORIDE 1000 MG: 1 INJECTION, POWDER, FOR SOLUTION INTRAMUSCULAR; INTRAVENOUS at 20:55

## 2022-01-01 RX ADMIN — SEVELAMER CARBONATE 800 MG: 800 TABLET, FILM COATED ORAL at 08:33

## 2022-01-01 RX ADMIN — PANTOPRAZOLE SODIUM 40 MG: 40 INJECTION, POWDER, FOR SOLUTION INTRAVENOUS at 11:59

## 2022-01-01 RX ADMIN — DEXTROSE MONOHYDRATE 25 G: 25 INJECTION, SOLUTION INTRAVENOUS at 06:41

## 2022-01-01 RX ADMIN — CALCIUM CHLORIDE 1000 MG: 100 INJECTION INTRAVENOUS; INTRAVENTRICULAR at 10:04

## 2022-01-01 RX ADMIN — MIDODRINE HYDROCHLORIDE 5 MG: 5 TABLET ORAL at 07:59

## 2022-01-01 RX ADMIN — Medication 10 ML: at 21:05

## 2022-01-01 RX ADMIN — DEXTROSE MONOHYDRATE 12.5 G: 25 INJECTION, SOLUTION INTRAVENOUS at 15:12

## 2022-01-01 RX ADMIN — LEVOTHYROXINE SODIUM 25 MCG: 0.03 TABLET ORAL at 05:10

## 2022-01-01 RX ADMIN — SEVELAMER CARBONATE 800 MG: 800 TABLET, FILM COATED ORAL at 17:05

## 2022-01-01 RX ADMIN — HYDROMORPHONE HYDROCHLORIDE 1 MG: 1 INJECTION, SOLUTION INTRAMUSCULAR; INTRAVENOUS; SUBCUTANEOUS at 15:56

## 2022-01-01 RX ADMIN — SODIUM CHLORIDE 25 ML: 9 INJECTION, SOLUTION INTRAVENOUS at 06:43

## 2022-01-01 RX ADMIN — HEPARIN SODIUM 5000 UNITS: 5000 INJECTION INTRAVENOUS; SUBCUTANEOUS at 05:34

## 2022-01-01 RX ADMIN — CALCIUM GLUCONATE 2000 MG: 20 INJECTION, SOLUTION INTRAVENOUS at 03:39

## 2022-01-01 RX ADMIN — HEPARIN SODIUM 5000 UNITS: 5000 INJECTION INTRAVENOUS; SUBCUTANEOUS at 21:47

## 2022-01-01 RX ADMIN — HEPARIN SODIUM 5000 UNITS: 5000 INJECTION INTRAVENOUS; SUBCUTANEOUS at 13:32

## 2022-01-01 RX ADMIN — Medication 50 MCG/HR: at 02:39

## 2022-01-01 RX ADMIN — HYDROMORPHONE HYDROCHLORIDE 1 MG: 1 INJECTION, SOLUTION INTRAMUSCULAR; INTRAVENOUS; SUBCUTANEOUS at 09:00

## 2022-01-01 RX ADMIN — SODIUM CHLORIDE: 9 INJECTION, SOLUTION INTRAVENOUS at 17:07

## 2022-01-01 RX ADMIN — PROPOFOL 20 MCG/KG/MIN: 10 INJECTION, EMULSION INTRAVENOUS at 10:41

## 2022-01-01 RX ADMIN — HYDROMORPHONE HYDROCHLORIDE 1 MG: 1 INJECTION, SOLUTION INTRAMUSCULAR; INTRAVENOUS; SUBCUTANEOUS at 23:35

## 2022-01-01 RX ADMIN — CEFEPIME HYDROCHLORIDE 1000 MG: 1 INJECTION, POWDER, FOR SOLUTION INTRAMUSCULAR; INTRAVENOUS at 21:02

## 2022-01-01 RX ADMIN — GLUCAGON HYDROCHLORIDE 1 MG: KIT at 06:10

## 2022-01-01 RX ADMIN — Medication 25 MCG/MIN: at 04:46

## 2022-01-01 RX ADMIN — DEXTROSE MONOHYDRATE 100 ML/HR: 50 INJECTION, SOLUTION INTRAVENOUS at 06:02

## 2022-01-01 RX ADMIN — DEXTROSE MONOHYDRATE 12.5 G: 25 INJECTION, SOLUTION INTRAVENOUS at 17:10

## 2022-01-01 RX ADMIN — Medication 10 ML: at 21:04

## 2022-01-01 RX ADMIN — HEPARIN SODIUM 5800 UNITS: 1000 INJECTION INTRAVENOUS; SUBCUTANEOUS at 21:36

## 2022-01-01 RX ADMIN — CALCIUM GLUCONATE 3000 MG: 98 INJECTION, SOLUTION INTRAVENOUS at 06:46

## 2022-01-01 RX ADMIN — DEXTROSE MONOHYDRATE 25 G: 25 INJECTION, SOLUTION INTRAVENOUS at 05:56

## 2022-01-01 RX ADMIN — Medication 10 ML: at 19:38

## 2022-01-01 RX ADMIN — ANTACID TABLETS 1000 MG: 500 TABLET, CHEWABLE ORAL at 13:41

## 2022-01-01 ASSESSMENT — PAIN SCALES - GENERAL
PAINLEVEL_OUTOF10: 0
PAINLEVEL_OUTOF10: 3
PAINLEVEL_OUTOF10: 4
PAINLEVEL_OUTOF10: 0
PAINLEVEL_OUTOF10: 7
PAINLEVEL_OUTOF10: 0
PAINLEVEL_OUTOF10: 7
PAINLEVEL_OUTOF10: 0
PAINLEVEL_OUTOF10: 2
PAINLEVEL_OUTOF10: 6
PAINLEVEL_OUTOF10: 0
PAINLEVEL_OUTOF10: 5
PAINLEVEL_OUTOF10: 0
PAINLEVEL_OUTOF10: 6
PAINLEVEL_OUTOF10: 6
PAINLEVEL_OUTOF10: 0
PAINLEVEL_OUTOF10: 6
PAINLEVEL_OUTOF10: 0
PAINLEVEL_OUTOF10: 0
PAINLEVEL_OUTOF10: 6
PAINLEVEL_OUTOF10: 0
PAINLEVEL_OUTOF10: 3
PAINLEVEL_OUTOF10: 3
PAINLEVEL_OUTOF10: 0
PAINLEVEL_OUTOF10: 7
PAINLEVEL_OUTOF10: 7
PAINLEVEL_OUTOF10: 0
PAINLEVEL_OUTOF10: 3
PAINLEVEL_OUTOF10: 7
PAINLEVEL_OUTOF10: 0
PAINLEVEL_OUTOF10: 5
PAINLEVEL_OUTOF10: 0
PAINLEVEL_OUTOF10: 0
PAINLEVEL_OUTOF10: 3
PAINLEVEL_OUTOF10: 7
PAINLEVEL_OUTOF10: 5
PAINLEVEL_OUTOF10: 4
PAINLEVEL_OUTOF10: 0
PAINLEVEL_OUTOF10: 7
PAINLEVEL_OUTOF10: 6
PAINLEVEL_OUTOF10: 8
PAINLEVEL_OUTOF10: 3
PAINLEVEL_OUTOF10: 0
PAINLEVEL_OUTOF10: 0
PAINLEVEL_OUTOF10: 7
PAINLEVEL_OUTOF10: 0
PAINLEVEL_OUTOF10: 0
PAINLEVEL_OUTOF10: 1
PAINLEVEL_OUTOF10: 4
PAINLEVEL_OUTOF10: 2
PAINLEVEL_OUTOF10: 0
PAINLEVEL_OUTOF10: 3
PAINLEVEL_OUTOF10: 0
PAINLEVEL_OUTOF10: 6
PAINLEVEL_OUTOF10: 0
PAINLEVEL_OUTOF10: 0
PAINLEVEL_OUTOF10: 6
PAINLEVEL_OUTOF10: 0
PAINLEVEL_OUTOF10: 4
PAINLEVEL_OUTOF10: 0
PAINLEVEL_OUTOF10: 0
PAINLEVEL_OUTOF10: 7
PAINLEVEL_OUTOF10: 5
PAINLEVEL_OUTOF10: 0
PAINLEVEL_OUTOF10: 3
PAINLEVEL_OUTOF10: 0
PAINLEVEL_OUTOF10: 7
PAINLEVEL_OUTOF10: 0
PAINLEVEL_OUTOF10: 4
PAINLEVEL_OUTOF10: 0
PAINLEVEL_OUTOF10: 2
PAINLEVEL_OUTOF10: 0
PAINLEVEL_OUTOF10: 0
PAINLEVEL_OUTOF10: 2
PAINLEVEL_OUTOF10: 3
PAINLEVEL_OUTOF10: 0
PAINLEVEL_OUTOF10: 7
PAINLEVEL_OUTOF10: 6
PAINLEVEL_OUTOF10: 0
PAINLEVEL_OUTOF10: 7
PAINLEVEL_OUTOF10: 0
PAINLEVEL_OUTOF10: 6
PAINLEVEL_OUTOF10: 0
PAINLEVEL_OUTOF10: 4
PAINLEVEL_OUTOF10: 0
PAINLEVEL_OUTOF10: 0
PAINLEVEL_OUTOF10: 7
PAINLEVEL_OUTOF10: 3
PAINLEVEL_OUTOF10: 10

## 2022-01-01 ASSESSMENT — PAIN DESCRIPTION - LOCATION
LOCATION: BACK

## 2022-01-01 ASSESSMENT — PAIN DESCRIPTION - PAIN TYPE
TYPE: CHRONIC PAIN

## 2022-01-01 ASSESSMENT — PULMONARY FUNCTION TESTS
PIF_VALUE: 21
PIF_VALUE: 21
PIF_VALUE: 27
PIF_VALUE: 21
PIF_VALUE: 11
PIF_VALUE: 26
PIF_VALUE: 22
PIF_VALUE: 26
PIF_VALUE: 22
PIF_VALUE: 12
PIF_VALUE: 27
PIF_VALUE: 27
PIF_VALUE: 26
PIF_VALUE: 14
PIF_VALUE: 29
PIF_VALUE: 33
PIF_VALUE: 27
PIF_VALUE: 20
PIF_VALUE: 16
PIF_VALUE: 29

## 2022-01-01 ASSESSMENT — ENCOUNTER SYMPTOMS
ABDOMINAL DISTENTION: 1
ALLERGIC/IMMUNOLOGIC NEGATIVE: 1
TROUBLE SWALLOWING: 1
SHORTNESS OF BREATH: 1
EYES NEGATIVE: 1

## 2022-01-01 ASSESSMENT — PAIN DESCRIPTION - PROGRESSION
CLINICAL_PROGRESSION: GRADUALLY IMPROVING

## 2022-01-01 ASSESSMENT — PAIN DESCRIPTION - DESCRIPTORS: DESCRIPTORS: ACHING

## 2022-01-01 ASSESSMENT — PAIN DESCRIPTION - FREQUENCY: FREQUENCY: CONTINUOUS

## 2022-01-01 ASSESSMENT — PAIN - FUNCTIONAL ASSESSMENT: PAIN_FUNCTIONAL_ASSESSMENT: PREVENTS OR INTERFERES SOME ACTIVE ACTIVITIES AND ADLS

## 2022-02-03 NOTE — ED PROVIDER NOTES
[address] PROVIDER NOTE    Patient Identification  Pt Name: Naheed Montes  MRN: [id number]  [place] 1970  Date of evaluation: 2/3/2022  Provider: Joann Medel MD  PCP: No primary care provider on file. Chief Complaint  Wound Check (patient in by [place] from home, states he is wheelchair bound and has a bedsore x3 weeks) and Diarrhea (diarrhea x1 month)      HPI  History provided by patient   This is a 49 y.o. male who presents to the ED for chief complaint of bedsore evaluation. It has been ongoing for the past 3 to 4 weeks. He was able to get a wound care nurse to come to his home next week. He is wheelchair-bound and is able to occasionally use a walker due to history of Guillain-Barré syndrome. He lives at home with his mother. He feels that he is able to take care of his activities of daily living and keep pressure off of the area. He has pain in the skin around his ulcer on his lower back. He has been having diarrhea for 1 month. Has had 2 stools today. Has not been evaluated for this in the past.  No recent antibiotics. ROS  12 systems reviewed, pertinent positives/negatives per HPI otherwise noted to be negative.     I have reviewed the following nursing documentation:  Allergies: Influenza virus vaccine    Past medical history:   Past Medical History:   Diagnosis Date    Blood transfusion     Chronic pain     Diabetes mellitus ([address]     Dialysis     tues-thur-sat    Guillain [place] syndrome     2002 after flu shot    [facility]od[facility] ([facility]     Low blood pressure     Pancreatitis     Peripheral Neuropathy     Pneumonia 11/11/2021    Renal failure 1992    on dialysis 3x/wk  (T, Th, Sat)    Status epilepticus ([address] 12/11/2021     Past surgical history:   Past Surgical History:   Procedure Laterality Date    CHOLECYSTECTOMY      COLONOSCOPY N/A 12/17/2021    COLONOSCOPY DIAGNOSTIC performed by Paola Parekh MD at [place] [address]  DIALYSIS FISTULA CREATION      left arm/currently non functioning    KIDNEY TRANSPLANT  9/1994    R-kidney    TOE SURGERY  10-8-2010    cut and realign 1st, 2nd, toe left foot , fixation 1st, 2nd toe left foot    TUNNELED VENOUS CATHETER PLACEMENT      left chest for dialysis    TUNNELED VENOUS PORT PLACEMENT      UPPER GASTROINTESTINAL ENDOSCOPY N/A 12/17/2021    EGD ESOPHAGOGASTRODUODENOS[facility]:   Previous Medications    BISACODYL (DULCOLAX) 10 MG SUPPOSITORY    Place 10 mg rectally daily    DEXTROMETHORPHAN-GUAIFENESIN  MG/5ML SYRP    Take 5 mLs by mouth every 4 hours as needed for Cough    FERROUS SULFATE (IRON 325) 325 (65 FE) MG TABLET    Take 325 mg by mouth 2 times daily    INSULIN GLARGINE (LANTUS;BASAGLAR) 100 UNIT/ML INJECTION PEN    Inject 5 Units into the skin nightly    LEVOTHYROXINE (SYNTHROID) 25 MCG TABLET    Take 25 mcg by mouth Daily    MAGNESIUM HYDROXIDE (MILK OF MAGNESIA) 400 MG/5ML SUSPENSION    Take 30 mLs by mouth daily as needed for Constipation    MIDODRINE (PROAMATINE) 5 MG TABLET    Take 1 tablet by mouth 2 times daily (with meals)    MULTIPLE VITAMINS-MINERALS (THERAPEUTIC MULTIVITAMIN-MINERALS) TABLET    Take 1 tablet by mouth daily    ONDANSETRON (ZOFRAN ODT) 4 MG DISINTEGRATING TABLET    Take 1 tablet by mouth every 8 hours as needed for Nausea    ONDANSETRON (ZOFRAN) 4 MG TABLET    Take 4 mg by mouth every 8 hours as needed for Nausea or Vomiting    PANTOPRAZOLE (PROTONIX) 40 MG TABLET    Take 40 mg by mouth 2 times daily     PROMETHAZINE HCL 6.25 MG/5ML SOLN    Take 12.5 mg by mouth 2 times daily as needed. SEVELAMER (RENVELA) 800 MG TABLET    Take 1 tablet by mouth 3 times daily (with meals)    TEMAZEPAM (RESTORIL) 30 MG CAPSULE    Take 30 mg by mouth nightly as needed. Social history:  reports that he has never smoked.  He has never used smokeless tobacco. He reports that he does not drink alcohol and does not use drugs. Family history:    Family History   Problem Relation Age of Onset    Diabetes Mother          Exam  ED Triage Vitals [02/03/22 1757]   BP Temp Temp src Pulse Resp SpO2 Height Weight   (!) 154/97 97.8 °F (36.6 °C) -- 84 16 96 % -- --     Nursing note and vitals reviewed. Constitutional: In no acute distress  HENT:      Head: Normocephalic      Ears: External ears normal.      Nose: Nose normal.     Mouth: Membrane mucosa moist   Eyes: No discharge. Neck: Supple. Trachea midline. Cardiovascular: Regular rate. Warm extremities  Pulmonary/Chest: Effort normal. No respiratory distress. Abdominal: Soft. No distension. Nontender  : Deferred  Rectal: Deferred   Musculoskeletal: Moves all extremities. No gross deformity. Neurological: Alert and oriented. Face symmetric. Speech is clear. Skin: Warm. 1 inch stage II decubitus ulcer. Psychiatric: Normal mood and affect.  Behavior is normal.    Procedures          Radiology  No orders to display       Labs  Results for orders placed or performed during the hospital encounter of 02/03/22   CBC Auto Differential   Result Value Ref Range    WBC 3.2 (L) 4.0 - 11.0 K/uL    RBC 3.59 (L) 4.20 - 5.90 M/uL    Hemoglobin 9.0 (L) 13.5 - 17.5 g/dL    Hematocrit 28.6 (L) 40.5 - 52.5 %    MCV 79.7 (L) 80.0 - 100.0 fL    MCH 25.2 (L) 26.0 - 34.0 pg    MCHC 31.6 31.0 - 36.0 g/dL    RDW 16.0 (H) 12.4 - 15.4 %    Platelets 96 (L) 699 - 450 K/uL    MPV 9.1 5.0 - 10.5 fL    Neutrophils % 71.6 %    Lymphocytes % 13.5 %    Monocytes % 9.5 %    Eosinophils % 3.9 %    Basophils % 1.5 %    Neutrophils Absolute 2.3 1.7 - 7.7 K/uL    Lymphocytes Absolute 0.4 (L) 1.0 - 5.1 K/uL    Monocytes Absolute 0.3 0.0 - 1.3 K/uL    Eosinophils Absolute 0.1 0.0 - 0.6 K/uL    Basophils Absolute 0.0 0.0 - 0.2 K/uL   Comprehensive Metabolic Panel w/ Reflex to MG   Result Value Ref Range    Sodium 133 (L) 136 - 145 mmol/L    Potassium reflex Magnesium 5.2 (H) 3.5 - 5.1 mmol/L    Chloride 93 (L) 99 - 110 mmol/L    CO2 21 21 - 32 mmol/L    Anion Gap 19 (H) 3 - 16    Glucose 245 (H) 70 - 99 mg/dL    BUN 69 (H) 7 - 20 mg/dL    CREATININE 7.6 (HH) 0.9 - 1.3 mg/dL    GFR Non-African American 8 (A) >60    GFR  9 (A) >60    Calcium 7.3 (L) 8.3 - 10.6 mg/dL    Total Protein 7.1 6.4 - 8.2 g/dL    Albumin 3.1 (L) 3.4 - 5.0 g/dL    Albumin/Globulin Ratio 0.8 (L) 1.1 - 2.2    Total Bilirubin 0.4 0.0 - 1.0 mg/dL    Alkaline Phosphatase 163 (H) 40 - 129 U/L    ALT 24 10 - 40 U/L    AST 34 15 - 37 U/L   SPECIMEN REJECTION   Result Value Ref Range    Rejected Test 7CDIF     Reason for Rejection see below        Screenings   Michele Coma Scale  Eye Opening: Spontaneous  Best Verbal Response: Oriented  Best Motor Response: Obeys commands  Michele Coma Scale Score: 15       MDM and ED Course  This is a 49 y.o. male who presents to the ED for wound check. He does have a stage II decubitus ulcer. It does not appear cellulitic. The patient has been having diarrhea which is likely exacerbating this. We will check for C. difficile. Symptoms have been ongoing for 3 to 4 weeks. He does feel comfortable at home and does not believe that he needs a skilled nursing facility to take care of his needs and ensure that he is not on his back for too long.    -------------    We are unable to run for C. difficile because the stool was too formed. We are sending off for ova and parasites. Will empirically treat for infectious diarrhea with short course of azithromycin and given prolonged diarrhea course. I again spoke to the patient about risks and benefits of admission to the hospital and he wishes to be discharged instead. He feels comfortable taking care of himself. I did discuss with him CMP abnormalities and he states that he would rather follow-up and get dialysis on Saturday. All questions answered and return precautions given         [unfilled]    Final Impression  1. Diarrhea, unspecified type    2. Pressure injury of skin of buttock, unspecified injury stage, unspecified laterality        Blood pressure (!) 142/70, pulse 86, temperature 97.8 °F (36.6 °C), resp. rate 16, SpO2 98 %. Disposition:  DISPOSITION Decision To Discharge 02/03/2022 08:07:12 PM      Patient Referrals:  No follow-up provider specified. Discharge Medications:  New Prescriptions    AZITHROMYCIN (ZITHROMAX) 500 MG TABLET    Take 1 tablet by mouth daily for 5 days       Discontinued Medications:  Discontinued Medications    No medications on file       This chart was generated using the [address] [place], [place] [address]Th  dictation system. I created this record but it may contain dictation errors given the limitations of this technology.         Stephany Avila MD  02/03/22 2015

## 2022-02-03 NOTE — ED NOTES
Bed: 23  Expected date:   Expected time:   Means of arrival:   Comments:  JODRI Waggoner RN  02/03/22 8771

## 2022-02-04 NOTE — PROGRESS NOTES
Attempted to gather labs x2 with no success,  to be sent to draw ordered labs. Report given to Jay Pal.

## 2022-02-04 NOTE — ED NOTES
Pt cleared for discharge. Pt needs transport home, is wheelchair bound, non-ambulatory.  aware of transport needs, attempting to arrange transport.       Miguel Duff RN  02/03/22 2011

## 2022-02-04 NOTE — ED NOTES
Report endorsed to EMS, care transferred without incident for transport home      Rakel Ibarra RN  02/03/22 4970

## 2022-02-04 NOTE — ED NOTES
Pt given all discharge instructions verbally and in writing. Pt verbalized understanding of all instructions given.  Pending transport, [place] [facility] ETA 21:45     Gloria Marti RN  02/03/22 4164

## 2022-02-04 NOTE — ED NOTES
Pt received in bed resting, breathing easy, non-labored. No distress noted. Pt aaox4, answering all questions, following all commands. Pt complaint free, voices no current needs. Labs collected by phlebotomy as Pt is difficult stick. On bedside cardiac monitor, alarms audible. Bed in lowest position, side rails elevated. Will continue to monitor.       Jayme Galeana RN  02/03/22 0551

## 2022-02-04 NOTE — ED NOTES
Per lab, creatinine 7.6 mg/dl, MD notified. Per MD, ok for Pt to eat. Provided sandwich and juice, tolerated well.       Jaden Perdue RN  02/03/22 1954

## 2022-02-04 NOTE — ED NOTES
Per lab, stool for c-diff collection too formed to run. MD notified.       Rakel Ibarra RN  02/03/22 1924

## 2022-02-12 PROBLEM — R19.7 DIARRHEA: Status: ACTIVE | Noted: 2022-01-01

## 2022-02-12 NOTE — ED PROVIDER NOTES
[address] [facility] Emergency Department      Pt Name: Mercedes Fitch  MRN: [id number]  [place] 1970  Date of evaluation: 2/12/2022  Provider: [place] Mcburney, MD  [address] [place] [address]  Chief Complaint   Patient presents with    Diarrhea     Pt here week ago, still having diarrhea due to imodium not working, dialysis T/TH/SA,      HPI  Mercedes Fitch is a 49 y.o. male who presents because of diarrhea. He has been having diarrhea for over a month. He has used Imodium which has not working. 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 His last dialysis was on Thursday and he missed dialysis today because of feeling ill. He admits to some abdominal cramping. He has not had vomiting. His appetite has been fair. He was seen here about a week ago because of diarrhea and also had a pressure ulcer. He had testing done on the diarrhea which did not identify a infectious pathogen. REVIEW OF SYSTEMS:  No fever, no vomiting, no chest pain, missed dialysis, does not make urine Pertinent positives and negatives as per the HPI. All other review of systems reviewed and negative. Nursing notes reviewed.     PAST MEDICAL HISTORY  Past Medical History:   Diagnosis Date    Blood transfusion     Chronic pain     Diabetes mellitus ([address]     Dialysis     tues-thur-sat    Guillain [place] syndrome     2002 after flu shot    Hemodialysis patient ([address]     Low blood pressure     Pancreatitis     Peripheral Neuropathy     Pneumonia 11/11/2021    Renal failure 1992    on dialysis 3x/wk  (T, Th, Sat)    Status epilepticus ([address] 12/11/2021     SURGICAL HISTORY  Past Surgical History:   Procedure Laterality Date    CHOLECYSTECTOMY      COLONOSCOPY N/A 12/17/2021    COLONOSCOPY DIAGNOSTIC performed by Milton Sheppard MD at [facility] [place]      left arm/currently non functioning    KIDNEY TRANSPLANT  9/1994    R-kidney    TOE SURGERY  10-8-2010    cut and realign 1st, 2nd, toe left foot , fixation 1st, 2nd toe left foot    TUNNELED VENOUS CATHETER PLACEMENT      left chest for dialysis    TUNNELED VENOUS PORT PLACEMENT      UPPER GASTROINTESTINAL ENDOSCOPY N/A 12/17/2021    EGD ESOPHAGOGASTRODUODENOSCOPY performed by Moncho Green MD at [facility]:  No current facility-administered medications on file prior to encounter. Current Outpatient Medications on File Prior to Encounter   Medication Sig Dispense Refill    oxyCODONE (OXY-IR) 30 MG immediate release tablet Take 30 mg by mouth every 8 hours as needed for Pain.  loperamide (LOPERAMIDE A-D) 2 MG tablet Take 1 tablet by mouth 4 times daily as needed for Diarrhea After each loose stool 20 tablet 0    ferrous sulfate (IRON 325) 325 (65 Fe) MG tablet Take 325 mg by mouth 2 times daily      levothyroxine (SYNTHROID) 25 MCG tablet Take 25 mcg by mouth Daily      Multiple Vitamins-Minerals (THERAPEUTIC MULTIVITAMIN-MINERALS) tablet Take 1 tablet by mouth daily      sevelamer (RENVELA) 800 MG tablet Take 1 tablet by mouth 3 times daily (with meals)      ondansetron (ZOFRAN ODT) 4 MG disintegrating tablet Take 1 tablet by mouth every 8 hours as needed for Nausea 20 tablet 0    pantoprazole (PROTONIX) 40 MG tablet Take 40 mg by mouth 2 times daily       Promethazine HCl 6.25 MG/5ML SOLN Take 12.5 mg by mouth 2 times daily as needed.  temazepam (RESTORIL) 30 MG capsule Take 30 mg by mouth nightly as needed.       midodrine (PROAMATINE) 5 MG tablet Take 1 tablet by mouth 2 times daily (with meals) 90 tablet 3    insulin glargine (LANTUS;BASAGLAR) 100 UNIT/ML injection pen Inject 5 Units into the skin nightly 5 pen 3    Dextromethorphan-guaiFENesin  MG/5ML SYRP Take 5 mLs by mouth every 4 hours as needed for Cough      bisacodyl (DULCOLAX) 10 MG suppository Place 10 mg rectally daily      magnesium hydroxide (MILK OF MAGNESIA) 400 MG/5ML suspension Take 30 mLs by mouth daily as needed for Constipation      ondansetron (ZOFRAN) 4 MG tablet Take 4 mg by mouth every 8 hours as needed for Nausea or Vomiting       ALLERGIES  Influenza virus vaccine  FAMILY HISTORY  Family History   Problem Relation Age of Onset    Diabetes Mother      SOCIAL HISTORY:  Social History     Tobacco Use    Smoking status: Never Smoker    Smokeless tobacco: Never Used   Substance Use Topics    Alcohol use: No    Drug use: No     IMMUNIZATIONS:  Noncontributory    PHYSICAL EXAM  VITAL SIGNS:  Blood pressure (!) 129/93, pulse 75, resp. rate 16, SpO2 97 %. Constitutional:  49 y.o. male alert, cooperative, chronically ill in appearance  HENT:  Atraumatic, mucous membranes moist  Eyes:   Conjunctiva clear, no icterus  Neck:  Supple, no JVD, no signs of injury  Cardiovascular:  Regular, no rubs  Thorax & Lungs:  No accessory muscle usage, clear  Abdomen:  Soft, non distended, bowel sounds present, NT  Genitalia:  Deferred  Rectal:  Deferred  Extremities:  No cyanosis, pretibial edema  Skin:  Warm, dry  Neurologic:  Alert, no slurred speech, contractures of fingers, generalized weakness (uses a wheelchair)  Psychiatric:  Affect appropriate    DIAGNOSTIC RESULTS:  Labs resulted at the time of this note reviewed.   Labs Reviewed   CBC WITH AUTO DIFFERENTIAL - Abnormal; Notable for the following components:       Result Value    WBC 2.9 (*)     RBC 3.64 (*)     Hemoglobin 9.0 (*)     Hematocrit 29.0 (*)     MCV 79.7 (*)     MCH 24.8 (*)     RDW 17.7 (*)     Platelets 92 (*)     Lymphocytes Absolute 0.5 (*)     All other components within normal limits    Narrative:     Performed at:  [facility]  [address], [address] [address], [address]   Phone [phone]   BASIC METABOLIC PANEL - Abnormal; Notable for the following components:    Sodium 134 (*)     Chloride 94 (*)     CO2 13 (*)     Anion Gap 27 (*)     Glucose 176 (*)     BUN 92 (*)     CREATININE 9.6 (*)     GFR Non- American 6 (*)     GFR  7 (*)     Calcium 5.6 (*)     All other components within normal limits    Narrative:     Adamkatina Gaytan  [facility] tel. [phone],  Chemistry results called to and read back by danielle jorgensen, 02/12/2022  15:45, by MAYCOL  Performed at:  [facility] [address] [facility] [place]   Phone [phone]   HEPATIC FUNCTION PANEL - Abnormal; Notable for the following components:    Albumin 2.7 (*)     Alkaline Phosphatase 185 (*)     All other components within normal limits    Narrative:     MILAD  [facility] tel. [phone],  Chemistry results called to and read back by danielle jorgensen, 02/12/2022  15:45, by MAYCOL  Performed at:  [facility] [address], [place], [facility] [place]   Phone [phone]   BLOOD GAS, VENOUS - Abnormal; Notable for the following components:    pH, Vinod 7.252 (*)     pO2, Vinod 60.7 (*)     HCO3, Venous 18.9 (*)     Base Excess, Vinod -7.8 (*)     Carboxyhemoglobin 3.4 (*)     All other components within normal limits    Narrative:     Performed at:  [facility] [address], [place], [facility] [place]   Phone [phone]   POCT GLUCOSE - Abnormal; Notable for the following components:    POC Glucose 133 (*)     All other components within normal limits    Narrative:     Performed at:  [facility] [address], [place], [facility] [place]   Phone [phone]   C DIFF TOXIN/ANTIGEN   LIPASE    Narrative:     Adam Gaytan  [facility] tel. [phone],  Chemistry results called to and read back by danielle jorgensen, 02/12/2022  15:45, by Christine Nielsen  Performed at:  [facility] [address], [place], [facility] [place]   Phone [phone]   MAGNESIUM    Narrative:     Rakel Razo,  Chemistry results called to and read back by danielle jorgensen, 02/12/2022  15:45, by Christine Nielsen  Performed at:  [address] [place] [address] [address],  [place], [address]   Phone [phone]   HEMOGLOBIN A1C   CBC   BASIC METABOLIC PANEL   PTH, INTACT   VITAMIN D 25 HYDROXY   POCT GLUCOSE   POCT GLUCOSE   POCT GLUCOSE     EKG:  Read by me in the absence of a cardiologist shows: Rhythm, rate 66, low voltages, QTC is 580, ST-T wave abnormality in anterior leads that suggest possibility of ischemia, some ST changes when compared to prior study on 11 December 2021    RADIOLOGY:    CT ABDOMEN PELVIS WO CONTRAST Additional Contrast? None   Final Result   Addendum 1 of 1   ADDENDUM:   There is a transcription error in the impression. The 1st item should    read   as follows:      Stable broad-based ventral abdominal wall hernia containing loops of small   large bowel without associated obstruction. Final   1. Stable broad-based ventral abdominal wall hernia containing loops of small   large bowel at associated obstruction. 2. Severe renal atrophy with diffuse vascular calcifications and findings of   renal osteodystrophy. 3. Small layering bilateral pleural effusions with mild pulmonary edema and   anasarca.               ED COURSE:  Medications administered:  Medications   levothyroxine (SYNTHROID) tablet 25 mcg (has no administration in time range)   insulin glargine (LANTUS;BASAGLAR) injection pen 5 Units (5 Units SubCUTAneous Not Given 2/12/22 2126)   sevelamer (RENVELA) tablet 800 mg (has no administration in time range)   pantoprazole (PROTONIX) tablet 40 mg (40 mg Oral Given 2/12/22 2226)   midodrine (PROAMATINE) tablet 5 mg (has no administration in time range)   glucose (GLUTOSE) 40 % oral gel 15 g (has no administration in time range)   dextrose 50 % IV solution (has no administration in time range)   glucagon (rDNA) injection 1 mg (has no administration in time range)   dextrose 5 % solution (has no administration in time range)   sodium chloride flush 0.9 % injection 5-40 mL (5 mLs IntraVENous Not Given 2/12/22 2128)   sodium chloride flush 0.9 % injection 5-40 mL (has no administration in time range)   0.9 % sodium chloride infusion (has no administration in time range)   ondansetron (ZOFRAN-ODT) disintegrating tablet 4 mg (has no administration in time range)     Or   ondansetron (ZOFRAN) injection 4 mg (has no administration in time range)   polyethylene glycol (GLYCOLAX) packet 17 g (has no administration in time range)   acetaminophen (TYLENOL) tablet 650 mg (has no administration in time range)     Or   acetaminophen (TYLENOL) suppository 650 mg (has no administration in time range)   insulin lispro (1 Unit Dial) 0-12 Units (has no administration in time range)   insulin lispro (1 Unit Dial) 0-6 Units (0 Units SubCUTAneous Not Given 2/12/22 2126)   heparin (porcine) injection 5,800 Units (5,800 Units IntraCATHeter Given 2/12/22 1940)   calcium gluconate 1000 mg in sodium chloride 50 mL (1,000 mg IntraVENous New Bag 2/12/22 2223)   heparin (porcine) injection 5,000 Units (5,000 Units SubCUTAneous Not Given 2/12/22 2228)   LORazepam (ATIVAN) tablet 0.5 mg (has no administration in time range)   calcium gluconate 1000 mg in sodium chloride 50 mL (0 mg IntraVENous Stopped 2/12/22 1718)   sodium bicarbonate 8.4 % injection 50 mEq (50 mEq IntraVENous Given 2/12/22 1706)   0.9 % sodium chloride infusion ( IntraVENous New Bag 2/12/22 1707)     PROCEDURES:  None    CRITICAL CARE:  Total critical care time of 31 minutes (excludes any time for procedures). This includes but not limited to vital sign monitoring, medication, clinical response to medications, interpretation of diagnostics, review of nursing notes, pertinent record review, discussions about patient condition, consultation time, documentation time. Critical care due to the patient's electrolyte imbalance.     CONSULTATIONS:  Dr Hayde Blake, Hospitalist     MEDICAL DECISION MAKING: Khushboo Berrios is a 49 y.o. male who presented because of fatigue, diarrhea. Dialysis patient and missed today. Electrolyte imbalance present. I have consulted with nephrology. He agrees with gentle hydration and electrolyte replacement until the patient can receive dialysis. I have ordered calcium, LR, bicarbonate. I discussed the case in full with the admitting physician. FINAL IMPRESSION:    1. Metabolic acidosis    2. Diarrhea, unspecified type    3. Dialysis patient ([facility]    4. Pancytopenia ([address]    5. Hypocalcemia        (Please note that I used voice recognition software to generate this note.   Occasionally words are mistranscribed despite my efforts to edit errors.)        Cassie Raymundo MD  02/13/22 1500

## 2022-02-12 NOTE — ED NOTES
Bed: 14  Expected date:   Expected time:   Means of arrival:   Comments:  Ziggy Porras RN  02/12/22 6234

## 2022-02-13 NOTE — FLOWSHEET NOTE
02/12/22 1736 02/12/22 1936   Vital Signs   BP (!) 145/52 (!) 138/50   Temp 96 °F (35.6 °C) 96.1 °F (35.6 °C)   Pulse 71 67   Resp 18 20     Treatment time: 2 hours    Net UF: No UF    Pre weight: On stretcher from ED without scale, stated not able to stand  Post weight: On stretcher from ED without scale, stated not able to stand  EDW: 73 kg    Access used: Left femoral Tunneled HD cath  Access function: No problems    Medications or blood products given: None    Regular outpatient schedule: [facility]    Summary of response to treatment: Tolerated tx without difficulty. VSS. Copy of dialysis treatment record placed in chart, to be scanned into EMR.     Report called to Karthik KidneyWALKER

## 2022-02-13 NOTE — CONSULTS
Nephrology Consult Note                                                                                                                                                                                                                                                                                                                                                               Office : [phone]     Fax :[phone]              Patient's Name: Adrianne Canela  10:58 AM  2/13/2022    Reason for Consult:  ESRD on HD    Requesting Physician:  No primary care provider on file.       Chief Complaint:  Diarrhea      History of Present Ilness:    Adrianne Canela is a 49 y.o. 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    Hemodialysis patient ([facility]     Low blood pressure     Pancreatitis     Peripheral Neuropathy     Pneumonia 11/11/2021    Renal failure 1992    on dialysis 3x/wk  (T, Th, Sat)    Status epilepticus ([facility] 12/11/2021       Past Surgical History:   Procedure Laterality Date    CHOLECYSTECTOMY      COLONOSCOPY N/A 12/17/2021    COLONOSCOPY DIAGNOSTIC performed by Glenn Peralta MD at [address]      left arm/currently non functioning    KIDNEY TRANSPLANT  9/1994    R-kidney    TOE SURGERY  10-8-2010    cut and realign 1st, 2nd, toe left foot , fixation 1st, 2nd toe left foot    TUNNELED VENOUS CATHETER PLACEMENT      left chest for dialysis    TUNNELED VENOUS PORT PLACEMENT      UPPER GASTROINTESTINAL ENDOSCOPY N/A 12/17/2021    EGD ESOPHAGOGASTRODUODENOSCOPY performed by Glenn Peralta MD at [address] History   Problem Relation Age of Onset    Diabetes Mother reports that he has never smoked. He has never used smokeless tobacco. He reports that he does not drink alcohol and does not use drugs.     Allergies:  Influenza virus vaccine    Current Medications:    oxyCODONE (OXY-IR) immediate release tablet 30 mg, Q8H PRN  levothyroxine (SYNTHROID) tablet 25 mcg, Daily  insulin glargine (LANTUS;BASAGLAR) injection pen 5 Units, Nightly  sevelamer (RENVELA) tablet 800 mg, TID WC  pantoprazole (PROTONIX) tablet 40 mg, BID  midodrine (PROAMATINE) tablet 5 mg, BID WC  glucose (GLUTOSE) 40 % oral gel 15 g, PRN  dextrose 50 % IV solution, PRN  glucagon (rDNA) injection 1 mg, PRN  dextrose 5 % solution, PRN  sodium chloride flush 0.9 % injection 5-40 mL, 2 times per day  sodium chloride flush 0.9 % injection 5-40 mL, PRN  0.9 % sodium chloride infusion, PRN  ondansetron (ZOFRAN-ODT) disintegrating tablet 4 mg, Q8H PRN   Or  ondansetron (ZOFRAN) injection 4 mg, Q6H PRN  polyethylene glycol (GLYCOLAX) packet 17 g, Daily PRN  acetaminophen (TYLENOL) tablet 650 mg, Q6H PRN   Or  acetaminophen (TYLENOL) suppository 650 mg, Q6H PRN  insulin lispro (1 Unit Dial) 0-12 Units, TID WC  insulin lispro (1 Unit Dial) 0-6 Units, Nightly  heparin (porcine) injection 5,800 Units, PRN  heparin (porcine) injection 5,000 Units, 3 times per day  LORazepam (ATIVAN) tablet 0.5 mg, Nightly PRN        Review of Systems:   14 point ROS obtained but were negative except mentioned in HPI      Physical exam:     Vitals:  /73   Pulse 94   Temp 98.1 °F (36.7 °C) (Oral)   Resp 16   Ht 6' 4\" (1.93 m)   Wt 202 lb 14.4 oz (92 kg)   SpO2 99%   BMI 24.70 kg/m²   Constitutional:  OAA X3 NAD  Skin: no rash, turgor wnl  Heent:  eomi, mmm  Neck: no bruits or jvd noted  Cardiovascular:  S1, S2 without m/r/g  Respiratory: CTA B without w/r/r  Abdomen:  +bs, soft, nt, nd  Ext: no  lower extremity edema  Psychiatric: mood and affect appropriate  Musculoskeletal:  Rom, muscular strength intact    Data: Labs:  CBC:   Recent Labs     02/12/22  1509 02/13/22  0500   WBC 2.9* 4.3   HGB 9.0* 9.8*   PLT 92* 90*     BMP:    Recent Labs     02/12/22  1509 02/13/22  0500   * 137   K 4.9 4.6   CL 94* 97*   CO2 13* 16*   BUN 92* 72*   CREATININE 9.6* 8.5*   GLUCOSE 176* 155*     Ca/Mg/Phos:   Recent Labs     02/12/22  1509 02/13/22  0500   CALCIUM 5.6* 5.7*   MG 1.90  --      Hepatic:   Recent Labs     02/12/22  1509   AST 34   ALT 20   BILITOT 0.4   ALKPHOS 185*     Troponin: No results for input(s): TROPONINI in the last 72 hours. BNP: No results for input(s): BNP in the last 72 hours. Lipids: No results for input(s): CHOL, TRIG, HDL, LDLCALC, LABVLDL in the last 72 hours. ABGs: No results for input(s): PHART, PO2ART, SJG0EEQ in the last 72 hours. INR: No results for input(s): INR in the last 72 hours. UA:No results for input(s): Evlyn Gaudy, GLUCOSEU, BILIRUBINUR, Ruthine Linker, BLOODU, PHUR, PROTEINU, UROBILINOGEN, NITRU, LEUKOCYTESUR, LABMICR, URINETYPE in the last 72 hours. Urine Microscopic: No results for input(s): LABCAST, BACTERIA, COMU, HYALCAST, WBCUA, RBCUA, EPIU in the last 72 hours. Urine Culture: No results for input(s): LABURIN in the last 72 hours. Urine Chemistry: No results for input(s): Xochitl Gunnels, PROTEINUR, NAUR in the last 72 hours. IMAGING:  CT ABDOMEN PELVIS WO CONTRAST Additional Contrast? None   Final Result   Addendum 1 of 1   ADDENDUM:   There is a transcription error in the impression. The 1st item should    read   as follows:      Stable broad-based ventral abdominal wall hernia containing loops of small   large bowel without associated obstruction. Final   1. Stable broad-based ventral abdominal wall hernia containing loops of small   large bowel at associated obstruction. 2. Severe renal atrophy with diffuse vascular calcifications and findings of   renal osteodystrophy.    3. Small layering bilateral pleural effusions with mild pulmonary edema and   anasarca. Assessment/Plan   1. ESRD on HD    2. HTN    3.  Anemia    4  DM     5 diarrhea    6 Metabolic acidosis    7 Hypocalcemia    Plan    Will continue HD as per outpatient schedule    S/p HD on 2/12, no UF  Got  cc NS on 2/12  Give sodium bicarb 650 mg PO TID  Give Calcium gluconate prn for hypocalcemia      Diarrhea management per primary team                  Thank you for allowing us to participate in care of Anna Copeland MD  Feel free to contact me   [facility] [address]  Office : [phone]  Fax :[phone]

## 2022-02-13 NOTE — PROGRESS NOTES
4 Eyes Skin Assessment     NAME:  Daniela Ann  YOB: 1970  MEDICAL RECORD NUMBER:  [id number]    The patient is being assess for  Admission    I agree that 2 RN's have performed a thorough Head to Toe Skin Assessment on the patient. ALL assessment sites listed below have been assessed. Areas assessed by both nurses:    Head, Face, Ears, Shoulders, Back, Chest, Arms, Elbows, Hands, Sacrum. Buttock, Coccyx, Ischium and Legs. Feet and Heels        Does the Patient have a Wound? Yes wound(s) were present on assessment.  LDA wound assessment was Initiated and completed        Yang Prevention initiated:  Yes    Wound Care Orders initiated:  {YES/NO:19732}    Pressure Injury (Stage 3,4, Unstageable, DTI, NWPT, and Complex wounds) if present place consult order under [de-identified]  Yes    New and Established Ostomies if present place consult order under : No      Nurse 1 eSignature: Electronically signed by Hebert Schwab, RN on 2/13/22 at 8:24 AM EST    **SHARE this note so that the co-signing nurse is able to place an eSignature**    Nurse 2 eSignature: {Esignature:[id number]}

## 2022-02-13 NOTE — PROGRESS NOTES
Night shift summary    Pt A&Ox4. VSS. No c/o pain. Refused inulin stating that it \"drops his sugar low. \" Night dose heparin not given per pt request to start his heparin injections this AM. Loose BM x1 here, pt reports having them for over a month now and the imodium is not helping, last time taken was 2/12. Unable to obtain stool specimen for Cdiff r/o, Day shift RN made aware. Calcium replaced. 1L NS completed. Ate 100% of dinner and snacks. Reports he doesn't make urine.

## 2022-02-13 NOTE — PLAN OF CARE
Problem: Falls - Risk of:  Goal: Will remain free from falls  Description: Will remain free from falls  2/13/2022 1407 by Karoline Garrett RN  Outcome: Ongoing     Problem: Falls - Risk of:  Goal: Absence of physical injury  Description: Absence of physical injury  2/13/2022 1407 by Karoline Garrett RN  Outcome: Ongoing     Problem: Skin Integrity:  Goal: Will show no infection signs and symptoms  Description: Will show no infection signs and symptoms  2/13/2022 1407 by Karoline Garrett RN  Outcome: Ongoing     Problem: Skin Integrity:  Goal: Absence of new skin breakdown  Description: Absence of new skin breakdown  2/13/2022 1407 by Karoline Garrett RN  Outcome: Ongoing     Problem: Daily Care:  Goal: Daily care needs are met  Description: Daily care needs are met  2/13/2022 1407 by Karoline Garrett RN  Outcome: Ongoing     Problem: Pain:  Goal: Pain level will decrease  Description: Pain level will decrease  Outcome: Ongoing     Problem: Pain:  Goal: Control of acute pain  Description: Control of acute pain  Outcome: Ongoing     Problem: Pain:  Goal: Control of chronic pain  Description: Control of chronic pain  Outcome: Ongoing     Problem: Health Behavior:  Goal: Ability to manage health-related needs will improve  Description: Ability to manage health-related needs will improve  Outcome: Ongoing     Problem: Health Behavior:  Goal: Identification of resources available to assist in meeting health care needs will improve  Description: Identification of resources available to assist in meeting health care needs will improve  Outcome: Ongoing     Problem: Nutritional:  Goal: Ability to identify appropriate dietary choices will improve  Description: Ability to identify appropriate dietary choices will improve  Outcome: Ongoing

## 2022-02-13 NOTE — H&P
HOSPITALISTS HISTORY AND PHYSICAL    2/12/2022 8:14 PM    Patient Information:  Oniel Bonilla is a 49 y.o. male [id number]  PCP:  No primary care provider on file. (Tel: None )    Chief complaint:    Chief Complaint   Patient presents with    Diarrhea     Pt here week ago, still having diarrhea due to imodium not working, dialysis T/TH/SA,         History of Present Illness:  Rolando Lewis is a 49 y.o. male with h/o ESRD on HD, HTN , DM presented d/t diarrhea. The symptoms are ongoing for the past several weeks. He has been feeling weakness. Could not go to his scheduled dialysis session     CT abdomen and pelvis corrected addendum reads  Stable broad-based ventral abdominal wall hernia containing loops of small   large bowel without associated obstruction. Nephrology was contacted by ED who plans for HD   History obtained from patient and  Old medical records        REVIEW OF SYSTEMS:   Constitutional: Negative for fever,chills or night sweats  ENT: Negative for rhinorrhea, epistaxis, hoarseness, sore throat. Respiratory: Negative for shortness of breath,wheezing  Cardiovascular: Negative for chest pain, palpitations   Gastrointestinal: Negative for nausea, vomiting, +Ve diarrhea  Genitourinary: Negative for polyuria, dysuria   Hematologic/Lymphatic: Negative for bleeding tendency, easy bruising  Musculoskeletal: Negative for myalgias and arthralgias  Neurologic: Negative for confusion,dysarthria. Skin: Negative for itching,rash  Psychiatric: Negative for depression,anxiety, agitation. Endocrine: Negative for polydipsia,polyuria,heat /cold intolerance.     Past Medical History:   has a past medical history of Blood transfusion, Chronic pain, Diabetes mellitus ([facility] Dialysis, Guillain [place] syndrome, Hemodialysis patient ([facility] Low blood pressure, Pancreatitis, Peripheral Neuropathy, Pneumonia, Renal failure, and Status epilepticus ([facility] Past Surgical History:   has a past surgical history that includes Kidney transplant (9/1994); Cholecystectomy; Tunneled venous port placement; Dialysis fistula creation; Tunneled venous catheter placement; Toe Surgery (10-8-2010); Upper gastrointestinal endoscopy (N/A, 12/17/2021); and Colonoscopy (N/A, 12/17/2021). Medications:  No current facility-administered medications on file prior to encounter.      Current Outpatient Medications on File Prior to Encounter   Medication Sig Dispense Refill    loperamide (LOPERAMIDE A-D) 2 MG tablet Take 1 tablet by mouth 4 times daily as needed for Diarrhea After each loose stool 20 tablet 0    midodrine (PROAMATINE) 5 MG tablet Take 1 tablet by mouth 2 times daily (with meals) 90 tablet 3    insulin glargine (LANTUS;BASAGLAR) 100 UNIT/ML injection pen Inject 5 Units into the skin nightly 5 pen 3    Dextromethorphan-guaiFENesin  MG/5ML SYRP Take 5 mLs by mouth every 4 hours as needed for Cough      bisacodyl (DULCOLAX) 10 MG suppository Place 10 mg rectally daily      ferrous sulfate (IRON 325) 325 (65 Fe) MG tablet Take 325 mg by mouth 2 times daily      levothyroxine (SYNTHROID) 25 MCG tablet Take 25 mcg by mouth Daily      magnesium hydroxide (MILK OF MAGNESIA) 400 MG/5ML suspension Take 30 mLs by mouth daily as needed for Constipation      Multiple Vitamins-Minerals (THERAPEUTIC MULTIVITAMIN-MINERALS) tablet Take 1 tablet by mouth daily      ondansetron (ZOFRAN) 4 MG tablet Take 4 mg by mouth every 8 hours as needed for Nausea or Vomiting      sevelamer (RENVELA) 800 MG tablet Take 1 tablet by mouth 3 times daily (with meals)      ondansetron (ZOFRAN ODT) 4 MG disintegrating tablet Take 1 tablet by mouth every 8 hours as needed for Nausea 20 tablet 0    pantoprazole (PROTONIX) 40 MG tablet Take 40 mg by mouth 2 times daily       Promethazine HCl 6.25 MG/5ML SOLN Take 12.5 mg by mouth 2 times daily as needed.  temazepam (RESTORIL) 30 MG capsule Take 30 mg by mouth nightly as needed.        Current Facility-Administered Medications   Medication Dose Route Frequency Provider Last Rate Last Admin    levothyroxine (SYNTHROID) tablet 25 mcg  25 mcg Oral Daily Emeterio Gipson MD        insulin glargine (LANTUS;BASAGLAR) injection pen 5 Units  5 Units SubCUTAneous Nightly Emeterio Gipson MD        sevelamer (RENVELA) tablet 800 mg  800 mg Oral TID  Emeterio Gipson MD        pantoprazole (PROTONIX) tablet 40 mg  40 mg Oral BID Emeterio Gipson MD        midodrine (PROAMATINE) tablet 5 mg  5 mg Oral BID  Emeterio Gipson MD        temazepam (RESTORIL) capsule 30 mg  30 mg Oral Nightly PRN Emeetrio Gipson MD        glucose (GLUTOSE) 40 % oral gel 15 g  15 g Oral PRN Emeterio Gipson MD        dextrose 50 % IV solution  12.5 g IntraVENous PRN Emeterio Gipson MD        glucagon (rDNA) injection 1 mg  1 mg IntraMUSCular PRN Emeterio Gipson MD        dextrose 5 % solution  100 mL/hr IntraVENous PRN Emeterio Gipson MD        sodium chloride flush 0.9 % injection 5-40 mL  5-40 mL IntraVENous 2 times per day Emeterio Gipson MD        sodium chloride flush 0.9 % injection 5-40 mL  5-40 mL IntraVENous PRN Emeterio Gipson MD        0.9 % sodium chloride infusion  25 mL IntraVENous PRN Emeterio Gipson MD        enoxaparin (LOVENOX) injection 40 mg  40 mg SubCUTAneous Daily Emeterio Gipson MD        ondansetron (ZOFRAN-ODT) disintegrating tablet 4 mg  4 mg Oral Q8H PRN Emeterio Gipson MD        Or    ondansetron (ZOFRAN) injection 4 mg  4 mg IntraVENous Q6H PRN Emeterio Gipson MD        polyethylene glycol (GLYCOLAX) packet 17 g  17 g Oral Daily PRN Emeterio Gipson MD        acetaminophen (TYLENOL) tablet 650 mg  650 mg Oral Q6H PRN Emeterio Gipson MD        Or   Ja Montane acetaminophen (TYLENOL) suppository 650 mg  650 mg Rectal Q6H PRN Emeterio Gipson MD        insulin lispro (1 Unit Dial) 0-12 Units  0-12 Units SubCUTAneous TID  Emeterio Gipson MD  insulin lispro (1 Unit Dial) 0-6 Units  0-6 Units SubCUTAneous Nightly Rowdy Valle MD        heparin (porcine) injection 5,800 Units  5,800 Units IntraCATHeter PRN Milka Amezquita MD        calcium gluconate 1000 mg in sodium chloride 50 mL  1,000 mg IntraVENous Once Rowdy Valle MD         Allergies: Allergies   Allergen Reactions    Influenza Virus Vaccine      Caused paralysis        Social History:  Patient Lives    reports that he has never smoked. He has never used smokeless tobacco. He reports that he does not drink alcohol and does not use drugs. Family History:  family history includes Diabetes in his mother. ,    Physical Exam:  BP (!) 146/82   Pulse 72   Temp 96 °F (35.6 °C) (Temporal)   Resp 18   Ht 6' 4\" (1.93 m)   Wt 202 lb 14.4 oz (92 kg)   SpO2 97%   BMI 24.70 kg/m²     General appearance:  Appears comfortable. Well nourished  Eyes: Sclera clear, pupils equal  ENT: Moist mucus membranes, no thrush. Trachea midline. Cardiovascular: Regular rhythm, normal S1, S2. No murmur, gallop, rub. No edema in lower extremities  Respiratory: Clear to auscultation bilaterally, no wheeze, good inspiratory effort  Gastrointestinal: Abdomen soft, non-tender, not distended, normal bowel sounds  Musculoskeletal: No cyanosis in digits, neck supple  Neurology: Cranial nerves grossly intact. Alert and oriented in time, place and person. No speech or motor deficits  Psychiatry: Appropriate affect.  Not agitated  Skin: Warm, dry, normal turgor, no rash  Brisk capillary refill, peripheral pulses palpable   Labs:  CBC:   Lab Results   Component Value Date    WBC 2.9 02/12/2022    RBC 3.64 02/12/2022    HGB 9.0 02/12/2022    HCT 29.0 02/12/2022    MCV 79.7 02/12/2022    MCH 24.8 02/12/2022    MCHC 31.1 02/12/2022    RDW 17.7 02/12/2022    PLT 92 02/12/2022    MPV 9.1 02/12/2022     BMP:    Lab Results   Component Value Date     02/12/2022    K 4.9 02/12/2022    K 5.2 02/03/2022    CL 94 02/12/2022    CO2 13 02/12/2022    BUN 92 02/12/2022    CREATININE 9.6 02/12/2022    CALCIUM 5.6 02/12/2022    GFRAA 7 02/12/2022    GFRAA 12 06/09/2013    GFRAA 12 06/09/2013    LABGLOM 6 02/12/2022    GLUCOSE 176 02/12/2022     CT ABDOMEN PELVIS WO CONTRAST Additional Contrast? None   Final Result   Addendum 1 of 1   ADDENDUM:   There is a transcription error in the impression. The 1st item should    read   as follows:      Stable broad-based ventral abdominal wall hernia containing loops of small   large bowel without associated obstruction. Final   1. Stable broad-based ventral abdominal wall hernia containing loops of small   large bowel at associated obstruction. 2. Severe renal atrophy with diffuse vascular calcifications and findings of   renal osteodystrophy. 3. Small layering bilateral pleural effusions with mild pulmonary edema and   anasarca. Chest Xray:   EKG:        Problem List  Active Problems:    Diarrhea  Resolved Problems:    * No resolved hospital problems. *        Assessment/Plan:   1. ESRD on HD   The pt missed her dialysis   Nephrology on board   Plan for dialysis    Hypocalcemia Ca 5.6 most likley d/t renal failure and pseudohyperpara  Worsening since 02/2022   Replace   Will check pth and vitamin D levels    Diarrhea   cdiff pending       DVT prophylaxis Lovenox  Code status Full   Diet   IV access   Cr Catheter    Admit as inpatient. I anticipate hospitalization spanning more than two midnights for investigation and treatment of the above medically necessary diagnoses. Please note that some part of this chart was generated using Dragon dictation software. Although every effort was made to ensure the accuracy of this automated transcription, some errors in transcription may have occurred inadvertently. If you may need any clarification, please do not hesitate to contact me through [facility].        Rambo Bell MD    2/12/2022 8:14 PM

## 2022-02-13 NOTE — PROGRESS NOTES
Assessment done, VSS. Pt refusing insulin and midodrine, states he stopped taking midodrine per his kidney doctor and he does not take insulin due to it dropping his sugar. No BM yet. Bed alarm engaged, call light in reach. POC discussed, all questions answered. Pt takes Oxy at home, requesting it this AM for back pain. Message sent to MD. Pt has a small pressure ulcer/wound on the R buttocks, this was here prior to admission.  Wound consult placed

## 2022-02-13 NOTE — PLAN OF CARE
Problem: Falls - Risk of:  Goal: Will remain free from falls  Description: Will remain free from falls  Outcome: Ongoing  Note: Fall precautions in place. Bed locked, alarmed, lowest position. Call light in reach. Oriented and educated on call light use, utilized appropriately. Goal: Absence of physical injury  Description: Absence of physical injury  Outcome: Ongoing     Problem: Skin Integrity:  Goal: Will show no infection signs and symptoms  Description: Will show no infection signs and symptoms  Outcome: Ongoing  Note: VSS, no fevers  Goal: Absence of new skin breakdown  Description: Absence of new skin breakdown  Outcome: Ongoing     Problem: Daily Care:  Goal: Daily care needs are met  Description: Daily care needs are met  Outcome: Ongoing  Note: Pt care needs me appropriately. Checked and changed w/ incontinent care provided. Problem: Skin Integrity:  Goal: Skin integrity will stabilize  Description: Skin integrity will stabilize  Outcome: Ongoing  Note: Pt w/ existing open wound on R buttock. Wound care eval & tx orders to be placed and Yang prevention initiated.

## 2022-02-13 NOTE — PROGRESS NOTES
[facility] PROGRESS NOTE    2/13/2022 12:07 PM        Name: Milton Watson . Admitted: 2/12/2022  Primary Care Provider: No primary care provider on file. (Tel: None)                        Subjective:  . No acute events overnight. Resting well. Pain control. Diet ok. Labs reviewed  Denies any chest pain sob.      Reviewed interval ancillary notes    Current Medications  oxyCODONE (OXY-IR) immediate release tablet 30 mg, Q8H PRN  levothyroxine (SYNTHROID) tablet 25 mcg, Daily  insulin glargine (LANTUS;BASAGLAR) injection pen 5 Units, Nightly  sevelamer (RENVELA) tablet 800 mg, TID WC  pantoprazole (PROTONIX) tablet 40 mg, BID  midodrine (PROAMATINE) tablet 5 mg, BID WC  glucose (GLUTOSE) 40 % oral gel 15 g, PRN  dextrose 50 % IV solution, PRN  glucagon (rDNA) injection 1 mg, PRN  dextrose 5 % solution, PRN  sodium chloride flush 0.9 % injection 5-40 mL, 2 times per day  sodium chloride flush 0.9 % injection 5-40 mL, PRN  0.9 % sodium chloride infusion, PRN  ondansetron (ZOFRAN-ODT) disintegrating tablet 4 mg, Q8H PRN   Or  ondansetron (ZOFRAN) injection 4 mg, Q6H PRN  polyethylene glycol (GLYCOLAX) packet 17 g, Daily PRN  acetaminophen (TYLENOL) tablet 650 mg, Q6H PRN   Or  acetaminophen (TYLENOL) suppository 650 mg, Q6H PRN  insulin lispro (1 Unit Dial) 0-12 Units, TID WC  insulin lispro (1 Unit Dial) 0-6 Units, Nightly  heparin (porcine) injection 5,800 Units, PRN  heparin (porcine) injection 5,000 Units, 3 times per day  LORazepam (ATIVAN) tablet 0.5 mg, Nightly PRN        Objective:  /73   Pulse 94   Temp 98.1 °F (36.7 °C) (Oral)   Resp 16   Ht 6' 4\" (1.93 m)   Wt 202 lb 14.4 oz (92 kg)   SpO2 99%   BMI 24.70 kg/m²     Intake/Output Summary (Last 24 hours) at 2/13/2022 1207  Last data filed at 2/13/2022 1007  Gross per 24 hour   Intake 970 ml   Output 600 ml Net 370 ml      Wt Readings from Last 3 Encounters:   02/12/22 202 lb 14.4 oz (92 kg)   12/18/21 185 lb 10 oz (84.2 kg)   12/11/21 170 lb (77.1 kg)       General appearance:  Appears comfortable  Eyes: Sclera clear. Pupils equal.  ENT: Moist oral mucosa. Trachea midline, no adenopathy. Cardiovascular: Regular rhythm, normal S1, S2. No murmur. No edema in lower extremities  Respiratory: Not using accessory muscles. Good inspiratory effort. Clear to auscultation bilaterally, no wheeze or crackles. GI: Abdomen soft, no tenderness, not distended, normal bowel sounds  Musculoskeletal: No cyanosis in digits, neck supple  Neurology: CN 2-12 grossly intact. No speech or motor deficits  Psych: Normal affect. Alert and oriented in time, place and person  Skin: Warm, dry, normal turgor    Labs and Tests:  CBC:   Recent Labs     02/12/22  1509 02/13/22  0500   WBC 2.9* 4.3   HGB 9.0* 9.8*   PLT 92* 90*     BMP:    Recent Labs     02/12/22  1509 02/13/22  0500   * 137   K 4.9 4.6   CL 94* 97*   CO2 13* 16*   BUN 92* 72*   CREATININE 9.6* 8.5*   GLUCOSE 176* 155*     Hepatic:   Recent Labs     02/12/22  1509   AST 34   ALT 20   BILITOT 0.4   ALKPHOS 185*       Discussed care with patient             Problem List  Active Problems:    Diarrhea  Resolved Problems:    * No resolved hospital problems. *       Assessment & Plan:   1. I end-stage on hemodialysis hypocalcemia  -Patient will undergo dialysis per nephrology    Hypocalcemia replace electrolyte    Diarrhea C. difficile negative supportive care      Diet: ADULT DIET;  Regular; 4 carb choices (60 gm/meal)  Code:Full Code  DVT PPX lovenox       Jd Russo MD   2/13/2022 12:07 PM

## 2022-02-14 NOTE — PROGRESS NOTES
Rapid response  Rapid response was called on the patient. Patient apparently having mentation changes with difficulty maintaining secretion concerning for possible hypoxia as well. Patient initially was evaluated earlier by me patient having some twitching disorder release lethargy was secondary to low calcium we will order stat calcium level. We are in the process of replacing. Patient on arrival found to be hypoxia leg confused but awake patient was given IV calcium. During my evaluation at the time patient had a witnessed seizure tonic-clonic. Patient became obtunded postictal.  Difficulty maintaining airway we called a rapid response again. Dr. Rosa Foy intubated the patient. We will transfer patient to ICU. Patient will have electrolyte replaced. Acute respiratory failure  -Likely secondary to seizure intubated for airway protection  -Stat ABG will be obtained patient will be transferred to ICU    Severe hypocalcemia  - we will give IV calcium now and repeat another 2 g of calcium and repeat at 1:00  -We will continue aggressively replace calcium    End-stage renal disease on hemodialysis plan for dialysis    Seizure  -Likely probably due to severe hypocalcemia  -CT head later    Spent greater than 35 minutes of critical care time excluding any procedure with direct patient care. Patient with life-threatening event.   Pending transfer to ICU

## 2022-02-14 NOTE — CARE COORDINATION
[facility] Wound Ostomy Continence Nurse  Consult Note       NAME:  Daniela Ann  MEDICAL RECORD NUMBER:  [id number]  AGE: 49 y.o.    GENDER: male  : 1970  TODAY'S DATE:  2022    Subjective   Reason for WOCN Evaluation and Assessment: sacral pressure injuries, present on admission      Daniela Ann is a 49 y.o. male referred by:   [x] Physician  [x] Nursing  [] Other:     Wound Identification:  Wound Type: pressure  Contributing Factors: chronic pressure, decreased mobility, incontinence of stool and incontinence of urine    Wound History: present on admission  Current Wound Care Treatment:  Foam dressing     Patient Goal of Care:  [x] Wound Healing  [] Odor Control  [] Palliative Care  [] Pain Control   [] Other:         PAST MEDICAL HISTORY        Diagnosis Date    Blood transfusion     Chronic pain     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     Low blood pressure     Pancreatitis     Peripheral Neuropathy     Pneumonia 2021    Renal failure     on dialysis 3x/wk  (, , Sat)    Status epilepticus ([facility] 2021       PAST SURGICAL HISTORY    Past Surgical History:   Procedure Laterality Date    CHOLECYSTECTOMY      COLONOSCOPY N/A 2021    COLONOSCOPY DIAGNOSTIC performed by [facility]cho [facility] at [facility]      left arm/currently non functioning    KIDNEY TRANSPLANT  1994    R-kidney    TOE SURGERY  10-8-2010    cut and realign 1st, 2nd, toe left foot , fixation 1st, 2nd toe left foot    TUNNELED VENOUS CATHETER PLACEMENT      left chest for dialysis    TUNNELED VENOUS PORT PLACEMENT      UPPER GASTROINTESTINAL ENDOSCOPY N/A 2021    EGD ESOPHAGOGASTRODUODENOSCOPY performed [facility]cho [facility] at [facility]    Family History   Problem Relation Age of Onset    Diabetes Mother        SOCIAL HISTORY    Social History     Tobacco Use    Smoking status: Never Smoker    Smokeless tobacco: Never Used   Substance Use Topics    Alcohol use: No    Drug use: No       ALLERGIES    Allergies   Allergen Reactions    Influenza Virus Vaccine      Caused paralysis       MEDICATIONS    No current facility-administered medications on file prior to encounter. Current Outpatient Medications on File Prior to Encounter   Medication Sig Dispense Refill    oxyCODONE (OXY-IR) 30 MG immediate release tablet Take 30 mg by mouth every 8 hours as needed for Pain.  ferrous sulfate (IRON 325) 325 (65 Fe) MG tablet Take 325 mg by mouth 2 times daily      levothyroxine (SYNTHROID) 25 MCG tablet Take 25 mcg by mouth Daily      Multiple Vitamins-Minerals (THERAPEUTIC MULTIVITAMIN-MINERALS) tablet Take 1 tablet by mouth daily      sevelamer (RENVELA) 800 MG tablet Take 1 tablet by mouth 3 times daily (with meals)      ondansetron (ZOFRAN ODT) 4 MG disintegrating tablet Take 1 tablet by mouth every 8 hours as needed for Nausea 20 tablet 0    pantoprazole (PROTONIX) 40 MG tablet Take 40 mg by mouth 2 times daily       Promethazine HCl 6.25 MG/5ML SOLN Take 12.5 mg by mouth 2 times daily as needed.  temazepam (RESTORIL) 30 MG capsule Take 30 mg by mouth nightly as needed.       midodrine (PROAMATINE) 5 MG tablet Take 1 tablet by mouth 2 times daily (with meals) 90 tablet 3    insulin glargine (LANTUS;BASAGLAR) 100 UNIT/ML injection pen Inject 5 Units into the skin nightly 5 pen 3    Dextromethorphan-guaiFENesin  MG/5ML SYRP Take 5 mLs by mouth every 4 hours as needed for Cough      bisacodyl (DULCOLAX) 10 MG suppository Place 10 mg rectally daily      magnesium hydroxide (MILK OF MAGNESIA) 400 MG/5ML suspension Take 30 mLs by mouth daily as needed for Constipation      ondansetron (ZOFRAN) 4 MG tablet Take 4 mg by mouth every 8 hours as needed for Nausea or Vomiting         Objective    /87 Pulse 83   Temp 98.2 °F (36.8 °C) (Temporal)   Resp 21   Ht 6' 4\" (1.93 m)   Wt 202 lb 14.4 oz (92 kg)   SpO2 100%   BMI 24.70 kg/m²     LABS:  WBC:    Lab Results   Component Value Date    WBC 2.7 02/14/2022     H/H:    Lab Results   Component Value Date    HGB 8.5 02/14/2022    HCT 26.3 02/14/2022     PTT:    Lab Results   Component Value Date    APTT 36.8 11/11/2021   [APTT}  PT/INR:    Lab Results   Component Value Date    PROTIME 12.1 12/11/2021    INR 1.07 12/11/2021     HgBA1c:    Lab Results   Component Value Date    LABA1C 6.6 02/12/2022       Assessment   Yang Risk Score: Yang Scale Score: 14    Patient Active Problem List   Diagnosis Code    ESRD (end stage renal disease) ([facility] N18.6    Anemia D64.9    Toe deformity M20.60    Peripheral neuropathy G62.9    Hypogonadism, male E29.1    Hyperkalemia E87.5    Acute pancreatitis K85.90    Acute on chronic renal failure (HCC) N17.9, N18.9    Chronic pancreatitis (HCC) K86.1    Pericardial effusion I31.3    Hypotension I95.9    Diabetes mellitus (HCC) E11.9    Sepsis (HCC) A41.9    Di[facility] E11.621, L97.509    Pancreatitis K85.90    Chronic pain G89.29    C. difficile diarrhea A04.72    Pneumonia J18.9    Status epilepticus ([facility] G40.901    Diarrhea R19.7       Measurements:  Wound 02/12/22 Buttocks Right;Left healing wound right/ left  buttocks (Active)   Wound Image   02/14/22 1552   Wound Etiology Pressure Stage  2 02/14/22 1552   Dressing/Treatment Zinc paste; Foam 02/14/22 1552   Wound Assessment Dry;Devitalized tissue 02/14/22 1552   Louisa-wound Assessment Intact 02/14/22 1552   Margins Defined edges 02/14/22 1552   Number of days: 1      patient from home. Patient transferred to ICU today after havning a seizure. Patient currently on the vent. Patient was turned by nurses, Jason Dowd and Arthur. Patient noted to have healing wound to right and left buttocks.   the wound to the right  has devitalized tissue and is dry.  Left wound is completely healed. Patient is incontinent. Will apply zinc paste and foam dressings to affected areas. To protect liberty-area skin, will use zinc paste. Patient also has dry flaky skin to legs and feet. Will request Dermaphor for legs and feet. Patient is on a critical care bed. To protect heels from pressure will place heelmedix boots. Will also request podiatry consult for toenails    Wounds to right and left buttocks     Uncut toenails            Dry skin on heels    Dry skin on legs       Dry skin on right foot    Response to treatment:  Well tolerated by patient. Pain Assessment:  Severity:  0 / 10  Quality of pain: N/A  Wound Pain Timing/Severity: none  Premedicated: N/A    Plan   Plan of Care: Wound 02/12/22 Buttocks Right;Left healing wound right/ left  buttocks-Dressing/Treatment: Zinc paste,Foam   Provide liberty-care after each episode of incontinence  Request Podiatry consult  Apply Dermaphor ointment (get from central supply) to dry skin on legs/ feet. Float heels off of bed; apply heelmedix boots  Assist patient to reposition  every two hours or a needed    Specialty Bed Required :no, patient on critical care bed  [] Low Air Loss   [] Pressure Redistribution  [] Fluid Immersion  [] Bariatric  [] Total Pressure Relief  [] Other:     Current Diet: ADULT DIET;  Regular; 4 carb choices (60 gm/meal)  Dietician consult:  Yes    Discharge Plan:  Placement for patient upon discharge: home with support    Patient appropriate for Outpatient [address] [facility] [address]: Yes    Referrals:  []   [] [facility]  [] Supplies  [] Other    Patient/Caregiver Teaching:  Level of patient/caregiver understanding able to:   [] Indicates understanding       [] Needs reinforcement  [] Unsuccessful      [] Verbal Understanding  [] Demonstrated understanding       [] No evidence of learning  [] Refused teaching         [x] N/A patient on vent, sedated     Electronically signed by Bernabe Coto RENITAN, RN  Wound/Ostomy Care on 2/14/2022 at 3:56 PM

## 2022-02-14 NOTE — PROCEDURES
Giancarlo Cuellar is a 49 y.o. male patient. 1. Metabolic acidosis    2. Diarrhea, unspecified type    3. Dialysis patient ([facility]    4. Pancytopenia ([facility]    5. 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 rate 16, height 6' 4\" (1.93 m), weight 202 lb 14.4 oz (92 kg), SpO2 100 %. Intubation    Date/Time: 2/14/2022 11:13 AM  Performed by: Jurgen Flores MD  Authorized by: Jurgen Flores MD   Consent: The procedure was performed in an emergent situation. Patient identity confirmed: arm band  Time out: Immediately prior to procedure a \"time out\" was called to verify the correct patient, procedure, equipment, support staff and site/side marked as required.   Indications: airway protection  Intubation method: fiberoptic oral  Patient status: sedated  Sedatives: etomidate  Paralytic: none  Laryngoscope size: Mac 4  Tube size: 7.5 mm  Tube type: cuffed  Number of attempts: 1  Cricoid pressure: no  Cords visualized: yes  Post-procedure assessment: CO2 detector  Breath sounds: equal  Cuff inflated: yes  ETT to lip: 26 cm  Tube secured with: ETT fofana  Chest x-ray findings: endotracheal tube too low  Patient tolerance: patient tolerated the procedure well with no immediate complications  Comments: Above the lux but Could likely come back a 3 cm          Jurgen Flores MD  2/14/2022

## 2022-02-14 NOTE — PROGRESS NOTES
Rapid Response Quick Summary    Room: [facility]59/5191-67    Assessment of concern / patient:  Aspiration    Physician involved:  Dr. Monk Heads and Dr. Jennifer Romo    Interventions:  Responded to rapid response for probable aspiration event. NT suctioning performed by RT. Initially difficulty picking up oxygen saturation. Placed on 4 L NC. Oxygen saturation 91%. Dr. Jennifer Romo and Dr. Monk Heads at bedside and updated. Dr. Mauricio Saez nephrologist and arranging HD treatment for today. Pt to be kept NPO. Pt remains in stable condition on 3 tower. Disposition:  Responded to rapid response for probable aspiration event. NT suctioning performed by RT. Initially difficulty picking up oxygen saturation. Placed on 4 L NC. Oxygen saturation 91%. Dr. Jennifer Romo and Dr. Monk Heads at bedside and updated. Dr. Mauricio Saez nephrologist and arranging HD treatment for today. Pt to be kept NPO. Pt remains in stable condition on 3 tower. Yue Lozano, RN, BSN, CCRN.

## 2022-02-14 NOTE — CONSULTS
P Pulmonary and Critical Care   Consult Note      Reason for Consult: Respiratory failure, aspiration, seizure  Requesting Physician: Dr. Isaias Santoyo:   [facility] / HPI:                The patient is a 49 y.o. male with significant past medical history of:      Diagnosis Date    Blood transfusion     Chronic pain     [facility] mellitus (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 [address] 12/11/2021     Patient was admitted 2/12/2022 with complaints of diarrhea. He was a renal failure patient on hemodialysis 3 days/week. On the floor today he had an aspiration episode resulting in a rapid response. However, once he seemed to clear his airway he did well and was not transferred to ICU. However, a short time later had a second rapid response this time due to generalized tonic-clonic seizure. This may have been precipitated by significant hypocalcemia. On presentation to the ED he did have CT abdomen pelvis revealing an abdominal wall hernia. That hernia remains easily visible at the bedside.   The patient is presently intubated and sedated on mechanical ventilation      Past Surgical History:        Procedure Laterality Date    CHOLECYSTECTOMY      COLONOSCOPY N/A 12/17/2021    COLONOSCOPY DIAGNOSTIC performed by Bryant Carbajal MD at [facility]      left arm/currently non functioning    KIDNEY TRANSPLANT  9/1994    R-kidney    TOE SURGERY  10-8-2010    cut and realign 1st, 2nd, toe left foot , fixation 1st, 2nd toe left foot    TUNNELED VENOUS CATHETER PLACEMENT      left chest for dialysis    TUNNELED VENOUS PORT PLACEMENT      UPPER GASTROINTESTINAL ENDOSCOPY N/A 12/17/2021    EGD ESOPHAGOGASTRODUODENOSCOPY performed by Giselle Gamez MD at [facility] ENDOSCOPY     Current Medications:    Current Facility-Administered Medications: calcium carbonate (TUMS) chewable tablet 500 mg, 500 mg, Oral, TID  propofol injection, 5-50 mcg/kg/min, IntraVENous, Titrated  etomidate (AMIDATE) injection 20 mg, 20 mg, IntraVENous, Once  midazolam PF (VERSED) injection 2 mg, 2 mg, IntraVENous, Once  propofol 1000 MG/100ML injection, , ,   fentaNYL 10 mcg/mL infusion, 12.5-200 mcg/hr, IntraVENous, Continuous  pantoprazole (PROTONIX) injection 40 mg, 40 mg, IntraVENous, Daily  norepinephrine (LEVOPHED) 16 mg in sodium chloride 0.9 % 250 mL infusion, 2-100 mcg/min, IntraVENous, Continuous  vitamin D (ERGOCALCIFEROL) capsule 50,000 Units, 50,000 Units, Oral, Weekly  calcium gluconate 2000 mg in sodium chloride 100 mL, 2,000 mg, IntraVENous, Once  lidocaine (XYLOCAINE) 4 % external solution, , , PRN  oxyCODONE (OXY-IR) immediate release tablet 30 mg, 30 mg, Oral, Q8H PRN  levothyroxine (SYNTHROID) tablet 25 mcg, 25 mcg, Oral, Daily  insulin glargine (LANTUS;BASAGLAR) injection pen 5 Units, 5 Units, SubCUTAneous, Nightly  midodrine (PROAMATINE) tablet 5 mg, 5 mg, Oral, BID WC  glucose (GLUTOSE) 40 % oral gel 15 g, 15 g, Oral, PRN  dextrose 50 % IV solution, 12.5 g, IntraVENous, PRN  glucagon (rDNA) injection 1 mg, 1 mg, IntraMUSCular, PRN  dextrose 5 % solution, 100 mL/hr, IntraVENous, PRN  sodium chloride flush 0.9 % injection 5-40 mL, 5-40 mL, IntraVENous, 2 times per day  sodium chloride flush 0.9 % injection 5-40 mL, 5-40 mL, IntraVENous, PRN  0.9 % sodium chloride infusion, 25 mL, IntraVENous, PRN  ondansetron (ZOFRAN-ODT) disintegrating tablet 4 mg, 4 mg, Oral, Q8H PRN **OR** ondansetron (ZOFRAN) injection 4 mg, 4 mg, IntraVENous, Q6H PRN  polyethylene glycol (GLYCOLAX) packet 17 g, 17 g, Oral, Daily PRN  acetaminophen (TYLENOL) tablet 650 mg, 650 mg, Oral, Q6H PRN **OR** acetaminophen (TYLENOL) suppository 650 mg, 650 mg, Rectal, Q6H PRN  insulin lispro (1 Unit Dial) 0-12 Units, 0-12 Units, SubCUTAneous, TID WC  insulin lispro (1 Unit Dial) 0-6 Units, 0-6 Units, SubCUTAneous, Nightly  heparin (porcine) injection 5,800 Units, 5,800 Units, IntraCATHeter, PRN  heparin (porcine) injection 5,000 Units, 5,000 Units, SubCUTAneous, 3 times per day  LORazepam (ATIVAN) tablet 0.5 mg, 0.5 mg, Oral, Nightly PRN    Allergies   Allergen Reactions    Influenza Virus Vaccine      Caused paralysis       Social History:    TOBACCO:   reports that he has never smoked. He has never used smokeless tobacco.  ETOH:   reports no history of alcohol use. Patient currently lives independently  Environmental/chemical exposure: Unknown    Family History:       Problem Relation Age of Onset    Diabetes Mother      REVIEW OF SYSTEMS:    KAYLA is unobtainable due to his critical illness. Objective:   PHYSICAL EXAM:      VITALS:  BP (!) 164/115   Pulse 79   Temp 98.2 °F (36.8 °C) (Temporal)   Resp 18   Ht 6' 4\" (1.93 m)   Wt 202 lb 14.4 oz (92 kg)   SpO2 99%   BMI 24.70 kg/m²      24HR INTAKE/OUTPUT:  No intake or output data in the 24 hours ending 02/14/22 1503  CONSTITUTIONAL: Sedated on mechanical ventilation  NECK:  Supple, symmetrical, trachea midline, no adenopathy, thyroid symmetric, not enlarged and no tenderness, skin normal  LUNGS:  no increased work of breathing and clear to auscultation. No accessory muscle use  CARDIOVASCULAR: S1 and S2, no edema and no JVD  ABDOMEN: Large abdominal wall hernia, normal bowel sounds, non-distended and no masses palpated, and no tenderness to palpation. No hepatospleenomegaly  LYMPHADENOPATHY:  no axillary or supraclavicular adenopathy. No cervical adnenopathy  PSYCHIATRIC: Sedated on mechanical ventilation  MUSCULOSKELETAL: No obvious misalignment or effusion of the joints. No clubbing, cyanosis of the digits. SKIN:  normal skin color, texture, turgor and no redness, warmth, or swelling.  No palpable nodules    DATA: Old records have been reviewed    CBC:  Recent Labs     02/12/22  1509 02/13/22  0500 02/14/22  0505   WBC 2.9* 4.3 2.7*   RBC 3.64* 3.93* 3.41*   HGB 9.0* 9.8* 8.5*   HCT 29.0* 30.7* 26.3*   PLT 92* 90* 80*   MCV 79.7* 78.1* 77.1*   MCH 24.8* 25.0* 24.8*   MCHC 31.1 32.0 32.1   RDW 17.7* 17.1* 17.1*      BMP:  Recent Labs     02/12/22  1509 02/12/22  1509 02/13/22  0500 02/14/22  0505 02/14/22  0853   *  --  137 131*  --    K 4.9  --  4.6 5.0  --    CL 94*  --  97* 93*  --    CO2 13*  --  16* 16*  --    BUN 92*  --  72* 77*  --    CREATININE 9.6*  --  8.5* 8.6*  --    CALCIUM 5.6*   < > 5.7* 5.3* 5.6*   GLUCOSE 176*  --  155* 70  --     < > = values in this interval not displayed. ABG:  Recent Labs     02/14/22  1226   PHART 7.247*   XDE2KIU 41.3   PO2ART 163.0*   [id number] 18.0*   O4YLFZYH 97.2   BEART -8.7*     Procalcitonin  No results for input(s): PROCAL in the last 72 hours. No results found for: BNP  Lab Results   Component Value Date    CKTOTAL 130 12/11/2021    TROPONINI 0.07 (H) 12/11/2021           Radiology Review:  All pertinent images / reports were reviewed as a part of this visit. Assessment:     1. Acute respiratory failure  2. Aspiration into airway  3. Atelectasis of the left lung  4. Generalized tonic-clonic seizure  5. Hypocalcemia  6. ESRD on hemodialysis      Plan:     I have reviewed laboratories, medical records and images for this visit    Post intubation chest x-ray does reveal endotracheal tube in good position. However, there is complete opacification of the left hemithorax. This could be a combination of atelectasis and pleural fluid. He does not have a leukocytosis or fever  His white count is actually low  We'll send post bronchoscopy sample for culture  Obtain procalcitonin  At this point not on antibiotics. Given his history of witnessed aspiration, plan fiberoptic bronchoscopy to clear the airway.   Previous chest imaging is from 12/11 and did not show effusion or collapse of the left lung  CT abdomen pelvis from 2/12 showed only a small pleural effusion. He is on AC/, respiratory rate 16, FiO2 100% and PEEP 5. ABG is 7.2 5/41/163. There is a metabolic component to his acidosis. He does have end-stage renal disease and receives hemodialysis normally Tuesday, Thursday and Saturday. Calcium was low this morning and this has been replaced  Nephrology is following    Seizure with thought secondary to hypocalcemia  This is been corrected  No further seizure activity  He is not on any seizure medications    Total critical care time caring for this patient with life threatening illness, including direct patient contact, management of life support systems, review of data including imaging and labs, discussions with other team members and physicians is at least 32 minutes so far today, excluding procedures.

## 2022-02-14 NOTE — PROGRESS NOTES
Rapid response called on patient due to aspiration, second rapid response called for patient shortly after due to seizure. Intubated for airway protection. Patient bit tongue and large amounts of blood visible. Brought to ICU. Started patient on sedation once arrived to unit. Blood pressure dropped significantly low, started on levophed. Left femoral central line placed by Dr. Sarah Boston. Calcium infusing. Restraints applied, patient was attempting to pull ET Tube. VS now stable. Will call family and update. Nephrology rounded, see note. Will continue to monitor.

## 2022-02-14 NOTE — PROCEDURES
Laz Davey is a 49 y.o. male patient. 1. Metabolic acidosis    2. Diarrhea, unspecified type    3. Dialysis patient ([facility] [address]    4. Pancytopenia ([facility] [address]    5. Hypocalcemia    6. 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 rate 16, height 6' 4\" (1.93 m), weight 202 lb 14.4 oz (92 kg), SpO2 100 %. Central Line    Date/Time: 2/14/2022 11:17 AM  Performed by: Austyn Madrigal MD  Authorized by: Austyn Madrigal MD   Consent: The procedure was performed in an emergent situation. Patient identity confirmed: arm band  Time out: Immediately prior to procedure a \"time out\" was called to verify the correct patient, procedure, equipment, support staff and site/side marked as required. Indications: vascular access  Anesthesia: local infiltration    Anesthesia:  Local Anesthetic: lidocaine 1% without epinephrine    Sedation:  Patient sedated: yes  Sedatives: propofol    Preparation: skin prepped with 2% chlorhexidine  Skin prep agent dried: skin prep agent completely dried prior to procedure  Sterile barriers: all five maximum sterile barriers used - cap, mask, sterile gown, sterile gloves, and large sterile sheet  Hand hygiene: hand hygiene performed prior to central venous catheter insertion  Location details: left femoral  Site selection rationale: not able to place in neck as wire would not advance.   Catheter type: triple lumen  Catheter size: 7 Fr  Ultrasound guidance: yes  Number of attempts: 1  Successful placement: yes  Post-procedure: line sutured  Assessment: blood return through all ports  Patient tolerance: patient tolerated the procedure well with no immediate complications  Comments: Not able to place in neck femoral placed with ease.  Estimated blood loss of 10 cc total.          Danny Goodman MD  2/14/2022

## 2022-02-14 NOTE — PROGRESS NOTES
Rapid Response Quick Summary    Room: [place] 386689/8947-29    Assessment of concern / patient:  Seizure and unresponsive. Physician involved:  Dr. Jamie Batista and Dr. Wai Cuevas    Interventions:  Second rapid response called this time for seizure and unresponsive. Pt appeared to bite tongue. Unable to protect airway. Dr. Jamie Batista intubated patient at bedside. Pre-medicated with 20 mg Etomidate and 2 mg Versed. Intubated # 7.5 @ 26 cm lip. Pt transported to ICU 11. Disposition:  Second rapid response called this time for seizure and unresponsive. Pt appeared to bite tongue. Unable to protect airway. Dr. Jamie Batista intubated patient at bedside. Pre-medicated with 20 mg Etomidate and 2 mg Versed. Intubated # 7.5 @ 26 cm lip. Pt transported to ICU 11. Eneida Lopez, RN, BSN, CCRN.

## 2022-02-14 NOTE — PROGRESS NOTES
Nephrology progress  Note                                                                                                                                                                                                                                                                                                                                                               Office : [phone]     Fax :[phone]              Patient's Name: Saul Pedraza  8:51 AM  2/14/2022    Reason for Consult:  ESRD on HD      Chief Complaint:  Diarrhea      History of Present Ilness:    Saul Pedraza is a 49 y.o. 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    Hemodialysis patient ([address]     Low blood pressure     Pancreatitis     Peripheral Neuropathy     Pneumonia 11/11/2021    Renal failure 1992    on dialysis 3x/wk  (T, Th, Sat)    Status epilepticus ([address] 12/11/2021       Past Surgical History:   Procedure Laterality Date    CHOLECYSTECTOMY      COLONOSCOPY N/A 12/17/2021    COLONOSCOPY DIAGNOSTIC [facility] Daria Kocher, [facility] at [facility]      left arm/currently non functioning    KIDNEY TRANSPLANT  9/1994    R-kidney    TOE SURGERY  10-8-2010    cut and realign 1st, 2nd, toe left foot , fixation 1st, 2nd toe left foot    TUNNELED VENOUS CATHETER PLACEMENT      left chest for dialysis    TUNNELED VENOUS PORT PLACEMENT      UPPER GASTROINTESTINAL ENDOSCOPY N/A 12/17/2021    EGD ESOPHAGOGASTRODUODENOSCOPY performed by Jenfifer Andrade MD at [facility] ENDOSCOPY       Family History   Problem Relation Age of Onset    Diabetes Mother         reports that he has never smoked. He has never used smokeless tobacco. He reports that he does not drink alcohol and does not use drugs.     Allergies:  Influenza virus vaccine    Current Medications:    oxyCODONE (OXY-IR) immediate release tablet 30 mg, Q8H PRN  sodium bicarbonate tablet 650 mg, TID  levothyroxine (SYNTHROID) tablet 25 mcg, Daily  insulin glargine (LANTUS;BASAGLAR) injection pen 5 Units, Nightly  sevelamer (RENVELA) tablet 800 mg, TID WC  pantoprazole (PROTONIX) tablet 40 mg, BID  midodrine (PROAMATINE) tablet 5 mg, BID WC  glucose (GLUTOSE) 40 % oral gel 15 g, PRN  dextrose 50 % IV solution, PRN  glucagon (rDNA) injection 1 mg, PRN  dextrose 5 % solution, PRN  sodium chloride flush 0.9 % injection 5-40 mL, 2 times per day  sodium chloride flush 0.9 % injection 5-40 mL, PRN  0.9 % sodium chloride infusion, PRN  ondansetron (ZOFRAN-ODT) disintegrating tablet 4 mg, Q8H PRN   Or  ondansetron (ZOFRAN) injection 4 mg, Q6H PRN  polyethylene glycol (GLYCOLAX) packet 17 g, Daily PRN  acetaminophen (TYLENOL) tablet 650 mg, Q6H PRN   Or  acetaminophen (TYLENOL) suppository 650 mg, Q6H PRN  insulin lispro (1 Unit Dial) 0-12 Units, TID WC  insulin lispro (1 Unit Dial) 0-6 Units, Nightly  heparin (porcine) injection 5,800 Units, PRN  heparin (porcine) injection 5,000 Units, 3 times per day  LORazepam (ATIVAN) tablet 0.5 mg, Nightly PRN        Physical exam:     Vitals:  /75   Pulse 87   Temp 98.2 °F (36.8 °C) (Oral)   Resp 16   Ht 6' 4\" (1.93 m)   Wt 202 lb 14.4 oz (92 kg)   SpO2 92%   BMI 24.70 kg/m²   Constitutional:  Awake initially then had a seizure and became lethargic   Skin: no rash, turgor wnl  Heent:  eomi, mmm  Neck: no bruits or jvd noted  Cardiovascular:  S1, S2 without m/r/g  Respiratory: CTA B without w/r/r  Abdomen:  +bs, soft, nt, nd  Ext: no  lower extremity edema      Data:   Labs:  CBC:   Recent Labs     02/12/22  1509 02/13/22  0500 02/14/22  0505   WBC 2.9* 4.3 2.7*   HGB 9.0* 9.8* 8.5*   PLT 92* 90* 80*     BMP:    Recent Labs     02/12/22  1509 02/13/22  0500 02/14/22  0505   * 137 131*   K 4.9 4.6 5.0   CL 94* 97* 93*   CO2 13* 16* 16*   BUN 92* 72* 77*   CREATININE 9.6* 8.5* 8.6*   GLUCOSE 176* 155* 70     Ca/Mg/Phos:   Recent Labs     02/12/22  1509 02/13/22  0500 02/14/22  0505   CALCIUM 5.6* 5.7* 5.3*   MG 1.90  --   --      Hepatic:   Recent Labs     02/12/22  1509   AST 34   ALT 20   BILITOT 0.4   ALKPHOS 185*     Troponin: No results for input(s): TROPONINI in the last 72 hours. BNP: No results for input(s): BNP in the last 72 hours. Lipids: No results for input(s): CHOL, TRIG, HDL, LDLCALC, LABVLDL in the last 72 hours. ABGs: No results for input(s): PHART, PO2ART, WWE6DRJ in the last 72 hours. INR: No results for input(s): INR in the last 72 hours. UA:No results for input(s): Hermina El, GLUCOSEU, BILIRUBINUR, Scot Rein, BLOODU, PHUR, PROTEINU, UROBILINOGEN, NITRU, LEUKOCYTESUR, LABMICR, URINETYPE in the last 72 hours. Urine Microscopic: No results for input(s): LABCAST, BACTERIA, COMU, HYALCAST, WBCUA, RBCUA, EPIU in the last 72 hours. Urine Culture: No results for input(s): LABURIN in the last 72 hours. Urine Chemistry: No results for input(s): Michael Merles, PROTEINUR, NAUR in the last 72 hours. IMAGING:  CT ABDOMEN PELVIS WO CONTRAST Additional Contrast? None   Final Result   Addendum 1 of 1   ADDENDUM:   There is a transcription error in the impression. The 1st item should    read   as follows:      Stable broad-based ventral abdominal wall hernia containing loops of small   large bowel without associated obstruction. Final   1.  Stable broad-based ventral abdominal wall hernia containing loops of small   large bowel at associated obstruction. 2. Severe renal atrophy with diffuse vascular calcifications and findings of   renal osteodystrophy. 3. Small layering bilateral pleural effusions with mild pulmonary edema and   anasarca. Assessment/Plan   1. ESRD on HD    2. HTN    3. Anemia    4  Seizure likely 2/2 hypocalcemia     5 diarrhea    6 Metabolic acidosis    7 Hypocalcemia  PTH is low at 11 .  Likely has hypoparathyroidism   Phos 11       Plan    Had HD on Saturday for 2 hrs  Will plan HD today     replaced calcium chloride iv stat   Will give additional CALCIUM GLUCONATE 2 gm over next 60 minutes     Recheck ionized calcium at 1 PM     Intubated for airway protection                 Thank you for allowing us to participate in care of Myra Deras MD  Feel free to contact me   [facility] [id number]  Office : [phone]  Fax :[phone]

## 2022-02-14 NOTE — PROGRESS NOTES
[facility] PROGRESS NOTE    2/14/2022 9:48 AM        Name: Shira Nieves . Admitted: 2/12/2022  Primary Care Provider: No primary care provider on file. (Tel: None)                        Subjective:  . No acute events overnight. Resting well. Pain control. Diet ok.    Labs reviewed  Noted to have twitching full    Reviewed interval ancillary notes    Current Medications  calcium carbonate (TUMS) chewable tablet 500 mg, TID  oxyCODONE (OXY-IR) immediate release tablet 30 mg, Q8H PRN  sodium bicarbonate tablet 650 mg, TID  levothyroxine (SYNTHROID) tablet 25 mcg, Daily  insulin glargine (LANTUS;BASAGLAR) injection pen 5 Units, Nightly  pantoprazole (PROTONIX) tablet 40 mg, BID  midodrine (PROAMATINE) tablet 5 mg, BID WC  glucose (GLUTOSE) 40 % oral gel 15 g, PRN  dextrose 50 % IV solution, PRN  glucagon (rDNA) injection 1 mg, PRN  dextrose 5 % solution, PRN  sodium chloride flush 0.9 % injection 5-40 mL, 2 times per day  sodium chloride flush 0.9 % injection 5-40 mL, PRN  0.9 % sodium chloride infusion, PRN  ondansetron (ZOFRAN-ODT) disintegrating tablet 4 mg, Q8H PRN   Or  ondansetron (ZOFRAN) injection 4 mg, Q6H PRN  polyethylene glycol (GLYCOLAX) packet 17 g, Daily PRN  acetaminophen (TYLENOL) tablet 650 mg, Q6H PRN   Or  acetaminophen (TYLENOL) suppository 650 mg, Q6H PRN  insulin lispro (1 Unit Dial) 0-12 Units, TID WC  insulin lispro (1 Unit Dial) 0-6 Units, Nightly  heparin (porcine) injection 5,800 Units, PRN  heparin (porcine) injection 5,000 Units, 3 times per day  LORazepam (ATIVAN) tablet 0.5 mg, Nightly PRN        Objective:  /75   Pulse 87   Temp 98.2 °F (36.8 °C) (Oral)   Resp 16   Ht 6' 4\" (1.93 m)   Wt 202 lb 14.4 oz (92 kg)   SpO2 92%   BMI 24.70 kg/m²     Intake/Output Summary (Last 24 hours) at 2/14/2022 0948  Last data filed at 2/13/2022 1007  Gross per 24 hour   Intake 360 ml   Output --   Net 360 ml      Wt Readings from Last 3 Encounters:   02/12/22 202 lb 14.4 oz (92 kg)   12/18/21 185 lb 10 oz (84.2 kg)   12/11/21 170 lb (77.1 kg)       General appearance:  Appears comfortable  Eyes: Sclera clear. Pupils equal.  ENT: Moist oral mucosa. Trachea midline, no adenopathy. Cardiovascular: Regular rhythm, normal S1, S2. No murmur. No edema in lower extremities  Respiratory: Not using accessory muscles. Good inspiratory effort. Clear to auscultation bilaterally, no wheeze or crackles. GI: Abdomen soft, no tenderness, not distended, normal bowel sounds  Musculoskeletal: No cyanosis in digits, neck supple  Neurology: CN 2-12 grossly intact. No speech or motor deficits  Psych: Normal affect. Alert and oriented in time, place and person  Skin: Warm, dry, normal turgor    Labs and Tests:  CBC:   Recent Labs     02/12/22  1509 02/13/22  0500 02/14/22  0505   WBC 2.9* 4.3 2.7*   HGB 9.0* 9.8* 8.5*   PLT 92* 90* 80*     BMP:    Recent Labs     02/12/22  1509 02/13/22  0500 02/14/22  0505   * 137 131*   K 4.9 4.6 5.0   CL 94* 97* 93*   CO2 13* 16* 16*   BUN 92* 72* 77*   CREATININE 9.6* 8.5* 8.6*   GLUCOSE 176* 155* 70     Hepatic:   Recent Labs     02/12/22  1509   AST 34   ALT 20   BILITOT 0.4   ALKPHOS 185*       Discussed care with patient             Problem List  Active Problems:    Diarrhea  Resolved Problems:    * No resolved hospital problems. *       Assessment & Plan:   1. I end-stage on hemodialysis hypocalcemia  -Patient having jerking movement likely due to low calcium  -Repeat calcium stat now.-We will need replaced      Hypocalcemia replace electrolyte  -Patient having some twitching likely due to electrolyte    Diarrhea C. difficile negative supportive care      Diet: ADULT DIET;  Regular; 4 carb choices (60 gm/meal)  Code:Full Code  DVT PPX lovenox       Diamante Jimenez MD   2/14/2022 9:48 AM

## 2022-02-14 NOTE — PROGRESS NOTES
4 Eyes Skin Assessment     NAME:  Milton Watson  YOB: 1970  MEDICAL RECORD NUMBER:  [id number]    The patient is being assess for  Transfer to New Unit    I agree that 2 RN's have performed a thorough Head to Toe Skin Assessment on the patient. ALL assessment sites listed below have been assessed. Areas assessed by both nurses:    Head, Face, Ears, Shoulders, Back, Chest, Arms, Elbows, Hands, Sacrum. Buttock, Coccyx, Ischium and Legs. Feet and Heels        Does the Patient have a Wound? Yes wound(s) were present on assessment.  LDA wound assessment was Initiated and completed        Yang Prevention initiated:  Yes   Wound Care Orders initiated:  Yes    Pressure Injury (Stage 3,4, Unstageable, DTI, NWPT, and Complex wounds) if present place consult order under [de-identified] Yes    New and Established Ostomies if present place consult order under : NA      Nurse 1 eSignature: Electronically signed by Manuel Conley RN on 2/14/22 at 6:18 PM EST    **SHARE this note so that the co-signing nurse is able to place an eSignature**    Nurse 2 eSignature: Electronically signed by Rodrigo Perdomo RN on 2/14/22 at 7:07 PM EST

## 2022-02-14 NOTE — PROGRESS NOTES
02/14/22 1030   Vent Information   Vent Type 980   Vent Mode AC/VC   Vt Ordered 450 mL   Rate Set 16 bmp   Peak Flow 44 L/min   FiO2  100 %   SpO2 100 %   SpO2/FiO2 ratio 100   Sensitivity 3   PEEP/CPAP 5   Humidification Source HME   Vent Patient Data   Peak Inspiratory Pressure 21 cmH2O   Mean Airway Pressure 9.9 cmH20   Rate Measured 27 br/min   Vt Exhaled 437 mL   Minute Volume 12.8 Liters   I:E Ratio 1;1.2

## 2022-02-15 NOTE — PROGRESS NOTES
Nephrology progress  Note                                                                                                                                                                                                                                                                                                                                                               Office : [phone]     Fax :[phone]              Patient's Name: Adrianne Canela  8:55 AM  2/15/2022    Reason for Consult:  ESRD on HD      Chief Complaint:  Diarrhea      History of Present Ilness:    Adrianne Canela is a 49 y.o. 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    Hemodialysis patient ([address]     Low blood pressure     Pancreatitis     Peripheral Neuropathy     Pneumonia 11/11/2021    Renal failure 1992    on dialysis 3x/wk  (T, Th, Sat)    Status epilepticus ([address] 12/11/2021       Past Surgical History:   Procedure Laterality Date    BRONCHOSCOPY N/A 2/14/2022    BRONCHOSCOPY DIAGNOSTIC OR CELL [address] ONLY performed by Danny Sinha MD at [address] [place] [address] N/A 12/17/2021    COLONOSCOPY DIAGNOSTIC performed by Glenn Peralta MD at [facility]      left arm/currently non functioning    KIDNEY TRANSPLANT  9/1994    R-kidney    TOE SURGERY  10-8-2010    cut and realign 1st, 2nd, toe left foot , fixation 1st, 2nd toe left foot    TUNNELED VENOUS CATHETER PLACEMENT left chest for dialysis    TUNNELED VENOUS PORT PLACEMENT      UPPER GASTROINTESTINAL ENDOSCOPY N/A 12/17/2021    EGD ESOPHAGOGASTRODUODENOSCOPY performed by Denita Flowers MD at [address] ENDOSCOPY       Family History   Problem Relation Age of Onset    Diabetes Mother         reports that he has never smoked. He has never used smokeless tobacco. He reports that he does not drink alcohol and does not use drugs.     Allergies:  Influenza virus vaccine    Current Medications:    calcium carbonate (TUMS) chewable tablet 500 mg, TID  propofol injection, Titrated  etomidate (AMIDATE) injection 20 mg, Once  midazolam PF (VERSED) injection 2 mg, Once  fentaNYL 10 mcg/mL infusion, Continuous  pantoprazole (PROTONIX) injection 40 mg, Daily  norepinephrine (LEVOPHED) 16 mg in sodium chloride 0.9 % 250 mL infusion, Continuous  vitamin D (ERGOCALCIFEROL) capsule 50,000 Units, Weekly  cefepime (MAXIPIME) 1000 mg IVPB minibag, Q24H  linezolid (ZYVOX) IVPB 600 mg, Q12H  oxyCODONE (OXY-IR) immediate release tablet 30 mg, Q8H PRN  levothyroxine (SYNTHROID) tablet 25 mcg, Daily  insulin glargine (LANTUS;BASAGLAR) injection pen 5 Units, Nightly  midodrine (PROAMATINE) tablet 5 mg, BID WC  glucose (GLUTOSE) 40 % oral gel 15 g, PRN  dextrose 50 % IV solution, PRN  glucagon (rDNA) injection 1 mg, PRN  dextrose 5 % solution, PRN  sodium chloride flush 0.9 % injection 5-40 mL, 2 times per day  sodium chloride flush 0.9 % injection 5-40 mL, PRN  0.9 % sodium chloride infusion, PRN  ondansetron (ZOFRAN-ODT) disintegrating tablet 4 mg, Q8H PRN   Or  ondansetron (ZOFRAN) injection 4 mg, Q6H PRN  polyethylene glycol (GLYCOLAX) packet 17 g, Daily PRN  acetaminophen (TYLENOL) tablet 650 mg, Q6H PRN   Or  acetaminophen (TYLENOL) suppository 650 mg, Q6H PRN  insulin lispro (1 Unit Dial) 0-12 Units, TID WC  insulin lispro (1 Unit Dial) 0-6 Units, Nightly  heparin (porcine) injection 5,800 Units, PRN  heparin (porcine) injection 5,000 Units, 3 times per day  LORazepam (ATIVAN) tablet 0.5 mg, Nightly PRN        Physical exam:     Vitals:  BP (!) 86/49   Pulse 68   Temp 96.9 °F (36.1 °C) (Temporal)   Resp 20   Ht 6' 4\" (1.93 m)   Wt 208 lb 5.4 oz (94.5 kg)   SpO2 98%   BMI 25.36 kg/m²   Constitutional:  Awake initially then had a seizure and became lethargic   Skin: no rash, turgor wnl  Heent:  eomi, mmm  Neck: no bruits or jvd noted  Cardiovascular:  S1, S2 without m/r/g  Respiratory: CTA B without w/r/r  Abdomen:  +bs, soft, nt, nd  Ext: no  lower extremity edema      Data:   Labs:  CBC:   Recent Labs     02/13/22  0500 02/14/22  0505 02/15/22  0505   WBC 4.3 2.7* 3.6*   HGB 9.8* 8.5* 9.2*   PLT 90* 80* 67*     BMP:    Recent Labs     02/14/22  0505 02/14/22  2150 02/15/22  0505   * 133* 132*   K 5.0 3.7 4.1   CL 93* 94* 93*   CO2 16* 24 20*   BUN 77* 46* 62*   CREATININE 8.6* 5.6* 7.2*   GLUCOSE 70 86 43*     Ca/Mg/Phos:   Recent Labs     02/12/22  1509 02/13/22  0500 02/14/22  0504 02/14/22  0505 02/14/22  0853 02/14/22  2150 02/15/22  0505   CALCIUM 5.6*   < >  --    < > 5.6* 6.8* 6.8*   MG 1.90  --   --   --  1.60* 1.60*  --    PHOS  --   --  11.5*  --   --   --   --     < > = values in this interval not displayed. Hepatic:   Recent Labs     02/12/22  1509   AST 34   ALT 20   BILITOT 0.4   ALKPHOS 185*     Troponin: No results for input(s): TROPONINI in the last 72 hours. BNP: No results for input(s): BNP in the last 72 hours. Lipids: No results for input(s): CHOL, TRIG, HDL, LDLCALC, LABVLDL in the last 72 hours. ABGs:   Recent Labs     02/14/22  1226   PHART 7.247*   PO2ART 163.0*   RAH1ZKA 41.3     INR: No results for input(s): INR in the last 72 hours. UA:No results for input(s): Alleen Corti, GLUCOSEU, BILIRUBINUR, Flordia Caicedo, BLOODU, PHUR, PROTEINU, UROBILINOGEN, NITRU, LEUKOCYTESUR, LABMICR, URINETYPE in the last 72 hours.    Urine Microscopic: No results for input(s): LABCAST, BACTERIA, COMU, HYALCAST, WBCUA, RBCUA, EPIU in the last 72 hours. Urine Culture: No results for input(s): LABURIN in the last 72 hours. Urine Chemistry: No results for input(s): Silvina , PROTEINUR, NAUR in the last 72 hours. IMAGING:  XR CHEST PORTABLE   Final Result   Moderate bilateral airspace disease. This could represent a combination of   edema and or pneumonia. Moderate improved aeration of the left lung. XR CHEST PORTABLE   Final Result   The endotracheal tube measures approximately 6.6 cm above the lux. The enteric catheter is coiled in the gastric fundus. Cardiomegaly with vascular congestion and patchy airspace disease, a   component of which is felt likely to represent pulmonary edema. Improved   aeration in the left upper lobe. XR CHEST PORTABLE   Final Result   1. Endotracheal tube with the tip 2.3 cm above the lux. Recommend   withdrawal by 1.5 cm.      2.  The left hemithorax is completely opacified. This could represent a   combination of large pleural effusion and/or consolidation. 3.  Patchy airspace disease throughout the right lung and small right pleural   effusion. CT ABDOMEN PELVIS WO CONTRAST Additional Contrast? None   Final Result   Addendum 1 of 1   ADDENDUM:   There is a transcription error in the impression. The 1st item should    read   as follows:      Stable broad-based ventral abdominal wall hernia containing loops of small   large bowel without associated obstruction. Final   1. Stable broad-based ventral abdominal wall hernia containing loops of small   large bowel at associated obstruction. 2. Severe renal atrophy with diffuse vascular calcifications and findings of   renal osteodystrophy. 3. Small layering bilateral pleural effusions with mild pulmonary edema and   anasarca. Assessment/Plan   1. ESRD on HD    2. HTN    3.  Anemia    4  Seizure likely 2/2 hypocalcemia     5 diarrhea    6 Metabolic acidosis    7 Hypocalcemia  PTH is low at 11 .  Likely has hypoparathyroidism   Phos 11       Plan    Had HD yesterday   No HD needed today     Ionized calcium every 12 hrs   Continue calcium carbonate   Give calcium gluconate iv based on ionized calcium levels   Possible extubation today                 Thank you for allowing us to participate in care of Kalen Leiva MD  Feel free to contact me   [facility] [address]  Office : [phone]  Fax :[phone]

## 2022-02-15 NOTE — PROGRESS NOTES
Hospitalist Progress Note      PCP: No primary care provider on file. Date of Admission: 2/12/2022    Chief Complaint:  Dropped sats, seized    Hospital Course:   Called to rapid response earlier today when the patient dropped his oxygen sats. He has been admitted with hypocalcemia was having some jerky-like movements and we suspected that he aspirated he was suctioned at the bedside and seem to be improving we wrapped up with a rapid response short time later they were calling a second rapid response was called overhead as the patient had had a seizure and dropped his sats again. This was witnessed by my partner. Patient has a previous history of status epilepticus and based on this we made the decision to intubate the patient for airway protection. He was intubated and transferred to the ICU central line was placed as he was a difficult stick and did not have any access appropriate in case he needed any pressors. He had 1 peripheral IV in his neck. Central line was placed      Subjective:   Patient is intubated and ventilated, on vasopressor, thrombocytopenia, hyperglycemia. Seizure. Remains on broad-spectrum antibiotic.           Medications:  Reviewed    Infusion Medications    propofol 15 mcg/kg/min (02/15/22 0702)    fentaNYL 50 mcg/hr (02/15/22 0511)    norepinephrine 8 mcg/min (02/15/22 0631)    dextrose 100 mL/hr (02/15/22 0602)    sodium chloride Stopped (02/14/22 0754)     Scheduled Medications    calcium carbonate  500 mg Oral TID    etomidate  20 mg IntraVENous Once    midazolam  2 mg IntraVENous Once    pantoprazole  40 mg IntraVENous Daily    vitamin D  50,000 Units Oral Weekly    cefepime  1,000 mg IntraVENous Q24H    linezolid  600 mg IntraVENous Q12H    levothyroxine  25 mcg Oral Daily    midodrine  5 mg Oral BID WC    sodium chloride flush  5-40 mL IntraVENous 2 times per day    insulin lispro  0-12 Units SubCUTAneous TID WC    insulin lispro  0-6 Units SubCUTAneous Nightly    heparin (porcine)  5,000 Units SubCUTAneous 3 times per day     PRN Meds: oxyCODONE, glucose, dextrose, glucagon (rDNA), dextrose, sodium chloride flush, sodium chloride, ondansetron **OR** ondansetron, polyethylene glycol, acetaminophen **OR** acetaminophen, heparin (porcine), LORazepam      Intake/Output Summary (Last 24 hours) at 2/15/2022 1331  Last data filed at 2/15/2022 0511  Gross per 24 hour   Intake 3074.27 ml   Output 600 ml   Net 2474.27 ml       Physical Exam Performed:    BP (!) 147/35   Pulse 60   Temp 96.3 °F (35.7 °C) (Temporal)   Resp 12   Ht 6' 4\" (1.93 m)   Wt 208 lb 5.4 oz (94.5 kg)   SpO2 100%   BMI 25.36 kg/m²     General appearance: Intubated  HEENT: Pupils equal, round, and reactive to light. Conjunctivae/corneas clear. Neck: Supple, with full range of motion. No jugular venous distention. Trachea midline. Respiratory: Patient has lots of upper respiratory noises following intubation we were able to suction a significant amount of what appeared to be blood from his ET tube  Cardiovascular: Regular rate and rhythm with normal S1/S2 without murmurs, rubs or gallops. Abdomen: Soft, he has a massive hernia of the front abdomen  Musculoskeletal: No clubbing, cyanosis or edema bilaterally. Fingers are bent from arthritis  Skin: Skin color, texture, turgor normal.  No rashes or lesions.   Neurologic: Intubated  Psychiatric: Sedated  Capillary Refill: Brisk,3 seconds, normal   Peripheral Pulses: +2 palpable, equal bilaterally       Labs:   Recent Labs     02/13/22  0500 02/14/22  0505 02/15/22  0505   WBC 4.3 2.7* 3.6*   HGB 9.8* 8.5* 9.2*   HCT 30.7* 26.3* 28.4*   PLT 90* 80* 67*     Recent Labs     02/13/22  0500 02/14/22  0504 02/14/22  0505 02/14/22  0505 02/14/22  0853 02/14/22  2150 02/15/22  0505   NA   < >  --  131*  --   --  133* 132*   K   < >  --  5.0  --   --  3.7 4.1   CL   < >  --  93*  --   --  94* 93*   CO2   < >  --  16*  --   --  24 20*   BUN   < >  --  77*  --   --  46* 62*   CREATININE   < >  --  8.6*  --   --  5.6* 7.2*   CALCIUM   < >  --  5.3*   < > 5.6* 6.8* 6.8*   PHOS  --  11.5*  --   --   --   --   --     < > = values in this interval not displayed. Recent Labs     02/12/22  1509   AST 34   ALT 20   BILIDIR <0.2   BILITOT 0.4   ALKPHOS 185*     No results for input(s): INR in the last 72 hours. No results for input(s): Reyne Turkmen in the last 72 hours. Urinalysis:    No results found for: Eder Parent, BACTERIA, RBCUA, BLOODU, [address]    Radiology:  CT CHEST WO CONTRAST   Final Result   Bilateral airspace disease, suspicious for pneumonia. Superimposed pleural effusions are seen with body wall anasarca, compatible   with a component of fluid overload. Findings of renal osteodystrophy      RECOMMENDATIONS:   Unavailable         XR CHEST PORTABLE   Final Result   Moderate bilateral airspace disease. This could represent a combination of   edema and or pneumonia. Moderate improved aeration of the left lung. XR CHEST PORTABLE   Final Result   The endotracheal tube measures approximately 6.6 cm above the lux. The enteric catheter is coiled in the gastric fundus. Cardiomegaly with vascular congestion and patchy airspace disease, a   component of which is felt likely to represent pulmonary edema. Improved   aeration in the left upper lobe. XR CHEST PORTABLE   Final Result   1. Endotracheal tube with the tip 2.3 cm above the lux. Recommend   withdrawal by 1.5 cm.      2.  The left hemithorax is completely opacified. This could represent a   combination of large pleural effusion and/or consolidation. 3.  Patchy airspace disease throughout the right lung and small right pleural   effusion. CT ABDOMEN PELVIS WO CONTRAST Additional Contrast? None   Final Result   Addendum 1 of 1   ADDENDUM:   There is a transcription error in the impression.   The 1st item should    read as follows:      Stable broad-based ventral abdominal wall hernia containing loops of small   large bowel without associated obstruction. Final   1. Stable broad-based ventral abdominal wall hernia containing loops of small   large bowel at associated obstruction. 2. Severe renal atrophy with diffuse vascular calcifications and findings of   renal osteodystrophy. 3. Small layering bilateral pleural effusions with mild pulmonary edema and   anasarca. Assessment/Plan:    Active Hospital Problems    Diagnosis     Diarrhea [R19.7]      Acute respiratory failure with hypoxia requiring nasal mechanical ventilation  Secondary to presumed aspiration pneumonia as well as seizure activity. Patient has been intubated and sedated and is now vented  Pulmonary on board appreciate recommendations    End-stage renal disease on dialysis  Patient is scheduled to have dialysis   Nephrology on board appreciate recommendations    ? Seizure  ? Could be secondary to hypocalcemia  We will consult neurology  Currently intubated and sedated  No history of seizure  Obtain EEG  We will consult neurology    Aspiration  On cefepime and Zyvox  Procalcitonin is elevated, no leukocytosis, cultures so far negative  Status post bronchoscopy pending culture. Hypocalcemia  Patient's calcium was been replaced and he is going to have additional adjustments made with dialysis. Massive ventral wall abdominal hernia  Stable per imaging    Diarrhea  C. difficile has been ruled out    DVT Prophylaxis: Heparin  Diet: ADULT DIET;  Regular; 4 carb choices (60 gm/meal)  ADULT TUBE FEEDING; Orogastric; Standard without Fiber; Continuous; 20; Yes; 25; Q 4 hours; 70; 160; Q 6 hours  Code Status: Full Code    PT/OT Eval Status: When appropriate    Dispo -continue ICU care    Due to the immediate potential for life-threatening deterioration due to acute hypoxic respiratory failure, seizure,, I spent 33 minutes providing critical care.  This time is excluding time spent performing procedures.       Shayla Siddiqui MD

## 2022-02-15 NOTE — FLOWSHEET NOTE
02/15/22 1559 02/15/22 1755   Treatment   Time On 1555  --    Time Off  --  1755   Vital Signs   /63 (!) 114/52   Weight 214 lb 4.6 oz (97.2 kg) 210 lb 1.6 oz (95.3 kg)   Post-Hemodialysis Assessment   NET Removed (ml)  --  2002 ml       Treatment time: 2 hours  Net UF: 2002ml    Pre weight: 97.2kg  Post weight: 95.3kg  EDW: N/A    Access used: central line  Access function: good    Medications or blood products given: N/A    Regular outpatient schedule: UF only today    Summary of response to treatment: pt tolerated UF ok on levophed gtt and sedated and on the vent. Total 2002ml removed. Copy of dialysis treatment record placed in chart, to be scanned into EMR.     Electronically signed by Camron Amezcua RN on 2/15/2022 at 6:23 PM

## 2022-02-15 NOTE — PROGRESS NOTES
Hospitalist Progress Note      PCP: No primary care provider on file. Date of Admission: 2/12/2022    Chief Complaint:  Dropped sats, seized    Hospital Course:   Called to rapid response earlier today when the patient dropped his oxygen sats. He has been admitted with hypocalcemia was having some jerky-like movements and we suspected that he aspirated he was suctioned at the bedside and seem to be improving we wrapped up with a rapid response short time later they were calling a second rapid response was called overhead as the patient had had a seizure and dropped his sats again. This was witnessed by my partner. Patient has a previous history of status epilepticus and based on this we made the decision to intubate the patient for airway protection. He was intubated and transferred to the ICU central line was placed as he was a difficult stick and did not have any access appropriate in case he needed any pressors. He had 1 peripheral IV in his neck. Central line was placed      Subjective: The patient is now intubated and sedated however prior to this the patient was doing well with the exception of having some twitching secondary to his hypocalcemia. He is in end-stage renal on dialysis patient he was scheduled to be going to dialysis.       Medications:  Reviewed    Infusion Medications    propofol 30 mcg/kg/min (02/14/22 1652)    fentaNYL 75 mcg/hr (02/14/22 1721)    norepinephrine Stopped (02/14/22 1819)    dextrose      sodium chloride Stopped (02/14/22 7605)     Scheduled Medications    calcium carbonate  500 mg Oral TID    etomidate  20 mg IntraVENous Once    midazolam  2 mg IntraVENous Once    propofol        pantoprazole  40 mg IntraVENous Daily    vitamin D  50,000 Units Oral Weekly    cefepime  1,000 mg IntraVENous Q24H    linezolid  600 mg IntraVENous Q12H    levothyroxine  25 mcg Oral Daily    insulin glargine  5 Units SubCUTAneous Nightly    midodrine  5 mg Oral BID WC  sodium chloride flush  5-40 mL IntraVENous 2 times per day    insulin lispro  0-12 Units SubCUTAneous TID WC    insulin lispro  0-6 Units SubCUTAneous Nightly    heparin (porcine)  5,000 Units SubCUTAneous 3 times per day     PRN Meds: oxyCODONE, glucose, dextrose, glucagon (rDNA), dextrose, sodium chloride flush, sodium chloride, ondansetron **OR** ondansetron, polyethylene glycol, acetaminophen **OR** acetaminophen, heparin (porcine), LORazepam      Intake/Output Summary (Last 24 hours) at 2/14/2022 2005  Last data filed at 2/14/2022 1812  Gross per 24 hour   Intake 1464.97 ml   Output --   Net 1464.97 ml       Physical Exam Performed:    /67   Pulse 82   Temp 97.4 °F (36.3 °C) (Temporal)   Resp 27   Ht 6' 4\" (1.93 m)   Wt 202 lb 14.4 oz (92 kg)   SpO2 97%   BMI 24.70 kg/m²     General appearance: Patient appears comfortable on the bed he is intubated and sedated at this time he has a central line in his right IJ. HEENT: Pupils equal, round, and reactive to light. Conjunctivae/corneas clear. Neck: Supple, with full range of motion. No jugular venous distention. Trachea midline. Respiratory: Patient has lots of upper respiratory noises following intubation we were able to suction a significant amount of what appeared to be blood from his ET tube  Cardiovascular: Regular rate and rhythm with normal S1/S2 without murmurs, rubs or gallops. Abdomen: Soft, he has a massive hernia of the front abdomen  Musculoskeletal: No clubbing, cyanosis or edema bilaterally. Fingers are bent from arthritis  Skin: Skin color, texture, turgor normal.  No rashes or lesions.   Neurologic: Was having seizure activity prior to that his cranial nerves were grossly intact  Psychiatric: Sedated  Capillary Refill: Brisk,3 seconds, normal   Peripheral Pulses: +2 palpable, equal bilaterally       Labs:   Recent Labs     02/12/22  1509 02/13/22  0500 02/14/22  0505   WBC 2.9* 4.3 2.7*   HGB 9.0* 9.8* 8.5*   HCT 29.0* 30.7* 26.3*   PLT 92* 90* 80*     Recent Labs     02/12/22  1509 02/12/22  1509 02/13/22  0500 02/14/22  0504 02/14/22  0505 02/14/22  0853   *  --  137  --  131*  --    K 4.9  --  4.6  --  5.0  --    CL 94*  --  97*  --  93*  --    CO2 13*  --  16*  --  16*  --    BUN 92*  --  72*  --  77*  --    CREATININE 9.6*  --  8.5*  --  8.6*  --    CALCIUM 5.6*   < > 5.7*  --  5.3* 5.6*   PHOS  --   --   --  11.5*  --   --     < > = values in this interval not displayed. Recent Labs     02/12/22  1509   AST 34   ALT 20   BILIDIR <0.2   BILITOT 0.4   ALKPHOS 185*     No results for input(s): INR in the last 72 hours. No results for input(s): Jadene Moh in the last 72 hours. Urinalysis:    No results found for: Seema Edwina, BACTERIA, RBCUA, BLOODU, SPECGRAV, John [place] [address]    Radiology:  XR CHEST PORTABLE   Final Result   The endotracheal tube measures approximately 6.6 cm above the lux. The enteric catheter is coiled in the gastric fundus. Cardiomegaly with vascular congestion and patchy airspace disease, a   component of which is felt likely to represent pulmonary edema. Improved   aeration in the left upper lobe. XR CHEST PORTABLE   Final Result   1. Endotracheal tube with the tip 2.3 cm above the lux. Recommend   withdrawal by 1.5 cm.      2.  The left hemithorax is completely opacified. This could represent a   combination of large pleural effusion and/or consolidation. 3.  Patchy airspace disease throughout the right lung and small right pleural   effusion. CT ABDOMEN PELVIS WO CONTRAST Additional Contrast? None   Final Result   Addendum 1 of 1   ADDENDUM:   There is a transcription error in the impression. The 1st item should    read   as follows:      Stable broad-based ventral abdominal wall hernia containing loops of small   large bowel without associated obstruction. Final   1.  Stable broad-based ventral abdominal wall hernia containing loops of small large bowel at associated obstruction. 2. Severe renal atrophy with diffuse vascular calcifications and findings of   renal osteodystrophy. 3. Small layering bilateral pleural effusions with mild pulmonary edema and   anasarca. XR CHEST PORTABLE    (Results Pending)           Assessment/Plan:    Active Hospital Problems    Diagnosis     Diarrhea [R19.7]      Acute respiratory failure with hypoxia  -Secondary to presumed aspiration pneumonia as well as seizure activity.  -Patient has been intubated and sedated and is now vented  -Based on his blood gas settings were adjusted slightly. End-stage renal disease on dialysis  -Patient is scheduled to have dialysis today. Hypocalcemia  -Patient's calcium was been replaced and he is going to have additional adjustments made with dialysis. Massive ventral wall abdominal hernia  -Stable per imaging    Diarrhea  -C. difficile has been ruled out    DVT Prophylaxis: Heparin  Diet: ADULT DIET; Regular; 4 carb choices (60 gm/meal)  Code Status: Full Code    PT/OT Eval Status: When appropriate    Dispo -patient has been intubated and is sedated for airway protection. Central line has been placed as well. Not including separately billable procedures 30 minutes of critical care time spent.         Harley Monson MD

## 2022-02-15 NOTE — PROCEDURES
2/15/2022     Patient: Kate Varela    MR Number: [id number]  YOB: 1970  Date of Visit: 2/15/2022    Clinical History:    The patient is a 49y.o. years old male with new onset seizures. Method: The EEG was performed utilizing the international 10/20 of electrode placements of both referential and bipolar montages. The patient is in coma through out the recording. Findings: The background of the EEG showed generalized diffuse slowing through out the recording in mixture of theta and delta. This generalized slowing was symmetric, non rhythmical, and continuous. No spike or sharp waves were seen. No EEG seizures or periodic pattern. Impression: This EEG is abnormal. The generalized diffuse slowing is suggestive of moderate diffuse encephalopathy. There is no evidence of epileptiform discharges, focal, or lateralizing abnormalities.         Ed Hernandez MD      Board certified in clinical neurophysiology

## 2022-02-15 NOTE — PROGRESS NOTES
P Pulmonary and Critical Care  Progress note      Reason for Consult: Respiratory failure, aspiration, seizure  Requesting Physician: Dr. Yu Repress:   [facility] / HPI:                The patient is a 49 y.o. 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 He underwent fiberoptic bronchoscopy yesterday for left lung collapse. Chest x-ray has shown improved aeration of the left lung but continues to demonstrate pulmonary edema and bilateral pleural effusions.       Past Surgical History:        Procedure Laterality Date    BRONCHOSCOPY N/A 2/14/2022    BRONCHOSCOPY DIAGNOSTIC OR CELL [address] ONLY performed by Tahmina Joe MD at [address] [facility] N/A 12/17/2021    COLONOSCOPY DIAGNOSTIC performed by Ethyl Soulier, MD at [facility]      left arm/currently non functioning    KIDNEY TRANSPLANT  9/1994    R-kidney    TOE SURGERY  10-8-2010    cut and realign 1st, 2nd, toe left foot , fixation 1st, 2nd toe left foot    TUNNELED VENOUS CATHETER PLACEMENT      left chest for dialysis    TUNNELED VENOUS PORT PLACEMENT      UPPER GASTROINTESTINAL ENDOSCOPY N/A 12/17/2021    EGD ESOPHAGOGASTRODUODENOSCOPY performed by Ethyl Soulier, MD at [address] ENDOSCOPY     Current Medications:    Current Facility-Administered Medications: calcium carbonate (TUMS) chewable tablet 500 mg, 500 mg, Oral, TID  propofol injection, 5-50 mcg/kg/min, IntraVENous, Titrated  etomidate (AMIDATE) injection 20 mg, 20 mg, IntraVENous, Once  midazolam PF (VERSED) injection 2 mg, 2 mg, IntraVENous, Once  fentaNYL 10 mcg/mL infusion, 12.5-200 mcg/hr, IntraVENous, Continuous  pantoprazole (PROTONIX) injection 40 mg, 40 mg, IntraVENous, Daily  norepinephrine (LEVOPHED) 16 mg in sodium chloride 0.9 % 250 mL infusion, 2-100 mcg/min, IntraVENous, Continuous  vitamin D (ERGOCALCIFEROL) capsule 50,000 Units, 50,000 Units, Oral, Weekly  cefepime (MAXIPIME) 1000 mg IVPB minibag, 1,000 mg, IntraVENous, Q24H  linezolid (ZYVOX) IVPB 600 mg, 600 mg, IntraVENous, Q12H  oxyCODONE (OXY-IR) immediate release tablet 30 mg, 30 mg, Oral, Q8H PRN  levothyroxine (SYNTHROID) tablet 25 mcg, 25 mcg, Oral, Daily  midodrine (PROAMATINE) tablet 5 mg, 5 mg, Oral, BID WC  glucose (GLUTOSE) 40 % oral gel 15 g, 15 g, Oral, PRN  dextrose 50 % IV solution, 12.5 g, IntraVENous, PRN  glucagon (rDNA) injection 1 mg, 1 mg, IntraMUSCular, PRN  dextrose 5 % solution, 100 mL/hr, IntraVENous, PRN  sodium chloride flush 0.9 % injection 5-40 mL, 5-40 mL, IntraVENous, 2 times per day  sodium chloride flush 0.9 % injection 5-40 mL, 5-40 mL, IntraVENous, PRN  0.9 % sodium chloride infusion, 25 mL, IntraVENous, PRN  ondansetron (ZOFRAN-ODT) disintegrating tablet 4 mg, 4 mg, Oral, Q8H PRN **OR** ondansetron (ZOFRAN) injection 4 mg, 4 mg, IntraVENous, Q6H PRN  polyethylene glycol (GLYCOLAX) packet 17 g, 17 g, Oral, Daily PRN  acetaminophen (TYLENOL) tablet 650 mg, 650 mg, Oral, Q6H PRN **OR** acetaminophen (TYLENOL) suppository 650 mg, 650 mg, Rectal, Q6H PRN  insulin lispro (1 Unit Dial) 0-12 Units, 0-12 Units, SubCUTAneous, TID WC  insulin lispro (1 Unit Dial) 0-6 Units, 0-6 Units, SubCUTAneous, Nightly  heparin (porcine) injection 5,800 Units, 5,800 Units, IntraCATHeter, PRN  heparin (porcine) injection 5,000 Units, 5,000 Units, SubCUTAneous, 3 times per day  LORazepam (ATIVAN) tablet 0.5 mg, 0.5 mg, Oral, Nightly PRN    Allergies   Allergen Reactions    Influenza Virus Vaccine      Caused paralysis       Social History:    TOBACCO:   reports that he has never smoked. He has never used smokeless tobacco.  ETOH:   reports no history of alcohol use. Patient currently lives independently  Environmental/chemical exposure: Unknown    Family History:       Problem Relation Age of Onset    Diabetes Mother      REVIEW OF SYSTEMS:    ROS is unobtainable due to his critical illness. Objective:   PHYSICAL EXAM:      VITALS:  BP (!) 147/35   Pulse 60   Temp 96.3 °F (35.7 °C) (Temporal)   Resp 12   Ht 6' 4\" (1.93 m)   Wt 208 lb 5.4 oz (94.5 kg)   SpO2 100%   BMI 25.36 kg/m²      24HR INTAKE/OUTPUT:      Intake/Output Summary (Last 24 hours) at 2/15/2022 1311  Last data filed at 2/15/2022 0511  Gross per 24 hour   Intake 3074.27 ml   Output 600 ml   Net 2474.27 ml     CONSTITUTIONAL: Sedated on mechanical ventilation  NECK:  Supple, symmetrical, trachea midline, no adenopathy, thyroid symmetric, not enlarged and no tenderness, skin normal  LUNGS:  no increased work of breathing and clear to auscultation. No accessory muscle use  CARDIOVASCULAR: S1 and S2, no edema and no JVD  ABDOMEN: Large abdominal wall hernia, normal bowel sounds, non-distended and no masses palpated, and no tenderness to palpation. No hepatospleenomegaly  LYMPHADENOPATHY:  no axillary or supraclavicular adenopathy. No cervical adnenopathy  PSYCHIATRIC: Sedated on mechanical ventilation  MUSCULOSKELETAL: No obvious misalignment or effusion of the joints. No clubbing, cyanosis of the digits. SKIN:  normal skin color, texture, turgor and no redness, warmth, or swelling.  No palpable nodules    DATA:    Old records have been reviewed    CBC:  Recent Labs     02/13/22  0500 02/14/22  0505 02/15/22  0505   WBC 4.3 2.7* 3.6*   RBC 3.93* 3.41* 3.67*   HGB 9.8* 8.5* 9.2*   HCT 30.7* 26.3* 28.4*   PLT 90* 80* 67*   MCV 78.1* 77.1* 77.4*   MCH 25.0* 24.8* 25.1* MCHC 32.0 32.1 32.4   RDW 17.1* 17.1* 16.9*      BMP:  Recent Labs     02/14/22  0505 02/14/22  0505 02/14/22  0853 02/14/22  2150 02/15/22  0505   *  --   --  133* 132*   K 5.0  --   --  3.7 4.1   CL 93*  --   --  94* 93*   CO2 16*  --   --  24 20*   BUN 77*  --   --  46* 62*   CREATININE 8.6*  --   --  5.6* 7.2*   CALCIUM 5.3*   < > 5.6* 6.8* 6.8*   GLUCOSE 70  --   --  86 43*    < > = values in this interval not displayed. ABG:  Recent Labs     02/14/22  1226 02/15/22  0858   PHART 7.247* 7.334*   BRI4FBU 41.3 39.8   PO2ART 163.0* 98.9   HMS0WRG 18.0* 21.2   B7PRPOCG 97.2 95.5   BEART -8.7* -4.3*     Procalcitonin  Recent Labs     02/14/22  1605   PROCAL 2.32*       No results found for: BNP  Lab Results   Component Value Date    CKTOTAL 130 12/11/2021    TROPONINI 0.07 (H) 12/11/2021           Radiology Review:  All pertinent images / reports were reviewed as a part of this visit. Assessment:     1. Acute respiratory failure  2. Aspiration into airway  3. Atelectasis of the left lung  4. Generalized tonic-clonic seizure  5. Hypocalcemia  6. ESRD on hemodialysis      Plan:     I have reviewed laboratories, medical records and images for this visit    I reviewed post bronchoscopy chest x-ray which shows improved aeration of the left hemithorax but continued pulmonary edema and likely bilateral pleural effusions  I ordered CT scan of the chest which reveals evidence of bilateral pleural effusion and anasarca consistent with volume overload. There is also some evidence of pulmonary edema. But there is continued left lower lobe collapse. This may be passive related to pleural fluid. Volume status seems to have worsened over the last several days. Pleural effusions are notably worse than previous CT abdomen pelvis 2/12/2022. He is on AC/, respiratory rate 16, FiO2 100% and PEEP 5. ABG is 7.3 3/40/99. Metabolic acidosis from yesterday has improved but persists.   This is likely on the basis of renal failure. I do not think he can be weaned from the ventilator until fluid status has improved    Patient is on Zyvox and cefepime. Procalcitonin is 2.32 in the presence of renal failure  No leukocytosis  No guiding culture data  In view of the results of CT imaging, continue present antibiotic therapy. Culture from the bronchoscopy is pending. He does have end-stage renal disease and receives hemodialysis normally Tuesday, Thursday and Saturday. Calcium was low this morning and this has been replaced  Needs additional fluid removal  Nephrology is following    Seizure with thought secondary to hypocalcemia  This is been corrected  No further seizure activity  He is not on any seizure medications    Addendum: CT shows anasarca and bilateral effuisons. I spoke with Dr Loly Calabrese who is recommending UF today for fluid removal.    Total critical care time caring for this patient with life threatening illness, including direct patient contact, management of life support systems, review of data including imaging and labs, discussions with other team members and physicians is at least 32 minutes so far today, excluding procedures.

## 2022-02-15 NOTE — CARE COORDINATION
Discharge Planning Assessment    RN discharge planner spoke with mother Clarissa Salguero via phone to discuss reason for admission, current living situation, and potential needs at the time of discharge    Demographics/Insurance verified Yes    Current type of dwelling:  Private home    Living arrangements: w/mother    Level of function/Support:  Limited supports needed- WC at baseline    PCP: Per mom pt sees a Dr. Pablo Rossi? Mother unsure of name and spelling - Nov 2021 notes indicate Dr. Janette Strong    Last Visit to PCP:  unknown    DME:  [facility]    Active with any community resources/agencies/skilled home care: Access bus for transportation t/f appointments    Medication compliance issues: none    Financial issues that could impact healthcare: none      Tentative discharge plan:  Home with current supports    Discussed and provided facilities of choice if transition to a skilled nursing facility is required at the time of discharge      Discussed with patient and/or family that on the day of discharge home tentative time of discharge will be between 10 AM and noon.     Transportation at the time of discharge: Medical transport      Electronically signed by Denisa Bal RN on 2/15/2022 at 12:09 PM

## 2022-02-15 NOTE — PROGRESS NOTES
Nutrition Note    RECOMMENDATIONS  PO Diet: NPO  ONS: None ordered  Nutrition Support:   1. Recommend Osmolite 1.2 (standard formula without fiber) at goal rate 70 mL per hour. 2. Recommend water bolus 160 mL q 6 hours. 3. Ensure head of bed is 30 - 45 degrees during continuous or bolus gastric feeding and for one hour after bolus. Turn off the feeding if head of bed is lowered less than 30 degrees. Monitor for tolerance (bowel habits, N/V, cramping, abdominal exam findings: distended, firm, tense, guarded, discomfort). NUTRITION ASSESSMENT   Pt seen during OC critical care rounds on mechanical ventilation. Propofol infusing at 8.3 mL per hour to provide 219 calories daily. Levophed infusing at 8 mcg/min. Pt with increased nutrient needs related to presence of stage II pressure ulcer to buttocks. Per Dr. Tiffany Samaniego, [facility] to begin tube feeds today.  Nutrition Related Findings: +2 pitting generalized edema. +2 BLE edema. Na+ 132. LBM 2/14. OG tube.  Wounds: Stage II,Pressure Injury   Nutrition Education:  Education not indicated    Nutrition Goals: Pt will tolerate EN at goal     MALNUTRITION ASSESSMENT   Malnutrition Status: Insufficient data    NUTRITION DIAGNOSIS   Inadequate oral intake related to impaired respiratory function as evidenced by intubation    CURRENT NUTRITION THERAPIES  ADULT DIET; Regular; 4 carb choices (60 gm/meal)     PO Intake: NPO   PO Supplement Intake:NPO    Current Tube Feeding (TF) Orders:  · Feeding Route: Orogastric  · Formula: Standard without Fiber  · Schedule: Continuous  · Water Flushes: 160 mL q 6 hours  · Current TF & Flush Orders Provides: As below  · Goal TF & Flush Orders Provides: Osmolite 1.2 at 70 mL per hour to provide 1680 mL total volume, 2016 calories, 93 grams protein, 1378 mL free water.     ANTHROPOMETRICS   Current Height: 6' 4\" (193 cm)   Current Weight: 208 lb 5.4 oz (94.5 kg)     Ideal Body Weight (IBW): 202 lbs  (92 kg)     Usual Bodyweight 160 lb 15 oz (73 kg) (EDW)       BMI: 25.4    COMPARATIVE STANDARDS  Energy (kcal):  7727-5439     Protein (g):   grams       Fluid (ml/day):  6934-8854 mL    The patient will be monitored per nutrition standards of care. Consult dietitian if additional nutrition interventions are needed prior to RD reassessment.      Dariel Reddy, [address] [place] [address],     Contact: [phone]

## 2022-02-15 NOTE — PROGRESS NOTES
Patient's blood glucose has been extremely low. Due to fluid overload status leaving patient on ventilator I sent a message to Dr. Soumya Montgomery, \"Patient is in fluid overload and just got UF 2 Liters was taken off. Patient's blood glucose was 19, 28, 58, 78, and is now 55 again. I have given two amps of D50 and glucagon IM. He has tube feed running. I wanted to avoid starting the PRN dextrose 5% at 100 ml/hr infusion. Would we be able to change the order to D10 continuous infusion at a lower rate? Then I can stop the fluid flushes with tube feed, he has 160 mL Q6 hrs ordered. Sodium was 132 this morning. \"    Waiting for reply.

## 2022-02-15 NOTE — PROGRESS NOTES
Patient blood glucose was 58, gave D50, will recheck. Starting tube feed. Also giving 2G of calcium gluconate over 30 min per Dr. Johnnie Burr. Will continue to monitor.

## 2022-02-16 NOTE — CONSULTS
In patient Neurology consult        [facility] Neurology      MD Anni Ngo  1970    Date of Service: 2/16/2022    Referring Physician: Elizabeth Al MD      Reason for the consult and CC: Acute encephalopathy and witnessed seizure. HPI:   Most of the history was obtained from chart reviewing and discussion with the patient's nurse as the patient is currently intubated. The patient is a 49y.o. years old male with multiple medical problems was admitted to the hospital few days ago with diarrhea, worsening kidney function testing and encephalopathy. The patient had acute encephalopathy 2 days ago with witnessed generalized tonic clonic seizure for few minutes and Degree was severe. He became obtunded with respiratory distress requiring intubation and admission to the ICU. Today he is awake alert but intubated. No more seizure over the last 2 days. EEG from yesterday showed diffuse encephalopathy. He does have prior history of seizure disorder. No recent fever chills. Other review of system was limited at this point.     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 COLONOSCOPY N/A 12/17/2021    COLONOSCOPY DIAGNOSTIC performed by Luzmaria Leach MD at [facility] [place]      left arm/currently non functioning    KIDNEY TRANSPLANT  9/1994    R-kidney    TOE SURGERY  10-8-2010    cut and realign 1st, 2nd, toe left foot , fixation 1st, 2nd toe left foot    TUNNELED VENOUS CATHETER PLACEMENT      left chest for dialysis    TUNNELED VENOUS PORT PLACEMENT      UPPER GASTROINTESTINAL ENDOSCOPY N/A 12/17/2021    EGD ESOPHAGOGASTRODUODENOSCOPY performed by John Perez MD at [address] History     Tobacco Use    Smoking status: Never Smoker    Smokeless tobacco: Never Used   Substance Use Topics    Alcohol use: No    Drug use: No     Allergies   Allergen Reactions    Influenza Virus Vaccine      Caused paralysis     Current Facility-Administered Medications   Medication Dose Route Frequency Provider Last Rate Last Admin    dextrose 10 % infusion   IntraVENous Continuous Adelia Beavers MD 50 mL/hr at 02/16/22 0926 Rate Verify at 02/16/22 0926    calcium carbonate (TUMS) chewable tablet 500 mg  500 mg Oral TID Alexx Thompson MD   500 mg at 02/15/22 2102    propofol injection  5-50 mcg/kg/min IntraVENous Titrated Rahul Price MD 8.3 mL/hr at 02/16/22 0926 15 mcg/kg/min at 02/16/22 0926    etomidate (AMIDATE) injection 20 mg  20 mg IntraVENous Once Rahul Price MD        midazolam PF (VERSED) injection 2 mg  2 mg IntraVENous Once Rahul Price MD        fentaNYL 10 mcg/mL infusion  12.5-200 mcg/hr IntraVENous Continuous Adelia Beavers MD 2.5 mL/hr at 02/15/22 1428 25 mcg/hr at 02/15/22 1428    pantoprazole (PROTONIX) injection 40 mg  40 mg IntraVENous Daily Adelia Beavers MD   40 mg at 02/16/22 0759    norepinephrine (LEVOPHED) 16 mg in sodium chloride 0.9 % 250 mL infusion  2-100 mcg/min IntraVENous Continuous Rahul Price MD 9.4 mL/hr at 02/16/22 0926 10 mcg/min at 02/16/22 4547    vitamin D (ERGOCALCIFEROL) capsule 50,000 Units  50,000 Units Oral Weekly Alexx Thompson MD        cefepime (MAXIPIME) 1000 mg IVPB minibag  1,000 mg IntraVENous Q24H Bhumit All Steinberg MD   Stopped at 02/15/22 2132    oxyCODONE (OXY-IR) immediate release tablet 30 mg  30 mg Oral Q8H PRN Veronika Orozco MD   30 mg at 02/15/22 2102    levothyroxine (SYNTHROID) tablet 25 mcg  25 mcg Oral Daily Omer Quintana MD   25 mcg at 02/16/22 0510    midodrine (PROAMATINE) tablet 5 mg  5 mg Oral BID  Omer Quintana MD   5 mg at 02/16/22 0759    glucose (GLUTOSE) 40 % oral gel 15 g  15 g Oral PRN Omer Quintana MD        dextrose 50 % IV solution  12.5 g IntraVENous PRN Omer Quintana MD   12.5 g at 02/15/22 1732    glucagon (rDNA) injection 1 mg  1 mg IntraMUSCular PRN Omer Quintana MD   1 mg at 02/15/22 1821    dextrose 5 % solution  100 mL/hr IntraVENous PRMICHELL Quintana MD   Stopped at 02/15/22 1907    sodium chloride flush 0.9 % injection 5-40 mL  5-40 mL IntraVENous 2 times per day Omer Quintana MD   20 mL at 02/16/22 0804    sodium chloride flush 0.9 % injection 5-40 mL  5-40 mL IntraVENous PRN Omer Quintana MD        0.9 % sodium chloride infusion  25 mL IntraVENous PRN Omer Quintana MD   Stopped at 02/14/22 0754    ondansetron (ZOFRAN-ODT) disintegrating tablet 4 mg  4 mg Oral Q8H PRN Omer Quintana MD        Or    ondansetron (ZOFRAN) injection 4 mg  4 mg IntraVENous Q6H PRN Omer Quintana MD        polyethylene glycol (GLYCOLAX) packet 17 g  17 g Oral Daily PRN Omer Quintana MD        acetaminophen (TYLENOL) tablet 650 mg  650 mg Oral Q6H PRN Omer Quintana MD        Or   Derby Self acetaminophen (TYLENOL) suppository 650 mg  650 mg Rectal Q6H PRN Omer Quintana MD        insulin lispro (1 Unit Dial) 0-12 Units  0-12 Units SubCUTAneous TID  Omer Quintana MD        insulin lispro (1 Unit Dial) 0-6 Units  0-6 Units SubCUTAneous Nightly Omer Quintana MD        heparin (porcine) injection 5,800 Units  5,800 Units IntraCATHeter PRN Julee Dey MD   5,800 Units at 02/16/22 0840    heparin (porcine) injection 5,000 Units  5,000 Units SubCUTAneous 3 times per day Isiah Juarez PA-C 5,000 Units at 02/14/22 0432    LORazepam (ATIVAN) tablet 0.5 mg  0.5 mg Oral Nightly PRN Joe Emanuel MD   0.5 mg at 02/13/22 2138       ROS: 10-12 ROS was Limited to obtain secondary to intubation and encephalopathy. Otherwise rest per HPI     Exam:   Vitals:    02/16/22 1230 02/16/22 1300 02/16/22 1351 02/16/22 1400   BP:  (!) 130/40  (!) 134/45   Pulse: 63 64 64 61   Resp: 11 10 9 8   Temp:       TempSrc:       SpO2: 92% 90% 100% 100%   Weight:       Height:         General appearance: intubated  Eye: Fundus of the eye: Funduscopic examination could not be performed due to intubation and COVID-19 restrictions. Neck: supple  Cardiovascular: No lower leg edema with good pulsation. .   Mental Status:   He is awake and alert  Can follow direction  Good attention  Cannot assess language or memory due to intubation  Cranial Nerves:   II: Pupils: equal, round, reactive to light  III,IV,VI: No gaze preference. OCR present  V: Good corneal, spontaneous breathing  VII: Face is symmetric. VIII: No nystagmus with OCR  IX: Gag reflex:  present  X:  cough reflex: present   XI: no shoulder shrug in response to painful stimulation  XII: Midline   Musculoskeletal: Can squeeze my hands and leg spontaneously. Generalized diffuse weakness with atrophy, chronic  Tone: normal  Reflexes: 1+ in both arms and legs  Planters: mute  Coordination: No abnormal movement  Sensation: Nl  Gait/Posture: Cannot be examined due to intubation.       Data:  LABS:   Lab Results   Component Value Date     02/16/2022    K 4.5 02/16/2022    K 5.2 02/03/2022    CL 92 02/16/2022    CO2 19 02/16/2022    BUN 69 02/16/2022    CREATININE 7.5 02/16/2022    GFRAA 9 02/16/2022    GFRAA 12 06/09/2013    GFRAA 12 06/09/2013    LABGLOM 8 02/16/2022    GLUCOSE 75 02/16/2022    PHOS 11.5 02/14/2022    MG 1.60 02/14/2022    CALCIUM 6.4 02/16/2022     Lab Results   Component Value Date    WBC 2.7 02/16/2022    RBC 3.81 02/16/2022    HGB 9.4 02/16/2022    HCT 29.4 02/16/2022    MCV 77.2 02/16/2022    RDW 17.0 02/16/2022    PLT 35 02/16/2022     Lab Results   Component Value Date    INR 1.07 12/11/2021    PROTIME 12.1 12/11/2021       Neuroimaging were independently reviewed by me  Reviewed notes from different physicians  Reviewed lab and blood testing    Impression:  Acute encephalopathy and breakthrough seizure. Could be symptomatic from metabolic derangement and hypocalcemia. History of seizure disorder  Acute respiratory failure with hypoxia  Acute on chronic kidney disease  Hyponatremia  Sepsis  HTN      Recommendation:  Continue current supportive care  MRI showed no acute findings  Seizure precautions  Respiratory support  Extubation trials today  Insulin sliding scale  Continue follow kidney function test  PT and OT  Speech when extubated   Hydration  Follow sodium level and kidney function test  Continue antibiotics  We will follow to consider long-term AED depending on his progress over the next few days  Discussed with ICU nurse. Thank you for referring such patient. If you have any questions regarding my consult note, please don't hesitate to call me. Gabrielle Rodriguez MD  [phone]    This dictation was generated by voice recognition computer software.  Although all attempts are made to edit the dictation for accuracy, there may be errors in the  transcription that are not intended

## 2022-02-16 NOTE — PROGRESS NOTES
02/15/22 1957   Vent Patient Data   Plateau Pressure 22 SRF90   Static Compliance 46 mL/cmH2O   Dynamic Compliance 29.43 mL/cmH2O

## 2022-02-16 NOTE — PROGRESS NOTES
Facility/Department: [facility] ICU  Initial Assessment  DYSPHAGIA BEDSIDE SWALLOW EVALUATION     Patient: Angel Quinonez   : 1970   MRN: [id number]      Evaluation Date: 2022   Admitting Diagnosis: Hypocalcemia [E83.51]  Diarrhea [R88.7]  Metabolic acidosis [W13.0]  Dialysis patient ([address] [Z99.2]  Pancytopenia ([address] [D61.818]  Diarrhea, unspecified type [R19.7]  Pain: None reported                         H&P: 2022  \"Called to rapid response earlier today when the patient dropped his oxygen sats. He has been admitted with hypocalcemia was having some jerky-like movements and we suspected that he aspirated he was suctioned at the bedside and seem to be improving we wrapped up with a rapid response short time later they were calling a second rapid response was called overhead as the patient had had a seizure and dropped his sats again. This was witnessed by my partner. Patient has a previous history of status epilepticus and based on this we made the decision to intubate the patient for airway protection. He was intubated and transferred to the ICU central line was placed as he was a difficult stick and did not have any access appropriate in case he needed any pressors. He had 1 peripheral IV in his neck. Central line was placed. \"    Pt extubated 2022. Chest CT: 2/15/2022  Impression   Bilateral airspace disease, suspicious for pneumonia.       Superimposed pleural effusions are seen with body wall anasarca, compatible   with a component of fluid overload.       Findings of renal osteodystrophy       RECOMMENDATIONS:   Unavailable     MRI Brain/Head CT: None this admission    Modified Barium Swallow Study: None per chart review    History/Prior Level of Function:   Living Status: Home with mother  Prior Dysphagia History: None per pt and chart review; pt previously seen by speech therapy for cognitive-linguistic intervention.  Per chart review, pt had aspiration event 2022 and rapid response called. Pt did not recall choking episode, denies hx of dysphagia. RN reports pt asking for food. Dysphagia Impressions/Diagnosis: Oropharyngeal Dysphagia   Pt alert and receptive, verbally responsive and oriented to person, location, and date. Positioned upright in bed with HOB elevated. On 3L NC with RR <20/min, pt using suction for baseline congested. Oral motor exam revealed functional lingual, labial, and buccal ROM and coordination. Some missing dentition for mastication, gross facial symmetry. Laryngeal function assessed with volitional swallow and volitional cough: laryngeal elevation judged to be reduced, volitional cough was weak and congested. Pt with hoarse vocal quality at baseline. Pt assessed with PO presentations, fed with support from SLP. Oral phase characterized by prolonged but functional oral manipulation and A-P transit. Swallow initiation appeared to be delayed across presentations. Clinical s/s pharyngeal impairment include intermittent double swallow across consistencies and wet breath quality with slight throat clearing following thin liquids, mildly thick liquids, and soft solid presentations. Pt with significant delayed congested coughing with use of suction following thin liquids x 1 and regular solids. Although pt with baseline congested cough, cannot rule out aspiration at bedside. Given dysphagia risk factors of recent intubation/extubation (1.5 hours prior to evaluation), reduced physical mobility, and clinical s/s oropharyngeal dysphagia with concern for airway protection, recommend NPO with ongoing assessment of swallowing. Pt may benefit from instrumental swallow evaluation if overt clinical s/s aspiration continue to be noted, to further assess pharyngeal swallow function and determine most appropriate PO diet.     Recommended Diet and Intervention 2/16/2022:  Diet Solids Recommendation:  NPO pending ongoing evaluation Liquid Consistency Recommendation:  NPO pending ongoing evaluation Recommended form of Meds:   Critical meds crushed in puree     Compensatory Swallowing Strategies: TBD    SHORT TERM DYSPHAGIA GOALS/PLAN OF CARE: Speech therapy for dysphagia tx 3-5 times per week during acute care stay.     Pt will functionally tolerate ongoing assessment of swallow function with diet to be determined as indicated   Pt will demonstrate understanding of aspiration risk and precautions via education/demonstration with occasional prompting  Pt will advance to least restrictive diet as indicated   If clinical s/s of aspiration/penetration continue to be noted, Pt will participate in Modified Barium Swallow Study     Dysphagia Therapeutic Intervention:  Diet Tolerance Monitoring , Patient/Family Education , Therapeutic Trials with SLP     Discharge Recommendations: Recommend ongoing SLP for dysphagia therapy upon discharge from hospital     Patient Positioning: Upright in bed    Current Diet Level (prior to evaluation): NPO     Respiratory Status:   []Room Air   [x]O2 via nasal cannula 3L   []Other:    Dentition:  []Adequate  []Dentures   [x]Missing Many Teeth  []Edentulous  []Other:    Baseline Vocal Quality:  []Normal  []Dysphonic   []Aphonic   [x]Hoarse  []Wet  []Weak  []Other:    Volitional Cough:  []Strong  [x]Weak  []Wet  []Absent  [x]Congested  []Other:    Volitional Swallow:   []Absent   [x]Delayed     []Adequate     []Required use of drink     Oral Mechanism Exam:  [x]WFL []Mild   [] Moderate  []Severe  []To be assessed  Impaired:   []Left side      []Right side    []Labial ROM/Coordination    []Labial Symmetry   []Lingual ROM/Coordination   []Lingual Symmetry  []Gag  []Other:     Oral Phase: []WFL [x]Mild   [] Moderate  []Severe  []To be assessed   [x]Impaired/Prolonged Mastication:   []Spillage Left:   []Spillage Right:  []Pocketing Left:   []Pocketing Right:   [x]Decreased Anterior to Posterior Transit:   [x]Suspected Premature Bolus Loss:   []Lingual/Palatal Residue:   []Other:     Pharyngeal Phase: []WFL []Mild   [x] Moderate  []Severe  []To be assessed   [x]Delayed Swallow:   [x]Suspected Pharyngeal Pooling:   [x]Suspected Decreased Laryngeal Elevation:   []Absent Swallow:  [x]Wet Vocal Quality:   [x]Throat Clearing-Immediate:   []Throat Clearing-Delayed:   []Cough-Immediate:   [x]Cough-Delayed:  []Change in Vital Signs:  [x]Suspected Delayed Pharyngeal Clearing:  []Other:        Eating Assistance:  []Independent  []Setup or clean-up assistance   [] Supervision or touching assistance   [x] Partial or moderate assistance   [] Substantial or maximal assistance  [] Dependent       EDUCATION:   Provided education regarding role of SLP, results of assessment, recommendations and general speech pathology plan of care. [x] Pt verbalized understanding and agreement   [x] Pt requires ongoing learning   [] No evidence of comprehension     If patient discharges prior to next visit, this note will serve as discharge. Timed Code Minutes: 0  Total Treatment Minutes: 25 minutes    Electronically signed by:   RENITA Stiles  Speech-Language Pathology Graduate Clinician    The speech-language pathologist was present, directed the patient's care, made skilled judgment and was responsible for assessment and treatment.     Cassi Dorantes, [address] [facility]  Speech-Language Pathologist  [phone]

## 2022-02-16 NOTE — CONSULTS
PODIATRY CONSULT NOTE    HISTORY OF PRESENT ILLNESS:                The patient is a 49 y.o. male who has been admitted for ESRD and diarrhea, who is consulted for toenail care and foot deformities. The patient is intubated at time of exam, but nursing shares that he will be extubated later today. He is able to answer yes or no. The patient is not established with a podiatrist in the outpatient setting. The patient admits to diabetes, is on HD, and does appear to have some sensation loss in the feet.     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    Hemod[facility] ([facility]     Low blood pressure     Pancreatitis     Peripheral Neuropathy     Pneumonia 11/11/2021    Renal failure 1992    on dialysis 3x/wk  (T, Th, Sat)    Status epilepticus ([facility] 12/11/2021       Past Surgical History:        Procedure Laterality Date    BRONCHOSCOPY N/A 2/14/2022    BRONCHOSCOPY DIAGNOSTIC OR CELL [address] ONLY [facility] N/A 12/17/2021    COLONOSCOPY DIAGNOSTIC performed by Jeniffer [facility]rade MD at [facility]      left arm/currently non functioning    KIDNEY TRANSPLANT  9/1994    R-kidney    TOE SURGERY  10-8-2010    cut and realign 1st, 2nd, toe left foot , fixation 1st, 2nd toe left foot    TUNNELED VENOUS CATHETER PLACEMENT      left chest for dialysis    TUNNELED VENOUS PORT PLACEMENT      UPPER GASTROINTESTINAL ENDOSCOPY N/A 12/17/2021    EGD ESOPHAGOGASTRODUODENOSCOPY performed by Jeniffer Andrade MD at [address] ENDOSCOPY       Current Medications:    Current Facility-Administered Medications: dextrose 10 % infusion, , IntraVENous, Continuous  calcium carbonate (TUMS) chewable tablet 500 mg, 500 mg, Oral, TID  propofol injection, 5-50 mcg/kg/min, IntraVENous, Titrated  etomidate (AMIDATE) injection 20 mg, 20 mg, IntraVENous, Once  midazolam PF (VERSED) injection 2 mg, 2 mg, IntraVENous, Once  fentaNYL 10 mcg/mL infusion, 12.5-200 mcg/hr, IntraVENous, Continuous  pantoprazole (PROTONIX) injection 40 mg, 40 mg, IntraVENous, Daily  norepinephrine (LEVOPHED) 16 mg in sodium chloride 0.9 % 250 mL infusion, 2-100 mcg/min, IntraVENous, Continuous  vitamin D (ERGOCALCIFEROL) capsule 50,000 Units, 50,000 Units, Oral, Weekly  cefepime (MAXIPIME) 1000 mg IVPB minibag, 1,000 mg, IntraVENous, Q24H  linezolid (ZYVOX) IVPB 600 mg, 600 mg, IntraVENous, Q12H  oxyCODONE (OXY-IR) immediate release tablet 30 mg, 30 mg, Oral, Q8H PRN  levothyroxine (SYNTHROID) tablet 25 mcg, 25 mcg, Oral, Daily  midodrine (PROAMATINE) tablet 5 mg, 5 mg, Oral, BID WC  glucose (GLUTOSE) 40 % oral gel 15 g, 15 g, Oral, PRN  dextrose 50 % IV solution, 12.5 g, IntraVENous, PRN  glucagon (rDNA) injection 1 mg, 1 mg, IntraMUSCular, PRN  dextrose 5 % solution, 100 mL/hr, IntraVENous, PRN  sodium chloride flush 0.9 % injection 5-40 mL, 5-40 mL, IntraVENous, 2 times per day  sodium chloride flush 0.9 % injection 5-40 mL, 5-40 mL, IntraVENous, PRN  0.9 % sodium chloride infusion, 25 mL, IntraVENous, PRN  ondansetron (ZOFRAN-ODT) disintegrating tablet 4 mg, 4 mg, Oral, Q8H PRN **OR** ondansetron (ZOFRAN) injection 4 mg, 4 mg, IntraVENous, Q6H PRN  polyethylene glycol (GLYCOLAX) packet 17 g, 17 g, Oral, Daily PRN  acetaminophen (TYLENOL) tablet 650 mg, 650 mg, Oral, Q6H PRN **OR** acetaminophen (TYLENOL) suppository 650 mg, 650 mg, Rectal, Q6H PRN  insulin lispro (1 Unit Dial) 0-12 Units, 0-12 Units, SubCUTAneous, TID WC  insulin lispro (1 Unit Dial) 0-6 Units, 0-6 Units, SubCUTAneous, Nightly  heparin (porcine) injection 5,800 Units, 5,800 Units, IntraCATHeter, PRN  heparin (porcine) injection 5,000 Units, 5,000 Units, SubCUTAneous, 3 times per day  LORazepam (ATIVAN) tablet 0.5 mg, 0.5 mg, Oral, Nightly PRN    Allergies:   Influenza virus vaccine    Social History:    TOBACCO:   reports that he has never smoked. He has never used smokeless tobacco.  ETOH:   reports no history of alcohol use. DRUGS:   reports no history of drug use. Family History:       Problem Relation Age of Onset    Diabetes Mother        REVIEW OF SYSTEMS:  Unable to be obtained due to intubation    PHYSICAL EXAM:      Vitals:    BP (!) 157/44   Pulse 62   Temp 96 °F (35.6 °C)   Resp 11   Ht 6' 4\" (1.93 m)   Wt 206 lb 2.1 oz (93.5 kg)   SpO2 100%   BMI 25.09 kg/m²       VASCULAR: DP and PT pulses are palpable 2/4 b/l. Hair growth is decreased to the b/l lower extremity. CFT is brisk to the digits of the foot b/l. Skin temperature is warm to cool from proximal to distal with no focal calor noted. No edema noted. No pain with calf compression b/l. NEUROLOGIC: unable to assess at this time    DERMATOLOGIC: Toenails 1, 2, 3, 4, and 5 on the right and left foot are thickened, elongated, and discolored. All toenails are painful to palpation. Toenails 2 and 3 on the right foot have subungual hematomas and loose nails. No erythema noted. Skin is thin and atrophic. Webspaces 1-4 b/l are clean, dry, and intact. No hyperkeratosis noted. No open wounds noted. No subcutaneous nodules, rashes, or other skin lesions noted. MUSCULOSKELETAL: Severe rigid hammertoe deformities noted to the lesser digits. Mallet toe noted to the hallux b/l. Muscle strength is 5/5 for all pedal groups tested. Ankle joint, subtalar joint, and 1st MTPJ ROM are decreased b/l. IMPRESSION/RECOMMENDATIONS:    1. Onychomycosis, toenails b/l  2. Diabetes mellitus with peripheral neuropathy & ESRD on HD  3. Severe rigid hammertoe deformities, b/l  4.  Subungual hematoma, digits 2 and 3, right foot    - Patient was seen and examined at bedside  - Reviewed findings of toenail pathology with the patient  - Reviewed all treatment options with the patient including debridement of the nails, antifungal therapy, and removal of the offending nails. The patient opted for debridement of the nails today  - Toenails 1-5 b/l were debrided in both length and thickness using nail nippers without incident.  - Toenails 2 and 3 were drained. No signs of infection noted. Bandaid was applied to both digits. - Recommend close follow up in the outpatient setting for hammertoe management and prevention of skin breakdown with frequent routine care    - Will sign off on the patient. If any other pedal problems occur please contact us. Thank you for the opportunity to take part in the patient's care.      Jonh Elias DPM  [phone]

## 2022-02-16 NOTE — PROGRESS NOTES
Hospitalist Progress Note      PCP: No primary care provider on file. Date of Admission: 2/12/2022    Chief Complaint:  Dropped sats, seized    Hospital Course:   Called to rapid response earlier today when the patient dropped his oxygen sats. He has been admitted with hypocalcemia was having some jerky-like movements and we suspected that he aspirated he was suctioned at the bedside and seem to be improving we wrapped up with a rapid response short time later they were calling a second rapid response was called overhead as the patient had had a seizure and dropped his sats again. This was witnessed by my partner. Patient has a previous history of status epilepticus and based on this we made the decision to intubate the patient for airway protection. He was intubated and transferred to the ICU central line was placed as he was a difficult stick and did not have any access appropriate in case he needed any pressors. He had 1 peripheral IV in his neck. Central line was placed      Subjective:      Intubated and sedated, FiO2 30%, on dialysis          Medications:  Reviewed    Infusion Medications    dextrose 50 mL/hr at 02/16/22 0926    propofol 15 mcg/kg/min (02/16/22 0926)    fentaNYL 25 mcg/hr (02/15/22 1428)    norepinephrine 10 mcg/min (02/16/22 0926)    dextrose Stopped (02/15/22 1907)    sodium chloride Stopped (02/14/22 1674)     Scheduled Medications    calcium carbonate  500 mg Oral TID    etomidate  20 mg IntraVENous Once    midazolam  2 mg IntraVENous Once    pantoprazole  40 mg IntraVENous Daily    vitamin D  50,000 Units Oral Weekly    cefepime  1,000 mg IntraVENous Q24H    levothyroxine  25 mcg Oral Daily    midodrine  5 mg Oral BID WC    sodium chloride flush  5-40 mL IntraVENous 2 times per day    insulin lispro  0-12 Units SubCUTAneous TID WC    insulin lispro  0-6 Units SubCUTAneous Nightly    heparin (porcine)  5,000 Units SubCUTAneous 3 times per day     PRN Meds: oxyCODONE, glucose, dextrose, glucagon (rDNA), dextrose, sodium chloride flush, sodium chloride, ondansetron **OR** ondansetron, polyethylene glycol, acetaminophen **OR** acetaminophen, heparin (porcine), LORazepam      Intake/Output Summary (Last 24 hours) at 2/16/2022 1417  Last data filed at 2/16/2022 0949  Gross per 24 hour   Intake 3386.29 ml   Output 2800 ml   Net 586.29 ml       Physical Exam Performed:    BP (!) 134/45   Pulse 61   Temp 96.7 °F (35.9 °C) (Temporal)   Resp 8   Ht 6' 4\" (1.93 m)   Wt 206 lb 2.1 oz (93.5 kg)   SpO2 100%   BMI 25.09 kg/m²     General appearance: Intubated  HEENT: Pupils equal, round, and reactive to light. Conjunctivae/corneas clear. Neck: Supple, with full range of motion. No jugular venous distention. Trachea midline. Respiratory: Patient has lots of upper respiratory noises following intubation we were able to suction a significant amount of what appeared to be blood from his ET tube  Cardiovascular: Regular rate and rhythm with normal S1/S2 without murmurs, rubs or gallops. Abdomen: Soft, he has a massive hernia of the front abdomen  Musculoskeletal: No clubbing, cyanosis or edema bilaterally. Fingers are bent from arthritis  Skin: Skin color, texture, turgor normal.  No rashes or lesions.   Neurologic: Intubated  Psychiatric: Sedated  Capillary Refill: Brisk,3 seconds, normal   Peripheral Pulses: +2 palpable, equal bilaterally       Labs:   Recent Labs     02/14/22  0505 02/15/22  0505 02/16/22  0500   WBC 2.7* 3.6* 2.7*   HGB 8.5* 9.2* 9.4*   HCT 26.3* 28.4* 29.4*   PLT 80* 67* 35*     Recent Labs     02/14/22  0504 02/14/22  0505 02/14/22  2150 02/15/22  0505 02/16/22  0500   NA  --    < > 133* 132* 129*   K  --    < > 3.7 4.1 4.5   CL  --    < > 94* 93* 92*   CO2  --    < > 24 20* 19*   BUN  --    < > 46* 62* 69*   CREATININE  --    < > 5.6* 7.2* 7.5*   CALCIUM  --    < > 6.8* 6.8* 6.4*   PHOS 11.5*  --   --   --   --     < > = values in this interval not displayed. No results for input(s): AST, ALT, BILIDIR, BILITOT, ALKPHOS in the last 72 hours. No results for input(s): INR in the last 72 hours. No results for input(s): Connee Matar in the last 72 hours. Urinalysis:    No results found for: Alyne Claw, BACTERIA, RBCUA, BLOODU, [address] [place] [address]    Radiology:  CT CHEST WO CONTRAST   Final Result   Bilateral airspace disease, suspicious for pneumonia. Superimposed pleural effusions are seen with body wall anasarca, compatible   with a component of fluid overload. Findings of renal osteodystrophy      RECOMMENDATIONS:   Unavailable         XR CHEST PORTABLE   Final Result   Moderate bilateral airspace disease. This could represent a combination of   edema and or pneumonia. Moderate improved aeration of the left lung. XR CHEST PORTABLE   Final Result   The endotracheal tube measures approximately 6.6 cm above the lux. The enteric catheter is coiled in the gastric fundus. Cardiomegaly with vascular congestion and patchy airspace disease, a   component of which is felt likely to represent pulmonary edema. Improved   aeration in the left upper lobe. XR CHEST PORTABLE   Final Result   1. Endotracheal tube with the tip 2.3 cm above the lux. Recommend   withdrawal by 1.5 cm.      2.  The left hemithorax is completely opacified. This could represent a   combination of large pleural effusion and/or consolidation. 3.  Patchy airspace disease throughout the right lung and small right pleural   effusion. CT ABDOMEN PELVIS WO CONTRAST Additional Contrast? None   Final Result   Addendum 1 of 1   ADDENDUM:   There is a transcription error in the impression. The 1st item should    read   as follows:      Stable broad-based ventral abdominal wall hernia containing loops of small   large bowel without associated obstruction. Final   1.  Stable broad-based ventral abdominal wall hernia containing loops of small   large bowel at associated obstruction. 2. Severe renal atrophy with diffuse vascular calcifications and findings of   renal osteodystrophy. 3. Small layering bilateral pleural effusions with mild pulmonary edema and   anasarca. Assessment/Plan:    Active Hospital Problems    Diagnosis     New onset seizure ([facility] [address] [R56.9]     Diarrhea [R19.7]      Acute respiratory failure with hypoxia requiring nasal mechanical ventilation  Secondary to presumed aspiration pneumonia as well as seizure activity. Patient has been intubated and sedated and is now vented  Pulmonary on board appreciate recommendations    End-stage renal disease on dialysis  Patient is scheduled to have dialysis   Nephrology on board appreciate recommendations    ? Seizure  ? Could be secondary to hypocalcemia  We will consult neurology  Currently intubated and sedated  No history of seizure  diffuse slowing is suggestive of moderate diffuse encephalopathy on EEG no evidence of epileptiform discharges, focal or lateralizing abnormalities  Neurology has been consulted    Aspiration  On cefepime  Procalcitonin is elevated, no leukocytosis, cultures so far negative  Status post bronchoscopy pending culture. Hypocalcemia  Patient's calcium was been replaced and he is going to have additional adjustments made with dialysis. Massive ventral wall abdominal hernia  Stable per imaging    Diarrhea  C. difficile has been ruled out    DVT Prophylaxis: Heparin  Diet: ADULT DIET; Regular; 4 carb choices (60 gm/meal)  ADULT TUBE FEEDING; Orogastric; Standard without Fiber; Continuous; 20; Yes; 25; Q 4 hours; 70; 160; Q 6 hours  Code Status: Full Code    PT/OT Eval Status: When appropriate    Dispo -continue ICU care    Due to the immediate potential for life-threatening deterioration due to acute hypoxic respiratory failure, seizure,, I spent 33 minutes providing critical care.   This time is excluding time spent performing procedures.       Kamini Ceja MD

## 2022-02-16 NOTE — PROGRESS NOTES
[facility]  Progress note      Reason for Consult: Respiratory failure, aspiration, seizure  Requesting Physician: Dr. Yu Repress:   [facility] / HPI:                The patient is a 49 y.o. 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 He is awake and alert this morning. He is writing notes on a clipboard. He had ultrafiltration with removal of 2 L fluid yesterday. Again this morning had net fluid removal of about 2 L.       Past Surgical History:        Procedure Laterality Date    BRONCHOSCOPY N/A 2/14/2022    BRONCHOSCOPY DIAGNOSTIC OR CELL [address] ONLY performed by Tahmina Joe MD at [address] [facility] N/A 12/17/2021    COLONOSCOPY DIAGNOSTIC performed by Ethyl Soulier, MD at [facility]      left arm/currently non functioning    KIDNEY TRANSPLANT  9/1994    R-kidney    TOE SURGERY  10-8-2010    cut and realign 1st, 2nd, toe left foot , fixation 1st, 2nd toe left foot    TUNNELED VENOUS CATHETER PLACEMENT      left chest for dialysis    TUNNELED VENOUS PORT PLACEMENT      UPPER GASTROINTESTINAL ENDOSCOPY N/A 12/17/2021    EGD ESOPHAGOGASTRODUODENOSCOPY performed by Ethyl Soulier, MD at [address] ENDOSCOPY     Current Medications:    Current Facility-Administered Medications: dextrose 10 % infusion, , IntraVENous, Continuous  calcium carbonate (TUMS) chewable tablet 500 mg, 500 mg, Oral, TID  propofol injection, 5-50 mcg/kg/min, IntraVENous, Titrated  etomidate (AMIDATE) injection 20 mg, 20 mg, IntraVENous, Once  midazolam PF (VERSED) injection 2 mg, 2 mg, IntraVENous, Once  fentaNYL 10 mcg/mL infusion, 12.5-200 mcg/hr, IntraVENous, Continuous  pantoprazole (PROTONIX) injection 40 mg, 40 mg, IntraVENous, Daily  norepinephrine (LEVOPHED) 16 mg in sodium chloride 0.9 % 250 mL infusion, 2-100 mcg/min, IntraVENous, Continuous  vitamin D (ERGOCALCIFEROL) capsule 50,000 Units, 50,000 Units, Oral, Weekly  cefepime (MAXIPIME) 1000 mg IVPB minibag, 1,000 mg, IntraVENous, Q24H  linezolid (ZYVOX) IVPB 600 mg, 600 mg, IntraVENous, Q12H  oxyCODONE (OXY-IR) immediate release tablet 30 mg, 30 mg, Oral, Q8H PRN  levothyroxine (SYNTHROID) tablet 25 mcg, 25 mcg, Oral, Daily  midodrine (PROAMATINE) tablet 5 mg, 5 mg, Oral, BID WC  glucose (GLUTOSE) 40 % oral gel 15 g, 15 g, Oral, PRN  dextrose 50 % IV solution, 12.5 g, IntraVENous, PRN  glucagon (rDNA) injection 1 mg, 1 mg, IntraMUSCular, PRN  dextrose 5 % solution, 100 mL/hr, IntraVENous, PRN  sodium chloride flush 0.9 % injection 5-40 mL, 5-40 mL, IntraVENous, 2 times per day  sodium chloride flush 0.9 % injection 5-40 mL, 5-40 mL, IntraVENous, PRN  0.9 % sodium chloride infusion, 25 mL, IntraVENous, PRN  ondansetron (ZOFRAN-ODT) disintegrating tablet 4 mg, 4 mg, Oral, Q8H PRN **OR** ondansetron (ZOFRAN) injection 4 mg, 4 mg, IntraVENous, Q6H PRN  polyethylene glycol (GLYCOLAX) packet 17 g, 17 g, Oral, Daily PRN  acetaminophen (TYLENOL) tablet 650 mg, 650 mg, Oral, Q6H PRN **OR** acetaminophen (TYLENOL) suppository 650 mg, 650 mg, Rectal, Q6H PRN  insulin lispro (1 Unit Dial) 0-12 Units, 0-12 Units, SubCUTAneous, TID WC  insulin lispro (1 Unit Dial) 0-6 Units, 0-6 Units, SubCUTAneous, Nightly  heparin (porcine) injection 5,800 Units, 5,800 Units, IntraCATHeter, PRN  heparin (porcine) injection 5,000 Units, 5,000 Units, SubCUTAneous, 3 times per day  LORazepam (ATIVAN) tablet 0.5 mg, 0.5 mg, Oral, Nightly PRN    Allergies   Allergen Reactions    Influenza Virus Vaccine      Caused paralysis       Social History:    TOBACCO:   reports that he has never smoked. He has never used smokeless tobacco.  ETOH:   reports no history of alcohol use. Patient currently lives independently  Environmental/chemical exposure: Unknown    Family History:       Problem Relation Age of Onset    Diabetes Mother      REVIEW OF SYSTEMS:    KAYLA is unobtainable due to his critical illness. Objective:   PHYSICAL EXAM:      VITALS:  BP (!) 157/44   Pulse 62   Temp 96 °F (35.6 °C)   Resp 11   Ht 6' 4\" (1.93 m)   Wt 206 lb 2.1 oz (93.5 kg)   SpO2 100%   BMI 25.09 kg/m²      24HR INTAKE/OUTPUT:      Intake/Output Summary (Last 24 hours) at 2/16/2022 1157  Last data filed at 2/16/2022 0949  Gross per 24 hour   Intake 3386.29 ml   Output 2800 ml   Net 586.29 ml     CONSTITUTIONAL: Sedated on mechanical ventilation  NECK:  Supple, symmetrical, trachea midline, no adenopathy, thyroid symmetric, not enlarged and no tenderness, skin normal  LUNGS:  no increased work of breathing and clear to auscultation. No accessory muscle use  CARDIOVASCULAR: S1 and S2, no edema and no JVD  ABDOMEN: Large abdominal wall hernia, normal bowel sounds, non-distended and no masses palpated, and no tenderness to palpation. No hepatospleenomegaly  LYMPHADENOPATHY:  no axillary or supraclavicular adenopathy. No cervical adnenopathy  PSYCHIATRIC: Sedated on mechanical ventilation  MUSCULOSKELETAL: No obvious misalignment or effusion of the joints. No clubbing, cyanosis of the digits. SKIN:  normal skin color, texture, turgor and no redness, warmth, or swelling.  No palpable nodules    DATA:    Old records have been reviewed    CBC:  Recent Labs     02/14/22  0505 02/15/22  0505 02/16/22  0500   WBC 2.7* 3.6* 2.7*   RBC 3.41* 3.67* 3.81*   HGB 8.5* 9.2* 9.4*   HCT 26.3* 28.4* 29.4*   PLT 80* 67* 35*   MCV 77.1* 77.4* 77.2*   MCH 24.8* 25.1* 24.7*   MCHC 32.1 32.4 32.0   RDW 17.1* 16.9* 17.0*      BMP:  Recent Labs     02/14/22  2150 02/15/22  0505 02/16/22  0500   * 132* 129*   K 3.7 4.1 4.5   CL 94* 93* 92*   CO2 24 20* 19*   BUN 46* 62* 69*   CREATININE 5.6* 7.2* 7.5*   CALCIUM 6.8* 6.8* 6.4*   GLUCOSE 86 43* 75      ABG:  Recent Labs     02/14/22  1226 02/15/22  0858   PHART 7.247* 7.334*   [id number] 41.3 39.8   PO2ART 163.0* 98.9   [id number] 18.0* 21.2   [id number] 97.2 95.5   BEART -8.7* -4.3*     Procalcitonin  Recent Labs     02/14/22  1605   PROCAL 2.32*       No results found for: BNP  Lab Results   Component Value Date    CKTOTAL 130 12/11/2021    TROPONINI 0.07 (H) 12/11/2021           Radiology Review:  All pertinent images / reports were reviewed as a part of this visit. Assessment:     1. Acute respiratory failure  2. Aspiration into airway  3. Atelectasis of the left lung  4. Generalized tonic-clonic seizure  5. Hypocalcemia  6. ESRD on hemodialysis      Plan:     I have reviewed laboratories, medical records and images for this visit    I reviewed post bronchoscopy chest x-ray which shows improved aeration of the left hemithorax but continued pulmonary edema and likely bilateral pleural effusions  I ordered CT scan of the chest which reveals evidence of bilateral pleural effusion and anasarca consistent with volume overload. There is also some evidence of pulmonary edema. But there is continued left lower lobe collapse. He has subsequently had 4 L of fluid removed since CT imaging yesterday. He looks really good this morning and is awake writing notes on a clipboard    He is on AC/, respiratory rate 16, FiO2 100% and PEEP 5. No new ABG  I placed him on CPAP/PSV and he isDoing well on spontaneous breathing. Give him trial liberation from mechanical ventilation. Patient is on Zyvox and cefepime.   Procalcitonin is 2.32 in the presence of renal failure  No leukocytosis  No guiding culture data  Bronchoscopy cultures are also negative so far  Stop Zyvox    He does have end-stage renal disease and receives hemodialysis normally Tuesday, Thursday and Saturday. He has had net 4 L fluid removed in the last 24 hours. Nephrology is following    Seizure with thought secondary to hypocalcemia  This is been corrected  No further seizure activity  EEG was abnormal with diffuse slowing but no obvious seizure activity      Total critical care time caring for this patient with life threatening illness, including direct patient contact, management of life support systems, review of data including imaging and labs, discussions with other team members and physicians is at least 32 minutes so far today, excluding procedures.

## 2022-02-16 NOTE — PROGRESS NOTES
02/16/22 0927   [facility] Patient Data   Plateau Pressure 19 RCP87   Static Compliance 36 mL/cmH2O   Dynamic Compliance 33 mL/cmH2O

## 2022-02-16 NOTE — PLAN OF CARE
Problem: Falls - Risk of:  Goal: Will remain free from falls  Description: Will remain free from falls  Outcome: Ongoing  Goal: Absence of physical injury  Description: Absence of physical injury  Outcome: Ongoing     Problem: Skin Integrity:  Goal: Will show no infection signs and symptoms  Description: Will show no infection signs and symptoms  Outcome: Ongoing  Goal: Absence of new skin breakdown  Description: Absence of new skin breakdown  Outcome: Ongoing     Problem: Daily Care:  Goal: Daily care needs are met  Description: Daily care needs are met  Outcome: Ongoing     Problem: Skin Integrity:  Goal: Skin integrity will stabilize  Description: Skin integrity will stabilize  Outcome: Ongoing     Problem: Pain:  Goal: Pain level will decrease  Description: Pain level will decrease  Outcome: Ongoing  Goal: Control of acute pain  Description: Control of acute pain  Outcome: Ongoing  Goal: Control of chronic pain  Description: Control of chronic pain  Outcome: Ongoing     Problem: Health Behavior:  Goal: Ability to manage health-related needs will improve  Description: Ability to manage health-related needs will improve  Outcome: Ongoing  Goal: Identification of resources available to assist in meeting health care needs will improve  Description: Identification of resources available to assist in meeting health care needs will improve  Outcome: Ongoing     Problem: Nutritional:  Goal: Ability to identify appropriate dietary choices will improve  Description: Ability to identify appropriate dietary choices will improve  Outcome: Ongoing     Problem: Non-Violent Restraints  Goal: Removal from restraints as soon as assessed to be safe  Outcome: Ongoing  Goal: No harm/injury to patient while restraints in use  Outcome: Ongoing  Goal: Patient's dignity will be maintained  Outcome: Ongoing

## 2022-02-16 NOTE — PROGRESS NOTES
Nephrology progress  Note                                                                                                                                                                                                                                                                                                                                                               Office : [phone]     Fax :[phone]              Patient's Name: Geneva Mccann  12:12 PM  2/16/2022    Reason for Consult:  ESRD on HD      Chief Complaint:  Diarrhea      History of Present Ilness:    Geneva Mccann is a 49 y.o. 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    Hemodialysis patient ([address]     Low blood pressure     Pancreatitis     Peripheral Neuropathy     Pneumonia 11/11/2021    Renal failure 1992    on dialysis 3x/wk  (T, Th, Sat)    Status epilepticus ([address] 12/11/2021       Past Surgical History:   Procedure Laterality Date    BRONCHOSCOPY N/A 2/14/2022    BRONCHOSCOPY DIAGNOSTIC OR CELL [address] ONLY performed by Sanjana Medley MD at [address] [place] [address] N/A 12/17/2021    COLONOSCOPY DIAGNOSTIC performed by Crow Diallo MD at [facility] [place]      left arm/currently non functioning    KIDNEY TRANSPLANT  9/1994    R-kidney    TOE SURGERY  10-8-2010    cut and realign 1st, 2nd, toe left foot , fixation 1st, 2nd toe left foot    TUNNELED VENOUS CATHETER PLACEMENT      left chest for dialysis    TUNNELED VENOUS PORT PLACEMENT      UPPER GASTROINTESTINAL ENDOSCOPY N/A 12/17/2021    EGD ESOPHAGOGASTRODUODENOSCOPY performed by Willem Augustine MD at [address] ENDOSCOPY       Family History   Problem Relation Age of Onset    Diabetes Mother         reports that he has never smoked. He has never used smokeless tobacco. He reports that he does not drink alcohol and does not use drugs.     Allergies:  Influenza virus vaccine    Current Medications:    dextrose 10 % infusion, Continuous  calcium carbonate (TUMS) chewable tablet 500 mg, TID  propofol injection, Titrated  etomidate (AMIDATE) injection 20 mg, Once  midazolam PF (VERSED) injection 2 mg, Once  fentaNYL 10 mcg/mL infusion, Continuous  pantoprazole (PROTONIX) injection 40 mg, Daily  norepinephrine (LEVOPHED) 16 mg in sodium chloride 0.9 % 250 mL infusion, Continuous  vitamin D (ERGOCALCIFEROL) capsule 50,000 Units, Weekly  cefepime (MAXIPIME) 1000 mg IVPB minibag, Q24H  oxyCODONE (OXY-IR) immediate release tablet 30 mg, Q8H PRN  levothyroxine (SYNTHROID) tablet 25 mcg, Daily  midodrine (PROAMATINE) tablet 5 mg, BID WC  glucose (GLUTOSE) 40 % oral gel 15 g, PRN  dextrose 50 % IV solution, PRN  glucagon (rDNA) injection 1 mg, PRN  dextrose 5 % solution, PRN  sodium chloride flush 0.9 % injection 5-40 mL, 2 times per day  sodium chloride flush 0.9 % injection 5-40 mL, PRN  0.9 % sodium chloride infusion, PRN  ondansetron (ZOFRAN-ODT) disintegrating tablet 4 mg, Q8H PRN   Or  ondansetron (ZOFRAN) injection 4 mg, Q6H PRN  polyethylene glycol (GLYCOLAX) packet 17 g, Daily PRN  acetaminophen (TYLENOL) tablet 650 mg, Q6H PRN   Or  acetaminophen (TYLENOL) suppository 650 mg, Q6H PRN  insulin lispro (1 Unit Dial) 0-12 Units, TID WC  insulin lispro (1 Unit Dial) 0-6 Units, Nightly  heparin (porcine) injection 5,800 Units, PRN  heparin (porcine) injection 5,000 Units, 3 times per day  LORazepam (ATIVAN) tablet 0.5 mg, Nightly PRN        Physical exam:     Vitals:  BP (!) 157/44   Pulse 62   Temp 96 °F (35.6 °C)   Resp 11   Ht 6' 4\" (1.93 m)   Wt 206 lb 2.1 oz (93.5 kg)   SpO2 100%   BMI 25.09 kg/m²   Constitutional:  Awake initially then had a seizure and became lethargic   Skin: no rash, turgor wnl  Heent:  eomi, mmm  Neck: no bruits or jvd noted  Cardiovascular:  S1, S2 without m/r/g  Respiratory: CTA B without w/r/r  Abdomen:  +bs, soft, nt, nd  Ext: no  lower extremity edema      Data:   Labs:  CBC:   Recent Labs     02/14/22  0505 02/15/22  0505 02/16/22  0500   WBC 2.7* 3.6* 2.7*   HGB 8.5* 9.2* 9.4*   PLT 80* 67* 35*     BMP:    Recent Labs     02/14/22  2150 02/15/22  0505 02/16/22  0500   * 132* 129*   K 3.7 4.1 4.5   CL 94* 93* 92*   CO2 24 20* 19*   BUN 46* 62* 69*   CREATININE 5.6* 7.2* 7.5*   GLUCOSE 86 43* 75     Ca/Mg/Phos:   Recent Labs     02/14/22  0504 02/14/22  0505 02/14/22  0853 02/14/22  0853 02/14/22  2150 02/15/22  0505 02/16/22  0500   CALCIUM  --    < > 5.6*   < > 6.8* 6.8* 6.4*   MG  --   --  1.60*  --  1.60*  --   --    PHOS 11.5*  --   --   --   --   --   --     < > = values in this interval not displayed. Hepatic:   No results for input(s): AST, ALT, ALB, BILITOT, ALKPHOS in the last 72 hours. Troponin: No results for input(s): TROPONINI in the last 72 hours. BNP: No results for input(s): BNP in the last 72 hours. Lipids: No results for input(s): CHOL, TRIG, HDL, LDLCALC, LABVLDL in the last 72 hours. ABGs:   Recent Labs     02/15/22  0858   PHART 7.334*   PO2ART 98.9   [id number] 39.8     INR: No results for input(s): INR in the last 72 hours. UA:No results for input(s): Alleen Corti, GLUCOSEU, BILIRUBINUR, Flordia Caicedo, BLOODU, PHUR, PROTEINU, UROBILINOGEN, NITRU, LEUKOCYTESUR, LABMICR, URINETYPE in the last 72 hours. Urine Microscopic: No results for input(s): LABCAST, BACTERIA, COMU, HYALCAST, WBCUA, RBCUA, EPIU in the last 72 hours. Urine Culture: No results for input(s): LABURIN in the last 72 hours.   Urine Chemistry: No results for input(s): CLUR, LABCREA, PROTEINUR, NAUR in the last 72 hours. IMAGING:  CT CHEST WO CONTRAST   Final Result   Bilateral airspace disease, suspicious for pneumonia. Superimposed pleural effusions are seen with body wall anasarca, compatible   with a component of fluid overload. Findings of renal osteodystrophy      RECOMMENDATIONS:   Unavailable         XR CHEST PORTABLE   Final Result   Moderate bilateral airspace disease. This could represent a combination of   edema and or pneumonia. Moderate improved aeration of the left lung. XR CHEST PORTABLE   Final Result   The endotracheal tube measures approximately 6.6 cm above the lux. The enteric catheter is coiled in the gastric fundus. Cardiomegaly with vascular congestion and patchy airspace disease, a   component of which is felt likely to represent pulmonary edema. Improved   aeration in the left upper lobe. XR CHEST PORTABLE   Final Result   1. Endotracheal tube with the tip 2.3 cm above the lux. Recommend   withdrawal by 1.5 cm.      2.  The left hemithorax is completely opacified. This could represent a   combination of large pleural effusion and/or consolidation. 3.  Patchy airspace disease throughout the right lung and small right pleural   effusion. CT ABDOMEN PELVIS WO CONTRAST Additional Contrast? None   Final Result   Addendum 1 of 1   ADDENDUM:   There is a transcription error in the impression. The 1st item should    read   as follows:      Stable broad-based ventral abdominal wall hernia containing loops of small   large bowel without associated obstruction. Final   1. Stable broad-based ventral abdominal wall hernia containing loops of small   large bowel at associated obstruction. 2. Severe renal atrophy with diffuse vascular calcifications and findings of   renal osteodystrophy.    3. Small layering bilateral pleural effusions with mild pulmonary edema and   anasarca. Assessment/Plan   1. ESRD on HD    2. HTN    3. Anemia    4  Seizure likely 2/2 hypocalcemia     5 diarrhea    6 Metabolic acidosis    7 Hypocalcemia  PTH is low at 11 .  Likely has hypoparathyroidism   Phos 11       Plan    HD today   Had UF yesterday     Ionized calcium every 12 hrs   Continue calcium carbonate   Give calcium gluconate iv 2 gm today     Possible extubation today                 Thank you for allowing us to participate in care of Kenroy aLrios MD  Feel free to contact me   [facility] [address]  Office : [phone]  Fax :[phone]

## 2022-02-16 NOTE — FLOWSHEET NOTE
02/16/22 0617 02/16/22 0949   Vital Signs   BP (!) 158/50 (!) 157/44   Temp 96.2 °F (35.7 °C) 96 °F (35.6 °C)   Pulse 52 61   Resp 11 14     Treatment time: 3.5 hours    Net UF: 2 L    Pre weight: 95.5 kg (bed scale)  Post weight: 93.5 kg (bed scale)  EDW: 73 kg    Access used: Right femoral Tunneled HD cath  Access function: no problems    Medications or blood products given: None    Regular outpatient schedule: [facility]    Summary of response to treatment: Tolerated tx without difficulty. VSS. Copy of dialysis treatment record placed in chart, to be scanned into EMR. Report given to Patience Ferrari RN.

## 2022-02-17 NOTE — PROGRESS NOTES
Nephrology progress  Note                                                                                                                                                                                                                                                                                                                                                               Office : [phone]     Fax :[phone]              Patient's Name: Jenny Arriaga  12:12 PM  2/17/2022    Reason for Consult:  ESRD on HD      Chief Complaint:  Diarrhea      History of Present Ilness:    Jenny Arriaga is a 49 y.o. 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    [facility]od[facility]     HTN (hypertension), benign     Low blood pressure     Pancreatitis     Peripheral Neuropathy     Pneumonia 11/11/2021    Renal failure 1992    on dialysis 3x/wk  (T, Th, Sat)    Status epilepticus ([facility] 12/11/2021       Past Surgical History:   Procedure Laterality Date    BRONCHOSCOPY N/A 2/14/2022    BRONCHOSCOPY DIAGNOSTIC OR CELL [address] ONLY performed by Dottie Sandra MD at [address] [place] [address] N/A 12/17/2021    COLONOSCOPY DIAGNOSTIC performed by Nabeel Yi MD at [facility]      left arm/currently non functioning    KIDNEY TRANSPLANT  9/1994    R-kidney    TOE SURGERY  10-8-2010    cut and realign 1st, 2nd, toe left foot , fixation 1st, 2nd toe left foot    TUNNELED VENOUS CATHETER PLACEMENT      left chest for dialysis    TUNNELED VENOUS PORT PLACEMENT      UPPER GASTROINTESTINAL ENDOSCOPY N/A 12/17/2021    EGD ESOPHAGOGASTRODUODENOSCOPY performed by Ventura Pike MD at [facility] ENDOSCOPY       Family History   Problem Relation Age of Onset    Diabetes Mother         reports that he has never smoked. He has never used smokeless tobacco. He reports that he does not drink alcohol and does not use drugs.     Allergies:  Influenza virus vaccine    Current Medications:    HYDROmorphone HCl PF (DILAUDID) injection 1 mg, Q4H PRN  HYDROmorphone (DILAUDID) injection 2 mg, Q4H PRN  calcium carbonate (TUMS) chewable tablet 500 mg, TID  midazolam PF (VERSED) injection 2 mg, Once  pantoprazole (PROTONIX) injection 40 mg, Daily  vitamin D (ERGOCALCIFEROL) capsule 50,000 Units, Weekly  cefepime (MAXIPIME) 1000 mg IVPB minibag, Q24H  oxyCODONE (OXY-IR) immediate release tablet 30 mg, Q8H PRN  levothyroxine (SYNTHROID) tablet 25 mcg, Daily  midodrine (PROAMATINE) tablet 5 mg, BID WC  glucose (GLUTOSE) 40 % oral gel 15 g, PRN  dextrose 50 % IV solution, PRN  glucagon (rDNA) injection 1 mg, PRN  dextrose 5 % solution, PRN  sodium chloride flush 0.9 % injection 5-40 mL, 2 times per day  sodium chloride flush 0.9 % injection 5-40 mL, PRN  0.9 % sodium chloride infusion, PRN  ondansetron (ZOFRAN-ODT) disintegrating tablet 4 mg, Q8H PRN   Or  ondansetron (ZOFRAN) injection 4 mg, Q6H PRN  polyethylene glycol (GLYCOLAX) packet 17 g, Daily PRN  acetaminophen (TYLENOL) tablet 650 mg, Q6H PRN   Or  acetaminophen (TYLENOL) suppository 650 mg, Q6H PRN  insulin lispro (1 Unit Dial) 0-12 Units, TID WC  insulin lispro (1 Unit Dial) 0-6 Units, Nightly  heparin (porcine) injection 5,800 Units, PRN  heparin (porcine) injection 5,000 Units, 3 times per day  LORazepam (ATIVAN) tablet 0.5 mg, Nightly PRN        Physical exam:     Vitals:  /64   Pulse 80   Temp 96.2 °F (35.7 °C) (Temporal)   Resp 8   Ht 6' 4\" (1.93 m)   Wt 209 lb 10.5 oz (95.1 kg)   SpO2 92%   BMI 25.52 kg/m² Constitutional: extubated   Awake and alert   Skin: no rash, turgor wnl  Heent:  eomi, mmm  Neck: no bruits or jvd noted  Cardiovascular:  S1, S2 without m/r/g  Respiratory: CTA B without w/r/r  Abdomen:  +bs, soft, nt, nd  Ext: no  lower extremity edema      Data:   Labs:  CBC:   Recent Labs     02/15/22  0505 02/16/22  0500 02/17/22  0515   WBC 3.6* 2.7* 3.2*   HGB 9.2* 9.4* 10.1*   PLT 67* 35* 41*     BMP:    Recent Labs     02/15/22  0505 02/16/22  0500 02/17/22  0515   * 129* 129*   K 4.1 4.5 4.2   CL 93* 92* 91*   CO2 20* 19* 22   BUN 62* 69* 43*   CREATININE 7.2* 7.5* 6.2*   GLUCOSE 43* 75 48*     Ca/Mg/Phos:   Recent Labs     02/14/22 2150 02/14/22  2150 02/15/22  0505 02/16/22  0500 02/17/22  0515   CALCIUM 6.8*   < > 6.8* 6.4* 6.7*   MG 1.60*  --   --   --   --     < > = values in this interval not displayed. Hepatic:   No results for input(s): AST, ALT, ALB, BILITOT, ALKPHOS in the last 72 hours. Troponin: No results for input(s): TROPONINI in the last 72 hours. BNP: No results for input(s): BNP in the last 72 hours. Lipids: No results for input(s): CHOL, TRIG, HDL, LDLCALC, LABVLDL in the last 72 hours. ABGs:   Recent Labs     02/15/22  0858   PHART 7.334*   PO2ART 98.9   [id number] 39.8     INR: No results for input(s): INR in the last 72 hours. UA:No results for input(s): Oscar Reno, GLUCOSEU, BILIRUBINUR, Amezquita Marrero, BLOODU, PHUR, PROTEINU, UROBILINOGEN, NITRU, LEUKOCYTESUR, LABMICR, URINETYPE in the last 72 hours. Urine Microscopic: No results for input(s): LABCAST, BACTERIA, COMU, HYALCAST, WBCUA, RBCUA, EPIU in the last 72 hours. Urine Culture: No results for input(s): LABURIN in the last 72 hours. Urine Chemistry: No results for input(s): Bonita Elidia, PROTEINUR, NAUR in the last 72 hours. IMAGING:  CT CHEST WO CONTRAST   Final Result   Bilateral airspace disease, suspicious for pneumonia.       Superimposed pleural effusions are seen with body wall anasarca, compatible   with a component of fluid overload. Findings of renal osteodystrophy      RECOMMENDATIONS:   Unavailable         XR CHEST PORTABLE   Final Result   Moderate bilateral airspace disease. This could represent a combination of   edema and or pneumonia. Moderate improved aeration of the left lung. XR CHEST PORTABLE   Final Result   The endotracheal tube measures approximately 6.6 cm above the lux. The enteric catheter is coiled in the gastric fundus. Cardiomegaly with vascular congestion and patchy airspace disease, a   component of which is felt likely to represent pulmonary edema. Improved   aeration in the left upper lobe. XR CHEST PORTABLE   Final Result   1. Endotracheal tube with the tip 2.3 cm above the lux. Recommend   withdrawal by 1.5 cm.      2.  The left hemithorax is completely opacified. This could represent a   combination of large pleural effusion and/or consolidation. 3.  Patchy airspace disease throughout the right lung and small right pleural   effusion. CT ABDOMEN PELVIS WO CONTRAST Additional Contrast? None   Final Result   Addendum 1 of 1   ADDENDUM:   There is a transcription error in the impression. The 1st item should    read   as follows:      Stable broad-based ventral abdominal wall hernia containing loops of small   large bowel without associated obstruction. Final   1. Stable broad-based ventral abdominal wall hernia containing loops of small   large bowel at associated obstruction. 2. Severe renal atrophy with diffuse vascular calcifications and findings of   renal osteodystrophy. 3. Small layering bilateral pleural effusions with mild pulmonary edema and   anasarca. Assessment/Plan   1. ESRD on HD    2. HTN    3. Anemia    4  Seizure likely 2/2 hypocalcemia     5 diarrhea    6 Metabolic acidosis    7 Hypocalcemia  PTH is low at 11 .  Likely has hypoparathyroidism   Phos 11       Plan    HAd HD yesterday   Plan for HD tomorrow       Ionized calcium every 12 hrs   Continue calcium carbonate   Give calcium gluconate iv 1 gm today     Possible extubation today                 Thank you for allowing us to participate in care of Davis Burgos MD  Feel free to contact me   [facility] [address]  Office : [phone]  Fax :[phone]

## 2022-02-17 NOTE — PROGRESS NOTES
Hospitalist Progress Note      PCP: No primary care provider on file. Date of Admission: 2/12/2022    Chief Complaint:  Dropped sats, seized    Hospital Course:   Called to rapid response earlier today when the patient dropped his oxygen sats. He has been admitted with hypocalcemia was having some jerky-like movements and we suspected that he aspirated he was suctioned at the bedside and seem to be improving we wrapped up with a rapid response short time later they were calling a second rapid response was called overhead as the patient had had a seizure and dropped his sats again. This was witnessed by my partner. Patient has a previous history of status epilepticus and based on this we made the decision to intubate the patient for airway protection. He was intubated and transferred to the ICU central line was placed as he was a difficult stick and did not have any access appropriate in case he needed any pressors. He had 1 peripheral IV in his neck.   Central line was placed  Subjective:     Patient was extubated on 2 L/min nasal cannula with acceptable saturation    Medications:  Reviewed    Infusion Medications    dextrose Stopped (02/15/22 6247)    sodium chloride Stopped (02/14/22 3274)     Scheduled Medications    calcium carbonate  500 mg Oral TID    midazolam  2 mg IntraVENous Once    pantoprazole  40 mg IntraVENous Daily    vitamin D  50,000 Units Oral Weekly    cefepime  1,000 mg IntraVENous Q24H    levothyroxine  25 mcg Oral Daily    midodrine  5 mg Oral BID WC    sodium chloride flush  5-40 mL IntraVENous 2 times per day    insulin lispro  0-12 Units SubCUTAneous TID WC    insulin lispro  0-6 Units SubCUTAneous Nightly    heparin (porcine)  5,000 Units SubCUTAneous 3 times per day     PRN Meds: HYDROmorphone, HYDROmorphone, oxyCODONE, glucose, dextrose, glucagon (rDNA), dextrose, sodium chloride flush, sodium chloride, ondansetron **OR** ondansetron, polyethylene glycol, acetaminophen **OR** acetaminophen, heparin (porcine), LORazepam      Intake/Output Summary (Last 24 hours) at 2/17/2022 1254  Last data filed at 2/17/2022 1112  Gross per 24 hour   Intake 3716.74 ml   Output --   Net 3716.74 ml       Physical Exam Performed:    /64   Pulse 80   Temp 96.2 °F (35.7 °C) (Temporal)   Resp 8   Ht 6' 4\" (1.93 m)   Wt 209 lb 10.5 oz (95.1 kg)   SpO2 92%   BMI 25.52 kg/m²     General appearance:  extubated  HEENT: Pupils equal, round, and reactive to light. Conjunctivae/corneas clear. Neck: Supple, with full range of motion. No jugular venous distention. Trachea midline. Respiratory: Patient has lots of upper respiratory noises following intubation we were able to suction a significant amount of what appeared to be blood from his ET tube  Cardiovascular: Regular rate and rhythm with normal S1/S2 without murmurs, rubs or gallops. Abdomen: Soft, he has a massive hernia of the front abdomen  Musculoskeletal: No clubbing, cyanosis or edema bilaterally. Fingers are bent from arthritis  Skin: Skin color, texture, turgor normal.  No rashes or lesions. Neurologic: Extubated following commands  Psychiatric: Alert  Capillary Refill: Brisk,3 seconds, normal   Peripheral Pulses: +2 palpable, equal bilaterally       Labs:   Recent Labs     02/15/22  0505 02/16/22  0500 02/17/22  0515   WBC 3.6* 2.7* 3.2*   HGB 9.2* 9.4* 10.1*   HCT 28.4* 29.4* 31.4*   PLT 67* 35* 41*     Recent Labs     02/15/22  0505 02/16/22  0500 02/17/22  0515   * 129* 129*   K 4.1 4.5 4.2   CL 93* 92* 91*   CO2 20* 19* 22   BUN 62* 69* 43*   CREATININE 7.2* 7.5* 6.2*   CALCIUM 6.8* 6.4* 6.7*     No results for input(s): AST, ALT, BILIDIR, BILITOT, ALKPHOS in the last 72 hours. No results for input(s): INR in the last 72 hours. No results for input(s): Loly Villasenor in the last 72 hours.     Urinalysis:    No results found for: Kaila Banerjee, BACTERIA, [address] [facility] [address] [facility], [facility] [address]    Radiology:  CT CHEST WO CONTRAST   Final Result   Bilateral airspace disease, suspicious for pneumonia. Superimposed pleural effusions are seen with body wall anasarca, compatible   with a component of fluid overload. Findings of renal osteodystrophy      RECOMMENDATIONS:   Unavailable         XR CHEST PORTABLE   Final Result   Moderate bilateral airspace disease. This could represent a combination of   edema and or pneumonia. Moderate improved aeration of the left lung. XR CHEST PORTABLE   Final Result   The endotracheal tube measures approximately 6.6 cm above the lux. The enteric catheter is coiled in the gastric fundus. Cardiomegaly with vascular congestion and patchy airspace disease, a   component of which is felt likely to represent pulmonary edema. Improved   aeration in the left upper lobe. XR CHEST PORTABLE   Final Result   1. Endotracheal tube with the tip 2.3 cm above the lux. Recommend   withdrawal by 1.5 cm.      2.  The left hemithorax is completely opacified. This could represent a   combination of large pleural effusion and/or consolidation. 3.  Patchy airspace disease throughout the right lung and small right pleural   effusion. CT ABDOMEN PELVIS WO CONTRAST Additional Contrast? None   Final Result   Addendum 1 of 1   ADDENDUM:   There is a transcription error in the impression. The 1st item should    read   as follows:      Stable broad-based ventral abdominal wall hernia containing loops of small   large bowel without associated obstruction. Final   1. Stable broad-based ventral abdominal wall hernia containing loops of small   large bowel at associated obstruction. 2. Severe renal atrophy with diffuse vascular calcifications and findings of   renal osteodystrophy. 3. Small layering bilateral pleural effusions with mild pulmonary edema and   anasarca.                     Assessment/Plan:    Active Hospital Problems    Diagnosis     Aspiration into airway [T17.908A]     Atelectasis [J98.11]     End stage renal disease due to benign hypertension (HCC) [I12.0, N18.6]     Acute hypoxemic respiratory failure (HCC) [J96.01]     HTN (hypertension), benign [I10]     New onset seizure ([facility] [address] [R56.9]     Diarrhea [R19.7]     JOANNE (acute kidney injury) ([facility] [address] [N17.9]     Hypocalcemia [E83.51]      Acute respiratory failure with hypoxia requiring nasal mechanical ventilation  Secondary to presumed aspiration pneumonia as well as seizure activity. Status post liberation from mechanical ventilation  Pulmonary on board appreciate recommendations    End-stage renal disease on dialysis  Patient is scheduled to have dialysis   Nephrology on board appreciate recommendations    ? Seizure  ? Could be secondary to hypocalcemia  We will consult neurology  Currently intubated and sedated  No history of seizure  diffuse slowing is suggestive of moderate diffuse encephalopathy on EEG no evidence of epileptiform discharges, focal or lateralizing abnormalities  Neurology has been consulted  No need to start AED as per neurology. Aspiration  On cefepime  Procalcitonin is elevated, no leukocytosis, cultures so far negative  Status post bronchoscopy, cultures so far negative    Hypocalcemia  Patient's calcium was been replaced and he is going to have additional adjustments made with dialysis. Endo has been consulted for evaluation of persistent hypercalcemia, recommending consulting oncology for  gammopathy    Massive ventral wall abdominal hernia  Stable per imaging    Hypoglycemia   Eating Now and started on dysphagia diet    Diarrhea  C. difficile has been ruled out    DVT Prophylaxis: Heparin  Diet: ADULT DIET; Dysphagia - Soft and Bite Sized; No Drinking Straws  ADULT ORAL NUTRITION SUPPLEMENT; Lunch, Dinner;  Low Calorie/High Protein Oral Supplement  Code Status: Full Code    PT/OT Eval Status: Needed, and SLP    Dispo -continue ICU care    Due to the immediate potential for life-threatening deterioration due to acute hypoxic respiratory failure, seizure,, I spent 33 minutes providing critical care. This time is excluding time spent performing procedures.       Ricardo Noe MD

## 2022-02-17 NOTE — PROGRESS NOTES
Speech Language Pathology  Facility/Department: [facility] ICU  SLP Dysphagia Treatment  and Speech Language Cognitive Assessment     Patient: Mercedes Fitch   : 1970   MRN: [id number]      Dysphagia Treatment Note    Treatment Diagnosis: Oropharyngeal Dysphagia  Pain level: Pt denies pain at this time    Diet level prior to treatment: NPO pending ongoing evaluation    Assessment of Texture Tolerance:  -Impressions: Pt currently alert and verbally responsive on RA but is slow to respond to questions. Pt able to complete oral motor exercises with mild reduced oral motor strength and ROM but with no overt asymmetry noted. Improved bolus control and A-P propulsion noted with all po trials. Premature bolus loss to pharynx suspected with all textures. Prolonged mastication but effective oral clearing noted with solid textures. Clinical symptoms of mild delayed swallow initiation, mild reduced laryngeal excursion and intermittent delayed pharyngeal clearing noted with all textures. No overt signs of aspiration noted with any texture in isolation. However, delayed throat clear noted with thin liquids given in combination with solid textures, due to suspected delayed pharyngeal clearing. Therefore, precautions should be followed with all textures.     Diet and Treatment Recommendations 2022:  1) Advance diet to Dysphagia III Soft and Bite-Sized with thin liquids/no straws/meds with applesauce  2) If respiratory status declines or if po diet is not tolerated, HOLD diet pending SLP re-evaluation    Compensatory strategies: Upright as possible with all PO intake , No straws , Small bites/sips , Swallow 2 times per bite , Eat/feed slowly, Remain upright 30-45 min     Dysphagia Goals:   Pt will functionally tolerate ongoing assessment of swallow function with diet to be determined as indicated (ongoing 2022)    Pt will demonstrate understanding of aspiration risk and precautions via education/demonstration with occasional prompting (ongoing 2/17/2022)   Pt will advance to least restrictive diet as indicated (ongoing 2/17/2022)   If clinical s/s of aspiration/penetration continue to be noted, Pt will participate in Modified Barium Swallow Study (ongoing 2/17/2022)       SPEECH LANGUAGE COGNITIVE ASSESSMENT:   Speech Diagnosis:    Dysarthria , Cognitive-Linguistic Deficits , Speech Language Deficits     Impressions:  Pt awake and verbally responsive but presents with mild dysarthria characterized by slow rate, reduced articulatory precision, and monotone speech. Flat affect and reduced sustained attention noted throughout assessment. Mild impaired auditory processing noted impacting comprehension of complex commands and questions and verbal instructions. No overt expressive aphasia noted. However, mild reduced high level naming and reduced thought organization impacts functional self expression. Mild/mod reduced cognitive linguistic function noted for short term recall and basic verbal problem solving tasks. High level cognitive linguistic function was not fully assessed at this time.     COMPREHENSION  Auditory Comprehension: []WFL [x]Mild   [] Moderate  []Severe  []To be assessed  Functional Ability to Comprehend Basic Commands/Questions   Impaired Complex questions  Impaired Multi-step commands  Impaired Complex/Abstract commands  Impaired Conversation    EXPRESSION  Primary Mode of Expression:  Verbal  Verbal Expression: []WFL []Mild   [] Moderate  []Severe  []To be assessed  Impaired Divergent Naming  Impaired Conversation  Impaired Thought Organization      Pragmatics/Social Functioning: []WFL [x]Mild   [] Moderate  []Severe  []To be assessed  Impaired Eye contact  Flat Affect  Monotone      MOTOR SPEECH  Motor Speech: []WFL [x]Mild   [] Moderate  []Severe  []To be assessed   []Apraxia   [x]Dysarthria   []Decreased Intelligibility    VOICE  Voice: []WFL [x]Mild   [] Moderate  []Severe  []To be assessed  Weak COGNITION  []Unable to be assessed secondary to Aphasia     Overall Orientation : []WFL [x]Mild   [] Moderate  []Severe  []To be assessed   []Unable to be assessed secondary to Aphasia   Oriented x4    Attention: []WFL [x]Mild   [] Moderate  []Severe  []To be assessed  Impaired Sustained Attention  Impaired Alternating Attention    Memory: []WFL [x]Mild   [x] Moderate  []Severe  []To be assessed  Impaired Short-term Memory  Impaired Recall of New Learning     Problem Solving: []WFL [x]Mild   [] Moderate  []Severe  []To be assessed  Impaired Simple Functional Tasks  Impaired Verbal Reasoning    Safety/Judgement: []WFL [x]Mild   [] Moderate  []Severe  []To be assessed  Impulsive   Impaired Flexibility of thought    Plan of care: 3-5 times a week during acute care stay     Discharge Recommendations:  Discharge recommendations to be determined pending ongoing follow-up during acute care stay    EDUCATION:   Provided education regarding role of SLP, results of assessment, recommendations and general speech pathology plan of care. [x] Pt verbalized understanding and agreement   [] Pt requires ongoing learning   [] No evidence of comprehension     GOALS:  Short Term Speech/Language/Cognitive Goals:   Pt will improve articulatory precision, rate and prosody in connected speech via graded tasks to 80%   Pt will improve auditory processing/comprehension of commands and questions to 80%, via graded tasks   Pt will improve short term recall via graded tasks to 80%  Pt will improve verbal expression for functional expression via graded tasks to 80%  Pt will participate in ongoing cognitive assessment with goals to be established as indicated     If patient discharges prior to next visit, this note will serve as discharge.      Time code minutes:  0 min  Total Time:  30 min    Fatoumata Chairez [id number]

## 2022-02-17 NOTE — CONSULTS
Endocrinology    Thanks for calling. Endocrinology consultation is requested for persistent hypocalcemia in a 49year-old type 2 diabetic, hemodialysis patient who recently required intubation for respiratory failure, felt most likely due to aspiration pneumonia after a seizure. Patient was intubated and sedated when first seen and little history available. Hypocalcemia was being treated by appropriate hemodialysis. The ionized serum calcium on 2- was 0.67 and increased to 0.89 by 2-, nml is > 1.12.  THe PTH 16.1 is low despite hypocalcemia. TSH 1.44 is normal but Free T4 0.76 is low. Past Hx ROS: Not obtained as yet    Exam:  EOM's not well evaluated  Neck: No prominent goiter nor thyroid nodule  Chest: Consolidation inferiorly (confirmed on CT along with pleural effusions)  Abd: nontender  Ext: 1+ edema    Assesment: 1. Probably hypoparathyroid as low PTH inappropriate to low serum calcium. Confirmed with repeated ionized calcium measures. 2. Gammopathy (patient has high kappa and lambda free chains) might be contributing as immunoglobins may bind calcium. 3. Probably euthyroid sick  4. Seizure, uncertain cause, old brain MRI do not show an overt reason for seizures, current EEG shows diffuse encephalopathy but no distinct seizure focus. Plan/Recommend:  1. PTH is available in theory as Forteo or Zebedee Lennox; however neither on hospital formulary. 2. Control of hypocalcemia with dialysis likely to continue to improve. 3. Consider heme-oncol evaluation of gammopathy.   4. Vitamin D3 5000 units daily when taking, can add rocaltrol 0.5 mcg twice daily

## 2022-02-17 NOTE — PLAN OF CARE
Problem: Falls - Risk of:  Goal: Will remain free from falls  Description: Will remain free from falls  Outcome: Ongoing  Goal: Absence of physical injury  Description: Absence of physical injury  Outcome: Ongoing     Problem: Skin Integrity:  Goal: Will show no infection signs and symptoms  Description: Will show no infection signs and symptoms  Outcome: Ongoing     Problem: Daily Care:  Goal: Daily care needs are met  Description: Daily care needs are met  Outcome: Ongoing     Problem: Pain:  Goal: Pain level will decrease  Description: Pain level will decrease  Outcome: Ongoing  Goal: Control of acute pain  Description: Control of acute pain  Outcome: Ongoing  Goal: Control of chronic pain  Description: Control of chronic pain  Outcome: Ongoing

## 2022-02-17 NOTE — PROGRESS NOTES
.[address] [phone] was active with this pt prior to admit. Discharge planner notified. Will follow.

## 2022-02-17 NOTE — CONSULTS
[facility]   CONSULT NOTE    2/17/2022 3:57 PM    Patient Information: Eusebio Sanchez   Date of Admit:  2/12/2022  Primary Care Physician:  No primary care provider on file. Requesting Physician:  Ricardo Noe MD    Reason for consult:   Evaluation and recommendations for gammopathy     Chief complaint:    Chief Complaint   Patient presents with    Diarrhea     Pt here week ago, still having diarrhea due to imodium not working, dialysis T/TH/SA,        History of Present Illness:  Eusebio Sanchez is a 49 y.o. male on Ricardo Noe MD service who was admitted on 2/12/2022 for diarrhea. He has ESRD on dialysis and missed dialysis. He had seizure due to hypocalcemia this admission and required intubation. He is now extubated. He has pancytopenia. He was evaluated by [facility] during admission for COVID-19 in November 2021. He had pancytopenia at that time. SPEP was normal, he did not have significantly elevated light chains at that time, however, IgG was elevated. He has a history of copper and zinc deficiency. He not currently on any copper or zinc supplements. No fevers, chills or night sweats. No recent weight loss. No bruising, bleeding or petechiae. Past Medical History:     has a past medical history of Blood transfusion, Chronic pain, Diabetes mellitus ([address] Dialysis, Guillain [place] syndrome, Hemodialysis patient ([address] HTN (hypertension), benign, Low blood pressure, Pancreatitis, Peripheral Neuropathy, Pneumonia, Renal failure, and Status epilepticus ([address]      Past Surgical History:    Past Surgical History:   Procedure Laterality Date    BRONCHOSCOPY N/A 2/14/2022    BRONCHOSCOPY DIAGNOSTIC OR CELL [address] ONLY performed by Reji Ventura MD at [address] [place] [address] N/A 12/17/2021    COLONOSCOPY DIAGNOSTIC performed by Julieth Vu MD at [facility]      left arm/currently non functioning    KIDNEY TRANSPLANT  9/1994 R-kidney    TOE SURGERY  10-8-2010    cut and realign 1st, 2nd, toe left foot , fixation 1st, 2nd toe left foot    TUNNELED VENOUS CATHETER PLACEMENT      left chest for dialysis    TUNNELED VENOUS PORT PLACEMENT      UPPER GASTROINTESTINAL ENDOSCOPY N/A 12/17/2021    EGD ESOPHAGOGASTRODUODENOSCOPY performed by Giselle Gamez MD at [facility] ENDOSCOPY        Current Medications:     calcium carbonate  500 mg Oral TID    midazolam  2 mg IntraVENous Once    pantoprazole  40 mg IntraVENous Daily    vitamin D  50,000 Units Oral Weekly    cefepime  1,000 mg IntraVENous Q24H    levothyroxine  25 mcg Oral Daily    midodrine  5 mg Oral BID WC    sodium chloride flush  5-40 mL IntraVENous 2 times per day    insulin lispro  0-12 Units SubCUTAneous TID WC    insulin lispro  0-6 Units SubCUTAneous Nightly    heparin (porcine)  5,000 Units SubCUTAneous 3 times per day       Allergies: Allergies   Allergen Reactions    Influenza Virus Vaccine      Caused paralysis        Social History:    reports that he has never smoked. He has never used smokeless tobacco. He reports that he does not drink alcohol and does not use drugs. Family History:     family history includes Diabetes in his mother. ROS:      Constitutional:  No weight loss, No fever, No chills, No night sweats.     Eyes:  No impairment or change in vision  ENT / Mouth:  No pain, abnormal ulceration, bleeding, nasal drip or change in voice or hearing  Cardiovascular:  No chest pain, palpitations, new edema, or calf discomfort  Respiratory:  No pain, hemoptysis, change to breathing  Gastrointestinal:  No pain, cramping, jaundice, change to eating and bowel habits  Urinary:  No pain, bleeding or change in continence  Musculoskeletal:  No redness, pain, edema or weakness  Skin:  No pruritus, rash, change to nodules or lesions  Neurologic:  No discomfort, change in mental status, speech, sensory or motor activity  Psychiatric:  No change in concentration or change to affect or mood  Endocrine:  No hot flashes, increased thirst, or change to urine production  Hematologic: No petechiae, ecchymosis or bleeding  Lymphatic:  No lymphadenopathy or lymphedema  Allergy / Immunologic:  No eczema, hives, frequent or recurrent infections      PHYSICAL EXAM:    Vitals:  Vitals:    02/17/22 1100   BP: 124/64   Pulse: 80   Resp: 8   Temp:    SpO2: 92%        Intake/Output Summary (Last 24 hours) at 2/17/2022 1557  Last data filed at 2/17/2022 1112  Gross per 24 hour   Intake 3364.74 ml   Output --   Net 3364.74 ml      Wt Readings from Last 3 Encounters:   02/17/22 209 lb 10.5 oz (95.1 kg)   12/18/21 185 lb 10 oz (84.2 kg)   12/11/21 170 lb (77.1 kg)        General appearance: Appears comfortable. Eyes: Sclera clear, pupils equal  ENT: Moist mucus membranes, no thrush  Neck: Trachea midline, symmetrical  Cardiovascular: Regular rhythm, normal S1, S2. No murmur, gallop, rub. No edema in  lower extremities  Respiratory: Clear to auscultation bilaterally. No wheeze. Good inspiratory effort  Gastrointestinal: Abdomen soft, not tender, not distended, normal bowel sounds  Musculoskeletal: No cyanosis in digits, warm extremities  Neurologic: Cranial nerves grossly intact, no motor or speech deficits. Psychiatric: Normal affect. Alert and oriented to time, place and person.   Skin: Warm, dry, normal turgor, no rash    DATA:  CBC:   Lab Results   Component Value Date    WBC 3.2 02/17/2022    RBC 4.09 02/17/2022    HGB 10.1 02/17/2022    HCT 31.4 02/17/2022    MCV 76.8 02/17/2022    MCH 24.8 02/17/2022    MCHC 32.3 02/17/2022    RDW 17.0 02/17/2022    PLT 41 02/17/2022    MPV 8.5 02/17/2022     BMP:  Lab Results   Component Value Date     02/17/2022    K 4.2 02/17/2022    K 5.2 02/03/2022    CL 91 02/17/2022    CO2 22 02/17/2022    BUN 43 02/17/2022    CREATININE 6.2 02/17/2022    CALCIUM 6.7 02/17/2022    GFRAA 12 02/17/2022    GFRAA 12 06/09/2013    GFRAA 12 06/09/2013    KIARRA 10 02/17/2022    GLUCOSE 48 02/17/2022     Magnesium:   Lab Results   Component Value Date    MG 1.60 02/14/2022    MG 1.60 02/14/2022    MG 1.90 02/12/2022     LIVER PROFILE: No results for input(s): AST, ALT, LIPASE, BILIDIR, BILITOT, ALKPHOS in the last 72 hours. Invalid input(s): AMYLASE,  ALB  PT/INR:    Lab Results   Component Value Date    PROTIME 12.1 12/11/2021    PROTIME 12.5 11/11/2021    PROTIME 16.3 04/30/2014    INR 1.07 12/11/2021    INR 1.10 11/11/2021    INR 1.47 04/30/2014     IMAGING:    Narrative   EXAMINATION:   CT OF THE ABDOMEN AND PELVIS WITHOUT CONTRAST 2/12/2022 3:00 pm       TECHNIQUE:   CT of the abdomen and pelvis was performed without the administration of   intravenous contrast. Multiplanar reformatted images are provided for review. Dose modulation, iterative reconstruction, and/or weight based adjustment of   the mA/kV was utilized to reduce the radiation dose to as low as reasonably   achievable.       COMPARISON:   12/18/2021       HISTORY:   ORDERING SYSTEM PROVIDED HISTORY: abd pain, diarrhea   TECHNOLOGIST PROVIDED HISTORY:   Reason for exam:->abd pain, diarrhea   Additional Contrast?->None   Decision Support Exception - unselect if not a suspected or confirmed   emergency medical condition->Emergency Medical Condition (MA)   Reason for Exam: Diarrhea (Pt here week ago, still having diarrhea due to   imodium not working, dialysis T/TH/SA, )       FINDINGS:   Lower Chest: Small layering bilateral pleural effusions.  Diffuse coronary   calcifications.  Right femoral dialysis catheter extends into the right   atrium.  Bibasilar atelectasis with interlobular septal thickening and mild   dependent ground-glass opacity.       Organs: Limited evaluation due lack of intravenous contrast.  Liver   unremarkable.  Gallbladder, if present, is not distended.  No radiopaque   gallstone is seen.  Pancreas, adrenal glands, and spleen are unremarkable.    There is severe bilateral renal atrophy with renal parenchymal   calcifications.  No hydronephrosis or urinary tract calculus is evident.       GI/Bowel: There is no evidence of bowel obstruction.  The appendix is not   definitely visualized.  No CT evidence of acute appendicitis.       Pelvis: No pelvic mass.  Urinary bladder is nondistended.       Peritoneum/Retroperitoneum: No free air or free fluid.  Diffuse vascular   calcifications.  Normal abdominal aortic caliber.       Bones/Soft Tissues: No acute osseous abnormality.  Diffuse anasarca.  Diffuse   mottled appearance of the visualized osseous structures compatible with renal   osteodystrophy.  Stable broad-based ventral abdominal wall hernia containing   loops of small and large bowel.           Impression   1. Stable broad-based ventral abdominal wall hernia containing loops of small   large bowel at associated obstruction. 2. Severe renal atrophy with diffuse vascular calcifications and findings of   renal osteodystrophy. 3. Small layering bilateral pleural effusions with mild pulmonary edema and   anasarca.          ADDENDUM:   There is a transcription error in the impression. The 1st item should    read   as follows:      Stable broad-based ventral abdominal wall hernia containing loops of small   large bowel without associated obstruction. Narrative   EXAMINATION:   ONE XRAY VIEW OF THE CHEST       2/15/2022 6:54 am       COMPARISON:   Prior study(s) most recent 02/14/2022.       HISTORY:   ORDERING SYSTEM PROVIDED HISTORY: aspiration pneumonia   TECHNOLOGIST PROVIDED HISTORY:   Reason for exam:->aspiration pneumonia   Reason for Exam:  aspiration pneumonia       FINDINGS:   The heart size is indeterminate, obscured with bilateral airspace disease.    There is central airspace disease extending peripheral bilaterally.  The   diaphragms are obscured bilaterally as well.  The endotracheal tube remains   in satisfactory position terminating approximately 3.5 cm above the lux. An NG tube extends well into the stomach.  There is no pneumothorax. Possible pleural effusion also contributing to the diaphragmatic obscuration. Compared to the prior study, there is improved aeration in the left lung.           Impression   Moderate bilateral airspace disease.  This could represent a combination of   edema and or pneumonia.       Moderate improved aeration of the left lung.         Narrative   EXAMINATION:   CT OF THE CHEST WITHOUT CONTRAST 2/15/2022 12:10 pm       TECHNIQUE:   CT of the chest was performed without the administration of intravenous   contrast. Multiplanar reformatted images are provided for review. Dose   modulation, iterative reconstruction, and/or weight based adjustment of the   mA/kV was utilized to reduce the radiation dose to as low as reasonably   achievable.       COMPARISON:   None.       HISTORY:   ORDERING SYSTEM PROVIDED HISTORY: dyspnea   TECHNOLOGIST PROVIDED HISTORY:   Reason for exam:->dyspnea   Reason for Exam: dyspnea       FINDINGS:   Mediastinum: Tip of ET tube seen in midthoracic trachea.  Secretions are seen   in the trachea and airways. Scattered small mediastinal and hilar nodes are   noted.  Severe coronary artery calcification is seen.  No pericardial   effusion is seen.  Tip of NG tube seen in stomach. Lower extremity dialysis   catheter is seen.  Calcification is seen in the SVC, which may relate to   chronic thrombus.  Small mediastinal and hilar nodes are noted       Lungs/pleura:  Moderate right-sided pleural effusion is seen. [place] is   adjacent consolidation the right lung base.  Hazy ground-glass opacity seen   in the right middle lobe       Moderate left-sided pleural effusion is seen. [place] Valle is adjacent   consolidation left lower left upper lobe.  Patchy consolidation is seen in   the left upper and left lower lobe.  Air bronchograms are seen on the left in   the left upper and left lower lobe.  Air bronchograms are seen on the right   in the right lower lobe.     Upper Abdomen: Renal kidneys native kidneys are trophic.  Scattered   calcifications are seenCalcifications also seen within the pancreas.  There   are clips from prior cholecystectomy       Soft Tissues/Bones: There is body wall anasarca seen.       Scattered areas of sclerosis are seen throughout the bones.           Impression   Bilateral airspace disease, suspicious for pneumonia.       Superimposed pleural effusions are seen with body wall anasarca, compatible   with a component of fluid overload.       Findings of renal osteodystrophy       RECOMMENDATIONS:   Unavailable           IMPRESSION/RECOMMENDATIONS:    Active Problems:    Hypocalcemia    JOANNE (acute kidney injury) ([address]    Diarrhea    New onset seizure (HCC)    Acute hypoxemic respiratory failure (HCC)    HTN (hypertension), benign    Aspiration into airway    Atelectasis    End stage renal disease due to benign hypertension ([address]  Resolved Problems:    * No resolved hospital problems. *       52 year old male with a history of DMII, ESRD on dialysis, HTN and seizures. He has:    1. Pancytopenia  -Anemia partially d/t ESRD  -History of copper and zinc deficiency, will recheck these levels.  -IgG was elevated in Nov 2021, will check SPEP, immunofixation, SFLC, and QIG. -Peripheral smear will be reviewed. -Transfuse PRBC if hgb <7  -Transfuse platelets if plt <49G or 50k with active bleeding.  -Hold anticoagulation if platelet count <11K. 2. Hypocalcemia  -Calcium has been replaced.  -Endocrinology is following.   -Concern for immunoglobulins binding to calcium resulting in hypocalcemia. Checking SPEP, immunofixation, SFLC, and QIG. 3. Acute respiratory failure with hypoxia  -Extubated. -Pulmonology following. 4. ESRD on dialysis  -Management per nephrology. This plan was discussed with the patient and he/she verbalized understanding.  Thank you for allowing us to participate in the care of this patient. Jose Hearing, [address] [place] [address]  [phone]      Agree with the above note. Mild pancytopenia. Changed CBC to CBC with differential to evaluate ANC. Peripheral blood smear was reviewed showed echinocytes consistent with know history of renal dysfunction, NO significant shistocytes were seen thereby TTP and HUS are highly unlikely. NO dysplastic findings were seen. Showed true thrombocytopenia with some giant platelets, thrombocytopenia mostly related to acute illness. Will get haptoglobin and LDH to rule out hemolysis. Repeat free light chains and repeat SPEP pending.     Yuli Lyn MD  [facility]

## 2022-02-17 NOTE — PROGRESS NOTES
[facility]  Progress note      Reason for Consult: Respiratory failure, aspiration, seizure  Requesting Physician: Dr. Diana Muñoz:   Amilcar Sandra / HPI:                The patient is a 49 y.o. 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 Presently on 2 L/min oxygen supplementation. He is eating breakfast well this morning. He is alert and answering questions appropriately.       Past Surgical History:        Procedure Laterality Date    BRONCHOSCOPY N/A 2/14/2022    BRONCHOSCOPY DIAGNOSTIC OR CELL [address] ONLY performed by Adelia Beavers MD at [address] [place] [address] N/A 12/17/2021    COLONOSCOPY DIAGNOSTIC performed by John Perez MD at [facility]      left arm/currently non functioning    KIDNEY TRANSPLANT  9/1994    R-kidney    TOE SURGERY  10-8-2010    cut and realign 1st, 2nd, toe left foot , fixation 1st, 2nd toe left foot    TUNNELED VENOUS CATHETER PLACEMENT      left chest for dialysis    TUNNELED VENOUS PORT PLACEMENT      UPPER GASTROINTESTINAL ENDOSCOPY N/A 12/17/2021    EGD ESOPHAGOGASTRODUODENOSCOPY performed by John Perez MD [facility] END[facility]     Current Medications:    Current Facility-Administered Medications: HYDROmorphone HCl PF (DILAUDID) injection 1 mg, 1 mg, IntraVENous, Q4H PRN  HYDROmorphone (DILAUDID) injection 2 mg, 2 mg, IntraVENous, Q4H PRN  calcium carbonate (TUMS) chewable tablet 500 mg, 500 mg, Oral, TID  midazolam PF (VERSED) injection 2 mg, 2 mg, IntraVENous, Once  pantoprazole (PROTONIX) injection 40 mg, 40 mg, IntraVENous, Daily  vitamin D (ERGOCALCIFEROL) capsule 50,000 Units, 50,000 Units, Oral, Weekly  cefepime (MAXIPIME) 1000 mg IVPB minibag, 1,000 mg, IntraVENous, Q24H  oxyCODONE (OXY-IR) immediate release tablet 30 mg, 30 mg, Oral, Q8H PRN  levothyroxine (SYNTHROID) tablet 25 mcg, 25 mcg, Oral, Daily  midodrine (PROAMATINE) tablet 5 mg, 5 mg, Oral, BID WC  glucose (GLUTOSE) 40 % oral gel 15 g, 15 g, Oral, PRN  dextrose 50 % IV solution, 12.5 g, IntraVENous, PRN  glucagon (rDNA) injection 1 mg, 1 mg, IntraMUSCular, PRN  dextrose 5 % solution, 100 mL/hr, IntraVENous, PRN  sodium chloride flush 0.9 % injection 5-40 mL, 5-40 mL, IntraVENous, 2 times per day  sodium chloride flush 0.9 % injection 5-40 mL, 5-40 mL, IntraVENous, PRN  0.9 % sodium chloride infusion, 25 mL, IntraVENous, PRN  ondansetron (ZOFRAN-ODT) disintegrating tablet 4 mg, 4 mg, Oral, Q8H PRN **OR** ondansetron (ZOFRAN) injection 4 mg, 4 mg, IntraVENous, Q6H PRN  polyethylene glycol (GLYCOLAX) packet 17 g, 17 g, Oral, Daily PRN  acetaminophen (TYLENOL) tablet 650 mg, 650 mg, Oral, Q6H PRN **OR** acetaminophen (TYLENOL) suppository 650 mg, 650 mg, Rectal, Q6H PRN  insulin lispro (1 Unit Dial) 0-12 Units, 0-12 Units, SubCUTAneous, TID WC  insulin lispro (1 Unit Dial) 0-6 Units, 0-6 Units, SubCUTAneous, Nightly  heparin (porcine) injection 5,800 Units, 5,800 Units, IntraCATHeter, PRN  heparin (porcine) injection 5,000 Units, 5,000 Units, SubCUTAneous, 3 times per day  LORazepam (ATIVAN) tablet 0.5 mg, 0.5 mg, Oral, Nightly PRN    Allergies   Allergen Reactions    Influenza Virus Vaccine      Caused paralysis       Social History:    TOBACCO:   reports that he has never smoked. He has never used smokeless tobacco.  ETOH:   reports no history of alcohol use.   Patient currently lives independently  Environmental/chemical exposure: Unknown    Family History:       Problem Relation Age of Onset    Diabetes Mother      REVIEW OF SYSTEMS:    KAYLA is unobtainable due to his critical illness. Objective:   PHYSICAL EXAM:      VITALS:  /64   Pulse 80   Temp 96.2 °F (35.7 °C) (Temporal)   Resp 8   Ht 6' 4\" (1.93 m)   Wt 209 lb 10.5 oz (95.1 kg)   SpO2 92%   BMI 25.52 kg/m²      24HR INTAKE/OUTPUT:      Intake/Output Summary (Last 24 hours) at 2/17/2022 1448  Last data filed at 2/17/2022 1112  Gross per 24 hour   Intake 3364.74 ml   Output --   Net 3364.74 ml     CONSTITUTIONAL: Sedated on mechanical ventilation  NECK:  Supple, symmetrical, trachea midline, no adenopathy, thyroid symmetric, not enlarged and no tenderness, skin normal  LUNGS:  no increased work of breathing and clear to auscultation. No accessory muscle use  CARDIOVASCULAR: S1 and S2, no edema and no JVD  ABDOMEN: Large abdominal wall hernia, normal bowel sounds, non-distended and no masses palpated, and no tenderness to palpation. No hepatospleenomegaly  LYMPHADENOPATHY:  no axillary or supraclavicular adenopathy. No cervical adnenopathy  PSYCHIATRIC: Sedated on mechanical ventilation  MUSCULOSKELETAL: No obvious misalignment or effusion of the joints. No clubbing, cyanosis of the digits. SKIN:  normal skin color, texture, turgor and no redness, warmth, or swelling.  No palpable nodules    DATA:    Old records have been reviewed    CBC:  Recent Labs     02/15/22  0505 02/16/22  0500 02/17/22  0515   WBC 3.6* 2.7* 3.2*   RBC 3.67* 3.81* 4.09*   HGB 9.2* 9.4* 10.1*   HCT 28.4* 29.4* 31.4*   PLT 67* 35* 41*   MCV 77.4* 77.2* 76.8*   MCH 25.1* 24.7* 24.8*   MCHC 32.4 32.0 32.3   RDW 16.9* 17.0* 17.0*      BMP:  Recent Labs     02/15/22  0505 02/16/22  0500 02/17/22  0515   * 129* 129*   K 4.1 4.5 4.2   CL 93* 92* 91*   CO2 20* 19* 22   BUN 62* 69* 43*   CREATININE 7.2* 7.5* 6.2*   CALCIUM 6.8* 6.4* 6.7*   GLUCOSE 43* 75 48*      ABG:  Recent Labs     02/15/22  0858   PHART 7.334*   XZB6JTP 39.8   PO2ART 98.9   CEU4LOM 21.2   [id number] 95.5   BEART -4.3*     Procalcitonin  Recent Labs     02/14/22  1605   PROCAL 2.32*       No results found for: BNP  Lab Results   Component Value Date    CKTOTAL 130 12/11/2021    TROPONINI 0.07 (H) 12/11/2021           Radiology Review:  All pertinent images / reports were reviewed as a part of this visit. Assessment:     1. Acute respiratory failure  2. Aspiration into airway  3. Atelectasis of the left lung  4. Generalized tonic-clonic seizure  5. Hypocalcemia  6. ESRD on hemodialysis      Plan:     I have reviewed laboratories, medical records and images for this visit    He has tolerated liberation from mechanical ventilation. Now on 2 L/min nasal cannula oxygen supplement with good saturations. Also eating a pretty good breakfast this morning. Speech is following him as he remains an aspiration risk. Stop his D10  Continue sliding scale insulin    Patient is on cefepime  Procalcitonin is 2.32 in the presence of renal failure  No leukocytosis  No guiding culture data  Bronchoscopy cultures are also negative so far  Recommend 7-day course of antibiotic therapy. He does have end-stage renal disease and receives hemodialysis .   Nephrology is following    Seizure with thought secondary to hypocalcemia  This is been corrected  No further seizure activity  EEG was abnormal with diffuse slowing but no obvious seizure activity

## 2022-02-17 NOTE — DISCHARGE INSTR - COC
Continuity of Care Form    Patient Name: Esmer Parmar   :  1970  MRN:  [id number]    Admit date:  2022  Discharge date:  ***    Code Status Order: Full Code   Advance Directives:   Advance Care Flowsheet Documentation       Date/Time Healthcare Directive Type of Healthcare Directive Copy in [address] Agent's Name Healthcare Agent's Phone Number    22 [phone] Unknown, patient unable to respond due to medical condition -- -- -- -- --            Admitting Physician:  Emeterio Gipson MD  PCP: No primary care provider on file.     Discharging Nurse: [facility] Unit/Room#: [id number]/6956-22  Discharging Unit Phone Number: ***    Emergency Contact:   Extended Emergency Contact Information  Primary Emergency Contact: Marry Villa  Address: [address] [place] [address] [employer]           [email or web address], [address] Phone: [phone]  Relation: Parent  Secondary Emergency Contact: Yoli Murray  Address: [address] [place] [address] [employer]           [email or web address], [address]  [place] Phone: [phone]  Relation: Niece/Nephew    Past Surgical History:  Past Surgical History:   Procedure Laterality Date    BRONCHOSCOPY N/A 2022    BRONCHOSCOPY DIAGNOSTIC OR CELL [address] ONLY performed by Ying Temple MD at [place] N/A 2021    COLONOSCOPY DIAGNOSTIC performed by Chelsy Stevens MD at [address] [place] [address]      left arm/currently non functioning    KIDNEY TRANSPLANT  1994    R-kidney    TOE SURGERY  10-8-2010    cut and realign 1st, 2nd, toe left foot , fixation 1st, 2nd toe left foot    TUNNELED VENOUS CATHETER PLACEMENT      left chest for dialysis    TUNNELED VENOUS PORT PLACEMENT      UPPER GASTROINTESTINAL ENDOSCOPY N/A 2021    EGD ESOPHAGOGASTRODUODENOSCOPY performed by Chelsy Stevens MD at [facility] ENDOSCOPY       Immunization History: There is no immunization history for the selected administration types on file for this patient.     Active Problems:  Patient Active Problem List   Diagnosis Code    ESRD (end stage renal disease) ([facility] [address] N18.6    Anemia D64.9    Toe deformity M20.60    Hypocalcemia E83.51    Peripheral neuropathy G62.9    Hypogonadism, male E29.1    Hyperkalemia E87.5    Acute pancreatitis K85.90    JOANNE (acute kidney injury) ([facility] [address] N17.9    Chronic pancreatitis (HCC) K86.1    Pericardial effusion I31.3    Hypotension I95.9    Diabetes mellitus (HCC) E11.9    Sepsis ([facility] [address] A41.9    Diabetic foot ulcer ([facility] [address] E11.621, L97.509    Pancreatitis K85.90    Chronic pain G89.29    C. difficile diarrhea A04.72    Pneumonia J18.9    Status epilepticus ([facility] [address] G40.901    Diarrhea R19.7    New onset seizure ([facility] [address] R56.9    Acute respiratory failure with hypoxia ([facility]) J96.01    HTN (hypertension), benign I10       Isolation/Infection:   Isolation            Contact          Patient Infection Status       Infection Onset Added Last Indicated Last Indicated By Review Planned Expiration Resolved Resolved By    [facility] in [place] 10/26/12 12/12/21 12/12/21 Elver Denney RN        Added from external infection; from [address] [facility] 22 Clostridium difficile toxin/antigen (Ordered)   22 Lori Pal RN    C-diff Rule Out 22 GI Bacterial Pathogens By PCR (Ordered)   22 Rule-Out Test Resulted    COVID-19 21 COVID-19   21 Elver Denney RN    Original diagnosis with COVID was 2021; has been over 20 days; doesn't need isolation    COVID-19 (Rule Out) 21 COVID-19 (Ordered)   21 Rule-Out Test Resulted    COVID-19 21 COVID-19   21     COVID-19 (Rule Out) 21 COVID-19 (Ordered)   21 Rule-Out Test Resulted            Nurse Assessment:  Last Vital Signs: BP 124/64   Pulse 80   Temp 96.2 °F (35.7 °C) (Temporal)   Resp 8   Ht 6' 4\" (1.93 m)   Wt 209 lb 10.5 oz (95.1 kg)   SpO2 92%   BMI 25.52 kg/m²     Last documented pain score (0-10 scale): Pain Level: 7  Last Weight:   Wt Readings from Last 1 Encounters:   02/17/22 209 lb 10.5 oz (95.1 kg)     Mental Status:  {IP PT MENTAL STATUS:01739}    IV Access:  { CLARISSA IV ACCESS:033792429}    Nursing Mobility/ADLs:  Walking   {P DME PLLD:140626514}  Transfer  {P DME VGYJ:758662759}  Bathing  {P DME UKSL:632413981}  Dressing  {P DME DFZQ:639525556}  Toileting  {P DME XJMM:395056732}  Feeding  {[facility] DME KZAW:403040098}  Med Admin  {[facility] DME NSPC:604829121}  Med Delivery   {[facility] MED Delivery:091925530}    Wound Care Documentation and Therapy:  Pressure Ulcer 04/30/14  Outer;Right (Active)   Number of days: 1854       Wound 07/09/10 Other (Comment) Toe (Comment  which one) Anterior;Right toe bent down, pt states bumped. ? friction from shoe. (Active)   Number of days: 4240       Wound 07/09/10 Other (Comment) Toe (Comment  which one) Anterior; Left granulation tissue center, toe bent down permanently.  open area at joint (Active)   Number of days: 4240       Wound 08/08/14 Diabetic Ulcer Foot Dorsal white/pink tissue, drainage, diabetic ulcer (Active)   Number of days: 2749       Incision Foot Left (Active)   Number of days:        Wound 11/11/21 Foot Dorsal; Right (Active)   Number of days: 97       Wound 12/15/21 Buttocks Right (Active)   Number of days: 63       Wound 02/12/22 Buttocks Right;Left healing wound right/ left  buttocks (Active)   Wound Image   02/14/22 1552   Wound Etiology Pressure Stage  2 02/17/22 0800   Dressing Status New dressing applied 02/15/22 1600   Dressing/Treatment Other (comment) 02/17/22 0800   Wound Assessment Dry;Devitalized tissue 02/17/22 0800   Louisa-wound Assessment Intact 02/17/22 0800   Margins Defined edges 02/17/22 0800   Number of days: 4        Elimination:  Continence: Bowel: {YES / NZ:11858}  Bladder: {YES / KI:30347}  Urinary Catheter: {Urinary Catheter:381464501}   Colostomy/Ileostomy/Ileal Conduit: {YES / VS:07306}       Date of Last BM: ***    Intake/Output Summary (Last 24 hours) at 2022 1212  Last data filed at 2022 1112  Gross per 24 hour   Intake 3716.74 ml   Output --   Net 3716.74 ml     I/O last 3 completed shifts:   In: 5689.6 [I.V.:4030.6; NG/GT:859]  Out: 2800     Safety Concerns:     [employer] Safety Concerns:717826596}    Impairments/Disabilities:      [employer] Impairments/Disabilities:279718499}    Nutrition Therapy:  Current Nutrition Therapy:   [employer] Diet List:712523490}    Routes of Feeding: {CHP DME Other Feedings:868539159}  Liquids: {Slp liquid thickness:68620}  Daily Fluid Restriction: {CHP DME Yes amt example:180611967}  Last Modified Barium Swallow with Video (Video Swallowing Test): {Done Not Done MMFO:143258168}    Treatments at the Time of Hospital Discharge:   Respiratory Treatments: ***  Oxygen Therapy:  {Therapy; copd oxygen:29020}  Ventilator:    { CC Vent JHOS:955597643}    Rehab Therapies: {THERAPEUTIC INTERVENTION:0287044696}  Weight Bearing Status/Restrictions: [address]  Weight Bearin}  Other Medical Equipment (for information only, NOT a DME order):  {EQUIPMENT:675986216}  Other Treatments: ***    Patient's personal belongings (please select all that are sent with patient):  {CHP DME Belongings:682058444}    RN SIGNATURE:  {Esignature:417051375}    CASE MANAGEMENT/SOCIAL WORK SECTION    Inpatient Status Date: ***    Readmission Risk Assessment Score:  Readmission Risk              Risk of Unplanned Readmission:  42           Discharging to Facility/ Agency   Name: Ellen Hager will call for Appointment  Phone: [phone]  Fax: [phone]     Dialysis Facility (if applicable)   Name:  Address:  Dialysis Schedule:  Phone:  Fax:    / signature: {Esignature:571641245}    PHYSICIAN SECTION    Prognosis: {Prognosis:8519020506}    Condition at Discharge: Leyda Oliveira Patient Condition:327794353}    Rehab Potential (if transferring to Rehab): {Prognosis:7332789409}    Recommended Labs or Other Treatments After Discharge: ***    Physician Certification: I certify the above information and transfer of Anni Wills  is necessary for the continuing treatment of the diagnosis listed and that he requires {Admit to Appropriate Level of Care:63326} for {GREATER/LESS:245860404} 30 days.      Update Admission H&P: {CHP DME Changes in BLNOU:950607040}    PHYSICIAN SIGNATURE:  {Esignature:683943281}

## 2022-02-17 NOTE — PROGRESS NOTES
Nutrition Note    RECOMMENDATIONS  1. PO Diet: Continue current diet   2. ONS: Offer Ensure High Protein BID     NUTRITION ASSESSMENT   Pt tolerated extubation 2/16. SLP assessed pt this morning and recommended a dysphagia soft and bite-sized diet. Pt seen over breakfast tray this morning; had just started eating but stated he was \"starving. \" Ordered two portions of meatloaf for lunch. Pt does have increased nutrient needs d/t stage II pressure ulcer to buttocks. Agreeable to trial Ensure High Protein BID.  Nutrition Related Findings: +1 generalized and BLE edema. Na+ 129. LBM 2/14. +6.2 liters.  Wounds: Stage II,Pressure Injury   Nutrition Education:  Education not indicated    Nutrition Goals: Pt will consume at least 50% of meals and supplements     MALNUTRITION ASSESSMENT   Malnutrition Status: Insufficient data    NUTRITION DIAGNOSIS   Increased nutrient needs related to increase demand for energy/nutrients as evidenced by wounds    CURRENT NUTRITION THERAPIES  ADULT DIET; Dysphagia - Soft and Bite Sized; No Drinking Straws     PO Intake: Unable to assess   PO Supplement Intake:None Ordered    ANTHROPOMETRICS   Current Height: 6' 4\" (193 cm)   Current Weight: 209 lb 10.5 oz (95.1 kg)     Admission weight: Admission Body Weight: 208 lb 5.4 oz (94.5 kg)   Ideal Body Weight (IBW): 202 lbs  (92 kg)     Usual Bodyweight 160 lb 15 oz (73 kg) (EDW)       BMI: 25.5    COMPARATIVE STANDARDS  Energy (kcal):  3909-3373     Protein (g):   grams       Fluid (ml/day):  6233-1398 mL    The patient will be monitored per nutrition standards of care. Consult dietitian if additional nutrition interventions are needed prior to RD reassessment.      Tejas Dunn, [address], [place]    Contact: [phone]

## 2022-02-17 NOTE — PROCEDURES
Bronchoscopy Procedure Note    Date of Operation: 2/17/22  Pre-op Diagnosis: mucus plugging  Post-op Diagnosis: same  Surgeon: Jessi Pa MD  Anesthesia: None, intubated and on mechanical ventialtion  Estimate Blood Loss: Minimal   Complications: None    Indications and History:  The patient is 49 y.o. male with mucus plugging. Heh ad witnessed aspiration. The risks, benefits, complications, treatment options and expected outcomes were discussed with the patient. The possibilities of reaction to medication, pulmonary aspiration, perforation of a viscus, bleeding, failure to diagnose a condition and creating a complication requiring transfusion or operation were discussed with the patient who freely signed the consent. Description of Procedure: The patient was taken to endoscopy suite, identified as Evy Royal and the procedure verified as flexible fiberoptic bronchoscopy. A time out was held and the above information confirmed. After the induction of topical nasopharyngeal anesthesia, the patient was placed in appropriate position and the bronchoscope was passed through the LMA . The vocal cords were visualized and total of 3 ml of 2% Lidocaine was topically placed onto the cords. The cords were normal. The scope was then passed into the trachea. Lidocaine 2% 3 ml was used topically on the lux. Careful inspection of the tracheal lumen was accomplished. The scope was sequentially passed into the left main and then left upper and lower bronchi and segmental bronchi. The scope was then withdrawn and advanced into the right main bronchus and then into the RUL, RML, and RLL bronchi and segmental bronchi.      Endobronchial findings:   Trachea: Normal mucosa   Lux: Normal mucosa   Right main bronchus: Normal mucosa   Right upper lobe bronchus: Normal mucosa   Right middle lobe bronchus: Normal mucosa   Right lower lobe bronchus: Normal mucosa   Left main bronchus: Normal mucosa   Left upper lobe bronchus: Normal mucosa   Left lower lobe bronchus: Normal mucosa     Mucus and mucus plugs were aspirated with therapeutic bronchoscopy. The patient was taken to the endoscopy recovery area in satisfactory condition. Recommendation:   1. F/U on culture results   2. F/U on cytology results     Attestation: I performed the procedure.   Lucas Mckeon MD

## 2022-02-17 NOTE — PROGRESS NOTES
[facility] Watson  Neurology Follow-up  [facility] Neurology    Date of Service: 2/17/2022    Subjective:   CC: Follow up today regarding: Acute encephalopathy and breakthrough seizure. Events noted. Chart and lab reviewed. The patient is awake and alert. He was extubated yesterday. He describes history of infrequent seizure maybe once or twice in his life. Last seizure was more than 2 years ago. He does not take an AED at this point. Denies any headache, neck or back pain. Generalized diffuse weakness which is chronic. No other new symptoms today. Other review of system was unremarkable. ROS : A 10-12 system review obtained and updated today and is unremarkable except as mentioned  in my interval history. family history includes Diabetes in his mother.     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    Hemodialysis patient ([facility] [address] [facility]     HTN (hypertension), benign     Low blood pressure     Pancreatitis     Peripheral Neuropathy     Pneumonia 11/11/2021    Renal failure 1992    on dialysis 3x/wk  (T, Th, Sat)    Status epilepticus ([address] 12/11/2021     Current Facility-Administered Medications   Medication Dose Route Frequency Provider Last Rate Last Admin    HYDROmorphone HCl PF (DILAUDID) injection 1 mg  1 mg IntraVENous Q4H PRN Nagi Stern MD        HYDROmorphone (DILAUDID) injection 2 mg  2 mg IntraVENous Q4H PRN Nagi Stern MD   2 mg at 02/17/22 1015    calcium carbonate (TUMS) chewable tablet 500 mg  500 mg Oral TID Rox David MD   500 mg at 02/17/22 1129    midazolam PF (VERSED) injection 2 mg  2 mg IntraVENous Once Danilo Elliott MD        pantoprazole (PROTONIX) injection 40 mg  40 mg IntraVENous Daily Patricia Merida MD   40 mg at 02/17/22 0828    vitamin D (ERGOCALCIFEROL) capsule 50,000 Units  50,000 Units Oral Weekly Ronnie Riggs MD        cefepime (MAXIPIME) 1000 mg IVPB minibag  1,000 mg IntraVENous Q24H Roselia Castillo MD   Stopped at 02/16/22 2059    oxyCODONE (OXY-IR) immediate release tablet 30 mg  30 mg Oral Q8H PRN Deniz Drew MD   30 mg at 02/15/22 2102    levothyroxine (SYNTHROID) tablet 25 mcg  25 mcg Oral Daily Darren Guo MD   25 mcg at 02/16/22 0510    midodrine (PROAMATINE) tablet 5 mg  5 mg Oral BID  Darren Guo MD   5 mg at 02/17/22 1015    glucose (GLUTOSE) 40 % oral gel 15 g  15 g Oral PRN Darren Guo MD        dextrose 50 % IV solution  12.5 g IntraVENous PRN Darren Guo MD   25 g at 02/17/22 0641    glucagon (rDNA) injection 1 mg  1 mg IntraMUSCular PRN Darren Guo MD   1 mg at 02/15/22 1821    dextrose 5 % solution  100 mL/hr IntraVENous PRN Darren Guo MD   Stopped at 02/15/22 1907    sodium chloride flush 0.9 % injection 5-40 mL  5-40 mL IntraVENous 2 times per day Darren Guo MD   10 mL at 02/17/22 0829    sodium chloride flush 0.9 % injection 5-40 mL  5-40 mL IntraVENous PRN Darren Guo MD        0.9 % sodium chloride infusion  25 mL IntraVENous PRN Darren Guo MD   Stopped at 02/14/22 0754    ondansetron (ZOFRAN-ODT) disintegrating tablet 4 mg  4 mg Oral Q8H PRN Darren Guo MD        Or    ondansetron (ZOFRAN) injection 4 mg  4 mg IntraVENous Q6H PRN Darren Guo MD        polyethylene glycol (GLYCOLAX) packet 17 g  17 g Oral Daily PRN Darren Guo MD        acetaminophen (TYLENOL) tablet 650 mg  650 mg Oral Q6H PRN Darren Guo MD        Or   [facility] acetaminophen (TYLENOL) suppository 650 mg  650 mg Rectal Q6H PRN Darren Guo MD        insulin lispro (1 Unit Dial) 0-12 Units  0-12 Units SubCUTAneous TID  Darren Guo MD   2 Units at 02/16/22 1651    insulin lispro (1 Unit Dial) 0-6 Units  0-6 Units SubCUTAneous Nightly Darren Guo MD   1 Units at 02/16/22 2037    heparin (porcine) injection 5,800 Units  5,800 Units IntraCATHeter PRN Arline Ballesteros MD   2,689 Units at 02/16/22 0840    heparin (porcine) injection 5,000 Units  5,000 Units SubCUTAneous 3 times per day Greta Maya PA-C   5,000 Units at 02/14/22 7417    LORazepam (ATIVAN) tablet 0.5 mg  0.5 mg Oral Nightly PRN Rocky Cervantes MD   0.5 mg at 02/13/22 6793     Allergies   Allergen Reactions    Influenza Virus Vaccine      Caused paralysis      reports that he has never smoked. He has never used smokeless tobacco. He reports that he does not drink alcohol and does not use drugs. Objective:  Exam:   Constitutional:   Vitals:    02/17/22 1030 02/17/22 1045 02/17/22 1100 02/17/22 1142   BP: (!) 126/54 125/73 124/64    Pulse: 59 57 80    Resp: 14 14 8    Temp:       TempSrc:       SpO2: 90% 93% 92%    Weight:       Height:    6' 4\" (1.93 m)     General appearance: Awake alert but cachectic  Mental Status:   Oriented to person, place, problem, and time. Memory: Good immediate recall. Intact remote memory  Normal attention span and concentration. Language: intact naming, repeating and fluency   Good fund of Knowledge. Cranial Nerves:   II: Visual fields: Full.    Pupils: equal, round, reactive to light  No gaze preference  VII: Facial strength and movements: intact and symmetric  XII: Tongue movements are normal  Musculoskeletal: Generalized diffuse weakness with poor effort, chronic with disuse atrophy  Increased tone throughout which is chronic  No sensory disturbance  Gait cannot be tested      Data:  LABS:   Lab Results   Component Value Date     02/17/2022    K 4.2 02/17/2022    K 5.2 02/03/2022    CL 91 02/17/2022    CO2 22 02/17/2022    BUN 43 02/17/2022    CREATININE 6.2 02/17/2022    GFRAA 12 02/17/2022    GFRAA 12 06/09/2013    GFRAA 12 06/09/2013    LABGLOM 10 02/17/2022    GLUCOSE 48 02/17/2022    PHOS 11.5 02/14/2022    MG 1.60 02/14/2022    CALCIUM 6.7 02/17/2022     Lab Results   Component Value Date    WBC 3.2 02/17/2022    RBC 4.09 02/17/2022 HGB 10.1 02/17/2022    HCT 31.4 02/17/2022    MCV 76.8 02/17/2022    RDW 17.0 02/17/2022    PLT 41 02/17/2022     Lab Results   Component Value Date    INR 1.07 12/11/2021    PROTIME 12.1 12/11/2021       Neuroimaging was independently reviewed by me and discussed results with the patient  I reviewed blood testing and other test results and discussed results with the patient      Impression:    Acute encephalopathy and breakthrough seizure. Could be symptomatic from metabolic derangement and hypocalcemia. History of seizure disorder  Remote stroke. Acute on chronic kidney disease  Hyponatremia  Sepsis  HTN         Recommendation  Seizure precautions  Continue current supportive care  PT, OT and speech  No need to start AED at this point giving likely symptomatic seizure and lack of significant seizure history according to the patient  Telemetry  DVT and GI prophylaxis  Insulin sliding scale  Continue antibiotics  Follow electrolytes  DC planning when medically stable  Nothing to add from neurology  We will follow from periphery  Please call for questions           Catherine Walton MD   [phone]      This dictation was generated by voice recognition computer software. Although all attempts are made to edit the dictation for accuracy, there may be errors in the transcription that are not intended.

## 2022-02-17 NOTE — PLAN OF CARE
Problem: Falls - Risk of:  Goal: Will remain free from falls  Description: Will remain free from falls  2/17/2022 0458 by Adithya Eisenberg RN  Outcome: Ongoing  2/16/2022 1941 by Joselyn Goldsmith RN  Outcome: Ongoing  Goal: Absence of physical injury  Description: Absence of physical injury  2/17/2022 0458 by Adithya Eisenberg RN  Outcome: Ongoing  2/16/2022 1941 by Joselyn Goldsmith RN  Outcome: Ongoing     Problem: Skin Integrity:  Goal: Will show no infection signs and symptoms  Description: Will show no infection signs and symptoms  2/17/2022 0458 by Adithya Eisenberg RN  Outcome: Ongoing  2/16/2022 1941 by Joselyn Goldsmith RN  Outcome: Ongoing  Goal: Absence of new skin breakdown  Description: Absence of new skin breakdown  Outcome: Ongoing     Problem: Daily Care:  Goal: Daily care needs are met  Description: Daily care needs are met  2/17/2022 0458 by Adithya Eisenberg RN  Outcome: Ongoing  2/16/2022 1941 by Joselyn Goldsmith RN  Outcome: Ongoing     Problem: Skin Integrity:  Goal: Skin integrity will stabilize  Description: Skin integrity will stabilize  Outcome: Ongoing     Problem: Pain:  Goal: Pain level will decrease  Description: Pain level will decrease  2/17/2022 0458 by Adithya Eisenberg RN  Outcome: Ongoing  2/16/2022 1941 by Joselyn Goldsmith RN  Outcome: Ongoing  Goal: Control of acute pain  Description: Control of acute pain  2/17/2022 0458 by Adithya Eisenberg RN  Outcome: Ongoing  2/16/2022 1941 by Joselyn Goldsmith RN  Outcome: Ongoing  Goal: Control of chronic pain  Description: Control of chronic pain  2/17/2022 0458 by Adithya Eisenberg RN  Outcome: Ongoing  2/16/2022 1941 by Joselyn Glodsmith RN  Outcome: Ongoing     Problem: Health Behavior:  Goal: Ability to manage health-related needs will improve  Description: Ability to manage health-related needs will improve  Outcome: Ongoing  Goal: Identification of resources available to assist in meeting health care needs will improve  Description: Identification of resources available to assist in meeting health care needs will improve  Outcome: Ongoing     Problem: Nutritional:  Goal: Ability to identify appropriate dietary choices will improve  Description: Ability to identify appropriate dietary choices will improve  Outcome: Ongoing     Problem: Non-Violent Restraints  Goal: Removal from restraints as soon as assessed to be safe  Outcome: Ongoing  Goal: No harm/injury to patient while restraints in use  Outcome: Ongoing  Goal: Patient's dignity will be maintained  Outcome: Ongoing

## 2022-02-18 NOTE — FLOWSHEET NOTE
Treatment time: 3 hrs    Net UF: 0    Pre weight: 95.1 kg  Post weight: 95 kg  EDW: 73 kg    Access used: R Fem TDC  Access function: no issues noted.  per orders. Medications or blood products given: None    Regular outpatient schedule: [facility]    Summary of response to treatment: Pt tolerated treatment. No issues noted.      Copy of dialysis treatment record placed in chart, to be scanned into EMR.       02/17/22 2341 02/18/22 0255   Treatment   Time On 2341  --    Time Off  --  0241   Vital Signs   BP (!) 115/54 120/76   Pulse 62 73   Weight 209 lb 10.5 oz (95.1 kg) 209 lb 7 oz (95 kg)   Weight Method Bed scale Bed scale   Dialysis Bath   K+ (Potassium) 3  --    Ca+ (Calcium) 2.5  --    Na+ (Sodium) 138  --    HCO3 (Bicarb) 35  --    Post-Hemodialysis Assessment   Total Liters Processed (l/min)  --  49.6 l/min   Duration of Treatment (minutes)  --  180 minutes

## 2022-02-18 NOTE — PROGRESS NOTES
Hospitalist Progress Note      PCP: No primary care provider on file. Date of Admission: 2/12/2022    Chief Complaint:  Dropped sats, seized    Hospital Course:   Called to rapid response earlier today when the patient dropped his oxygen sats. He has been admitted with hypocalcemia was having some jerky-like movements and we suspected that he aspirated he was suctioned at the bedside and seem to be improving we wrapped up with a rapid response short time later they were calling a second rapid response was called overhead as the patient had had a seizure and dropped his sats again. This was witnessed by my partner. Patient has a previous history of status epilepticus and based on this we made the decision to intubate the patient for airway protection. He was intubated and transferred to the ICU central line was placed as he was a difficult stick and did not have any access appropriate in case he needed any pressors. He had 1 peripheral IV in his neck. Central line was placed  Subjective:     No acute events overnight, had HD today, on 2 L nasal cannula with acceptable saturation, mild hypoglycemia. Now he is eating.     Medications:  Reviewed    Infusion Medications    dextrose Stopped (02/15/22 1907)    sodium chloride Stopped (02/14/22 0754)     Scheduled Medications    calcium carbonate  500 mg Oral TID    midazolam  2 mg IntraVENous Once    pantoprazole  40 mg IntraVENous Daily    vitamin D  50,000 Units Oral Weekly    cefepime  1,000 mg IntraVENous Q24H    levothyroxine  25 mcg Oral Daily    midodrine  5 mg Oral BID WC    sodium chloride flush  5-40 mL IntraVENous 2 times per day    heparin (porcine)  5,000 Units SubCUTAneous 3 times per day     PRN Meds: dextrose bolus (hypoglycemia) **OR** dextrose bolus (hypoglycemia), HYDROmorphone, HYDROmorphone, oxyCODONE, glucose, dextrose, glucagon (rDNA), dextrose, sodium chloride flush, sodium chloride, ondansetron **OR** ondansetron, polyethylene glycol, acetaminophen **OR** acetaminophen, heparin (porcine), LORazepam      Intake/Output Summary (Last 24 hours) at 2/18/2022 1207  Last data filed at 2/18/2022 1000  Gross per 24 hour   Intake 1774.7 ml   Output 900 ml   Net 874.7 ml       Physical Exam Performed:    BP (!) 129/57   Pulse 71   Temp 97.6 °F (36.4 °C) (Temporal)   Resp 15   Ht 6' 4\" (1.93 m)   Wt 209 lb 7 oz (95 kg)   SpO2 91%   BMI 25.49 kg/m²     General appearance:  extubated  HEENT: Pupils equal, round, and reactive to light. Conjunctivae/corneas clear. Neck: Supple, with full range of motion. No jugular venous distention. Trachea midline. Respiratory: Patient has lots of upper respiratory noises following intubation we were able to suction a significant amount of what appeared to be blood from his ET tube  Cardiovascular: Regular rate and rhythm with normal S1/S2 without murmurs, rubs or gallops. Abdomen: Soft, he has a massive hernia of the front abdomen  Musculoskeletal: No clubbing, cyanosis or edema bilaterally. Fingers are bent from arthritis  Skin: Skin color, texture, turgor normal.  No rashes or lesions. Neurologic: Extubated following commands  Psychiatric: Alert  Capillary Refill: Brisk,3 seconds, normal   Peripheral Pulses: +2 palpable, equal bilaterally       Labs:   Recent Labs     02/16/22  0500 02/17/22  0515 02/18/22  0617   WBC 2.7* 3.2* 2.6*   HGB 9.4* 10.1* 9.0*   HCT 29.4* 31.4* 27.5*   PLT 35* 41* 23*     Recent Labs     02/16/22  0500 02/17/22  0515 02/18/22  0617   * 129* 129*   K 4.5 4.2 3.9   CL 92* 91* 92*   CO2 19* 22 22   BUN 69* 43* 33*   CREATININE 7.5* 6.2* 4.6*   CALCIUM 6.4* 6.7* 6.8*     No results for input(s): AST, ALT, BILIDIR, BILITOT, ALKPHOS in the last 72 hours. No results for input(s): INR in the last 72 hours. No results for input(s): Wu Espinoza in the last 72 hours.     Urinalysis:    No results found for: Sofia Sung, [address], BACTERIA, RBCUA, BLOODU, [address], GLUCOSEU    Radiology:  CT CHEST WO CONTRAST   Final Result   Bilateral airspace disease, suspicious for pneumonia. Superimposed pleural effusions are seen with body wall anasarca, compatible   with a component of fluid overload. Findings of renal osteodystrophy      RECOMMENDATIONS:   Unavailable         XR CHEST PORTABLE   Final Result   Moderate bilateral airspace disease. This could represent a combination of   edema and or pneumonia. Moderate improved aeration of the left lung. XR CHEST PORTABLE   Final Result   The endotracheal tube measures approximately 6.6 cm above the lux. The enteric catheter is coiled in the gastric fundus. Cardiomegaly with vascular congestion and patchy airspace disease, a   component of which is felt likely to represent pulmonary edema. Improved   aeration in the left upper lobe. XR CHEST PORTABLE   Final Result   1. Endotracheal tube with the tip 2.3 cm above the lux. Recommend   withdrawal by 1.5 cm.      2.  The left hemithorax is completely opacified. This could represent a   combination of large pleural effusion and/or consolidation. 3.  Patchy airspace disease throughout the right lung and small right pleural   effusion. CT ABDOMEN PELVIS WO CONTRAST Additional Contrast? None   Final Result   Addendum 1 of 1   ADDENDUM:   There is a transcription error in the impression. The 1st item should    read   as follows:      Stable broad-based ventral abdominal wall hernia containing loops of small   large bowel without associated obstruction. Final   1. Stable broad-based ventral abdominal wall hernia containing loops of small   large bowel at associated obstruction. 2. Severe renal atrophy with diffuse vascular calcifications and findings of   renal osteodystrophy. 3. Small layering bilateral pleural effusions with mild pulmonary edema and   anasarca.                     Assessment/Plan:    JARRETT/Hussein Hathaway 5425 Problems    Diagnosis     Aspiration into airway [T17.908A]     Atelectasis [J98.11]     End stage renal disease due to benign hypertension (HCC) [I12.0, N18.6]     Acute hypoxemic respiratory failure (HCC) [J96.01]     HTN (hypertension), benign [I10]     New onset seizure ([facility] [R56.9]     Diarrhea [R19.7]     JOANNE (acute kidney injury) ([facility] [N17.9]     Hypocalcemia [E83.51]      Acute respiratory failure with hypoxia requiring  mechanical ventilation, liberated from mechanical ventilation  Secondary to presumed aspiration pneumonia as well as seizure activity. Status post liberation from mechanical ventilation  Pulmonary on board appreciate recommendations    End-stage renal disease on dialysis  Patient is scheduled to have dialysis   Nephrology on board appreciate recommendations    ? Seizure  ? Could be secondary to hypocalcemia  Neurology has been consulted  Currently intubated and sedated  No history of seizure  diffuse slowing is suggestive of moderate diffuse encephalopathy on EEG no evidence of epileptiform discharges, focal or lateralizing abnormalities  Neurology has been consulted  No need to start AED as per neurology. Aspiration  On cefepime  Procalcitonin is elevated, no leukocytosis, cultures so far negative  Status post bronchoscopy, cultures so far negative    Hypocalcemia  Patient's calcium was been replaced and he is going to have additional adjustments made with dialysis. Endo has been consulted for evaluation of persistent hypercalcemia, recommending consulting oncology for  gammopathy    Massive ventral wall abdominal hernia  Stable per imaging    Hypoglycemia   Eating Now and started on dysphagia diet  Continue to monitor blood glucose    Diarrhea  C. difficile has been ruled out    DVT Prophylaxis: Heparin  Diet: ADULT DIET; Dysphagia - Soft and Bite Sized; No Drinking Straws  ADULT ORAL NUTRITION SUPPLEMENT; Lunch, Dinner;  Low Calorie/High Protein Oral Supplement  Code Status: Full Code    PT/OT Eval Status: Needed    Dispo -likely patient can be transferred out of ICU to progressive care unit. 3T     Due to the immediate potential for life-threatening deterioration due to acute hypoxic respiratory failure, seizure,, I spent 33 minutes providing critical care. This time is excluding time spent performing procedures.       Elisabeth Rubi MD

## 2022-02-18 NOTE — CARE COORDINATION
CM spoke with pt regarding potential need for SNF on dc. Pt is not wanting SNF on dc, he is pleased with the services he currently has in place. He is active with [facility] he has his transportation coordinated with the [facility] to get to/from HD. PT/OT is pending at this time. Pt has a WC he uses \"most of the time. \"      Electronically signed by Lucio Gaspar RN on 2/18/2022 at 2:58 PM

## 2022-02-18 NOTE — PLAN OF CARE
Problem: Falls - Risk of:  Goal: Will remain free from falls  Description: Will remain free from falls  Outcome: Ongoing  Goal: Absence of physical injury  Description: Absence of physical injury  Outcome: Ongoing   Fall protocols in place. Arora scale assessed Q shift. Problem: Daily Care:  Goal: Daily care needs are met  Description: Daily care needs are met  Outcome: Ongoing     Problem: Skin Integrity:  Goal: Skin integrity will stabilize  Description: Skin integrity will stabilize  Outcome: Ongoing   Wound prevention protocol in place.  Yang assessed Q shift

## 2022-02-18 NOTE — PROGRESS NOTES
Nephrology progress  Note                                                                                                                                                                                                                                                                                                                                                               Office : [phone]     Fax :[phone]              Patient's Name: Anni Wills  12:27 PM  2/18/2022    Reason for Consult:  ESRD on HD      Chief Complaint:  Diarrhea      History of Present Ilness:    Anni Wills is a 49 y.o. 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    Hemodialysis patient ([facility]     HTN (hypertension), benign     Low blood pressure     Pancreatitis     Peripheral Neuropathy     Pneumonia 11/11/2021    Renal failure 1992    on dialysis 3x/wk  (T, Th, Sat)    Status epilepticus ([facility] 12/11/2021       Past Surgical History:   Procedure Laterality Date    BRONCHOSCOPY N/A 2/14/2022    BRONCHOSCOPY DIAGNOSTIC OR [facility] ONLY performed by Gabby Parra MD at [address] [place] [address] N/A 12/17/2021    COLONOSCOPY DIAGNOSTIC performed by Luzmaria Leach MD at [facility]      left arm/currently non functioning    KIDNEY TRANSPLANT  9/1994    R-kidney    TOE SURGERY  10-8-2010    cut and realign 1st, 2nd, toe left foot , fixation 1st, 2nd toe left foot    TUNNELED VENOUS CATHETER PLACEMENT      left chest for dialysis    TUNNELED VENOUS PORT PLACEMENT      UPPER GASTROINTESTINAL ENDOSCOPY N/A 12/17/2021    EGD ESOPHAGOGASTRODUODENOSCOPY performed by Ellen Watkins MD at [address] ENDOSCOPY       Family History   Problem Relation Age of Onset    Diabetes Mother         reports that he has never smoked. He has never used smokeless tobacco. He reports that he does not drink alcohol and does not use drugs.     Allergies:  Influenza virus vaccine    Current Medications:    dextrose bolus (hypoglycemia) 10% 125 mL, PRN   Or  dextrose bolus (hypoglycemia) 10% 250 mL, PRN  HYDROmorphone HCl PF (DILAUDID) injection 1 mg, Q4H PRN  HYDROmorphone (DILAUDID) injection 2 mg, Q4H PRN  calcium carbonate (TUMS) chewable tablet 500 mg, TID  midazolam PF (VERSED) injection 2 mg, Once  pantoprazole (PROTONIX) injection 40 mg, Daily  vitamin D (ERGOCALCIFEROL) capsule 50,000 Units, Weekly  cefepime (MAXIPIME) 1000 mg IVPB minibag, Q24H  oxyCODONE (OXY-IR) immediate release tablet 30 mg, Q8H PRN  levothyroxine (SYNTHROID) tablet 25 mcg, Daily  midodrine (PROAMATINE) tablet 5 mg, BID WC  glucose (GLUTOSE) 40 % oral gel 15 g, PRN  dextrose 50 % IV solution, PRN  glucagon (rDNA) injection 1 mg, PRN  dextrose 5 % solution, PRN  sodium chloride flush 0.9 % injection 5-40 mL, 2 times per day  sodium chloride flush 0.9 % injection 5-40 mL, PRN  0.9 % sodium chloride infusion, PRN  ondansetron (ZOFRAN-ODT) disintegrating tablet 4 mg, Q8H PRN   Or  ondansetron (ZOFRAN) injection 4 mg, Q6H PRN  polyethylene glycol (GLYCOLAX) packet 17 g, Daily PRN  acetaminophen (TYLENOL) tablet 650 mg, Q6H PRN   Or  acetaminophen (TYLENOL) suppository 650 mg, Q6H PRN  heparin (porcine) injection 5,800 Units, PRN  heparin (porcine) injection 5,000 Units, 3 times per day  LORazepam (ATIVAN) tablet 0.5 mg, Nightly PRN        Physical exam:     Vitals:  BP (!) 129/57   Pulse 71   Temp 97.6 °F (36.4 °C) (Temporal)   Resp 15   Ht 6' 4\" (1.93 m)   Wt 209 lb 7 oz (95 kg)   SpO2 91%   BMI 25.49 kg/m² Constitutional: extubated   Awake and alert   Skin: no rash, turgor wnl  Heent:  eomi, mmm  Neck: no bruits or jvd noted  Cardiovascular:  S1, S2 without m/r/g  Respiratory: CTA B without w/r/r  Abdomen:  +bs, soft, nt, nd  Ext: no  lower extremity edema      Data:   Labs:  CBC:   Recent Labs     02/16/22  0500 02/17/22  0515 02/18/22  0617   WBC 2.7* 3.2* 2.6*   HGB 9.4* 10.1* 9.0*   PLT 35* 41* 23*     BMP:    Recent Labs     02/16/22  0500 02/17/22  0515 02/18/22  0617   * 129* 129*   K 4.5 4.2 3.9   CL 92* 91* 92*   CO2 19* 22 22   BUN 69* 43* 33*   CREATININE 7.5* 6.2* 4.6*   GLUCOSE 75 48* 59*     Ca/Mg/Phos:   Recent Labs     02/16/22  0500 02/17/22  0515 02/18/22  0617   CALCIUM 6.4* 6.7* 6.8*     Hepatic:   No results for input(s): AST, ALT, ALB, BILITOT, ALKPHOS in the last 72 hours. Troponin: No results for input(s): TROPONINI in the last 72 hours. BNP: No results for input(s): BNP in the last 72 hours. Lipids: No results for input(s): CHOL, TRIG, HDL, LDLCALC, LABVLDL in the last 72 hours. ABGs:   No results for input(s): PHART, PO2ART, SLF1HRQ in the last 72 hours. INR: No results for input(s): INR in the last 72 hours. UA:No results for input(s): Gib Songster, GLUCOSEU, BILIRUBINUR, [place], BLOODU, PHUR, PROTEINU, UROBILINOGEN, NITRU, LEUKOCYTESUR, LABMICR, URINETYPE in the last 72 hours. Urine Microscopic: No results for input(s): LABCAST, BACTERIA, COMU, HYALCAST, WBCUA, RBCUA, EPIU in the last 72 hours. Urine Culture: No results for input(s): LABURIN in the last 72 hours. Urine Chemistry: No results for input(s): Marcel Duffel, PROTEINUR, NAUR in the last 72 hours. IMAGING:  CT CHEST WO CONTRAST   Final Result   Bilateral airspace disease, suspicious for pneumonia. Superimposed pleural effusions are seen with body wall anasarca, compatible   with a component of fluid overload.       Findings of renal osteodystrophy      RECOMMENDATIONS:   Unavailable XR CHEST PORTABLE   Final Result   Moderate bilateral airspace disease. This could represent a combination of   edema and or pneumonia. Moderate improved aeration of the left lung. XR CHEST PORTABLE   Final Result   The endotracheal tube measures approximately 6.6 cm above the lux. The enteric catheter is coiled in the gastric fundus. Cardiomegaly with vascular congestion and patchy airspace disease, a   component of which is felt likely to represent pulmonary edema. Improved   aeration in the left upper lobe. XR CHEST PORTABLE   Final Result   1. Endotracheal tube with the tip 2.3 cm above the lux. Recommend   withdrawal by 1.5 cm.      2.  The left hemithorax is completely opacified. This could represent a   combination of large pleural effusion and/or consolidation. 3.  Patchy airspace disease throughout the right lung and small right pleural   effusion. CT ABDOMEN PELVIS WO CONTRAST Additional Contrast? None   Final Result   Addendum 1 of 1   ADDENDUM:   There is a transcription error in the impression. The 1st item should    read   as follows:      Stable broad-based ventral abdominal wall hernia containing loops of small   large bowel without associated obstruction. Final   1. Stable broad-based ventral abdominal wall hernia containing loops of small   large bowel at associated obstruction. 2. Severe renal atrophy with diffuse vascular calcifications and findings of   renal osteodystrophy. 3. Small layering bilateral pleural effusions with mild pulmonary edema and   anasarca. Assessment/Plan   1. ESRD on HD    2. HTN    3. Anemia    4  Seizure likely 2/2 hypocalcemia     5 diarrhea    6 Metabolic acidosis    7 Hypocalcemia  PTH is low at 11 .  Likely has hypoparathyroidism   Phos 11           HAD HD today   Check ionized calcium   Increase the calcium carbonate to 1000 mg po tid                   Thank you for allowing us to participate in care of Marek Nelson MD  Feel free to contact me   [facility] [address]  Office : [phone]  Fax :[phone]

## 2022-02-18 NOTE — PROGRESS NOTES
[facility]   Progress Note      2/18/2022 8:11 AM        Name: Rolando Lewis . Admitted: 2/12/2022    SUBJECTIVE:  Patient resting quietly in bed, offers no complaints, spoke with RN.      Reviewed interval ancillary notes    Current Medications  HYDROmorphone HCl PF (DILAUDID) injection 1 mg, Q4H PRN  HYDROmorphone (DILAUDID) injection 2 mg, Q4H PRN  calcium carbonate (TUMS) chewable tablet 500 mg, TID  midazolam PF (VERSED) injection 2 mg, Once  pantoprazole (PROTONIX) injection 40 mg, Daily  vitamin D (ERGOCALCIFEROL) capsule 50,000 Units, Weekly  cefepime (MAXIPIME) 1000 mg IVPB minibag, Q24H  oxyCODONE (OXY-IR) immediate release tablet 30 mg, Q8H PRN  levothyroxine (SYNTHROID) tablet 25 mcg, Daily  midodrine (PROAMATINE) tablet 5 mg, BID WC  glucose (GLUTOSE) 40 % oral gel 15 g, PRN  dextrose 50 % IV solution, PRN  glucagon (rDNA) injection 1 mg, PRN  dextrose 5 % solution, PRN  sodium chloride flush 0.9 % injection 5-40 mL, 2 times per day  sodium chloride flush 0.9 % injection 5-40 mL, PRN  0.9 % sodium chloride infusion, PRN  ondansetron (ZOFRAN-ODT) disintegrating tablet 4 mg, Q8H PRN   Or  ondansetron (ZOFRAN) injection 4 mg, Q6H PRN  polyethylene glycol (GLYCOLAX) packet 17 g, Daily PRN  acetaminophen (TYLENOL) tablet 650 mg, Q6H PRN   Or  acetaminophen (TYLENOL) suppository 650 mg, Q6H PRN  insulin lispro (1 Unit Dial) 0-12 Units, TID WC  insulin lispro (1 Unit Dial) 0-6 Units, Nightly  heparin (porcine) injection 5,800 Units, PRN  heparin (porcine) injection 5,000 Units, 3 times per day  LORazepam (ATIVAN) tablet 0.5 mg, Nightly PRN        Objective:  BP (!) 127/48   Pulse (!) 48   Temp 96.3 °F (35.7 °C) (Temporal)   Resp 11   Ht 6' 4\" (1.93 m)   Wt 209 lb 7 oz (95 kg)   SpO2 96%   BMI 25.49 kg/m²     Intake/Output Summary (Last 24 hours) at 2/18/2022 0811  Last data filed at 2/18/2022 0530  Gross per 24 hour   Intake 2418.86 ml   Output 900 ml   Net 1518.86 ml      Wt Readings from Last 3 Encounters:   02/18/22 209 lb 7 oz (95 kg)   12/18/21 185 lb 10 oz (84.2 kg)   12/11/21 170 lb (77.1 kg)       General appearance:  Appears comfortable  Eyes: Sclera clear. Pupils equal.  ENT: Moist oral mucosa. Trachea midline, no adenopathy. Cardiovascular: Regular rhythm, normal S1, S2. No murmur. No edema in lower extremities  Respiratory: Not using accessory muscles. Good inspiratory effort. Clear to auscultation bilaterally, no wheeze or crackles. GI: Abdomen soft, no tenderness, not distended  Musculoskeletal: No cyanosis in digits, neck supple  Neurology: CN 2-12 grossly intact. No speech or motor deficits  Psych: Normal affect. Alert and oriented in time, place and person  Skin: Warm, dry, normal turgor    Labs and Tests:  CBC:   Recent Labs     02/16/22  0500 02/17/22  0515 02/18/22  0617   WBC 2.7* 3.2* 2.6*   HGB 9.4* 10.1* 9.0*   PLT 35* 41* 23*     BMP:    Recent Labs     02/16/22  0500 02/17/22  0515 02/18/22  0617   * 129* 129*   K 4.5 4.2 3.9   CL 92* 91* 92*   CO2 19* 22 22   BUN 69* 43* 33*   CREATININE 7.5* 6.2* 4.6*   GLUCOSE 75 48* 59*     Hepatic: No results for input(s): AST, ALT, ALB, BILITOT, ALKPHOS in the last 72 hours. ASSESSMENT AND PLAN    Active Problems:    Hypocalcemia    JOANNE (acute kidney injury) ([address]    Diarrhea    New onset seizure ([address]    Acute hypoxemic respiratory failure (HCC)    HTN (hypertension), benign    Aspiration into airway    Atelectasis    End stage renal disease due to benign hypertension ([address]  Resolved Problems:    * No resolved hospital problems. *      1. Pancytopenia  -Anemia partially d/t ESRD  -History of copper and zinc deficiency, results pending  -IgG was elevated in Nov 2021    SPEP preliminary result showing total protein at 5.7   -QIG are wnl  -Peripheral smear reviewed by Dr. Alecia Garcia, no dysplastic findings, no significant schistocytes.     -Transfuse PRBC if hgb <7  -Transfuse platelets if plt <41B or 50k with active bleeding.  -Hold anticoagulation if platelet count <11P. 2. Hypocalcemia  -Calcium has been replaced.  -Endocrinology is following.   -Concern for immunoglobulins binding to calcium resulting in hypocalcemia.    - SPEP preliminary result showing total protein at 5.7   -QIG are wnl     3. Acute respiratory failure with hypoxia  -Extubated. -Pulmonology following. 4. ESRD on dialysis  -Management per nephrology. Monique Dancer, CNP  [facility]    Patient was seen with HAMILTON in the morning. Severe pancytopenia and end-stage renal disease. On dialysis. Labs were reviewed. Plan was discussed. Mekhi Lindsay.  Steph Andre MD, [facility] [place]   Hematology and Oncology  [facility]  [phone]

## 2022-02-18 NOTE — PROGRESS NOTES
Speech  Language And Dysphagia Treatment Note    Name: Mercedes Fitch  : 1970  Medical Diagnosis: Hypocalcemia [E83.51]  Diarrhea [K31.8]  Metabolic acidosis [P61.6]  D[facility] patient ([facility] [Z99.2]  Pancytopenia ([facility] [D61.818]  Diarrhea, unspecified type [R19.7]  Treatment Diagnosis: Oropharyngeal Dysphagia, Dysarthria, Cognitive-Linguistic Deficits, Speech-Language Deficits   Pain: Denied    Patient's response to therapy:  Pt alert and participative with treatment this date. Dysphagia Treatment:  Current Diet Level:   1) Advance diet to Dysphagia III Soft and Bite-Sized with thin liquids/no straws/meds with applesauce  2) If respiratory status declines or if po diet is not tolerated, HOLD diet pending SLP re-evaluation  Tolerance of Current Diet Level: Per chart review and RN report, no noted difficulty with current diet. Assessment of Texture Tolerance:  -Impressions: Pt alert and upright in chair for PO trials. Pt accepting of thin liquids via cup and regular solids to assess texture tolerance. Thin liquids via cup revealed suspected premature spillage to pharynx, a mildly delayed swallow initiation and no overt clinical symptoms of aspiration in isolation. Laryngeal elevation judged to be mildly reduced upon manual palpation. Prolonged mastication, delayed oral clearing and need for liquid wash for adequate oral clearance noted with regular solids. A slight wet vocal quality that independently resolved was present following thin liquids utilized as a liquid wash with regular solids. Pt appears overall tolerating of current diet recommendations however continued use of aspiration precautions should be followed. Recommend continuation of Dysphagia III/Soft and Bite-Sized with Thin Liquids/No straws, meds in puree at this time.      Diet and Treatment Recommendations 2022:  Recommend continuation of Dysphagia III/Soft and Bite-Sized with Thin Liquids/No straws, meds in puree    Compensatory Strategies: Alternate solids/liquids , Upright as possible with all PO intake , No straws , Assist Feed , Small bites/sips , Eat/feed slowly, Remain upright 30-45 min     Dysphagia Goals, addressed this date:  1.) Pt will functionally tolerate ongoing assessment of swallow function with diet to be determined as indicated (ongoing 2/18/2022)  2.) Pt will demonstrate understanding of aspiration risk and precautions via education/demonstration with occasional prompting (ongoing 2/18/2022)  3.) Pt will advance to least restrictive diet as indicated (ongoing 2/18/2022)  4.) If clinical s/s of aspiration/penetration continue to be noted, Pt will participate in Modified Barium Swallow Study (ongoing 2/18/2022)     Speech Language Treatment:  Impressions: Pt alert and willing to participate with speech-language treatment. Pt continues to present with a slow rate of speech and monotone quality. Mildly decreased intelligibility noted with increase in utterance length but otherwise intelligible at the word and phrase level. Pt able to follow all multi-step commands 5/5 and answer yes/no, [address] questions 7/7 adequately however processing remains delayed. Pt appropriately recalled lunch meal, length of hospital admission/reasoning and being on mechanical ventilation. Pt was unable to recall nurse name, length of time of ventilator and aspiration episode. Verbal expression targeted through convergent and divergent naming task with pt demonstrating increased success with convergent vs divergent naming. Delayed response time and need for min-mod verbal cues noted with divergent naming. Decreased insight to deficits present. Recommend continuation of POC for return to prior level of function and to maximize independence at discharge.      Speech Language STG, addressed this date:  1.) Pt will improve articulatory precision, rate and prosody in connected speech via graded tasks to 80% (ongoing 2/18/2022)  2.) Pt will improve auditory processing/comprehension of commands and questions to 80%, via graded tasks   3.) Pt will improve short term recall via graded tasks to 80% (ongoing 2/18/2022)  4.) Pt will improve verbal expression for functional expression via graded tasks to 80% (ongoing 2/18/2022)  5.) Pt will participate in ongoing cognitive assessment with goals to be established as indicated (ongoing 2/18/2022)    Patient/Family Education:Education given to the Pt and nurse, who verbalized understanding    Assessment: Patient progressing toward goals    Plan: Continue as per plan of care    Discharge Recommendations: Pt will benefit from continued skilled Speech Therapy for Speech and Dysphagia services, prior to returning home. Treatment time  Timed Code Treatment Minutes: 0 minutes   Total Treatment time: 24 minutes    If patient discharges prior to next session this note will serve as a discharge summary.       Signature:   Shannon Turner., [address]  Speech-Language Pathologist  2/18/2022 2:06 PM    -

## 2022-02-18 NOTE — PROGRESS NOTES
P Pulmonary and Critical Care  Progress note      Reason for Consult: Respiratory failure, aspiration, seizure  Requesting Physician: Dr. Vicente Sheffield:   [facility] / HPI:                The patient is a 49 y.o. 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 Now tolerating room air.       Past Surgical History:        Procedure Laterality Date    BRONCHOSCOPY N/A 2/14/2022    BRONCHOSCOPY DIAGNOSTIC OR CELL [address] ONLY performed by Kassandra Ward MD at [address] [place] [address] N/A 12/17/2021    COLONOSCOPY DIAGNOSTIC performed by Surinder Moore MD at [facility]      left arm/currently non functioning    KIDNEY TRANSPLANT  9/1994    R-kidney    TOE SURGERY  10-8-2010    cut and realign 1st, 2nd, toe left foot , fixation 1st, 2nd toe left foot    TUNNELED VENOUS CATHETER PLACEMENT      left chest for dialysis    TUNNELED VENOUS PORT PLACEMENT      UPPER GASTROINTESTINAL ENDOSCOPY N/A 12/17/2021    EGD ESOPHAGOGASTRODUODENOSCOPY performed by Surinder Moore MD at [facility] ENDOSCOPY     Current Medications:    Current Facility-Administered Medications: dextrose bolus (hypoglycemia) 10% 125 mL, 125 mL, IntraVENous, PRN **OR** dextrose bolus (hypoglycemia) 10% 250 mL, 250 mL, IntraVENous, PRN  calcium carbonate (TUMS) chewable tablet 1,000 mg, 1,000 mg, Oral, TID  HYDROmorphone HCl PF (DILAUDID) injection 1 mg, 1 mg, IntraVENous, Q4H PRN  HYDROmorphone (DILAUDID) injection 2 mg, 2 mg, IntraVENous, Q4H PRN  midazolam PF (VERSED) injection 2 mg, 2 mg, IntraVENous, Once  pantoprazole (PROTONIX) injection 40 mg, 40 mg, IntraVENous, Daily  vitamin D (ERGOCALCIFEROL) capsule 50,000 Units, 50,000 Units, Oral, Weekly  cefepime (MAXIPIME) 1000 mg IVPB minibag, 1,000 mg, IntraVENous, Q24H  oxyCODONE (OXY-IR) immediate release tablet 30 mg, 30 mg, Oral, Q8H PRN  levothyroxine (SYNTHROID) tablet 25 mcg, 25 mcg, Oral, Daily  midodrine (PROAMATINE) tablet 5 mg, 5 mg, Oral, BID WC  glucose (GLUTOSE) 40 % oral gel 15 g, 15 g, Oral, PRN  dextrose 50 % IV solution, 12.5 g, IntraVENous, PRN  glucagon (rDNA) injection 1 mg, 1 mg, IntraMUSCular, PRN  dextrose 5 % solution, 100 mL/hr, IntraVENous, PRN  sodium chloride flush 0.9 % injection 5-40 mL, 5-40 mL, IntraVENous, 2 times per day  sodium chloride flush 0.9 % injection 5-40 mL, 5-40 mL, IntraVENous, PRN  0.9 % sodium chloride infusion, 25 mL, IntraVENous, PRN  ondansetron (ZOFRAN-ODT) disintegrating tablet 4 mg, 4 mg, Oral, Q8H PRN **OR** ondansetron (ZOFRAN) injection 4 mg, 4 mg, IntraVENous, Q6H PRN  polyethylene glycol (GLYCOLAX) packet 17 g, 17 g, Oral, Daily PRN  acetaminophen (TYLENOL) tablet 650 mg, 650 mg, Oral, Q6H PRN **OR** acetaminophen (TYLENOL) suppository 650 mg, 650 mg, Rectal, Q6H PRN  heparin (porcine) injection 5,800 Units, 5,800 Units, IntraCATHeter, PRN  heparin (porcine) injection 5,000 Units, 5,000 Units, SubCUTAneous, 3 times per day  LORazepam (ATIVAN) tablet 0.5 mg, 0.5 mg, Oral, Nightly PRN    Allergies   Allergen Reactions    Influenza Virus Vaccine      Caused paralysis       Social History:    TOBACCO:   reports that he has never smoked. He has never used smokeless tobacco.  ETOH:   reports no history of alcohol use.   Patient currently lives independently  Environmental/chemical exposure: Unknown    Family History:       Problem Relation Age of Onset    Diabetes Mother REVIEW OF SYSTEMS:    KAYLA is unobtainable due to his critical illness. Objective:   PHYSICAL EXAM:      VITALS:  BP (!) 150/49   Pulse 68   Temp 97.2 °F (36.2 °C) (Temporal)   Resp 12   Ht 6' 4\" (1.93 m)   Wt 209 lb 7 oz (95 kg)   SpO2 95%   BMI 25.49 kg/m²      24HR INTAKE/OUTPUT:      Intake/Output Summary (Last 24 hours) at 2/18/2022 1311  Last data filed at 2/18/2022 1000  Gross per 24 hour   Intake 1774.7 ml   Output 900 ml   Net 874.7 ml     CONSTITUTIONAL: Sedated on mechanical ventilation  NECK:  Supple, symmetrical, trachea midline, no adenopathy, thyroid symmetric, not enlarged and no tenderness, skin normal  LUNGS:  no increased work of breathing and clear to auscultation. No accessory muscle use  CARDIOVASCULAR: S1 and S2, no edema and no JVD  ABDOMEN: Large abdominal wall hernia, normal bowel sounds, non-distended and no masses palpated, and no tenderness to palpation. No hepatospleenomegaly  LYMPHADENOPATHY:  no axillary or supraclavicular adenopathy. No cervical adnenopathy  PSYCHIATRIC: Sedated on mechanical ventilation  MUSCULOSKELETAL: No obvious misalignment or effusion of the joints. No clubbing, cyanosis of the digits. SKIN:  normal skin color, texture, turgor and no redness, warmth, or swelling. No palpable nodules    DATA:    Old records have been reviewed    CBC:  Recent Labs     02/16/22  0500 02/17/22  0515 02/18/22  0617   WBC 2.7* 3.2* 2.6*   RBC 3.81* 4.09* 3.64*   HGB 9.4* 10.1* 9.0*   HCT 29.4* 31.4* 27.5*   PLT 35* 41* 23*   MCV 77.2* 76.8* 75.5*   MCH 24.7* 24.8* 24.6*   MCHC 32.0 32.3 32.6   RDW 17.0* 17.0* 17.0*      BMP:  Recent Labs     02/16/22  0500 02/17/22  0515 02/18/22  0617   * 129* 129*   K 4.5 4.2 3.9   CL 92* 91* 92*   CO2 19* 22 22   BUN 69* 43* 33*   CREATININE 7.5* 6.2* 4.6*   CALCIUM 6.4* 6.7* 6.8*   GLUCOSE 75 48* 59*      ABG:  No results for input(s): PHART, NAD7UMD, PO2ART, PBB4CTS, T6TIYGEJ, BEART in the last 72 hours.   Procalcitonin  No results for input(s): PROCAL in the last 72 hours. No results found for: BNP  Lab Results   Component Value Date    CKTOTAL 130 12/11/2021    TROPONINI 0.07 (H) 12/11/2021           Radiology Review:  All pertinent images / reports were reviewed as a part of this visit. Assessment:     1. Acute respiratory failure  2. Aspiration into airway  3. Atelectasis of the left lung  4. Generalized tonic-clonic seizure  5. Hypocalcemia  6. ESRD on hemodialysis      Plan:     I have reviewed laboratories, medical records and images for this visit    He has tolerated liberation from mechanical ventilation. Now on RA with good saturations. Also eating a pretty good breakfast this morning. Speech is following him as he remains an aspiration risk. We stopped his D10 yesterday. Still was hypoglycemic this morning  Stop his sliding scale insulin    Patient is on cefepime  Procalcitonin is 2.32 in the presence of renal failure  No leukocytosis  No guiding culture data  Bronchoscopy cultures are also negative so far  Recommend 7-day course of antibiotic therapy. He does have end-stage renal disease and receives hemodialysis .   Nephrology is following    Seizure with thought secondary to hypocalcemia  This is been corrected  No further seizure activity  EEG was abnormal with diffuse slowing but no obvious seizure activity    I will sign off

## 2022-02-19 NOTE — PROGRESS NOTES
Pt now up in chair per PT/OT   Reassessed with no acute changes. VSS  Monitor NSR  Denies any c/o at this time. See flow sheets for complete assessment.

## 2022-02-19 NOTE — PROGRESS NOTES
Pt's appetite poor to fair today refusing both breakfast & lunch & only eating 50% dinner. Poor po fluid intake also. Encouraged pt to increase po fluids.

## 2022-02-19 NOTE — PROGRESS NOTES
Nephrology progress  Note                                                                                                                                                                                                                                                                                                                                                               Office : [phone]     Fax :[phone]              Patient's Name: Grace Snell  8:13 AM  2/19/2022    Reason for Consult:  ESRD on HD      Chief Complaint:  Diarrhea      History of Present Ilness:    Grace Snell is a 49 y.o. 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    [facility]od[facility]     HTN (hypertension), benign     Low blood pressure     Pancreatitis     Peripheral Neuropathy     Pneumonia 11/11/2021    Renal failure 1992    on dialysis 3x/wk  (T, Th, Sat)    Status epilepticus ([facility] 12/11/2021       Past Surgical History:   Procedure Laterality Date    BRONCHOSCOPY N/A 2/14/2022    BRONCHOSCOPY DIAGNOSTIC OR CELL [address] ONLY performed by Harley Lux MD at [address] [place] [address] N/A 12/17/2021    COLONOSCOPY DIAGNOSTIC performed by Zena Lee MD at [facility]      left arm/currently non functioning    KIDNEY TRANSPLANT  9/1994    R-kidney    TOE SURGERY  10-8-2010    cut and realign 1st, 2nd, toe left foot , fixation 1st, 2nd toe left foot    TUNNELED VENOUS CATHETER PLACEMENT      left chest for dialysis    TUNNELED VENOUS PORT PLACEMENT      UPPER GASTROINTESTINAL ENDOSCOPY N/A 12/17/2021    EGD ESOPHAGOGASTRODUODENOSCOPY performed by Abdirahman Benson MD at [facility] ENDOSCOPY       Family History   Problem Relation Age of Onset    Diabetes Mother         reports that he has never smoked. He has never used smokeless tobacco. He reports that he does not drink alcohol and does not use drugs.     Allergies:  Influenza virus vaccine    Current Medications:    dextrose bolus (hypoglycemia) 10% 125 mL, PRN   Or  dextrose bolus (hypoglycemia) 10% 250 mL, PRN  calcium carbonate (TUMS) chewable tablet 1,000 mg, TID  HYDROmorphone HCl PF (DILAUDID) injection 1 mg, Q4H PRN  HYDROmorphone (DILAUDID) injection 2 mg, Q4H PRN  midazolam PF (VERSED) injection 2 mg, Once  pantoprazole (PROTONIX) injection 40 mg, Daily  vitamin D (ERGOCALCIFEROL) capsule 50,000 Units, Weekly  cefepime (MAXIPIME) 1000 mg IVPB minibag, Q24H  oxyCODONE (OXY-IR) immediate release tablet 30 mg, Q8H PRN  levothyroxine (SYNTHROID) tablet 25 mcg, Daily  midodrine (PROAMATINE) tablet 5 mg, BID WC  glucose (GLUTOSE) 40 % oral gel 15 g, PRN  dextrose 50 % IV solution, PRN  glucagon (rDNA) injection 1 mg, PRN  dextrose 5 % solution, PRN  sodium chloride flush 0.9 % injection 5-40 mL, 2 times per day  sodium chloride flush 0.9 % injection 5-40 mL, PRN  0.9 % sodium chloride infusion, PRN  ondansetron (ZOFRAN-ODT) disintegrating tablet 4 mg, Q8H PRN   Or  ondansetron (ZOFRAN) injection 4 mg, Q6H PRN  polyethylene glycol (GLYCOLAX) packet 17 g, Daily PRN  acetaminophen (TYLENOL) tablet 650 mg, Q6H PRN   Or  acetaminophen (TYLENOL) suppository 650 mg, Q6H PRN  heparin (porcine) injection 5,800 Units, PRN  heparin (porcine) injection 5,000 Units, 3 times per day  LORazepam (ATIVAN) tablet 0.5 mg, Nightly PRN        Physical exam:     Vitals:  BP (!) 154/60   Pulse 80   Temp 96.7 °F (35.9 °C) (Temporal)   Resp 13   Ht 6' 4\" (1.93 m)   Wt 208 lb 6.4 oz (94.5 kg)   SpO2 96%   BMI 25.37 kg/m²   Constitutional: extubated   Awake and alert   Skin: no rash, turgor wnl  Heent:  eomi, mmm  Neck: no bruits or jvd noted  Cardiovascular:  S1, S2 without m/r/g  Respiratory: CTA B without w/r/r  Abdomen:  +bs, soft, nt, nd  Ext: no  lower extremity edema      Data:   Labs:  CBC:   Recent Labs     02/17/22  0515 02/18/22  0617   WBC 3.2* 2.6*   HGB 10.1* 9.0*   PLT 41* 23*     BMP:    Recent Labs     02/17/22  0515 02/18/22  0617   * 129*   K 4.2 3.9   CL 91* 92*   CO2 22 22   BUN 43* 33*   CREATININE 6.2* 4.6*   GLUCOSE 48* 59*     Ca/Mg/Phos:   Recent Labs     02/17/22  0515 02/18/22 0617   CALCIUM 6.7* 6.8*     Hepatic:   No results for input(s): AST, ALT, ALB, BILITOT, ALKPHOS in the last 72 hours. Troponin: No results for input(s): TROPONINI in the last 72 hours. BNP: No results for input(s): BNP in the last 72 hours. Lipids: No results for input(s): CHOL, TRIG, HDL, LDLCALC, LABVLDL in the last 72 hours. ABGs:   No results for input(s): PHART, PO2ART, QYZ0BUZ in the last 72 hours. INR: No results for input(s): INR in the last 72 hours. UA:No results for input(s): Ruben Marcos, GLUCOSEU, BILIRUBINUR, Binu Gain, BLOODU, PHUR, PROTEINU, UROBILINOGEN, NITRU, LEUKOCYTESUR, LABMICR, URINETYPE in the last 72 hours. Urine Microscopic: No results for input(s): LABCAST, BACTERIA, COMU, HYALCAST, WBCUA, RBCUA, EPIU in the last 72 hours. Urine Culture: No results for input(s): LABURIN in the last 72 hours. Urine Chemistry: No results for input(s): Mal Paige, PROTEINUR, NAUR in the last 72 hours. IMAGING:  CT CHEST WO CONTRAST   Final Result   Bilateral airspace disease, suspicious for pneumonia. Superimposed pleural effusions are seen with body wall anasarca, compatible   with a component of fluid overload. Findings of renal osteodystrophy      RECOMMENDATIONS:   Unavailable         XR CHEST PORTABLE   Final Result   Moderate bilateral airspace disease.   This could represent a combination of   edema and or pneumonia. Moderate improved aeration of the left lung. XR CHEST PORTABLE   Final Result   The endotracheal tube measures approximately 6.6 cm above the lux. The enteric catheter is coiled in the gastric fundus. Cardiomegaly with vascular congestion and patchy airspace disease, a   component of which is felt likely to represent pulmonary edema. Improved   aeration in the left upper lobe. XR CHEST PORTABLE   Final Result   1. Endotracheal tube with the tip 2.3 cm above the lux. Recommend   withdrawal by 1.5 cm.      2.  The left hemithorax is completely opacified. This could represent a   combination of large pleural effusion and/or consolidation. 3.  Patchy airspace disease throughout the right lung and small right pleural   effusion. CT ABDOMEN PELVIS WO CONTRAST Additional Contrast? None   Final Result   Addendum 1 of 1   ADDENDUM:   There is a transcription error in the impression. The 1st item should    read   as follows:      Stable broad-based ventral abdominal wall hernia containing loops of small   large bowel without associated obstruction. Final   1. Stable broad-based ventral abdominal wall hernia containing loops of small   large bowel at associated obstruction. 2. Severe renal atrophy with diffuse vascular calcifications and findings of   renal osteodystrophy. 3. Small layering bilateral pleural effusions with mild pulmonary edema and   anasarca. Assessment/Plan   1. ESRD on HD    2. HTN    3. Anemia    4  Seizure likely 2/2 hypocalcemia     5 diarrhea    6 Metabolic acidosis    7 Hypocalcemia  PTH is low at 11 .  Likely has hypoparathyroidism   Phos 11       HD om Monday   Check ionized calcium daily   Increased the calcium carbonate to 1000 mg po tid       Give calcium gluconate 1 gm iv x 1                 Thank you for allowing us to participate in care of Ijeoma Bull MD  Feel free to contact me [facility] [address]  Office : [phone]  Fax :[phone]

## 2022-02-19 NOTE — PROGRESS NOTES
Dr Milvia Velasco perfect served regarding Dr Trisha Cheek order for Lorsartan if OK with nephrology. [address] 27 or Facility: [facility] From: Scott Alvarado RE: Jose Quintanilla 03/06/1969 RM: 80 Dr Trisha Cheek is wanting to start losartan (COZAAR) tablet 25 mg , Oral, DAILY if OK with nephrologist. Please advise, thanks Need Callback: NO CALLBACK REQ ICU ROUTINE    Dr Milvia Velasco replied-  2/19/22 1:12 PM   Repeat K after HD pls, if less than 5, can start Losartan 25mg.  Thanks

## 2022-02-19 NOTE — PROGRESS NOTES
Physical Therapy    Facility/Department: [facility] ICU  Initial Assessment/Discharge Summary    NAME: Mary Sung  : 1970  MRN: [id number]    Date of Service: 2022    Discharge Recommendations:  Mary Sung scored a 6/24 on the AM-PAC short mobility form. Current research shows that an AM-PAC score of 18 or greater is typically associated with a discharge to the patient's home setting. Based on the patient's AM-PAC score and their current functional mobility deficits, it is recommended that the patient have 2-3 sessions per week of Physical Therapy at d/c to increase the patient's independence. At this time, this patient demonstrates the endurance and safety to discharge home with home health PT for home safety and equipment assessment and a follow up treatment frequency of 2-3x/wk. Please see assessment section for further patient specific details. If patient discharges prior to next session this note will serve as a discharge summary. Please see below for the latest assessment towards goals. 24 hour supervision or assist,2-3 sessions per week,S Level 1   PT Equipment Recommendations  Equipment Needed: Yes  Mobility Devices: Transport Devices  Transport Devices: Patient Mechanical Lift    Assessment   Body structures, Functions, Activity limitations: Decreased functional mobility ; Decreased ADL status; Decreased ROM; Decreased strength  Assessment: Patient reports he is assisted to w/c by family, unable to say exactly how but sounds as though he was being lifted in some manner. Patient presently requires use of ceiling lift to transfer to chair. He appears to be at functional baseline. Recommend home health PT to assess home situation. Patient may benefit from lift equipment at home. Prognosis: Guarded  Decision Making: Low Complexity  History: As noted. Exam: MMT, ROM, functional mobility assessment  Clinical Presentation: Stable.   PT Education: Brenden Torres of Care  Patient Education: Patient verbalized understanding. Barriers to Learning: cognition  REQUIRES PT FOLLOW UP: No  Activity Tolerance  Activity Tolerance: Patient Tolerated treatment well       Patient Diagnosis(es): The primary encounter diagnosis was Metabolic acidosis. Diagnoses of Diarrhea, unspecified type, Dialysis patient ([address] Pancytopenia ([address] Hypocalcemia, Seizure ([address] and Hypotension, unspecified hypotension type were also pertinent to this visit. has a past medical history of Blood transfusion, Chronic pain, Diabetes mellitus ([address] Dialysis, Guillain [place] syndrome, Hemodialysis patient ([address] HTN (hypertension), benign, Low blood pressure, Pancreatitis, Peripheral Neuropathy, Pneumonia, Renal failure, and Status epilepticus ([address] has a past surgical history that includes Kidney transplant (9/1994); Cholecystectomy; Tunneled venous port placement; Dialysis fistula creation; Tunneled venous catheter placement; Toe Surgery (10-8-2010); Upper gastrointestinal endoscopy (N/A, 12/17/2021); Colonoscopy (N/A, 12/17/2021); and bronchoscopy (N/A, 2/14/2022). Restrictions  Restrictions/Precautions  Restrictions/Precautions: Fall Risk (moderate fall risk)  Required Braces or Orthoses?: No  Position Activity Restriction  Other position/activity restrictions: The pt was admitted 2/12 with diarrhea. Rapid response called twice on 2/14 for aspiration and then seizure. Intubated 2/14-2/16. Possible seizure due to hypocalcemia. Pt has been hypoglycemic. Ronald Rees is a 49 y.o. male with h/o ESRD on HD, HTN , DM presented d/t diarrhea. The symptoms are ongoing for the past several weeks. He has been feeling weakness.  Could not go to his scheduled dialysis session     Vision/Hearing  Vision: Within Functional Limits  Hearing: Within functional limits       Subjective  General  Chart Reviewed: Yes  Patient assessed for rehabilitation services?: Yes  Response To Previous Treatment: Not applicable  Family / Caregiver Present: No  Diagnosis: diarrhea, acute respiratory failure s/p intubation  Follows Commands: Within Functional Limits  General Comment  Comments: Patient supine in bed. Subjective  Subjective: Patient reports he is transferred to w/c by family, can't say exactly how, and is able to self-propel manual w/c. He anamnesis is questionable, however. Pain Screening  Patient Currently in Pain: Denies  Vital Signs  Patient Currently in Pain: Denies       Orientation  Orientation  Overall Orientation Status: Within Functional Limits     Social/Functional History  Social/Functional History  Lives With: Family (Mother)  Type of Home: Apartment  Home Layout: One level  Home Access: Level entry  Bathroom Shower/Tub: Walk-in shower  Home Equipment: [address] Help From: [employer] ([address] [place] [address])  ADL Assistance: Needs assistance (assistance needed for bathing and dressing by mother)  Additional Comments: Pt performs w/c transfers with total lift assistance; pt denies use of slide board with transfers; pt is able to self-propel w/c. Objective  PROM RLE (degrees)  RLE General PROM: BLE hypertonicity noted disallowing PROM. AROM RLE (degrees)  RLE General AROM: Patient has a history of paraplegia. PROM LLE (degrees)  LLE General PROM: BLE hypertonicity noted disallowing PROM. AROM LLE (degrees)  LLE General AROM: Patient has a history of paraplegia. Strength RLE  Comment: 0/5  Strength LLE  Comment: 0/5     Sensation  Overall Sensation Status: Impaired  Light Touch: Severe deficits in the LLE; Severe deficits in the RLE  Bed mobility  Supine to Sit: Dependent/Total  Transfers  Bed to Chair: Dependent/Total  Comment: Ceiling lift used to transfer patient to chair. Ambulation  Ambulation?: No  Stairs/Curb  Stairs?: No     Balance  Posture: Poor  Sitting - Static: Poor  Sitting - Dynamic: Poor        Plan   Plan  Plan Comment: D/C acute PT. Safety Devices  Type of devices:  All fall risk precautions in place,Call light within reach,Left in chair,Nurse notified  Restraints  Initially in place: No           AM-PAC Score  AM-PAC Inpatient Mobility Raw Score : 6 (02/19/22 1201)  AM-PAC Inpatient T-Scale Score : 23.55 (02/19/22 1201)  Mobility Inpatient CMS 0-100% Score: 100 (02/19/22 1201)  Mobility Inpatient CMS G-Code Modifier : CN (02/19/22 1201)       Therapy Time   Individual Concurrent Group Co-treatment   Time In 1119         Time Out 1143         Minutes 24         Timed Code Treatment Minutes: [address] [place], [place], DPT, ATC-R [id number]

## 2022-02-19 NOTE — PROGRESS NOTES
Hospitalist Progress Note      PCP: No primary care provider on file. Date of Admission: 2/12/2022    Chief Complaint:  Dropped sats, seized    Hospital Course:   Called to rapid response earlier today when the patient dropped his oxygen sats. He has been admitted with hypocalcemia was having some jerky-like movements and we suspected that he aspirated he was suctioned at the bedside and seem to be improving we wrapped up with a rapid response short time later they were calling a second rapid response was called overhead as the patient had had a seizure and dropped his sats again. This was witnessed by my partner. Patient has a previous history of status epilepticus and based on this we made the decision to intubate the patient for airway protection. He was intubated and transferred to the ICU central line was placed as he was a difficult stick and did not have any access appropriate in case he needed any pressors. He had 1 peripheral IV in his neck.   Central line was placed  Subjective:     No acute event overnight    Medications:  Reviewed    Infusion Medications    dextrose Stopped (02/15/22 1907)    sodium chloride 25 mL (02/18/22 2053)     Scheduled Medications    calcium gluconate-NaCl  1,000 mg IntraVENous Once    calcium carbonate  1,000 mg Oral TID    midazolam  2 mg IntraVENous Once    pantoprazole  40 mg IntraVENous Daily    vitamin D  50,000 Units Oral Weekly    cefepime  1,000 mg IntraVENous Q24H    levothyroxine  25 mcg Oral Daily    midodrine  5 mg Oral BID WC    sodium chloride flush  5-40 mL IntraVENous 2 times per day    heparin (porcine)  5,000 Units SubCUTAneous 3 times per day     PRN Meds: dextrose bolus (hypoglycemia) **OR** dextrose bolus (hypoglycemia), HYDROmorphone, HYDROmorphone, oxyCODONE, glucose, dextrose, glucagon (rDNA), dextrose, sodium chloride flush, sodium chloride, ondansetron **OR** ondansetron, polyethylene glycol, acetaminophen **OR** acetaminophen, heparin (porcine), LORazepam      Intake/Output Summary (Last 24 hours) at 2/19/2022 1336  Last data filed at 2/19/2022 0900  Gross per 24 hour   Intake 452.3 ml   Output --   Net 452.3 ml       Physical Exam Performed:    BP (!) 146/65   Pulse 72   Temp 98 °F (36.7 °C) (Temporal)   Resp 12   Ht 6' 4\" (1.93 m)   Wt 208 lb 6.4 oz (94.5 kg)   SpO2 94%   BMI 25.37 kg/m²     General appearance:  extubated  HEENT: Pupils equal, round, and reactive to light. Conjunctivae/corneas clear. Neck: Supple, with full range of motion. No jugular venous distention. Trachea midline. Respiratory: Patient has lots of upper respiratory noises following intubation we were able to suction a significant amount of what appeared to be blood from his ET tube  Cardiovascular: Regular rate and rhythm with normal S1/S2 without murmurs, rubs or gallops. Abdomen: Soft, he has a massive hernia of the front abdomen  Musculoskeletal: No clubbing, cyanosis or edema bilaterally. Fingers are bent from arthritis  Skin: Skin color, texture, turgor normal.  No rashes or lesions. Neurologic: Extubated following commands  Psychiatric: Alert  Capillary Refill: Brisk,3 seconds, normal   Peripheral Pulses: +2 palpable, equal bilaterally       Labs:   Recent Labs     02/17/22  0515 02/18/22  0617 02/19/22  0919   WBC 3.2* 2.6* 2.5*   HGB 10.1* 9.0* 8.7*   HCT 31.4* 27.5* 27.4*   PLT 41* 23* 34*     Recent Labs     02/17/22  0515 02/18/22  0617 02/19/22  0853   * 129* 130*   K 4.2 3.9 4.6   CL 91* 92* 95*   CO2 22 22 15*   BUN 43* 33* 39*   CREATININE 6.2* 4.6* 5.6*   CALCIUM 6.7* 6.8* 6.5*     No results for input(s): AST, ALT, BILIDIR, BILITOT, ALKPHOS in the last 72 hours. No results for input(s): INR in the last 72 hours. No results for input(s): Issac Clark in the last 72 hours.     Urinalysis:    No results found for: Hawk Roulette, BACTERIA, RBCUA, BLOODU, [address], [address]    Radiology:  CT CHEST WO CONTRAST   Final Result Bilateral airspace disease, suspicious for pneumonia. Superimposed pleural effusions are seen with body wall anasarca, compatible   with a component of fluid overload. Findings of renal osteodystrophy      RECOMMENDATIONS:   Unavailable         XR CHEST PORTABLE   Final Result   Moderate bilateral airspace disease. This could represent a combination of   edema and or pneumonia. Moderate improved aeration of the left lung. XR CHEST PORTABLE   Final Result   The endotracheal tube measures approximately 6.6 cm above the lux. The enteric catheter is coiled in the gastric fundus. Cardiomegaly with vascular congestion and patchy airspace disease, a   component of which is felt likely to represent pulmonary edema. Improved   aeration in the left upper lobe. XR CHEST PORTABLE   Final Result   1. Endotracheal tube with the tip 2.3 cm above the lux. Recommend   withdrawal by 1.5 cm.      2.  The left hemithorax is completely opacified. This could represent a   combination of large pleural effusion and/or consolidation. 3.  Patchy airspace disease throughout the right lung and small right pleural   effusion. CT ABDOMEN PELVIS WO CONTRAST Additional Contrast? None   Final Result   Addendum 1 of 1   ADDENDUM:   There is a transcription error in the impression. The 1st item should    read   as follows:      Stable broad-based ventral abdominal wall hernia containing loops of small   large bowel without associated obstruction. Final   1. Stable broad-based ventral abdominal wall hernia containing loops of small   large bowel at associated obstruction. 2. Severe renal atrophy with diffuse vascular calcifications and findings of   renal osteodystrophy. 3. Small layering bilateral pleural effusions with mild pulmonary edema and   anasarca.                     Assessment/Plan:    Active Hospital Problems    Diagnosis     Aspiration into airway [T17.482Y]  Atelectasis [J98.11]     End stage renal disease due to benign hypertension (HCC) [I12.0, N18.6]     Acute hypoxemic respiratory failure (HCC) [J96.01]     HTN (hypertension), benign [I10]     New onset seizure ([facility] [R56.9]     Diarrhea [R19.7]     JOANNE (acute kidney injury) ([facility] [N17.9]     Hypocalcemia [E83.51]      Acute respiratory failure with hypoxia requiring  mechanical ventilation, liberated from mechanical ventilation  Secondary to presumed aspiration pneumonia as well as seizure activity. Status post liberation from mechanical ventilation  Pulmonary on board appreciate recommendations    End-stage renal disease on dialysis  Patient is scheduled to have dialysis   Nephrology on board appreciate recommendations    ? Seizure  ? Could be secondary to hypocalcemia  Neurology has been consulted  Currently intubated and sedated  No history of seizure  diffuse slowing is suggestive of moderate diffuse encephalopathy on EEG no evidence of epileptiform discharges, focal or lateralizing abnormalities  Neurology has been consulted  No need to start AED as per neurology. Aspiration  On cefepime  Procalcitonin is elevated, no leukocytosis, cultures so far negative  Status post bronchoscopy, cultures so far negative    Hypocalcemia  Patient's calcium was been replaced and he is going to have additional adjustments made with dialysis. Endo has been consulted for evaluation of persistent hypercalcemia, recommending consulting oncology for  Gammopathy  O ionized calcium 0.74, will give calcium gluconate    Massive ventral wall abdominal hernia  Stable per imaging    Hypoglycemia   Eating Now and started on dysphagia diet  Continue to monitor blood glucose    Diarrhea  C. difficile has been ruled out    DVT Prophylaxis: Heparin  Diet: ADULT DIET; Dysphagia - Soft and Bite Sized; No Drinking Straws  ADULT ORAL NUTRITION SUPPLEMENT; Lunch, Dinner;  Low Calorie/High Protein Oral Supplement  Code Status: Full Code    PT/OT Eval Status: Needed    Dispo -likely patient can be transferred out of ICU to progressive care unit. 3T     Due to the immediate potential for life-threatening deterioration due to acute hypoxic respiratory failure, seizure,, I spent 33 minutes providing critical care. This time is excluding time spent performing procedures.       Diogo Deal MD

## 2022-02-19 NOTE — PROGRESS NOTES
Incontinent of medium amt of soft yellow stool. Complete bath given, teeth brushed & repositioned.   Pt states pain now 3/10

## 2022-02-19 NOTE — PROGRESS NOTES
Occupational Therapy   Occupational Therapy Initial Assessment/ Discharge Summary  Date: 2022   Patient Name: Milton Watson  MRN: [id number]     : 1970    Date of Service: 2022    Discharge Recommendations:Carter Mehta Pretty scored a  on the AM-[facility] ADL Inpatient form. At this time, no further OT is recommended upon discharge due to baseline functional status. Recommend patient returns to prior setting with prior services. OT Equipment Recommendations  Equipment Needed: No    Assessment   Assessment: Pt presents at baseline functional status as evidenced by bed mobility, w/c transfers, ADL's and endurance. DC from acute care OT and recommend DC to home. Prognosis: Fair  Decision Making: Low Complexity  OT Education: OT Role;Plan of Care;Precautions;Transfer Training;Equipment  REQUIRES OT FOLLOW UP: No  Activity Tolerance  Activity Tolerance: Patient Tolerated treatment well  Safety Devices  Safety Devices in place: Yes  Type of devices: All fall risk precautions in place;Call light within reach; Left in chair;Nurse notified; Patient at risk for falls           Patient Diagnosis(es): The primary encounter diagnosis was Metabolic acidosis. Diagnoses of Diarrhea, unspecified type, Dialysis patient ([address] Pancytopenia ([address] Hypocalcemia, Seizure ([address] and Hypotension, unspecified hypotension type were also pertinent to this visit. has a past medical history of Blood transfusion, Chronic pain, Diabetes mellitus ([address] Dialysis, Guillain [place] syndrome, Hemodialysis patient ([address] HTN (hypertension), benign, Low blood pressure, Pancreatitis, Peripheral Neuropathy, Pneumonia, Renal failure, and Status epilepticus ([address] has a past surgical history that includes Kidney transplant (1994); Cholecystectomy; Tunneled venous port placement; Dialysis fistula creation; Tunneled venous catheter placement; Toe Surgery (10-8-2010); Upper gastrointestinal endoscopy (N/A, 2021);  Colonoscopy (N/A, 12/17/2021); and bronchoscopy (N/A, 2/14/2022). Restrictions  Restrictions/Precautions  Restrictions/Precautions: Fall Risk (moderate fall risk)  Required Braces or Orthoses?: No  Position Activity Restriction  Other position/activity restrictions: The pt was admitted 2/12 with diarrhea. Rapid response called twice on 2/14 for aspiration and then seizure. Intubated 2/14-2/16. Possible seizure due to hypocalcemia. Pt has been hypoglycemic. Ronald Rees is a 49 y.o. male with h/o ESRD on HD, HTN , DM presented d/t diarrhea. The symptoms are ongoing for the past several weeks. He has been feeling weakness. Could not go to his scheduled dialysis session    Subjective   General  Chart Reviewed: Yes  Patient assessed for rehabilitation services?: Yes  Family / Caregiver Present: No  Diagnosis: Hypocalcemia  Subjective  Subjective: Pt supine in bed on arrival and agreeable for session. RN approval prior to session. Patient Currently in Pain: Denies  Vital Signs  Patient Currently in Pain: Denies  Social/Functional History  Social/Functional History  Lives With: Family (Mother)  Type of Home: Apartment  Home Layout: One level  Home Access: Level entry  Bathroom Shower/Tub: Walk-in shower  Home Equipment: [address] Help From: [employer] ([address] [place] [address])  ADL Assistance: Needs assistance (assistance needed for bathing and dressing by mother)  Additional Comments: Pt performs w/c transfers with total lift assistance; pt denies use of slide board with transfers; pt is able to self-propel w/c.        Objective   Vision: Within Functional Limits  Hearing: Within functional limits    Orientation  Overall Orientation Status: Within Functional Limits  Observation/Palpation  Posture: Poor  Balance  Sitting Balance: Unable to assess(comment)  Standing Balance: Unable to assess(comment)     Tone RUE  RUE Tone: Hypertonic  Tone LUE  LUE Tone: Hypertonic  Coordination  Movements Are Fluid And Coordinated: No  Quality of Movement Other  Comment: Rigid movements noted in BUE's. Contractions also observed in B hands but RN reports that pt is able to self feed. Bed mobility  Supine to Sit: Dependent/Total  Transfers  Sit to stand: Dependent/Total  Stand to sit: Dependent/Total  Transfer Comments: [employer] utilized for safe bed mobility and OOB transfer to chair. Vision - Basic Assessment  Prior Vision: No visual deficits  Visual History: No significant visual history  Patient Visual Report: No visual complaint reported. Cognition  Overall Cognitive Status: Exceptions  Arousal/Alertness: Delayed responses to stimuli  Following Commands: Inconsistently follows commands  Attention Span: Difficulty attending to directions  Memory: Decreased recall of precautions;Decreased short term memory  Safety Judgement: Decreased awareness of need for safety  Problem Solving: Decreased awareness of errors  Insights: Decreased awareness of deficits  Initiation: Requires cues for all  Sequencing: Requires cues for all  Perception  Overall Perceptual Status: Impaired     Sensation  Overall Sensation Status: Impaired  Light Touch: Severe deficits in the LLE; Severe deficits in the RLE     Plan   Plan  Times per week: N/A      AM-PAC Score  AM-[facility] Inpatient Daily Activity Raw Score: 12 (02/19/22 1217)  AM-PAC Inpatient ADL T-Scale Score : 30.6 (02/19/22 1217)  ADL Inpatient CMS 0-100% Score: 66.57 (02/19/22 1217)  ADL Inpatient CMS G-Code Modifier : CL (02/19/22 1217)    Goals  Short term goals  Time Frame for Short term goals: NO GOALS--OT EVAL ONLY  Patient Goals   Patient goals : Safe Discharge       Therapy Time   Individual Concurrent Group Co-treatment   Time In 1119         Time Out 1143         Minutes 24              Timed Code Treatment Minutes:  11 Minutes    Total Treatment Minutes:  24 minutes      ANUP BAEZ [address], [address], OTR/L [id number]

## 2022-02-19 NOTE — PROGRESS NOTES
See complex assessment, vitals, I&O for complete assessment. AAOx4  Follows simple commands. Denies c/o at this time  VSS  Monitor NSR  Lungs clear, diminished  RA sat 94%  Occasional productive cough thick tan yellow sputum. Abd soft with +BSx4. BLE's +1 pitting edema. Skin warm, dry, swollen & flaky. Pulses palp. Stage 2 noted to sacrum. Plan of care discussed- comfort, safety, up in chair, possible PT/OT, assist with needs prn. Pt v/u.

## 2022-02-19 NOTE — PLAN OF CARE
Problem: Falls - Risk of:  Goal: Will remain free from falls  Description: Will remain free from falls  2/19/2022 0057 by Mary Jane Heredia RN  Outcome: Ongoing  2/18/2022 1849 by Gina Caldwell RN  Outcome: Ongoing  Goal: Absence of physical injury  Description: Absence of physical injury  2/19/2022 0057 by Mary Jane Heredia RN  Outcome: Ongoing  2/18/2022 1849 by Gina Caldwell RN  Outcome: Ongoing     Problem: Skin Integrity:  Goal: Will show no infection signs and symptoms  Description: Will show no infection signs and symptoms  2/19/2022 0057 by Mary Jane Heredia RN  Outcome: Ongoing  2/18/2022 1849 by Gina Caldwell RN  Outcome: Ongoing  Goal: Absence of new skin breakdown  Description: Absence of new skin breakdown  2/19/2022 0057 by Mary Jane Heredia RN  Outcome: Ongoing  2/18/2022 1849 by Gina Caldwell RN  Outcome: Ongoing     Problem: Daily Care:  Goal: Daily care needs are met  Description: Daily care needs are met  2/19/2022 0057 by Mary Jane Heredia RN  Outcome: Ongoing  2/18/2022 1849 by Gina Caldwell RN  Outcome: Ongoing     Problem: Skin Integrity:  Goal: Skin integrity will stabilize  Description: Skin integrity will stabilize  2/19/2022 0057 by Mary Jane Heredia RN  Outcome: Ongoing  2/18/2022 1849 by Gina Caldwell RN  Outcome: Ongoing     Problem: Pain:  Goal: Pain level will decrease  Description: Pain level will decrease  2/19/2022 0057 by Mary Jane Heredia RN  Outcome: Ongoing  2/18/2022 1849 by Gina Caldwell RN  Outcome: Ongoing  Goal: Control of acute pain  Description: Control of acute pain  2/19/2022 0057 by Mary Jane Heredia RN  Outcome: Ongoing  2/18/2022 1849 by Gina Caldwell RN  Outcome: Ongoing  Goal: Control of chronic pain  Description: Control of chronic pain  2/19/2022 0057 by Mary Jane Heredia RN  Outcome: Ongoing  2/18/2022 1849 by Gina Caldwell RN  Outcome: Ongoing     Problem: Health Behavior:  Goal: Ability to manage health-related needs will improve  Description: Ability to manage health-related needs will improve  2/19/2022 0057 by Nacho Lucia RN  Outcome: Ongoing  2/18/2022 1849 by Lynnette Mclean RN  Outcome: Ongoing  Goal: Identification of resources available to assist in meeting health care needs will improve  Description: Identification of resources available to assist in meeting health care needs will improve  2/19/2022 0057 by Nacho Lucia RN  Outcome: Ongoing  2/18/2022 1849 by Lynnette Mclean RN  Outcome: Ongoing     Problem: Nutritional:  Goal: Ability to identify appropriate dietary choices will improve  Description: Ability to identify appropriate dietary choices will improve  2/19/2022 0057 by Nacho Lucia RN  Outcome: Ongoing  2/18/2022 1849 by Lynnette Mclean RN  Outcome: Ongoing     Problem: Non-Violent Restraints  Goal: Removal from restraints as soon as assessed to be safe  2/18/2022 1851 by Lynnette Mclean RN  Outcome: Completed  2/18/2022 1849 by Lynnette Mclean RN  Outcome: Ongoing  Goal: No harm/injury to patient while restraints in use  2/18/2022 1851 by Lynnette Mclean RN  Outcome: Completed  2/18/2022 1849 by Lynnette Mclean RN  Outcome: Ongoing  Goal: Patient's dignity will be maintained  2/18/2022 1851 by Lynnette Mclean RN  Outcome: Completed  2/18/2022 1849 by Lynnette Mclean RN  Outcome: Ongoing

## 2022-02-19 NOTE — PROGRESS NOTES
Remains up in chair. VSS  Monitor NSR   Lungs remain clear, diminished. C/O pain 7/10 but pt not due for po pain med. Repositioned in chair. See flow sheets for complete assessment.

## 2022-02-19 NOTE — PROGRESS NOTES
Assessment completed. VSS. Patient awake, alert, and in bed. . Medications given per MAR. Patient complained of pain 7/10. Prn Dilaudid given. Safety precautions continued. Will continue to monitor.

## 2022-02-19 NOTE — PLAN OF CARE
Problem: Skin Integrity:  Goal: Absence of new skin breakdown  2/19/2022 1043 by Willian Vance RN  Outcome: Ongoing  Wound prevention protocol in place  Yang assessed q shift  2/19/2022 0057 by Lottie Amato RN  Outcome: Ongoing     Problem: Daily Care:  Goal: Daily care needs are met  2/19/2022 1043 by Willian Vance RN  Outcome: Ongoing  All pt's daily care needs are met by RN  2/19/2022 0057 by Lottie Amato RN  Outcome: Ongoing     Problem: Pain:  Description: Pain management should include both nonpharmacologic and pharmacologic interventions.   Goal: Control of chronic pain  2/19/2022 1043 by Willian Vance RN  Outcome: Ongoing  Using pain scale with descriptors to describe pain  Dilaudid, Oxy IR prn for pain  Repositioning  2/19/2022 0057 by Lottie Amato RN  Outcome: Ongoing

## 2022-02-20 NOTE — PROGRESS NOTES
GASTROINTESTINAL ENDOSCOPY N/A 12/17/2021    EGD ESOPHAGOGASTRODUODENOSCOPY performed by Clare Dawn MD at [facility] ENDOSCOPY       Family History   Problem Relation Age of Onset    Diabetes Mother         reports that he has never smoked. He has never used smokeless tobacco. He reports that he does not drink alcohol and does not use drugs.     Allergies:  Influenza virus vaccine    Current Medications:    dextrose bolus (hypoglycemia) 10% 125 mL, PRN   Or  dextrose bolus (hypoglycemia) 10% 250 mL, PRN  calcium carbonate (TUMS) chewable tablet 1,000 mg, TID  HYDROmorphone HCl PF (DILAUDID) injection 1 mg, Q4H PRN  HYDROmorphone (DILAUDID) injection 2 mg, Q4H PRN  midazolam PF (VERSED) injection 2 mg, Once  pantoprazole (PROTONIX) injection 40 mg, Daily  vitamin D (ERGOCALCIFEROL) capsule 50,000 Units, Weekly  cefepime (MAXIPIME) 1000 mg IVPB minibag, Q24H  oxyCODONE (OXY-IR) immediate release tablet 30 mg, Q8H PRN  levothyroxine (SYNTHROID) tablet 25 mcg, Daily  midodrine (PROAMATINE) tablet 5 mg, BID WC  glucose (GLUTOSE) 40 % oral gel 15 g, PRN  dextrose 50 % IV solution, PRN  glucagon (rDNA) injection 1 mg, PRN  dextrose 5 % solution, PRN  sodium chloride flush 0.9 % injection 5-40 mL, 2 times per day  sodium chloride flush 0.9 % injection 5-40 mL, PRN  0.9 % sodium chloride infusion, PRN  ondansetron (ZOFRAN-ODT) disintegrating tablet 4 mg, Q8H PRN   Or  ondansetron (ZOFRAN) injection 4 mg, Q6H PRN  polyethylene glycol (GLYCOLAX) packet 17 g, Daily PRN  acetaminophen (TYLENOL) tablet 650 mg, Q6H PRN   Or  acetaminophen (TYLENOL) suppository 650 mg, Q6H PRN  heparin (porcine) injection 5,800 Units, PRN  heparin (porcine) injection 5,000 Units, 3 times per day  LORazepam (ATIVAN) tablet 0.5 mg, Nightly PRN        Physical exam:     Vitals:  /79   Pulse 72   Temp 96.8 °F (36 °C) (Temporal)   Resp 16   Ht 6' 4\" (1.93 m)   Wt 210 lb 15.7 oz (95.7 kg)   SpO2 93%   BMI 25.68 kg/m²   Constitutional: extubated   Awake and alert   Skin: no rash, turgor wnl  Heent:  eomi, mmm  Neck: no bruits or jvd noted  Cardiovascular:  S1, S2 without m/r/g  Respiratory: CTA B without w/r/r  Abdomen:  +bs, soft, nt, nd  Ext: no  lower extremity edema      Data:   Labs:  CBC:   Recent Labs     02/18/22  0617 02/19/22  0919 02/20/22  0510   WBC 2.6* 2.5* 2.7*   HGB 9.0* 8.7* 9.5*   PLT 23* 34* 50*     BMP:    Recent Labs     02/18/22  0617 02/19/22  0853 02/20/22  0510   * 130* 131*   K 3.9 4.6 4.5   CL 92* 95* 94*   CO2 22 15* 22   BUN 33* 39* 44*   CREATININE 4.6* 5.6* 6.2*   GLUCOSE 59* 90 63*     Ca/Mg/Phos:   Recent Labs     02/18/22  0617 02/19/22  0853 02/20/22  0510   CALCIUM 6.8* 6.5* 6.7*     Hepatic:   No results for input(s): AST, ALT, ALB, BILITOT, ALKPHOS in the last 72 hours. Troponin: No results for input(s): TROPONINI in the last 72 hours. BNP: No results for input(s): BNP in the last 72 hours. Lipids: No results for input(s): CHOL, TRIG, HDL, LDLCALC, LABVLDL in the last 72 hours. ABGs:   No results for input(s): PHART, PO2ART, IYW6GUP in the last 72 hours. INR: No results for input(s): INR in the last 72 hours. UA:No results for input(s): Vidalia Batch, GLUCOSEU, BILIRUBINUR, Sarah Jose J, BLOODU, PHUR, PROTEINU, UROBILINOGEN, NITRU, LEUKOCYTESUR, LABMICR, URINETYPE in the last 72 hours. Urine Microscopic: No results for input(s): LABCAST, BACTERIA, COMU, HYALCAST, WBCUA, RBCUA, EPIU in the last 72 hours. Urine Culture: No results for input(s): LABURIN in the last 72 hours. Urine Chemistry: No results for input(s): Je Factor, PROTEINUR, NAUR in the last 72 hours. IMAGING:  CT CHEST WO CONTRAST   Final Result   Bilateral airspace disease, suspicious for pneumonia. Superimposed pleural effusions are seen with body wall anasarca, compatible   with a component of fluid overload.       Findings of renal osteodystrophy      RECOMMENDATIONS:   Unavailable         XR CHEST PORTABLE   Final Result   Moderate bilateral airspace disease. This could represent a combination of   edema and or pneumonia. Moderate improved aeration of the left lung. XR CHEST PORTABLE   Final Result   The endotracheal tube measures approximately 6.6 cm above the lux. The enteric catheter is coiled in the gastric fundus. Cardiomegaly with vascular congestion and patchy airspace disease, a   component of which is felt likely to represent pulmonary edema. Improved   aeration in the left upper lobe. XR CHEST PORTABLE   Final Result   1. Endotracheal tube with the tip 2.3 cm above the lux. Recommend   withdrawal by 1.5 cm.      2.  The left hemithorax is completely opacified. This could represent a   combination of large pleural effusion and/or consolidation. 3.  Patchy airspace disease throughout the right lung and small right pleural   effusion. CT ABDOMEN PELVIS WO CONTRAST Additional Contrast? None   Final Result   Addendum 1 of 1   ADDENDUM:   There is a transcription error in the impression. The 1st item should    read   as follows:      Stable broad-based ventral abdominal wall hernia containing loops of small   large bowel without associated obstruction. Final   1. Stable broad-based ventral abdominal wall hernia containing loops of small   large bowel at associated obstruction. 2. Severe renal atrophy with diffuse vascular calcifications and findings of   renal osteodystrophy. 3. Small layering bilateral pleural effusions with mild pulmonary edema and   anasarca. Assessment/Plan   1. ESRD on HD    2. HTN    3.  Anemia    4  Seizure likely 2/2 hypocalcemia     5 diarrhea    6 Metabolic acidosis    7 Hypocalcemia  PTH is low at 11 . has hypoparathyroidism   Phos 11       HD om Monday   Check ionized calcium daily   Increased the calcium carbonate to 1000 mg po tid       Give calcium gluconate 1 gm iv x 1                 Thank you for allowing us to participate in care of Ivanna Caceres MD  Feel free to contact me   [facility] [address]  Office : [phone]  Fax :[phone]

## 2022-02-20 NOTE — PROGRESS NOTES
[facility]   Progress Note    2/20/2022    SUBJECTIVE:  Resting comfortably    OBJECTIVE:    Physical Assessment:  Vitals:  BP (!) 144/85   Pulse 72   Temp 96.8 °F (36 °C) (Temporal)   Resp 11   Ht 6' 4\" (1.93 m)   Wt 210 lb 15.7 oz (95.7 kg)   SpO2 98%   BMI 25.68 kg/m²    24HR INTAKE/OUTPUT:    Intake/Output Summary (Last 24 hours) at 2/20/2022 1503  Last data filed at 2/19/2022 1846  Gross per 24 hour   Intake 120 ml   Output --   Net 120 ml       CONSTITUTIONAL:  Awake, alert & oriented x3  RESPIRATORY:  No increased work of breathing, breath sounds decreased. CARDIOVASCULAR:  Cardiac S1/S2, RRR  GASTROINTESTINAL:  abdomen soft +BS x4, no hepatosplenomegaly  SKIN:  negative for rash and skin lesion(s)  EXTREMITIES: Mild bilateral lower extremity edema    Labs Results:    CBC:   Recent Labs     02/18/22  0617 02/19/22  0919 02/20/22  0510   WBC 2.6* 2.5* 2.7*   HGB 9.0* 8.7* 9.5*   HCT 27.5* 27.4* 29.4*   MCV 75.5* 76.8* 76.0*   PLT 23* 34* 50*     BMP:   Recent Labs     02/18/22  0617 02/19/22  0853 02/20/22  0510   * 130* 131*   K 3.9 4.6 4.5   CL 92* 95* 94*   CO2 22 15* 22   BUN 33* 39* 44*   CREATININE 4.6* 5.6* 6.2*     LIVER PROFILE: No results for input(s): AST, ALT, LIPASE, BILIDIR, BILITOT, ALKPHOS in the last 72 hours. Invalid input(s): AMYLASE,  ALB  PT/INR:    Lab Results   Component Value Date    PROTIME 12.1 12/11/2021    PROTIME 12.5 11/11/2021    PROTIME 16.3 04/30/2014    INR 1.07 12/11/2021    INR 1.10 11/11/2021    INR 1.47 04/30/2014     PTT:    Lab Results   Component Value Date    APTT 36.8 11/11/2021    APTT 41.6 04/30/2014     UA:No results for input(s): NITRITE, COLORU, PHUR, LABCAST, WBCUA, RBCUA, MUCUS, TRICHOMONAS, YEAST, BACTERIA, CLARITYU, SPECGRAV, LEUKOCYTESUR, UROBILINOGEN, BILIRUBINUR, BLOODU, GLUCOSEU, AMORPHOUS in the last 72 hours.     Invalid input(s): KETONESU  Magnesium:   Lab Results   Component Value Date    MG 1.60 02/14/2022    MG 1.60 02/14/2022    MG 1.90 02/12/2022     ABBY:    Lab Results   Component Value Date    ANATITER 1-320 10/01/2010    ABBY POSITIVE at 1-40 10/01/2010       RADIOLOGY:    CT ABDOMEN PELVIS WO CONTRAST Additional Contrast? None    Addendum Date: 2/12/2022    ADDENDUM: There is a transcription error in the impression. The 1st item should read as follows: Stable broad-based ventral abdominal wall hernia containing loops of small large bowel without associated obstruction. Result Date: 2/12/2022  EXAMINATION: CT OF THE ABDOMEN AND PELVIS WITHOUT CONTRAST 2/12/2022 3:00 pm TECHNIQUE: CT of the abdomen and pelvis was performed without the administration of intravenous contrast. Multiplanar reformatted images are provided for review. Dose modulation, iterative reconstruction, and/or weight based adjustment of the mA/kV was utilized to reduce the radiation dose to as low as reasonably achievable. COMPARISON: 12/18/2021 HISTORY: ORDERING SYSTEM PROVIDED HISTORY: abd pain, diarrhea TECHNOLOGIST PROVIDED HISTORY: Reason for exam:->abd pain, diarrhea Additional Contrast?->None Decision Support Exception - unselect if not a suspected or confirmed emergency medical condition->Emergency Medical Condition (MA) Reason for Exam: Diarrhea (Pt here week ago, still having diarrhea due to imodium not working, dialysis T/TH/SA, ) FINDINGS: Lower Chest: Small layering bilateral pleural effusions. Diffuse coronary calcifications. Right femoral dialysis catheter extends into the right atrium. Bibasilar atelectasis with interlobular septal thickening and mild dependent ground-glass opacity. Organs: Limited evaluation due lack of intravenous contrast.  Liver unremarkable. Gallbladder, if present, is not distended. No radiopaque gallstone is seen. Pancreas, adrenal glands, and spleen are unremarkable. There is severe bilateral renal atrophy with renal parenchymal calcifications. No hydronephrosis or urinary tract calculus is evident. GI/Bowel: There is no evidence of bowel obstruction. The appendix is not definitely visualized. No CT evidence of acute appendicitis. Pelvis: No pelvic mass. Urinary bladder is nondistended. Peritoneum/Retroperitoneum: No free air or free fluid. Diffuse vascular calcifications. Normal abdominal aortic caliber. Bones/Soft Tissues: No acute osseous abnormality. Diffuse anasarca. Diffuse mottled appearance of the visualized osseous structures compatible with renal osteodystrophy. Stable broad-based ventral abdominal wall hernia containing loops of small and large bowel. 1. Stable broad-based ventral abdominal wall hernia containing loops of small large bowel at associated obstruction. 2. Severe renal atrophy with diffuse vascular calcifications and findings of renal osteodystrophy. 3. Small layering bilateral pleural effusions with mild pulmonary edema and anasarca. CT CHEST WO CONTRAST    Result Date: 2/15/2022  EXAMINATION: CT OF THE CHEST WITHOUT CONTRAST 2/15/2022 12:10 pm TECHNIQUE: CT of the chest was performed without the administration of intravenous contrast. Multiplanar reformatted images are provided for review. Dose modulation, iterative reconstruction, and/or weight based adjustment of the mA/kV was utilized to reduce the radiation dose to as low as reasonably achievable. COMPARISON: None. HISTORY: ORDERING SYSTEM PROVIDED HISTORY: dyspnea TECHNOLOGIST PROVIDED HISTORY: Reason for exam:->dyspnea Reason for Exam: dyspnea FINDINGS: Mediastinum: Tip of ET tube seen in midthoracic trachea. Secretions are seen in the trachea and airways. Scattered small mediastinal and hilar nodes are noted. Severe coronary artery calcification is seen. No pericardial effusion is seen. Tip of NG tube seen in stomach. Lower extremity dialysis catheter is seen. Calcification is seen in the SVC, which may relate to chronic thrombus. Small mediastinal and hilar nodes are noted Lungs/pleura:  Moderate right-sided pleural effusion is seen. There is adjacent consolidation the right lung base. Hazy ground-glass opacity seen in the right middle lobe Moderate left-sided pleural effusion is seen. There is adjacent consolidation left lower left upper lobe. Patchy consolidation is seen in the left upper and left lower lobe. Air bronchograms are seen on the left in the left upper and left lower lobe. Air bronchograms are seen on the right in the right lower lobe. Upper Abdomen: Renal kidneys native kidneys are trophic. Scattered calcifications are seenCalcifications also seen within the pancreas. There are clips from prior cholecystectomy Soft Tissues/Bones: There is body wall anasarca seen. Scattered areas of sclerosis are seen throughout the bones. Bilateral airspace disease, suspicious for pneumonia. Superimposed pleural effusions are seen with body wall anasarca, compatible with a component of fluid overload. Findings of renal osteodystrophy RECOMMENDATIONS: Unavailable     XR CHEST PORTABLE    Result Date: 2/15/2022  EXAMINATION: ONE XRAY VIEW OF THE CHEST 2/15/2022 6:54 am COMPARISON: Prior study(s) most recent 02/14/2022. HISTORY: ORDERING SYSTEM PROVIDED HISTORY: aspiration pneumonia TECHNOLOGIST PROVIDED HISTORY: Reason for exam:->aspiration pneumonia Reason for Exam:  aspiration pneumonia FINDINGS: The heart size is indeterminate, obscured with bilateral airspace disease. There is central airspace disease extending peripheral bilaterally. The diaphragms are obscured bilaterally as well. The endotracheal tube remains in satisfactory position terminating approximately 3.5 cm above the lux. An NG tube extends well into the stomach. There is no pneumothorax. Possible pleural effusion also contributing to the diaphragmatic obscuration. Compared to the prior study, there is improved aeration in the left lung. Moderate bilateral airspace disease. This could represent a combination of edema and or pneumonia. Moderate improved aeration of the left lung. XR CHEST PORTABLE    Result Date: 2/14/2022  EXAMINATION: ONE XRAY VIEW OF THE CHEST 2/14/2022 3:36 pm COMPARISON: 02/14/2022 HISTORY: ORDERING SYSTEM PROVIDED HISTORY: dyspnea TECHNOLOGIST PROVIDED HISTORY: Reason for exam:->dyspnea Reason for Exam: dyspnea FINDINGS: The endotracheal tube tip measures approximately 6.6 cm above the lux. Prior measurement accentuated by apical lordotic view. There appears to be a central venous catheter from a femoral approach which may represent a dialysis catheter, similar position. The patient is rotated. Mild improved aeration noted in the left upper lobe. Patchy airspace disease seen bilaterally with vascular congestion. Diffuse osteopenia. The enteric catheter is coiled in the gastric fundus. The endotracheal tube measures approximately 6.6 cm above the lux. The enteric catheter is coiled in the gastric fundus. Cardiomegaly with vascular congestion and patchy airspace disease, a component of which is felt likely to represent pulmonary edema. Improved aeration in the left upper lobe. XR CHEST PORTABLE    Result Date: 2/14/2022  EXAMINATION: ONE XRAY VIEW OF THE CHEST 2/14/2022 10:39 am COMPARISON: Chest x-ray dated 11 December 2021 HISTORY: ORDERING SYSTEM PROVIDED HISTORY: ETT, OG, CVC LINE PLACEMENTS TECHNOLOGIST PROVIDED HISTORY: Reason for exam:->ETT, OG, CVC LINE PLACEMENTS Reason for Exam: ETT, CVC line placement FINDINGS: There is an endotracheal tube with the tip 2.3 cm above the lux. There appears to be a right femoral dialysis catheter with the tip in the right atrium. The left hemithorax is opacified. Patchy airspace opacities are seen throughout the right lung. No pneumothorax. The cardiomediastinal silhouette is obscured. 1.  Endotracheal tube with the tip 2.3 cm above the lux. Recommend withdrawal by 1.5 cm. 2.  The left hemithorax is completely opacified.   This could represent a combination of large pleural effusion and/or consolidation. 3.  Patchy airspace disease throughout the right lung and small right pleural effusion. Bronchoscopy    Result Date: 2/14/2022  No dictation       Current Medications   calcium carbonate  1,000 mg Oral TID    midazolam  2 mg IntraVENous Once    pantoprazole  40 mg IntraVENous Daily    vitamin D  50,000 Units Oral Weekly    cefepime  1,000 mg IntraVENous Q24H    levothyroxine  25 mcg Oral Daily    midodrine  5 mg Oral BID WC    sodium chloride flush  5-40 mL IntraVENous 2 times per day    heparin (porcine)  5,000 Units SubCUTAneous 3 times per day        ASSESSMENT & PLAN:      1. Pancytopenia  -Anemia partially d/t ESRD  -History of copper and zinc deficiency, results pending  -IgG was elevated in Nov 2021    SPEP preliminary result showing total protein at 5.7   -QIG are wnl  -Peripheral smear reviewed by Dr. Berkley Mcdaniel, no dysplastic findings, no significant schistocytes. -Transfuse PRBC if hgb <7  -Transfuse platelets if plt <90D or 50k with active bleeding.  -Hold anticoagulation if platelet count <60R. -Platelet count improving.     2. Hypocalcemia  -Calcium has been replaced.  -Endocrinology is following.   -Concern for immunoglobulins binding to calcium resulting in hypocalcemia.    - SPEP NO Evidence of monoclonal protein  -Kappa and lambda free light chains are elevated but ratio is normal   -QIG are wnl     3. Acute respiratory failure with hypoxia  -Extubated. -Pulmonology following.     4. ESRD on dialysis  -Management per nephrology.         Misael Waggoner MD  [facility]

## 2022-02-20 NOTE — PROGRESS NOTES
Call light and personal belongings within reach, no s/s of distress or SOB observed. Pt wound care completed and tolerated well. Pt turned Q 2 and tolerated well. Midodrine held throughout the day for elevated B/P. Mom called and update was given. Dressing to L EJ was changed and tolerated well.

## 2022-02-20 NOTE — PLAN OF CARE
Problem: Falls - Risk of:  Goal: Will remain free from falls  Description: Will remain free from falls  2/19/2022 2310 by Wally Kemp RN  Outcome: Ongoing  2/19/2022 1043 by Nano Gtz RN  Outcome: Ongoing  Goal: Absence of physical injury  Description: Absence of physical injury  2/19/2022 2310 by Wally Kemp RN  Outcome: Ongoing  2/19/2022 1043 by Nano Gtz RN  Outcome: Ongoing     Problem: Skin Integrity:  Goal: Will show no infection signs and symptoms  Description: Will show no infection signs and symptoms  2/19/2022 2310 by Wally Kemp RN  Outcome: Ongoing  2/19/2022 1043 by Nano Gtz RN  Outcome: Ongoing  Goal: Absence of new skin breakdown  Description: Absence of new skin breakdown  2/19/2022 2310 by Wally Kemp RN  Outcome: Ongoing  2/19/2022 1043 by Nano Gtz RN  Outcome: Ongoing     Problem: Daily Care:  Goal: Daily care needs are met  Description: Daily care needs are met  2/19/2022 2310 by Wally Kemp RN  Outcome: Ongoing  2/19/2022 1043 by Nano Gtz RN  Outcome: Ongoing     Problem: Skin Integrity:  Goal: Skin integrity will stabilize  Description: Skin integrity will stabilize  2/19/2022 2310 by Wally Kemp RN  Outcome: Ongoing  2/19/2022 1043 by Nano Gtz RN  Outcome: Ongoing     Problem: Pain:  Description: Pain management should include both nonpharmacologic and pharmacologic interventions.   Goal: Pain level will decrease  Description: Pain level will decrease  2/19/2022 2310 by Wally Kemp RN  Outcome: Ongoing  2/19/2022 1043 by Nano Gtz RN  Outcome: Ongoing  Goal: Control of acute pain  Description: Control of acute pain  2/19/2022 2310 by Wally Kemp RN  Outcome: Ongoing  2/19/2022 1043 by Nano Gtz RN  Outcome: Ongoing  Goal: Control of chronic pain  Description: Control of chronic pain  2/19/2022 2310 by aWlly Kemp RN  Outcome: Ongoing  2/19/2022 1043 by Nano Gtz RN  Outcome: Ongoing     Problem: Health Behavior:  Goal: Ability to manage health-related needs will improve  Description: Ability to manage health-related needs will improve  2/19/2022 2310 by Alfreda Jimenez RN  Outcome: Ongoing  2/19/2022 1043 by Maddie Thomas RN  Outcome: Ongoing  Goal: Identification of resources available to assist in meeting health care needs will improve  Description: Identification of resources available to assist in meeting health care needs will improve  2/19/2022 2310 by Alfreda Jimenez RN  Outcome: Ongoing  2/19/2022 1043 by Maddie Thomas RN  Outcome: Ongoing     Problem: Nutritional:  Goal: Ability to identify appropriate dietary choices will improve  Description: Ability to identify appropriate dietary choices will improve  2/19/2022 2310 by Alfreda Jimenez RN  Outcome: Ongoing  2/19/2022 1043 by Maddie Thomas RN  Outcome: Ongoing     Problem: Musculor/Skeletal Functional Status  Goal: Highest potential functional level  Outcome: Ongoing  Goal: Absence of falls  Outcome: Ongoing

## 2022-02-20 NOTE — PROGRESS NOTES
Hospitalist Progress Note      PCP: No primary care provider on file. Date of Admission: 2/12/2022    Chief Complaint:  Dropped sats, seized    Hospital Course:   Called to rapid response earlier today when the patient dropped his oxygen sats. He has been admitted with hypocalcemia was having some jerky-like movements and we suspected that he aspirated he was suctioned at the bedside and seem to be improving we wrapped up with a rapid response short time later they were calling a second rapid response was called overhead as the patient had had a seizure and dropped his sats again. This was witnessed by my partner. Patient has a previous history of status epilepticus and based on this we made the decision to intubate the patient for airway protection. He was intubated and transferred to the ICU central line was placed as he was a difficult stick and did not have any access appropriate in case he needed any pressors. He had 1 peripheral IV in his neck. Central line was placed  Subjective:     Hypercalcemia, not been repleted.     Medications:  Reviewed    Infusion Medications    dextrose Stopped (02/15/22 1907)    sodium chloride 25 mL (02/20/22 0948)     Scheduled Medications    calcium carbonate  1,000 mg Oral TID    midazolam  2 mg IntraVENous Once    pantoprazole  40 mg IntraVENous Daily    vitamin D  50,000 Units Oral Weekly    cefepime  1,000 mg IntraVENous Q24H    levothyroxine  25 mcg Oral Daily    midodrine  5 mg Oral BID WC    sodium chloride flush  5-40 mL IntraVENous 2 times per day    heparin (porcine)  5,000 Units SubCUTAneous 3 times per day     PRN Meds: dextrose bolus (hypoglycemia) **OR** dextrose bolus (hypoglycemia), HYDROmorphone, HYDROmorphone, oxyCODONE, glucose, dextrose, glucagon (rDNA), dextrose, sodium chloride flush, sodium chloride, ondansetron **OR** ondansetron, polyethylene glycol, acetaminophen **OR** acetaminophen, heparin (porcine), LORazepam      Intake/Output Summary (Last 24 hours) at 2/20/2022 1256  Last data filed at 2/19/2022 1846  Gross per 24 hour   Intake 170 ml   Output --   Net 170 ml       Physical Exam Performed:    BP (!) 144/85   Pulse 72   Temp 96.8 °F (36 °C) (Temporal)   Resp 11   Ht 6' 4\" (1.93 m)   Wt 210 lb 15.7 oz (95.7 kg)   SpO2 98%   BMI 25.68 kg/m²     General appearance: Alert, in no acute distress  HEENT: Pupils equal, round, and reactive to light. Conjunctivae/corneas clear. Neck: Supple, with full range of motion. No jugular venous distention. Trachea midline. Respiratory: Patient has lots of upper respiratory noises following intubation we were able to suction a significant amount of what appeared to be blood from his ET tube  Cardiovascular: Regular rate and rhythm with normal S1/S2 without murmurs, rubs or gallops. Abdomen: Soft, he has a massive hernia of the front abdomen  Musculoskeletal: No clubbing, cyanosis or edema bilaterally. Fingers are bent from arthritis  Skin: Skin color, texture, turgor normal.  No rashes or lesions. Neurologic: No focal deficit or sensory loss  Psychiatric: Alert  Capillary Refill: Brisk,3 seconds, normal   Peripheral Pulses: +2 palpable, equal bilaterally       Labs:   Recent Labs     02/18/22  0617 02/19/22  0919 02/20/22  0510   WBC 2.6* 2.5* 2.7*   HGB 9.0* 8.7* 9.5*   HCT 27.5* 27.4* 29.4*   PLT 23* 34* 50*     Recent Labs     02/18/22  0617 02/19/22  0853 02/20/22  0510   * 130* 131*   K 3.9 4.6 4.5   CL 92* 95* 94*   CO2 22 15* 22   BUN 33* 39* 44*   CREATININE 4.6* 5.6* 6.2*   CALCIUM 6.8* 6.5* 6.7*     No results for input(s): AST, ALT, BILIDIR, BILITOT, ALKPHOS in the last 72 hours. No results for input(s): INR in the last 72 hours. No results for input(s): Sofia Lager in the last 72 hours.     Urinalysis:    No results found for: Georgiachachae Lute, BACTERIA, RBCUA, BLOODU, [address]    Radiology:  CT CHEST WO CONTRAST   Final Result Bilateral airspace disease, suspicious for pneumonia. Superimposed pleural effusions are seen with body wall anasarca, compatible   with a component of fluid overload. Findings of renal osteodystrophy      RECOMMENDATIONS:   Unavailable         XR CHEST PORTABLE   Final Result   Moderate bilateral airspace disease. This could represent a combination of   edema and or pneumonia. Moderate improved aeration of the left lung. XR CHEST PORTABLE   Final Result   The endotracheal tube measures approximately 6.6 cm above the lux. The enteric catheter is coiled in the gastric fundus. Cardiomegaly with vascular congestion and patchy airspace disease, a   component of which is felt likely to represent pulmonary edema. Improved   aeration in the left upper lobe. XR CHEST PORTABLE   Final Result   1. Endotracheal tube with the tip 2.3 cm above the lux. Recommend   withdrawal by 1.5 cm.      2.  The left hemithorax is completely opacified. This could represent a   combination of large pleural effusion and/or consolidation. 3.  Patchy airspace disease throughout the right lung and small right pleural   effusion. CT ABDOMEN PELVIS WO CONTRAST Additional Contrast? None   Final Result   Addendum 1 of 1   ADDENDUM:   There is a transcription error in the impression. The 1st item should    read   as follows:      Stable broad-based ventral abdominal wall hernia containing loops of small   large bowel without associated obstruction. Final   1. Stable broad-based ventral abdominal wall hernia containing loops of small   large bowel at associated obstruction. 2. Severe renal atrophy with diffuse vascular calcifications and findings of   renal osteodystrophy. 3. Small layering bilateral pleural effusions with mild pulmonary edema and   anasarca.                     Assessment/Plan:    Active Hospital Problems    Diagnosis     Aspiration into airway [T16.314R]  Atelectasis [J98.11]     End stage renal disease due to benign hypertension (HCC) [I12.0, N18.6]     Acute hypoxemic respiratory failure (HCC) [J96.01]     HTN (hypertension), benign [I10]     Seizure ([facility] [address] [R56.9]     Diarrhea [R19.7]     JOANNE (acute kidney injury) ([facility] [address] [N17.9]     Hypocalcemia [E83.51]      Acute respiratory failure with hypoxia requiring  mechanical ventilation, liberated from mechanical ventilation  Secondary to presumed aspiration pneumonia as well as seizure activity. Status post liberation from mechanical ventilation  Has been seen by pulmonary    End-stage renal disease on dialysis  Patient is scheduled to have dialysis   Nephrology on board appreciate recommendations    ? Seizure  ? Could be secondary to hypocalcemia  Neurology has been consulted  Currently intubated and sedated  No history of seizure  diffuse slowing is suggestive of moderate diffuse encephalopathy on EEG no evidence of epileptiform discharges, focal or lateralizing abnormalities  Neurology has been consulted  No need to start AED as per neurology. Aspiration  On cefepime  Procalcitonin is elevated, no leukocytosis, cultures so far negative  Status post bronchoscopy, cultures so far negative    Hypocalcemia, primary hypoparathyroidism  Patient's calcium was been replaced and he is going to have additional adjustments made with dialysis. Endo has been consulted for evaluation of persistent hypercalcemia, recommending consulting oncology for  Gammopathy  O ionized calcium 0.74, will give calcium gluconate. Persistent hypocalcemia, has been started on vitamin D. Massive ventral wall abdominal hernia  Stable per imaging    Hypoglycemia   Eating Now and started on dysphagia diet  Continue to monitor blood glucose    Diarrhea  C. difficile has been ruled out    DVT Prophylaxis: Heparin  Diet: ADULT DIET; Dysphagia - Soft and Bite Sized; No Drinking Straws  ADULT ORAL NUTRITION SUPPLEMENT; Lunch, Dinner;  Low Calorie/High Protein Oral Supplement  Code Status: Full Code    PT/OT Eval Status: Needed    Dispo -likely patient can be transferred out of ICU to progressive care unit.  Diamond Rojas MD

## 2022-02-20 NOTE — PLAN OF CARE
Problem: Falls - Risk of:  Goal: Will remain free from falls  Description: Will remain free from falls  2/20/2022 1230 by Corinne Marie RN  Outcome: Ongoing  2/19/2022 2310 by Bob Stacy RN  Outcome: Ongoing  Goal: Absence of physical injury  Description: Absence of physical injury  2/20/2022 1230 by Corinne Marie RN  Outcome: Ongoing  2/19/2022 2310 by Bob Stacy RN  Outcome: Ongoing     Problem: Falls - Risk of:  Goal: Absence of physical injury  Description: Absence of physical injury  2/20/2022 1230 by Corinne Marie RN  Outcome: Ongoing  2/19/2022 2310 by Bob Stacy RN  Outcome: Ongoing     Problem: Skin Integrity:  Goal: Will show no infection signs and symptoms  Description: Will show no infection signs and symptoms  2/20/2022 1230 by Corinne Marie RN  Outcome: Ongoing  2/19/2022 2310 by Bob Stacy RN  Outcome: Ongoing  Goal: Absence of new skin breakdown  Description: Absence of new skin breakdown  2/19/2022 2310 by Bob Stacy RN  Outcome: Ongoing     Problem: Skin Integrity:  Goal: Absence of new skin breakdown  Description: Absence of new skin breakdown  2/19/2022 2310 by Bob Stacy RN  Outcome: Ongoing     Problem: Daily Care:  Goal: Daily care needs are met  Description: Daily care needs are met  2/19/2022 2310 by Bob Stacy RN  Outcome: Ongoing

## 2022-02-21 NOTE — PROGRESS NOTES
Assessment completed, see doc flowsheets. Pt is A&Ox2. Lung sounds are clear. VSS. Tele on. Medication given per [facility]. Patient has no needs at this time. Call light within in reach, will continue to monitor.

## 2022-02-21 NOTE — PROGRESS NOTES
Nutrition Note    RECOMMENDATIONS  1. PO Diet: Diet per SLP  2. ONS: Change to Ensure Enlive BID    NUTRITION ASSESSMENT   Pt's nutrition status is fair. Pt has had variable PO intake, consuming anywhere from 1-100% of meals. Note pt refused breakfast and lunch 2/19 and consumed 50% of dinner. Pt continues on dysphagia soft and bite sized diet per SLP; however note some difficulty as RN charted pt was pocketing liquids. Pt continues with increased nutrient needs related to stage II pressure ulcer to buttocks. Also note hypoglycemia. Will change supplement from Ensure High Protein to Ensure Enlive.  Nutrition Related Findings: Oriented to person/place. Na+ 131. LBM 2/20. Pitting generalized edema. +3 pitting BUE, BLE edema.  Wounds: Stage II,Pressure Injury   Nutrition Education:  Education not indicated    Nutrition Goals: Pt will consume at least 50% of meals and supplements     MALNUTRITION ASSESSMENT   Malnutrition Status: No malnutrition    NUTRITION DIAGNOSIS   Increased nutrient needs related to increase demand for energy/nutrients as evidenced by wounds    CURRENT NUTRITION THERAPIES  ADULT DIET; Dysphagia - Soft and Bite Sized; No Drinking Straws  ADULT ORAL NUTRITION SUPPLEMENT; Lunch, Dinner; Low Calorie/High Protein Oral Supplement     PO Intake: 1-25%,%   PO Supplement Intake:Unable to assess    ANTHROPOMETRICS   Current Height: 6' 4\" (193 cm)   Current Weight: 211 lb 3.2 oz (95.8 kg)     Admission weight: 208 lb 5.4 oz (94.5 kg)   Ideal Body Weight (IBW): 202 lbs  (92 kg)     Usual Bodyweight 160 lb 15 oz (73 kg) (EDW)       BMI: 25.7    COMPARATIVE STANDARDS  Energy (kcal):  7623-1476     Protein (g):   grams       Fluid (ml/day):  0313-4354 mL    The patient will be monitored per nutrition standards of care. Consult dietitian if additional nutrition interventions are needed prior to RD reassessment.      Zenon Mcnulty, [address], [place]    Contact: [phone]

## 2022-02-21 NOTE — PROGRESS NOTES
Pt is hypoglecemic with BS at 58. This RN tried to get pt to drink ensure with sugar in it. Pt follow some commands but this time unable to follow command to swallow liquid in mouth, pt is pocketing thins, suctioned out. D50 is out of stock in [employer], glucagon IM given.

## 2022-02-21 NOTE — CARE COORDINATION
CM met with pt to discuss SNF placement on dc. Pt appeared drowsy but did state that he was ok with SNF placement. CM asked if CM could discuss with mom and get mom's input and pt stated \"yes. \". CM spoke to mother regarding SNF placement. Per mom's request, he had a bad experience at [facility] and will not allow him to go back. She reported that pt was at [facility] before and they would like to return there. CM explained that ARU and SNF are different but willing to look into it for mother. Updated PT/OT orders obtained with result pending. CM attempted to contact Tatiana Murguia, admission coordinator for [facility] but d/t holiday, office closed and no fax number provided. Left message for return call. Referral also made to [facility].   Pt does require HD TTS    Electronically signed by Mariam Gonzales RN on 2/21/2022 at 1:59 PM

## 2022-02-21 NOTE — PROGRESS NOTES
Nephrology progress  Note                                                                                                                                                                                                                                                                                                                                                               Office : [phone]     Fax :[phone]              Patient's Name: Beau Rutledge  7:58 AM  2/21/2022    Reason for Consult:  ESRD on HD      Chief Complaint:  Diarrhea      History of Present Ilness:    Beau Rutledge is a 49 y.o. 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    Hemodialysis patient ([facility] [address]     HTN (hypertension), benign     Low blood pressure     Pancreatitis     Peripheral Neuropathy     Pneumonia 11/11/2021    Renal failure 1992    on dialysis 3x/wk  (T, Th, Sat)    Status epilepticus ([address] 12/11/2021       Past Surgical History:   Procedure Laterality Date    BRONCHOSCOPY N/A 2/14/2022    BRONCHOSCOPY DIAGNOSTIC OR CELL [address] ONLY performed by Kassandra Ward MD at [address] [place] [address] N/A 12/17/2021    COLONOSCOPY DIAGNOSTIC performed by Surinder Moore MD at [facility]      left arm/currently non functioning    KIDNEY TRANSPLANT  9/1994    R-kidney    TOE SURGERY  10-8-2010    cut and realign 1st, 2nd, toe left foot , fixation 1st, 2nd toe left foot    TUNNELED VENOUS CATHETER PLACEMENT      left chest for dialysis    TUNNELED VENOUS PORT PLACEMENT      UPPER GASTROINTESTINAL ENDOSCOPY N/A 12/17/2021    EGD ESOPHAGOGASTRODUODENOSCOPY performed by Ventura Pike MD at [facility] ENDOSCOPY       Family History   Problem Relation Age of Onset    Diabetes Mother         reports that he has never smoked. He has never used smokeless tobacco. He reports that he does not drink alcohol and does not use drugs.     Allergies:  Influenza virus vaccine    Current Medications:    sterile water injection,   dextrose bolus (hypoglycemia) 10% 125 mL, PRN   Or  dextrose bolus (hypoglycemia) 10% 250 mL, PRN  calcium carbonate (TUMS) chewable tablet 1,000 mg, TID  HYDROmorphone HCl PF (DILAUDID) injection 1 mg, Q4H PRN  HYDROmorphone (DILAUDID) injection 2 mg, Q4H PRN  midazolam PF (VERSED) injection 2 mg, Once  pantoprazole (PROTONIX) injection 40 mg, Daily  vitamin D (ERGOCALCIFEROL) capsule 50,000 Units, Weekly  oxyCODONE (OXY-IR) immediate release tablet 30 mg, Q8H PRN  levothyroxine (SYNTHROID) tablet 25 mcg, Daily  midodrine (PROAMATINE) tablet 5 mg, BID WC  glucose (GLUTOSE) 40 % oral gel 15 g, PRN  dextrose 50 % IV solution, PRN  glucagon (rDNA) injection 1 mg, PRN  dextrose 5 % solution, PRN  sodium chloride flush 0.9 % injection 5-40 mL, 2 times per day  sodium chloride flush 0.9 % injection 5-40 mL, PRN  0.9 % sodium chloride infusion, PRN  ondansetron (ZOFRAN-ODT) disintegrating tablet 4 mg, Q8H PRN   Or  ondansetron (ZOFRAN) injection 4 mg, Q6H PRN  polyethylene glycol (GLYCOLAX) packet 17 g, Daily PRN  acetaminophen (TYLENOL) tablet 650 mg, Q6H PRN   Or  acetaminophen (TYLENOL) suppository 650 mg, Q6H PRN  heparin (porcine) injection 5,800 Units, PRN  heparin (porcine) injection 5,000 Units, 3 times per day  LORazepam (ATIVAN) tablet 0.5 mg, Nightly PRN        Physical exam:     Vitals:  BP (!) 155/86   Pulse 67   Temp 95.8 °F (35.4 °C) (Temporal)   Resp 16   Ht 6' 4\" (1.93 m)   Wt 211 lb 3.2 oz (95.8 kg)   SpO2 (!) 89%   BMI 25.71 kg/m² Constitutional: extubated   Awake and alert   Skin: no rash, turgor wnl  Heent:  eomi, mmm  Neck: no bruits or jvd noted  Cardiovascular:  S1, S2 without m/r/g  Respiratory: CTA B without w/r/r  Abdomen:  +bs, soft, nt, nd  Ext: no  lower extremity edema      Data:   Labs:  CBC:   Recent Labs     02/19/22  0919 02/20/22  0510 02/21/22  0423   WBC 2.5* 2.7* 2.8*   HGB 8.7* 9.5* 9.3*   PLT 34* 50* 80*     BMP:    Recent Labs     02/19/22  0853 02/20/22  0510 02/21/22  0423   * 131* 131*   K 4.6 4.5 4.6   CL 95* 94* 93*   CO2 15* 22 18*   BUN 39* 44* 47*   CREATININE 5.6* 6.2* 6.4*   GLUCOSE 90 63* 48*     Ca/Mg/Phos:   Recent Labs     02/19/22  0853 02/20/22  0510 02/21/22  0423   CALCIUM 6.5* 6.7* 6.6*     Hepatic:   No results for input(s): AST, ALT, ALB, BILITOT, ALKPHOS in the last 72 hours. Troponin: No results for input(s): TROPONINI in the last 72 hours. BNP: No results for input(s): BNP in the last 72 hours. Lipids: No results for input(s): CHOL, TRIG, HDL, LDLCALC, LABVLDL in the last 72 hours. ABGs:   No results for input(s): PHART, PO2ART, HZP3IHZ in the last 72 hours. INR: No results for input(s): INR in the last 72 hours. UA:No results for input(s): [place] , GLUCOSEU, BILIRUBINUR, Aaron Dyson, BLOODU, PHUR, PROTEINU, UROBILINOGEN, NITRU, LEUKOCYTESUR, LABMICR, URINETYPE in the last 72 hours. Urine Microscopic: No results for input(s): LABCAST, BACTERIA, COMU, HYALCAST, WBCUA, RBCUA, EPIU in the last 72 hours. Urine Culture: No results for input(s): LABURIN in the last 72 hours. Urine Chemistry: No results for input(s): Anna Hun, PROTEINUR, NAUR in the last 72 hours. IMAGING:  CT CHEST WO CONTRAST   Final Result   Bilateral airspace disease, suspicious for pneumonia. Superimposed pleural effusions are seen with body wall anasarca, compatible   with a component of fluid overload.       Findings of renal osteodystrophy      RECOMMENDATIONS:   Unavailable XR CHEST PORTABLE   Final Result   Moderate bilateral airspace disease. This could represent a combination of   edema and or pneumonia. Moderate improved aeration of the left lung. XR CHEST PORTABLE   Final Result   The endotracheal tube measures approximately 6.6 cm above the lux. The enteric catheter is coiled in the gastric fundus. Cardiomegaly with vascular congestion and patchy airspace disease, a   component of which is felt likely to represent pulmonary edema. Improved   aeration in the left upper lobe. XR CHEST PORTABLE   Final Result   1. Endotracheal tube with the tip 2.3 cm above the lux. Recommend   withdrawal by 1.5 cm.      2.  The left hemithorax is completely opacified. This could represent a   combination of large pleural effusion and/or consolidation. 3.  Patchy airspace disease throughout the right lung and small right pleural   effusion. CT ABDOMEN PELVIS WO CONTRAST Additional Contrast? None   Final Result   Addendum 1 of 1   ADDENDUM:   There is a transcription error in the impression. The 1st item should    read   as follows:      Stable broad-based ventral abdominal wall hernia containing loops of small   large bowel without associated obstruction. Final   1. Stable broad-based ventral abdominal wall hernia containing loops of small   large bowel at associated obstruction. 2. Severe renal atrophy with diffuse vascular calcifications and findings of   renal osteodystrophy. 3. Small layering bilateral pleural effusions with mild pulmonary edema and   anasarca. Assessment/Plan   1. ESRD on HD    2. HTN    3.  Anemia    4  Seizure likely 2/2 hypocalcemia     5 diarrhea    6 Metabolic acidosis    7 Hypocalcemia  PTH is low at 11 . has hypoparathyroidism   Phos 11       HD today   Increased the calcium carbonate to 1000 mg po tid   Should resume outpt HD tomorrow at outpt unit if discharged home today   continue to take calcium carbonate daily               Thank you for allowing us to participate in care of Kalen Leiva MD  Feel free to contact me   [facility] [address]  Office : [phone]  Fax :[phone]

## 2022-02-21 NOTE — PLAN OF CARE
Problem: Falls - Risk of:  Goal: Will remain free from falls  Description: Will remain free from falls  2/20/2022 2345 by Eusebio Childs RN  Outcome: Ongoing    Goal: Absence of physical injury  Description: Absence of physical injury  2/20/2022 2345 by Eusebio Childs RN  Outcome: Ongoing    Problem: Skin Integrity:  Goal: Will show no infection signs and symptoms  Description: Will show no infection signs and symptoms  2/20/2022 2345 by Eusebio Childs RN  Outcome: Ongoing    Goal: Absence of new skin breakdown  Description: Absence of new skin breakdown  Outcome: Ongoing     Problem: Daily Care:  Goal: Daily care needs are met  Description: Daily care needs are met  Outcome: Ongoing     Problem: Skin Integrity:  Goal: Skin integrity will stabilize  Description: Skin integrity will stabilize  2/20/2022 2345 by Eusebio Childs RN  Outcome: Ongoing     Problem: Pain:  Goal: Pain level will decrease  Description: Pain level will decrease  Outcome: Ongoing  Goal: Control of acute pain  Description: Control of acute pain  Outcome: Ongoing  Goal: Control of chronic pain  Description: Control of chronic pain  Outcome: Ongoing     Problem: Health Behavior:  Goal: Ability to manage health-related needs will improve  Description: Ability to manage health-related needs will improve  Outcome: Ongoing  Goal: Identification of resources available to assist in meeting health care needs will improve  Description: Identification of resources available to assist in meeting health care needs will improve  Outcome: Ongoing     Problem: Nutritional:  Goal: Ability to identify appropriate dietary choices will improve  Description: Ability to identify appropriate dietary choices will improve  Outcome: Ongoing     Problem: Musculor/Skeletal Functional Status  Goal: Highest potential functional level  Outcome: Ongoing  Goal: Absence of falls  Outcome: Ongoing

## 2022-02-21 NOTE — PROGRESS NOTES
Hospitalist Progress Note      PCP: No primary care provider on file. Date of Admission: 2/12/2022    Chief Complaint:  Dropped sats, seized    Hospital Course:   Called to rapid response earlier today when the patient dropped his oxygen sats. He has been admitted with hypocalcemia was having some jerky-like movements and we suspected that he aspirated he was suctioned at the bedside and seem to be improving we wrapped up with a rapid response short time later they were calling a second rapid response was called overhead as the patient had had a seizure and dropped his sats again. This was witnessed by my partner. Patient has a previous history of status epilepticus and based on this we made the decision to intubate the patient for airway protection. He was intubated and transferred to the ICU central line was placed as he was a difficult stick and did not have any access appropriate in case he needed any pressors. He had 1 peripheral IV in his neck.   Central line was placed  Subjective:     Hypocalcemia, hypoglycemia, patient was seen at dialysis    Medications:  Reviewed    Infusion Medications    dextrose      dextrose Stopped (02/15/22 1907)    sodium chloride 25 mL (02/20/22 2052)     Scheduled Medications    sterile water        calcium carbonate  1,000 mg Oral TID    midazolam  2 mg IntraVENous Once    pantoprazole  40 mg IntraVENous Daily    vitamin D  50,000 Units Oral Weekly    levothyroxine  25 mcg Oral Daily    midodrine  5 mg Oral BID WC    sodium chloride flush  5-40 mL IntraVENous 2 times per day    heparin (porcine)  5,000 Units SubCUTAneous 3 times per day     PRN Meds: dextrose bolus (hypoglycemia) **OR** dextrose bolus (hypoglycemia), HYDROmorphone, HYDROmorphone, oxyCODONE, glucose, dextrose, glucagon (rDNA), dextrose, sodium chloride flush, sodium chloride, ondansetron **OR** ondansetron, polyethylene glycol, acetaminophen **OR** acetaminophen, heparin (porcine), LORazepam      Intake/Output Summary (Last 24 hours) at 2/21/2022 1132  Last data filed at 2/21/2022 1044  Gross per 24 hour   Intake 751.33 ml   Output 2600 ml   Net -1848.67 ml       Physical Exam Performed:    BP (!) 171/69   Pulse 69   Temp 95.7 °F (35.4 °C)   Resp 20   Ht 6' 4\" (1.93 m)   Wt 206 lb 12.7 oz (93.8 kg)   SpO2 (!) 89%   BMI 25.17 kg/m²     General appearance: Alert, in no acute distress  HEENT: Pupils equal, round, and reactive to light. Conjunctivae/corneas clear. Neck: Supple, with full range of motion. No jugular venous distention. Trachea midline. Respiratory: Patient has lots of upper respiratory noises following intubation we were able to suction a significant amount of what appeared to be blood from his ET tube  Cardiovascular: Regular rate and rhythm with normal S1/S2 without murmurs, rubs or gallops. Abdomen: Soft, he has a massive hernia of the front abdomen  Musculoskeletal: No clubbing, cyanosis or edema bilaterally. Fingers are bent from arthritis  Skin: Skin color, texture, turgor normal.  No rashes or lesions. Neurologic: No focal deficit or sensory loss  Psychiatric: Alert  Capillary Refill: Brisk,3 seconds, normal   Peripheral Pulses: +2 palpable, equal bilaterally       Labs:   Recent Labs     02/19/22  0919 02/20/22  0510 02/21/22  0423   WBC 2.5* 2.7* 2.8*   HGB 8.7* 9.5* 9.3*   HCT 27.4* 29.4* 28.9*   PLT 34* 50* 80*     Recent Labs     02/19/22  0853 02/20/22  0510 02/21/22  0423   * 131* 131*   K 4.6 4.5 4.6   CL 95* 94* 93*   CO2 15* 22 18*   BUN 39* 44* 47*   CREATININE 5.6* 6.2* 6.4*   CALCIUM 6.5* 6.7* 6.6*     No results for input(s): AST, ALT, BILIDIR, BILITOT, ALKPHOS in the last 72 hours. No results for input(s): INR in the last 72 hours. No results for input(s): Jadene Moh in the last 72 hours.     Urinalysis:    No results found for: [address] [facility] [address] [facility], [facility] [address]    Radiology:  CT CHEST WO CONTRAST   Final Result   Bilateral airspace disease, suspicious for pneumonia. Superimposed pleural effusions are seen with body wall anasarca, compatible   with a component of fluid overload. Findings of renal osteodystrophy      RECOMMENDATIONS:   Unavailable         XR CHEST PORTABLE   Final Result   Moderate bilateral airspace disease. This could represent a combination of   edema and or pneumonia. Moderate improved aeration of the left lung. XR CHEST PORTABLE   Final Result   The endotracheal tube measures approximately 6.6 cm above the lux. The enteric catheter is coiled in the gastric fundus. Cardiomegaly with vascular congestion and patchy airspace disease, a   component of which is felt likely to represent pulmonary edema. Improved   aeration in the left upper lobe. XR CHEST PORTABLE   Final Result   1. Endotracheal tube with the tip 2.3 cm above the lux. Recommend   withdrawal by 1.5 cm.      2.  The left hemithorax is completely opacified. This could represent a   combination of large pleural effusion and/or consolidation. 3.  Patchy airspace disease throughout the right lung and small right pleural   effusion. CT ABDOMEN PELVIS WO CONTRAST Additional Contrast? None   Final Result   Addendum 1 of 1   ADDENDUM:   There is a transcription error in the impression. The 1st item should    read   as follows:      Stable broad-based ventral abdominal wall hernia containing loops of small   large bowel without associated obstruction. Final   1. Stable broad-based ventral abdominal wall hernia containing loops of small   large bowel at associated obstruction. 2. Severe renal atrophy with diffuse vascular calcifications and findings of   renal osteodystrophy. 3. Small layering bilateral pleural effusions with mild pulmonary edema and   anasarca.                     Assessment/Plan:    Active Hospital Problems    Diagnosis     Aspiration into airway [T17.908A]     Atelectasis [J98.11]     End stage renal disease due to benign hypertension (HCC) [I12.0, N18.6]     Acute hypoxemic respiratory failure (HCC) [J96.01]     HTN (hypertension), benign [I10]     Se[facility] ([facility] [address] [R56.9]     Diarrhea [R19.7]     JOANNE (acute kidney injury) ([facility] [address] [N17.9]     Hypocalcemia [E83.51]      Acute respiratory failure with hypoxia requiring  mechanical ventilation, liberated from mechanical ventilation  Secondary to presumed aspiration pneumonia as well as seizure activity. Status post liberation from mechanical ventilation  Has been seen by pulmonary    End-stage renal disease on dialysis  Patient is scheduled to have dialysis   Nephrology on board appreciate recommendations  HD on 2/21    ? Seizure  ? Could be secondary to hypocalcemia  Neurology has been consulted  Currently intubated and sedated  No history of seizure  diffuse slowing is suggestive of moderate diffuse encephalopathy on EEG no evidence of epileptiform discharges, focal or lateralizing abnormalities  Neurology has been consulted  No need to start AED as per neurology. Aspiration  On cefepime  Procalcitonin is elevated, no leukocytosis, cultures so far negative  Status post bronchoscopy, cultures so far negative    Hypocalcemia, primary hypoparathyroidism  Patient's calcium was been replaced and he is going to have additional adjustments made with dialysis. Endo has been consulted for evaluation of persistent hypercalcemia, recommending consulting oncology for  Gammopathy  O ionized calcium 0.74, will give calcium gluconate. Persistent hypocalcemia, has been started on vitamin D. Replete calcium today    Massive ventral wall abdominal hernia  Stable per imaging    Hypoglycemia   Eating Now and started on dysphagia diet  Continue to monitor blood glucose    Diarrhea  C. difficile has been ruled out    DVT Prophylaxis: Heparin  Diet: ADULT DIET; Dysphagia - Soft and Bite Sized;  No Drinking Straws  ADULT ORAL NUTRITION SUPPLEMENT; Lunch, Dinner; Standard High Calorie/High Protein Oral Supplement  Code Status: Full Code    PT/OT Eval Status: Appropriate for SNF    Dispo -likely patient can be transferred out of ICU to progressive care unit.  3T   He does not need ICU, he is appropriate for SNF, CM following          Lakshmi Gallegos MD

## 2022-02-21 NOTE — PROGRESS NOTES
Message sent to hospitalist: \"pt is here for AMS, hypocalcemia. pt Calcium this am is 0.83. Please put orders in for replacement! Thank you!  Need Callback: NO CALLBACK REQ ICU ROUTINE\"

## 2022-02-21 NOTE — PROGRESS NOTES
Entered pt's room to find patient having possible seizure, making intermittent jerking movements, HR between 130s-170s, lasted a couple minutes. Charge RN notified. VSS following, SpO2 WNL on RA. Dr. All Steinberg also notified. Ordered to give D50 and send stat calcium. 1710: D50 given. 1730: Stat calcium sent.

## 2022-02-21 NOTE — PROGRESS NOTES
Blood glucose checked, pt hypoglycemic at 66. Attempted to feed pt lunch and assist with juice, pt pocketing, unable to follow commands. Mouth suctioned and oral care provided.  D50 given per MAR.     1550: Blood glucose recheck 85

## 2022-02-21 NOTE — CARE COORDINATION
Isabella Shows from [facility] is able to accept pt on dc pending coordination of HD transport. CHRISTIANO called mother Ashwini Temple to update and provide Julee's phone number to call and schedule a tour. In discussion with Ashwini Temple she is really wanting to have pt transferred to [facility]..  When asked why, Ashwini Temple stated \"he just doesn't seem to be getting better and when he was at [facility], he got better. \"  CHRISTIANO informed Ashwini Temple that I would pass along that information to Dr. Arnulfo Khan but that we typically don't do hospital transfers unless care is needed that cannot be provided at [facility]. Ashwini Temple is wanting to come visit tomorrow and see for herself. Ashwini Temple will need a WC to visit because she cannot walk far d/t knees.  CM encouraged Ashwini Temple to come and she can speak to the team.     Electronically signed by Keshawn Woodall RN on 2/21/2022 at 3:52 PM

## 2022-02-21 NOTE — FLOWSHEET NOTE
02/21/22 0738 02/21/22 1044   Vital Signs   BP (!) 156/119 (!) 171/69   Temp 95.8 °F (35.4 °C) 95.7 °F (35.4 °C)   Pulse 55 69   Resp 18 20     Treatment time: 3 hours    Net UF: 2 L    Pre weight: 95.8 kg (bed scale)  Post weight: 93.8 kg (bed scale)  EDW: 73 kg    Access used: Right femoral Tunneled HD cath  Access function: No problems    Medications or blood products given: None    Regular outpatient schedule: [facility]    Summary of response to treatment: Tolerated tx without difficulty. .  VSS. Copy of dialysis treatment record placed in chart, to be scanned into EMR.     Report called to Maddie Damian RN

## 2022-02-21 NOTE — PROGRESS NOTES
Speech Language Pathology  Attempt   Yuly Cull   1970     Attempted to see pt for dysphagia therapy follow-up. Pt off the floor for HD at time of attempt. Will re-attempt to follow-up as therapy schedule allows.     Thanks,  Karissa Lam, [address] [facility]  Speech Language Pathologist

## 2022-02-22 NOTE — PROGRESS NOTES
Pt pocketing PO meds crushed in apple sauce. Suction and oral care after. Did not receive last nights dose of calcium or AM synthroid.

## 2022-02-22 NOTE — PROGRESS NOTES
Occupational Therapy   Occupational Therapy Initial Assessment  Date: 2022   Patient Name: Babar Turcios  MRN: [id number]     : 1970    Date of Service: 2022    Discharge Recommendations: Babar Turcios scored a 6/24 on the AM-PAC ADL Inpatient form. Current research shows that an AM-PAC score of 17 or less is typically not associated with a discharge to the patient's home setting. Based on the patient's AM-PAC score and their current ADL deficits, it is recommended that the patient have 3-5 sessions per week of Occupational Therapy at d/c to increase the patient's independence. Please see assessment section for further patient specific details. If patient discharges prior to next session this note will serve as a discharge summary. Please see below for the latest assessment towards goals. OT Equipment Recommendations  Equipment Needed: No  Other: continue to assess    Assessment   Performance deficits / Impairments: Decreased balance;Decreased functional mobility ; Decreased ADL status; Decreased cognition;Decreased endurance;Decreased high-level IADLs;Decreased ROM; Decreased strength  Assessment: Patient seemingly presents below baseline level of function as evidenced by impaired endurance and participation with ADL with current nonverbal status. Limited assessment this date d/t communication barrier and varying levels of prior function when gathering information from different sources. Per chart, pt able to transfer to w/c without assist and feed self in 2021.  Continued OT indicated in order to facilitate return to PLOF and ease all occupational pursuits  Treatment Diagnosis: Above deficits associated with hypocalcemia  Prognosis: Fair  Decision Making: Medium Complexity  Exam: as above  OT Education: OT Role;Plan of Care  Patient Education: eval, discharge--pt not an independent learner, requires reinforcement for carryover  REQUIRES OT FOLLOW UP: Yes  Activity Tolerance  Activity Tolerance: Patient limited by fatigue;Treatment limited secondary to decreased cognition  Safety Devices  Safety Devices in place: Yes  Type of devices: All fall risk precautions in place; Bed alarm in place;Nurse notified;Call light within reach; Left in bed           Patient Diagnosis(es): The primary encounter diagnosis was Metabolic acidosis. Diagnoses of Diarrhea, unspecified type, Dialysis patient ([facility] [address] Pancytopenia ([facility] [address] Hypocalcemia, Seizure ([facility] [address] and Hypotension, unspecified hypotension type were also pertinent to this visit. has a past medical history of Blood transfusion, Chronic pain, Diabetes mellitus ([facility] [address] Dialysis, Guillain Owings syndrome, Hemodialysis patient ([facility] [address] HTN (hypertension), benign, Low blood pressure, Pancreatitis, Peripheral Neuropathy, Pneumonia, Renal failure, and Status epilepticus ([facility] [address] has a past surgical history that includes Kidney transplant (9/1994); Cholecystectomy; Tunneled venous port placement; Dialysis fistula creation; Tunneled venous catheter placement; Toe Surgery (10-8-2010); Upper gastrointestinal endoscopy (N/A, 12/17/2021); Colonoscopy (N/A, 12/17/2021); bronchoscopy (N/A, 2/14/2022); and CT BIOPSY BONE MARROW (2/22/2022). Treatment Diagnosis: Above deficits associated with hypocalcemia      Restrictions  Restrictions/Precautions  Restrictions/Precautions: Fall Risk,Modified Diet (High falls risk; dysphagia III with thins)  Required Braces or Orthoses?: No  Position Activity Restriction  Other position/activity restrictions: The pt was admitted 2/12 with diarrhea. Rapid response called twice on 2/14 for aspiration and then seizure. Intubated 2/14-2/16. Possible seizure due to hypocalcemia. Pt has been hypoglycemic. Sydney Patricio is a 49 y.o. male with h/o ESRD on HD, HTN , DM presented d/t diarrhea. The symptoms are ongoing for the past several weeks. He has been feeling weakness.  Could not go to his scheduled dialysis session    Subjective   General  Chart Reviewed: Yes  Patient assessed for rehabilitation services?: Yes  Family / Caregiver Present: No  Diagnosis: Hypocalcemia  Subjective  Subjective: Pt supine in bed upon arrival, nonverbal throughout majority of session but does nod head. Cooperative with assessment  Patient Currently in Pain: No  Pain Assessment  Pain Assessment: Faces  Pain Level: 4  Pre Treatment Pain Screening  Intervention List: Patient able to continue with treatment;Nurse/Physician notified  Vital Signs  Pulse: 68  Heart Rate Source: Monitor  Resp: 18  BP: 96/67  BP Location: Right lower arm  MAP (mmHg): 72  Patient Position: Semi fowlers  Level of Consciousness: Alert (0)  Patient Currently in Pain: No  Oxygen Therapy  SpO2: 100 %  O2 Device: None (Room air)  Social/Functional History  Social/Functional History  Lives With: Family (Mother)  Type of Home: Apartment  Home Layout: One level  Home Access: Level entry  Bathroom Shower/Tub: Walk-in shower  Home Equipment[address] Help From: [place] [employer] ([facility])  ADL Assistance: Needs assistance (assistance needed for bathing and dressing by mother)  Ambulation Assistance: Independent (per chart from [facility] in 12/2021, pt would self-propel w/c with BLEs)  Transfer Assistance: Independent (reported as being independent)  Active : No  Additional Comments: Pt performs w/c transfers with total lift assistance; pt denies use of slide board with transfers; pt is able to self-propel w/c--previous evaluation on 2/19/2022 obtaining this information from pt. However, pt unable to provide information this date due to nonverbal status. Information obtained from various sources leads to unclear PLOF. CM reporting pt has been reported to ambulate with RW and taking self to bus-stop. Per previous chart indicated above, pt transferred self to w/c but used w/c for mobility.  However, pt typically was able to weight bear during therapy treatments as recently as November and December 2021. Objective   Vision: Within Functional Limits  Hearing: Within functional limits    Orientation  Overall Orientation Status: Within Functional Limits  Observation/Palpation  Posture: Poor     ADL  Grooming: Dependent/Total (hand hygiene secondary to skin breakdown caused by nailbed and flexion contractures)  Tone RUE  RUE Tone: Hypertonic  Tone LUE  LUE Tone: Hypertonic  Coordination  Movements Are Fluid And Coordinated: No  Quality of Movement Other  Comment: Rigid movements noted in BUE's. Flexion contractions also observed in B hands. Bed mobility  Rolling to Left: Dependent/Total;2 Person assistance  Rolling to Right: Dependent/Total;2 Person assistance  Scooting: Dependent/Total;2 Person assistance  Comment: Max A x2 for all bed mobility        Cognition  Overall Cognitive Status: Exceptions  Arousal/Alertness: Delayed responses to stimuli  Following Commands: Inconsistently follows commands  Attention Span: Difficulty attending to directions  Memory: Decreased recall of precautions;Decreased short term memory  Safety Judgement: Decreased awareness of need for safety  Problem Solving: Decreased awareness of errors  Insights: Decreased awareness of deficits  Initiation: Requires cues for all  Sequencing: Requires cues for all  Cognition Comment: Nonverbal throughout session, requires ~15-20 seconds of time to respond to prompts via head nod  Perception  Overall Perceptual Status: Impaired     Sensation  Overall Sensation Status: Impaired  Light Touch: Severe deficits in the LLE; Severe deficits in the RLE        LUE PROM (degrees)  LUE PROM: WFL  LUE AROM (degrees)  LUE AROM : Exceptions  LUE General AROM: Limited assessment secondary to cognition/decreased compliance with commands  Left Hand PROM (degrees)  Left Hand PROM: Exceptions  Left Hand General PROM: Flexion contractures to DIP and PIP joints of digits in (B) hands, unable to achieve full extension  RUE PROM (degrees)  RUE PROM: WFL  RUE AROM (degrees)  RUE AROM : Exceptions  RUE General AROM: Limited assessment secondary to cognition/decreased compliance with commands  Right Hand PROM (degrees)  Right Hand PROM: Exceptions  Right Hand General PROM: Flexion contractures to DIP and PIP joints of digits in (B) hands, unable to achieve full extension                      Plan   Plan  Times per week: 3-5  Times per day: Daily  Current Treatment Recommendations: Strengthening,Safety Education & Training,Patient/Caregiver Education & Training,Self-Care / ADL,Balance Training,Functional Mobility Training,Endurance Training,Equipment Evaluation, Education, & procurement    G-Code     OutComes Score                                                  AM-PAC Score        AM-PAC Inpatient Daily Activity Raw Score: 6 (02/22/22 1726)  AM-PAC Inpatient ADL T-Scale Score : 17.07 (02/22/22 1726)  ADL Inpatient CMS 0-100% Score: 100 (02/22/22 1726)  ADL Inpatient CMS G-Code Modifier : CN (02/22/22 1726)    Goals  Short term goals  Time Frame for Short term goals: discharge  Short term goal 1: Bed mobility mod A  Short term goal 2: Self-feeding min A  Short term goal 3: Tolerate sitting EOB 3-4 minutes  Long term goals  Time Frame for Long term goals : LTG=STG  Patient Goals   Patient goals : Safe Discharge       Therapy Time   Individual Concurrent Group Co-treatment   Time In 1113 (1515)         Time Out 1126 (1545)         Minutes 13 (30)            Timed Code Treatment Minutes:  27 minutes    Total Treatment Minutes:  43 minutes    TAMIKA Taylor OTR/L [id number]    Guy Welch OT

## 2022-02-22 NOTE — PROGRESS NOTES
Nephrology progress  Note                                                                                                                                                                                                                                                                                                                                                               Office : [phone]     Fax :[phone]              Patient's Name: Hudson Nino  11:41 AM  2/22/2022    Reason for Consult:  ESRD on HD      Chief Complaint:  Diarrhea      History of Present Ilness:    Hudson Nino is a 49 y.o. 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    Hemodialysis [facility] ([facility]     HTN (hypertension), benign     Low blood pressure     Pancreatitis     Peripheral Neuropathy     Pneumonia 11/11/2021    Renal failure 1992    on dialysis 3x/wk  (T, Th, Sat)    Status epilepticus ([facility] 12/11/2021       Past Surgical History:   Procedure Laterality Date    BRONCHOSCOPY N/A 2/14/2022    BRONCHOSCOPY DIAGNOSTIC OR CELL [address] ONLY performed by Lucas Mckeon MD at [address] [place] [address] N/A 12/17/2021    COLONOSCOPY DIAGNOSTIC performed by Tyra Pelaez MD at [facility]      left arm/currently non functioning    KIDNEY TRANSPLANT  9/1994    R-kidney    TOE SURGERY  10-8-2010    cut and realign 1st, 2nd, toe left foot , fixation 1st, 2nd toe left foot    TUNNELED VENOUS CATHETER PLACEMENT      left chest for dialysis    TUNNELED VENOUS PORT PLACEMENT      UPPER GASTROINTESTINAL ENDOSCOPY N/A 12/17/2021    EGD ESOPHAGOGASTRODUODENOSCOPY performed by Willem Augustine MD at [facility] ENDOSCOPY       Family History   Problem Relation Age of Onset    Diabetes Mother         reports that he has never smoked. He has never used smokeless tobacco. He reports that he does not drink alcohol and does not use drugs.     Allergies:  Influenza virus vaccine    Current Medications:    calcium gluconate 1000 mg in sodium chloride 50 mL, Once  dextrose 10 % infusion, Continuous  dextrose bolus (hypoglycemia) 10% 125 mL, PRN   Or  dextrose bolus (hypoglycemia) 10% 250 mL, PRN  calcium carbonate (TUMS) chewable tablet 1,000 mg, TID  HYDROmorphone HCl PF (DILAUDID) injection 1 mg, Q4H PRN  HYDROmorphone (DILAUDID) injection 2 mg, Q4H PRN  midazolam PF (VERSED) injection 2 mg, Once  pantoprazole (PROTONIX) injection 40 mg, Daily  vitamin D (ERGOCALCIFEROL) capsule 50,000 Units, Weekly  oxyCODONE (OXY-IR) immediate release tablet 30 mg, Q8H PRN  levothyroxine (SYNTHROID) tablet 25 mcg, Daily  midodrine (PROAMATINE) tablet 5 mg, BID WC  glucose (GLUTOSE) 40 % oral gel 15 g, PRN  dextrose 50 % IV solution, PRN  glucagon (rDNA) injection 1 mg, PRN  dextrose 5 % solution, PRN  sodium chloride flush 0.9 % injection 5-40 mL, 2 times per day  sodium chloride flush 0.9 % injection 5-40 mL, PRN  0.9 % sodium chloride infusion, PRN  ondansetron (ZOFRAN-ODT) disintegrating tablet 4 mg, Q8H PRN   Or  ondansetron (ZOFRAN) injection 4 mg, Q6H PRN  polyethylene glycol (GLYCOLAX) packet 17 g, Daily PRN  acetaminophen (TYLENOL) tablet 650 mg, Q6H PRN   Or  acetaminophen (TYLENOL) suppository 650 mg, Q6H PRN  heparin (porcine) injection 5,800 Units, PRN  heparin (porcine) injection 5,000 Units, 3 times per day  LORazepam (ATIVAN) tablet 0.5 mg, Nightly PRN        Physical exam:     Vitals:  BP (!) 135/109   Pulse 72   Temp 96.6 °F (35.9 °C) (Temporal)   Resp 18   Ht 6' 4\" (1.93 m)   Wt 215 lb 2.7 oz (97.6 kg)   SpO2 94%   BMI 26.19 kg/m²   Constitutional: extubated   Awake and alert   Skin: no rash, turgor wnl  Heent:  eomi, mmm  Neck: no bruits or jvd noted  Cardiovascular:  S1, S2 without m/r/g  Respiratory: CTA B without w/r/r  Abdomen:  +bs, soft, nt, nd  Ext: no  lower extremity edema      Data:   Labs:  CBC:   Recent Labs     02/20/22  0510 02/21/22  0423 02/22/22  0318   WBC 2.7* 2.8* 2.3*   HGB 9.5* 9.3* 8.8*   PLT 50* 80* 35*     BMP:    Recent Labs     02/20/22  0510 02/21/22  0423 02/22/22  0318   * 131* 131*   K 4.5 4.6 3.9   CL 94* 93* 93*   CO2 22 18* 22   BUN 44* 47* 37*   CREATININE 6.2* 6.4* 5.5*   GLUCOSE 63* 48* 91     Ca/Mg/Phos:   Recent Labs     02/21/22  0423 02/21/22  1723 02/22/22  0318   CALCIUM 6.6* 6.7* 6.9*     Hepatic:   No results for input(s): AST, ALT, ALB, BILITOT, ALKPHOS in the last 72 hours. Troponin: No results for input(s): TROPONINI in the last 72 hours. BNP: No results for input(s): BNP in the last 72 hours. Lipids: No results for input(s): CHOL, TRIG, HDL, LDLCALC, LABVLDL in the last 72 hours. ABGs:   No results for input(s): PHART, PO2ART, UTM6TOH in the last 72 hours. INR:   Recent Labs     02/22/22  1000   INR 1.35*     UA:No results for input(s): [place] Batch, GLUCOSEU, BILIRUBINUR, Sarah Jose J, BLOODU, PHUR, PROTEINU, UROBILINOGEN, NITRU, LEUKOCYTESUR, Brandie Savers in the last 72 hours. Urine Microscopic: No results for input(s): LABCAST, BACTERIA, COMU, HYALCAST, WBCUA, RBCUA, EPIU in the last 72 hours. Urine Culture: No results for input(s): LABURIN in the last 72 hours. Urine Chemistry: No results for input(s): Je Factor, PROTEINUR, NAUR in the last 72 hours. IMAGING:  CT CHEST WO CONTRAST   Final Result   Bilateral airspace disease, suspicious for pneumonia. Superimposed pleural effusions are seen with body wall anasarca, compatible   with a component of fluid overload.       Findings of renal osteodystrophy      RECOMMENDATIONS:   Unavailable         XR CHEST PORTABLE   Final Result   Moderate bilateral airspace disease. This could represent a combination of   edema and or pneumonia. Moderate improved aeration of the left lung. XR CHEST PORTABLE   Final Result   The endotracheal tube measures approximately 6.6 cm above the lux. The enteric catheter is coiled in the gastric fundus. Cardiomegaly with vascular congestion and patchy airspace disease, a   component of which is felt likely to represent pulmonary edema. Improved   aeration in the left upper lobe. XR CHEST PORTABLE   Final Result   1. Endotracheal tube with the tip 2.3 cm above the lux. Recommend   withdrawal by 1.5 cm.      2.  The left hemithorax is completely opacified. This could represent a   combination of large pleural effusion and/or consolidation. 3.  Patchy airspace disease throughout the right lung and small right pleural   effusion. CT ABDOMEN PELVIS WO CONTRAST Additional Contrast? None   Final Result   Addendum 1 of 1   ADDENDUM:   There is a transcription error in the impression. The 1st item should    read   as follows:      Stable broad-based ventral abdominal wall hernia containing loops of small   large bowel without associated obstruction. Final   1. Stable broad-based ventral abdominal wall hernia containing loops of small   large bowel at associated obstruction. 2. Severe renal atrophy with diffuse vascular calcifications and findings of   renal osteodystrophy. 3. Small layering bilateral pleural effusions with mild pulmonary edema and   anasarca. CT BIOPSY BONE MARROW    (Results Pending)   CT GUIDED NEEDLE PLACEMENT    (Results Pending)       Assessment/Plan   1. ESRD on HD    2. HTN    3.  Anemia    4  Seizure likely 2/2 hypocalcemia     5 diarrhea    6 Metabolic acidosis    7 Hypocalcemia  PTH is low at 11 . has hypoparathyroidism   Phos 11 HD tomorrow   Increased the calcium carbonate to 1000 mg po tid   Should resume outpt HD tomorrow at outpt unit if discharged home today   continue to take calcium carbonate daily               Thank you for allowing us to participate in care of Jerrica Cazares MD  Feel free to contact me   [facility] [address]  Office : [phone]  Fax :[phone]

## 2022-02-22 NOTE — PROGRESS NOTES
Physical Therapy    Facility/Department: [facility] ICU  Initial Assessment    NAME: Hudson Nino  : 1970  MRN: [id number]    Date of Service: 2022    Discharge Recommendations:Carter Johnson scored a 6/24 on the AM-PAC short mobility form. Current research shows that an AM-PAC score of 17 or less is typically not associated with a discharge to the patient's home setting. Based on the patient's AM-PAC score and their current functional mobility deficits, it is recommended that the patient have 3-5 sessions per week of Physical Therapy at d/c to increase the patient's independence. Please see assessment section for further patient specific details. If patient discharges prior to next session this note will serve as a discharge summary. Please see below for the latest assessment towards goals. PT Equipment Recommendations  Equipment Needed: Yes  Mobility Devices: Transport Devices  Transport Devices: Patient Mechanical Lift  Other: If discharged to home    Assessment   Body structures, Functions, Activity limitations: Decreased functional mobility ; Decreased ADL status; Decreased ROM; Decreased strength;Decreased cognition;Decreased endurance;Decreased balance; Increased pain;Decreased posture  Assessment: Pt presents as below his baseline function, secondary to new nonverbal status, and impaired mobility. Per chart review, pt was able to complete transfers to w/c independently as of 2021. This date, pt prevents as far below his baseline function. Pt would benefit from skilled PT services to promote highest possible level of independence and promote return to PLOF. Prognosis: Fair  Decision Making: Medium Complexity  History: As noted. Exam: MMT, ROM, functional mobility assessment  Clinical Presentation: Stable. PT Education: [facility]  Patient Education: Patient verbalized understanding.   Barriers to Learning: cognition  REQUIRES PT FOLLOW UP: Yes  Activity Tolerance  Activity Tolerance: Patient limited by cognitive status       Patient Diagnosis(es): The primary encounter diagnosis was Metabolic acidosis. Diagnoses of Diarrhea, unspecified type, Dialysis patient ([facility] Pancytopenia ([facility] [address] Hypocalcemia, Seizure ([facility] [address] and Hypotension, unspecified hypotension type were also pertinent to this visit. has a past medical history of Blood transfusion, Chronic pain, Diabetes mellitus ([facility] [address] Dialysis, Guillain Carnation syndrome, Hemodialysis patient ([address] HTN (hypertension), benign, Low blood pressure, Pancreatitis, Peripheral Neuropathy, Pneumonia, Renal failure, and Status epilepticus ([facility] [address] has a past surgical history that includes Kidney transplant (9/1994); Cholecystectomy; Tunneled venous port placement; Dialysis fistula creation; Tunneled venous catheter placement; Toe Surgery (10-8-2010); Upper gastrointestinal endoscopy (N/A, 12/17/2021); Colonoscopy (N/A, 12/17/2021); bronchoscopy (N/A, 2/14/2022); and CT BIOPSY BONE MARROW (2/22/2022). Restrictions  Restrictions/Precautions  Restrictions/Precautions: Fall Risk,Modified Diet (High falls risk; dysphagia III with thins)  Required Braces or Orthoses?: No  Position Activity Restriction  Other position/activity restrictions: The pt was admitted 2/12 with diarrhea. Rapid response called twice on 2/14 for aspiration and then seizure. Intubated 2/14-2/16. Possible seizure due to hypocalcemia. Pt has been hypoglycemic. Beau Rutledge is a 49 y.o. male with h/o ESRD on HD, HTN , DM presented d/t diarrhea. The symptoms are ongoing for the past several weeks. He has been feeling weakness.  Could not go to his scheduled dialysis session  Vision/Hearing  Vision: Within Functional Limits  Hearing: Within functional limits     Subjective  General  Chart Reviewed: Yes  Response To Previous Treatment: Patient unable to report, no changes reported from family or staff  Family / Caregiver Present: No  Diagnosis: diarrhea, acute respiratory failure s/p intubation  Follows Commands: Impaired  General Comment  Comments: Patient supine in bed. Subjective  Subjective: Pt nonverbal during session, only shaking head 1-2 times during session, once appropriately in response to asking if pt was in pain  Pain Screening  Patient Currently in Pain: Denies  Vital Signs  Patient Currently in Pain: Denies       Orientation  Orientation  Overall Orientation Status: Impaired  Orientation Level: Unable to assess  Social/Functional History  Social/Functional History  Lives With: Family (Mother)  Type of Home: Apartment  Home Layout: One level  Home Access: Level entry  Bathroom Shower/Tub: Walk-in shower  Home Equipment: [address] Help From: [facility] ([facility])  ADL Assistance: Needs assistance (assistance needed for bathing and dressing by mother)  Ambulation Assistance: Independent (per chart from [facility] in 12/2021, pt would self-propel w/c with BLEs)  Transfer Assistance: Independent (reported as being independent)  Active : No  Additional Comments: Pt performs w/c transfers with total lift assistance; pt denies use of slide board with transfers; pt is able to self-propel w/c--previous evaluation on 2/19/2022 obtaining this information from pt. However, pt unable to provide information this date due to nonverbal status. Information obtained from various sources leads to unclear PLOF. CM reporting pt has been reported to ambulate with RW and taking self to bus-stop. Per previous chart from [facility] indicated above, pt transferred self to w/c but used w/c for mobility. However, pt typically was able to weight bear during therapy treatments as recently as November and December 2021. Cognition        Objective     Observation/Palpation  Posture: Poor    PROM RLE (degrees)  RLE PROM: WFL  AROM RLE (degrees)  RLE AROM: WFL  RLE General AROM: Occasional spontaneous flexion/extension. No active DF/PF  PROM LLE (degrees)  LLE PROM: WFL  AROM LLE (degrees)  LLE AROM : WFL  LLE General AROM: Occasional spontaneous flexion/extension. No active DF/PF  Strength RLE  Comment: Grossly 2+/5  Strength LLE  Comment: Grossly 2+/5  Motor Control  Gross Motor?: WFL  Sensation  Overall Sensation Status: Impaired  Light Touch: Severe deficits in the LLE; Severe deficits in the RLE  Bed mobility  Rolling to Left: Dependent/Total;2 Person assistance  Rolling to Right: Dependent/Total;2 Person assistance  Scooting: Dependent/Total;2 Person assistance  Comment: Max A x2 for all bed mobility  Transfers  Bed to Chair: Unable to assess  Ambulation  Ambulation?: No  Stairs/Curb  Stairs?: No     Balance  Posture: Poor  Sitting - Static:  (DANIELLE)  Sitting - Dynamic:  (DANIELLE)  Standing - Static:  (DANIELLE)  Standing - Dynamic:  (DANIELLE)     2nd session: PT/OT returning following completion of CT bone biopsy. OT assisting with cleaning skin of pt's palms secondary to contractures. Pt also provided with towel rolls to protect pt's palms. PT also completing BLE PROM and assisting RN with placement of new sacral mepilex dressing. Plan   Plan  Times per week: 3-5x  Times per day: Daily  Current Treatment Recommendations: Strengthening,ROM,Balance Training,Functional Mobility Training,Transfer Training,Endurance Training,Gait [employer] training,Safety Education & Training,Patient/Caregiver Education & Training  Plan Comment: D/C acute PT. Safety Devices  Type of devices:  All fall risk precautions in place,Call light within reach,Nurse notified,Bed alarm in place,Gait belt,Patient at risk for falls,Left in bed  Restraints  Initially in place: No    G-Code       OutComes Score                                                  AM-PAC Score  AM-PAC Inpatient Mobility Raw Score : 6 (02/22/22 1656)  AM-PAC Inpatient T-Scale Score : 23.55 (02/22/22 1656)  Mobility Inpatient CMS 0-100% Score: 100 (02/22/22 1656)  Mobility Inpatient CMS G-Code Modifier : CN (02/22/22 1656) Goals  Short term goals  Time Frame for Short term goals:  To be met prior to discharge  Short term goal 1: Pt will complete bed mobility with max A  Short term goal 2: Pt will tolerate sitting EOB x5 minutes with mod A       Therapy Time   Individual Concurrent Group Co-treatment   Time In 1115         Time Out 1126         Minutes 11         Timed Code Treatment Minutes: 0 Minutes      Second Session Therapy Time:   Individual Concurrent Group Co-treatment   Time In 2741         Time Out 3919         Minutes 30           Timed Code Treatment Minutes:  26    Total Treatment Minutes:  [address] [place], [place], DPT, [id number]  Sangita Michelle, PT

## 2022-02-22 NOTE — FLOWSHEET NOTE
Pt's mother called and opted for pt to have bone biopsy after having declined procedure earlier today. RN and attending made aware.     Electronically signed by Ivette Dutta RN on 2/22/2022 at 12:15 PM

## 2022-02-22 NOTE — PROGRESS NOTES
[facility]   Progress Note      2/22/2022 8:58 AM        Name: Adrianne Canela .               Admitted: 2/12/2022    SUBJECTIVE:  Patient appears comfortable, alert, non-verbal, plts have dropped to 35k, no bleeding noted, spoke with RN    Reviewed interval ancillary notes    Current Medications  dextrose 10 % infusion, Continuous  dextrose bolus (hypoglycemia) 10% 125 mL, PRN   Or  dextrose bolus (hypoglycemia) 10% 250 mL, PRN  calcium carbonate (TUMS) chewable tablet 1,000 mg, TID  HYDROmorphone HCl PF (DILAUDID) injection 1 mg, Q4H PRN  HYDROmorphone (DILAUDID) injection 2 mg, Q4H PRN  midazolam PF (VERSED) injection 2 mg, Once  pantoprazole (PROTONIX) injection 40 mg, Daily  vitamin D (ERGOCALCIFEROL) capsule 50,000 Units, Weekly  oxyCODONE (OXY-IR) immediate release tablet 30 mg, Q8H PRN  levothyroxine (SYNTHROID) tablet 25 mcg, Daily  midodrine (PROAMATINE) tablet 5 mg, BID WC  glucose (GLUTOSE) 40 % oral gel 15 g, PRN  dextrose 50 % IV solution, PRN  glucagon (rDNA) injection 1 mg, PRN  dextrose 5 % solution, PRN  sodium chloride flush 0.9 % injection 5-40 mL, 2 times per day  sodium chloride flush 0.9 % injection 5-40 mL, PRN  0.9 % sodium chloride infusion, PRN  ondansetron (ZOFRAN-ODT) disintegrating tablet 4 mg, Q8H PRN   Or  ondansetron (ZOFRAN) injection 4 mg, Q6H PRN  polyethylene glycol (GLYCOLAX) packet 17 g, Daily PRN  acetaminophen (TYLENOL) tablet 650 mg, Q6H PRN   Or  acetaminophen (TYLENOL) suppository 650 mg, Q6H PRN  heparin (porcine) injection 5,800 Units, PRN  heparin (porcine) injection 5,000 Units, 3 times per day  LORazepam (ATIVAN) tablet 0.5 mg, Nightly PRN        Objective:  BP (!) 135/109   Pulse 72   Temp 96.6 °F (35.9 °C) (Temporal)   Resp 18   Ht 6' 4\" (1.93 m)   Wt 215 lb 2.7 oz (97.6 kg)   SpO2 94%   BMI 26.19 kg/m²     Intake/Output Summary (Last 24 hours) at 2/22/2022 0858  Last data filed at 2/21/2022 2103  Gross per 24 hour   Intake 600 ml   Output 2600 ml Net -2000 ml      Wt Readings from Last 3 Encounters:   02/22/22 215 lb 2.7 oz (97.6 kg)   12/18/21 185 lb 10 oz (84.2 kg)   12/11/21 170 lb (77.1 kg)       General appearance:  Appears comfortable  Eyes: Sclera clear. Pupils equal.  ENT: Moist oral mucosa. Trachea midline, no adenopathy. Cardiovascular: Regular rhythm, normal S1, S2. No murmur. No edema in lower extremities  Respiratory: Not using accessory muscles. Good inspiratory effort. Clear to auscultation bilaterally, no wheeze or crackles. GI: Abdomen soft, no tenderness, not distended  Musculoskeletal: No cyanosis in digits, neck supple  Neurology: CN 2-12 grossly intact. Psych: Normal affect. Alert   Skin: Warm, dry, normal turgor    Labs and Tests:  CBC:   Recent Labs     02/20/22  0510 02/21/22  0423 02/22/22  0318   WBC 2.7* 2.8* 2.3*   HGB 9.5* 9.3* 8.8*   PLT 50* 80* 35*     BMP:    Recent Labs     02/20/22  0510 02/21/22  0423 02/22/22  0318   * 131* 131*   K 4.5 4.6 3.9   CL 94* 93* 93*   CO2 22 18* 22   BUN 44* 47* 37*   CREATININE 6.2* 6.4* 5.5*   GLUCOSE 63* 48* 91     Hepatic: No results for input(s): AST, ALT, ALB, BILITOT, ALKPHOS in the last 72 hours. ASSESSMENT AND PLAN    Active Problems:    Hypocalcemia    JOANNE (acute kidney injury) ([address]    Diarrhea    Seizure ([address]    Acute hypoxemic respiratory failure (HCC)    HTN (hypertension), benign    Aspiration into airway    Atelectasis    End stage renal disease due to benign hypertension ([address]  Resolved Problems:    * No resolved hospital problems. *      1.  Pancytopenia  -Anemia partially d/t ESRD  -History of copper and zinc deficiency, results pending  -IgG was elevated in Nov 2021  Kendrick Smart preliminary result showing total protein at 5.7   -QIG are wnl  -Peripheral smear reviewed by Dr. Caro Judd, no dysplastic findings, no significant schistocytes.    -Transfuse PRBC if hgb <7  -Transfuse platelets if plt <71E or 50k with active bleeding.  -Hold anticoagulation if platelet count <50k.  - remains pancytopenic, plts lower today at 35k,  Will plan for bone marrow bx      2. Hypocalcemia  -Calcium has been replaced.  -Endocrinology is following.   -Concern for immunoglobulins binding to calcium resulting in hypocalcemia.    - SPEP NO Evidence of monoclonal protein  -Kappa and lambda free light chains are elevated but ratio is normal   -QIG are wnl     3. Acute respiratory failure with hypoxia  -Extubated. -Pulmonology following.     4. ESRD on dialysis  -Management per nephrology    Nataliia Hun, [facility]  [facility]      Agree with the above note. Will follow up on bone marrow biopsy results to rule out dysplasia causing his cytopenias.      Cathyann Angelucci MD  [facility]

## 2022-02-22 NOTE — PROGRESS NOTES
Brief Postoperative Note    Mirna Bray  YOB: 1970  [id number]    Pre-operative Diagnosis: pancytopenia    Post-operative Diagnosis: Same    Procedure: CT guided bone marrow biopsy    Anesthesia: Local    Surgeons/Assistants: Darylene Carpen, MD    Estimated Blood Loss: less than 5    Complications: None    Specimens: Was Obtained: marrow aspirate and core sample    Findings: none    Electronically signed by Darylene Carpen, MD on 2/22/2022 at 2:48 PM

## 2022-02-22 NOTE — PLAN OF CARE
Problem: Falls - Risk of:  Goal: Will remain free from falls  Description: Will remain free from falls  2/22/2022 1310 by Tiffany Soria RN  Outcome: Ongoing  2/22/2022 0449 by Colletta Flavors, RN  Outcome: Met This Shift  Goal: Absence of physical injury  Description: Absence of physical injury  Outcome: Ongoing     Problem: Skin Integrity:  Goal: Will show no infection signs and symptoms  Description: Will show no infection signs and symptoms  Outcome: Ongoing  Goal: Absence of new skin breakdown  Description: Absence of new skin breakdown  2/22/2022 1310 by Tiffany Soria RN  Outcome: Ongoing  2/22/2022 0449 by Colletta Flavors, RN  Outcome: Ongoing     Problem: Daily Care:  Goal: Daily care needs are met  Description: Daily care needs are met  Outcome: Ongoing     Problem: Skin Integrity:  Goal: Skin integrity will stabilize  Description: Skin integrity will stabilize  Outcome: Ongoing     Problem: Pain:  Goal: Pain level will decrease  Description: Pain level will decrease  Outcome: Ongoing  Goal: Control of acute pain  Description: Control of acute pain  Outcome: Ongoing  Goal: Control of chronic pain  Description: Control of chronic pain  Outcome: Ongoing     Problem: Health Behavior:  Goal: Ability to manage health-related needs will improve  Description: Ability to manage health-related needs will improve  Outcome: Ongoing  Goal: Identification of resources available to assist in meeting health care needs will improve  Description: Identification of resources available to assist in meeting health care needs will improve  Outcome: Ongoing     Problem: Nutritional:  Goal: Ability to identify appropriate dietary choices will improve  Description: Ability to identify appropriate dietary choices will improve  Outcome: Ongoing     Problem: Musculor/Skeletal Functional Status  Goal: Highest potential functional level  Outcome: Ongoing  Goal: Absence of falls  Outcome: Ongoing   The care plan and education has been reviewed and mutually agreed upon with the patient.

## 2022-02-22 NOTE — PROGRESS NOTES
Pt returned from bone marrow biopsy, bandage noted on L hip, scant amount of blood noted on dressing. Pt currently lying on his left side.  VSS

## 2022-02-22 NOTE — CARE COORDINATION
Received call from [facility], [address] liaison. [facility] is reviewing pt records for appropriateness and will visit pt onsite. Pt will require a pre-cert and they are not going to have HD bed T/T/S until this Thursday or Friday. If pt had confirmed Sz overnight, pt unable to start pre-cert. [facility] needs to know the HX of the MRSA and contact isolation as this will impact availability to meet pt needs. CHRISTIANO spoke to Marry/mother to update on above. Mother plans to visit today.      [facility] Liaison  Phone answered 24/7  [phone]    Electronically signed by Vivi Cali RN on 2/22/2022 at 9:04 AM      Pt tested MRSA Neg 11/14/2021, [facility] at [facility] updated      Electronically signed by Vivi Cali RN on 2/22/2022 at 9:51 AM

## 2022-02-22 NOTE — PLAN OF CARE
Problem: Falls - Risk of:  Goal: Will remain free from falls  Description: Will remain free from falls  Outcome: Met This Shift     Problem: Skin Integrity:  Goal: Absence of new skin breakdown  Description: Absence of new skin breakdown  Outcome: Ongoing

## 2022-02-22 NOTE — PRE SEDATION
Sedation Pre-Procedure Note    Patient Name: Grace Snell   YOB: 1970  Room/Bed: [facility]-1617/3068-17  Medical Record Number: [id number]  Date: 2/22/2022   Time: 2:18 PM       Indication:  Bone Marrow Biopsy    Consent: Consent was obtained from the patients mother. Vital Signs:   Vitals:    02/22/22 1155   BP:    Pulse: 74   Resp:    Temp:    SpO2: 96%       Past Medical History:   has a past medical history of Blood transfusion, Chronic pain, Diabetes mellitus ([facility] [address] Dialysis, Guillain [place] syndrome, Hemodialysis patient ([facility] [address] HTN (hypertension), benign, Low blood pressure, Pancreatitis, Peripheral Neuropathy, Pneumonia, Renal failure, and Status epilepticus ([facility] [address] Past Surgical History:   has a past surgical history that includes Kidney transplant (9/1994); Cholecystectomy; Tunneled venous port placement; Dialysis fistula creation; Tunneled venous catheter placement; Toe Surgery (10-8-2010); Upper gastrointestinal endoscopy (N/A, 12/17/2021); Colonoscopy (N/A, 12/17/2021); and bronchoscopy (N/A, 2/14/2022).     Medications:   Scheduled Meds:    calcium carbonate  1,000 mg Oral TID    midazolam  2 mg IntraVENous Once    pantoprazole  40 mg IntraVENous Daily    vitamin D  50,000 Units Oral Weekly    levothyroxine  25 mcg Oral Daily    midodrine  5 mg Oral BID WC    sodium chloride flush  5-40 mL IntraVENous 2 times per day    heparin (porcine)  5,000 Units SubCUTAneous 3 times per day     Continuous Infusions:    dextrose 50 mL/hr at 02/22/22 0756    dextrose Stopped (02/15/22 1907)    sodium chloride 25 mL (02/21/22 1823)     PRN Meds: dextrose bolus (hypoglycemia) **OR** dextrose bolus (hypoglycemia), HYDROmorphone, HYDROmorphone, oxyCODONE, glucose, dextrose, glucagon (rDNA), dextrose, sodium chloride flush, sodium chloride, ondansetron **OR** ondansetron, polyethylene glycol, acetaminophen **OR** acetaminophen, heparin (porcine), LORazepam  Home Meds:   Prior to Admission medications    Medication Sig Start Date End Date Taking? Authorizing Provider   oxyCODONE (OXY-IR) 30 MG immediate release tablet Take 30 mg by mouth every 8 hours as needed for Pain. Yes Historical Provider, MD   ferrous sulfate (IRON 325) 325 (65 Fe) MG tablet Take 325 mg by mouth 2 times daily   Yes Historical Provider, MD   levothyroxine (SYNTHROID) 25 MCG tablet Take 25 mcg by mouth Daily   Yes Historical Provider, MD   Multiple Vitamins-Minerals (THERAPEUTIC MULTIVITAMIN-MINERALS) tablet Take 1 tablet by mouth daily   Yes Historical Provider, MD   sevelamer (RENVELA) 800 MG tablet Take 1 tablet by mouth 3 times daily (with meals)   Yes Historical Provider, MD   ondansetron (ZOFRAN ODT) 4 MG disintegrating tablet Take 1 tablet by mouth every 8 hours as needed for Nausea 3/24/17  Yes LOUISA Reyes CNP   pantoprazole (PROTONIX) 40 MG tablet Take 40 mg by mouth 2 times daily    Yes Historical Provider, MD   Promethazine HCl 6.25 MG/5ML SOLN Take 12.5 mg by mouth 2 times daily as needed. Yes Historical Provider, MD   temazepam (RESTORIL) 30 MG capsule Take 30 mg by mouth nightly as needed.    Yes Historical Provider, MD   midodrine (PROAMATINE) 5 MG tablet Take 1 tablet by mouth 2 times daily (with meals) 12/18/21   Julieta Trejo MD   insulin glargine (LANTUS;BASAGLAR) 100 UNIT/ML injection pen Inject 5 Units into the skin nightly 12/18/21   Julieta Trejo MD   Dextromethorphan-guaiFENesin  MG/5ML SYRP Take 5 mLs by mouth every 4 hours as needed for Cough    Historical Provider, MD   bisacodyl (DULCOLAX) 10 MG suppository Place 10 mg rectally daily    Historical Provider, MD   magnesium hydroxide (MILK OF MAGNESIA) 400 MG/5ML suspension Take 30 mLs by mouth daily as needed for Constipation    Historical Provider, MD   ondansetron (ZOFRAN) 4 MG tablet Take 4 mg by mouth every 8 hours as needed for Nausea or Vomiting    Historical Provider, MD     Coumadin Use Last 7 Days: no  Antiplatelet drug therapy use last 7 days: no  Other anticoagulant use last 7 days: Heparin, stopped appropriately  Additional Medication Information:  N/A     Pre-Sedation Documentation and Exam:   Vital signs have been reviewed (see flow sheet for vitals). I have reviewed the patient's history and review of systems.     Mallampati Airway Assessment:  Mallampati Class  IV - (soft palate not visible)    Prior History of Anesthesia Complications:   none    ASA Classification:  Class 4 - A patient with an incapacitating systemic disease that is a constant threat to life    Sedation/ Anesthesia Plan:   intravenous sedation    Medications Planned:   midazolam (Versed) intravenously and fentanyl intravenously    Patient is an appropriate candidate for plan of sedation: NO, due to mallampati of 4    Electronically signed by Ford Liu PA-C on 2/22/2022 at 2:18 PM

## 2022-02-22 NOTE — CARE COORDINATION
Received call from [facility] reporting that after review of current records and past records from last admission at [facility], the team had determined that they cannot accept pt.       Electronically signed by Bibi Peoples RN on 2/22/2022 at 11:22 AM

## 2022-02-22 NOTE — PROGRESS NOTES
Speech Language Pathology  Rolando Lewis   1970     Attempted to see Pt for dysphagia follow up treatment. Pt is currently off the floor for a procedure and is unable to be seen for treatment at this time. Will re-attempt at a later time as schedule allows.     Hermes Mayberry [id number]

## 2022-02-22 NOTE — PROGRESS NOTES
Pt still unwilling to to take meds crushed in applesauce, will continue encouragement. IV calcium, gluconate ordered and given per MAR. Pt scheduled for bone biopsy today, mother called for consent. She was not willing to give consent for procedure and requested [facility]. She called back a short time later, giving consent for procedure.

## 2022-02-23 NOTE — PROGRESS NOTES
Hospitalist Progress Note      PCP: No primary care provider on file. Date of Admission: 2/12/2022    Chief Complaint:  Dropped sats, seized    Hospital Course:   Called to rapid response earlier today when the patient dropped his oxygen sats. He has been admitted with hypocalcemia was having some jerky-like movements and we suspected that he aspirated he was suctioned at the bedside and seem to be improving we wrapped up with a rapid response short time later they were calling a second rapid response was called overhead as the patient had had a seizure and dropped his sats again. This was witnessed by my partner. Patient has a previous history of status epilepticus and based on this we made the decision to intubate the patient for airway protection. He was intubated and transferred to the ICU central line was placed as he was a difficult stick and did not have any access appropriate in case he needed any pressors. He had 1 peripheral IV in his neck. Central line was placed  Subjective:     Hypocalcemia, hypoglycemia,not following commands or making eye contact.   Medications:  Reviewed    Infusion Medications    dextrose 50 mL/hr at 02/22/22 1614    dextrose Stopped (02/15/22 1907)    sodium chloride 25 mL (02/21/22 1823)     Scheduled Medications    calcium carbonate  1,000 mg Oral TID    midazolam  2 mg IntraVENous Once    pantoprazole  40 mg IntraVENous Daily    vitamin D  50,000 Units Oral Weekly    levothyroxine  25 mcg Oral Daily    midodrine  5 mg Oral BID WC    sodium chloride flush  5-40 mL IntraVENous 2 times per day    heparin (porcine)  5,000 Units SubCUTAneous 3 times per day     PRN Meds: dextrose bolus (hypoglycemia) **OR** dextrose bolus (hypoglycemia), HYDROmorphone, HYDROmorphone, oxyCODONE, glucose, dextrose, glucagon (rDNA), dextrose, sodium chloride flush, sodium chloride, ondansetron **OR** ondansetron, polyethylene glycol, acetaminophen **OR** acetaminophen, heparin (porcine), LORazepam    No intake or output data in the 24 hours ending 02/22/22 2142    Physical Exam Performed:    BP (!) 179/79   Pulse 69   Temp 96.5 °F (35.8 °C) (Temporal)   Resp 16   Ht 6' 4\" (1.93 m)   Wt 215 lb 2.7 oz (97.6 kg)   SpO2 100%   BMI 26.19 kg/m²     General appearance: Alert, in no acute distress  HEENT: Pupils equal, round, and reactive to light. Conjunctivae/corneas clear. Neck: Supple, with full range of motion. No jugular venous distention. Trachea midline. Respiratory: Patient has lots of upper respiratory noises following intubation we were able to suction a significant amount of what appeared to be blood from his ET tube  Cardiovascular: Regular rate and rhythm with normal S1/S2 without murmurs, rubs or gallops. Abdomen: Soft, he has a massive hernia of the front abdomen  Musculoskeletal: No clubbing, cyanosis or edema bilaterally. Fingers are bent from arthritis  Skin: Skin color, texture, turgor normal.  No rashes or lesions. Neurologic: No focal deficit or sensory loss  Psychiatric: Alert  Capillary Refill: Brisk,3 seconds, normal   Peripheral Pulses: +2 palpable, equal bilaterally       Labs:   Recent Labs     02/20/22  0510 02/21/22  0423 02/22/22  0318   WBC 2.7* 2.8* 2.3*   HGB 9.5* 9.3* 8.8*   HCT 29.4* 28.9* 27.6*   PLT 50* 80* 35*     Recent Labs     02/20/22  0510 02/20/22  0510 02/21/22  0423 02/21/22  1723 02/22/22  0318   *  --  131*  --  131*   K 4.5  --  4.6  --  3.9   CL 94*  --  93*  --  93*   CO2 22  --  18*  --  22   BUN 44*  --  47*  --  37*   CREATININE 6.2*  --  6.4*  --  5.5*   CALCIUM 6.7*   < > 6.6* 6.7* 6.9*    < > = values in this interval not displayed. No results for input(s): AST, ALT, BILIDIR, BILITOT, ALKPHOS in the last 72 hours. Recent Labs     02/22/22  1000   INR 1.35*     No results for input(s): Elinor Dunlap in the last 72 hours.     Urinalysis:    No results found for: Gabriela Schirmer, [address], BACTERIA, RBCUA, BLOODU, [address], [facility] [place] [facility] [address]    Radiology:  CT BIOPSY BONE MARROW   Final Result   Successful CT guided bone marrow aspiration and core biopsy of the left iliac   bone. CT GUIDED NEEDLE PLACEMENT   Final Result   Successful CT guided bone marrow aspiration and core biopsy of the left iliac   bone. CT CHEST WO CONTRAST   Final Result   Bilateral airspace disease, suspicious for pneumonia. Superimposed pleural effusions are seen with body wall anasarca, compatible   with a component of fluid overload. Findings of renal osteodystrophy      RECOMMENDATIONS:   Unavailable         XR CHEST PORTABLE   Final Result   Moderate bilateral airspace disease. This could represent a combination of   edema and or pneumonia. Moderate improved aeration of the left lung. XR CHEST PORTABLE   Final Result   The endotracheal tube measures approximately 6.6 cm above the lux. The enteric catheter is coiled in the gastric fundus. Cardiomegaly with vascular congestion and patchy airspace disease, a   component of which is felt likely to represent pulmonary edema. Improved   aeration in the left upper lobe. XR CHEST PORTABLE   Final Result   1. Endotracheal tube with the tip 2.3 cm above the lux. Recommend   withdrawal by 1.5 cm.      2.  The left hemithorax is completely opacified. This could represent a   combination of large pleural effusion and/or consolidation. 3.  Patchy airspace disease throughout the right lung and small right pleural   effusion. CT ABDOMEN PELVIS WO CONTRAST Additional Contrast? None   Final Result   Addendum 1 of 1   ADDENDUM:   There is a transcription error in the impression. The 1st item should    read   as follows:      Stable broad-based ventral abdominal wall hernia containing loops of small   large bowel without associated obstruction. Final   1.  Stable broad-based ventral abdominal wall hernia containing loops of small   large bowel at associated obstruction. 2. Severe renal atrophy with diffuse vascular calcifications and findings of   renal osteodystrophy. 3. Small layering bilateral pleural effusions with mild pulmonary edema and   anasarca. Assessment/Plan:    Active Hospital Problems    Diagnosis     Aspiration into airway [T17.908A]     Atelectasis [J98.11]     End stage renal disease due to benign hypertension (HCC) [I12.0, N18.6]     Acute hypoxemic respiratory failure (HCC) [J96.01]     HTN (hypertension), benign [I10]     Seizure ([address] [R56.9]     Diarrhea [R19.7]     JOANNE (acute kidney injury) ([address] [N17.9]     Hypocalcemia [E83.51]      Acute respiratory failure with hypoxia requiring  mechanical ventilation, liberated from mechanical ventilation  Secondary to presumed aspiration pneumonia as well as seizure activity. Status post liberation from mechanical ventilation  Has been seen by pulmonary    End-stage renal disease on dialysis  Patient is scheduled to have dialysis   Nephrology on board appreciate recommendations  HD on 2/21    ? Seizure  ? Could be secondary to hypocalcemia  Neurology has been consulted  Currently intubated and sedated  No history of seizure  diffuse slowing is suggestive of moderate diffuse encephalopathy on EEG no evidence of epileptiform discharges, focal or lateralizing abnormalities  Neurology has been consulted  No need to start AED as per neurology. Aspiration  On cefepime  Procalcitonin is elevated, no leukocytosis, cultures so far negative  Status post bronchoscopy, cultures so far negative    Hypocalcemia, primary hypoparathyroidism  Patient's calcium was been replaced and he is going to have additional adjustments made with dialysis. Endo has been consulted for evaluation of persistent hypercalcemia, recommending consulting oncology for  Gammopathy  O ionized calcium 0.74, will give calcium gluconate.   Persistent hypocalcemia, has been started on vitamin D. Replete calcium today    Massive ventral wall abdominal hernia  Stable per imaging    Hypoglycemia   Eating Now and started on dysphagia diet  Continue to monitor blood glucose    Diarrhea  C. difficile has been ruled out    Pancytopenia  -Anemia partially d/t ESRD  -History of copper and zinc deficiency, results pending  -IgG was elevated in Nov 2021  Enedina Purcell preliminary result showing total protein at 5.7   -QIG are wnl  -Peripheral smear reviewed by Dr. Sarai Garay, no dysplastic findings, no significant schistocytes.    -Transfuse PRBC if hgb <7  -Transfuse platelets if plt <86N or 50k with active bleeding.  -Hold anticoagulation if platelet count <24R. - remains pancytopenic, plts lower today at 35k,  Will plan for bone marrow bx mom prevented this from occurring today as she refused to give consent. Later she changed her mind but was not done today     DVT Prophylaxis: Heparin  Diet: ADULT DIET; Dysphagia - Soft and Bite Sized; No Drinking Straws  ADULT ORAL NUTRITION SUPPLEMENT; Lunch, Dinner; Standard High Calorie/High Protein Oral Supplement  Code Status: Full Code    PT/OT Eval Status: Appropriate for SNF    Dispo -likely patient can be transferred out of ICU to progressive care unit.  3T   He does not need ICU, he is appropriate for SNF, CM following          Sandeep Sanders MD

## 2022-02-23 NOTE — PROGRESS NOTES
Nephrology progress  Note                                                                                                                                                                                                                                                                                                                                                               Office : [phone]     Fax :[phone]              Patient's Name: Shira Nieves  12:33 PM  2/23/2022    Reason for Consult:  ESRD on HD      Chief Complaint:  Diarrhea      History of Present Ilness:    Shira Nieves is a 49 y.o. 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 shot    Hemodialysis patient ([facility]     HTN (hypertension), benign     Low blood pressure     Pancreatitis     Peripheral Neuropathy     Pneumonia 11/11/2021    Renal failure 1992    on dialysis 3x/wk  (T, Th, Sat)    Status epilepticus ([facility] 12/11/2021       Past Surgical History:   Procedure Laterality Date    BRONCHOSCOPY N/A 2/14/2022    BRONCHOSCOPY DIAGNOSTIC OR CELL [address] ONLY performed by Scottie Horowitz MD at [address] [place] [address] N/A 12/17/2021    COLONOSCOPY DIAGNOSTIC performed by Pinky Finnegan MD at [address] [place] [address] [place]  2/22/2022    CT BONE MARROW BIOPSY 2/22/2022 [facility] CT SCAN    DIALYSIS FISTULA CREATION      left arm/currently non functioning    KIDNEY TRANSPLANT  9/1994    R-kidney    TOE SURGERY  10-8-2010    cut and realign 1st, 2nd, toe left foot , fixation 1st, 2nd toe left foot    TUNNELED VENOUS CATHETER PLACEMENT      left chest for dialysis    TUNNELED VENOUS PORT PLACEMENT      UPPER GASTROINTESTINAL ENDOSCOPY N/A 12/17/2021    EGD ESOPHAGOGASTRODUODENOSCOPY performed by Reji Pardo MD at [facility] ENDOSCOPY       Family History   Problem Relation Age of Onset    Diabetes Mother         reports that he has never smoked. He has never used smokeless tobacco. He reports that he does not drink alcohol and does not use drugs.     Allergies:  Influenza virus vaccine    Current Medications:    dextrose 10 % infusion, Continuous  dextrose bolus (hypoglycemia) 10% 125 mL, PRN   Or  dextrose bolus (hypoglycemia) 10% 250 mL, PRN  calcium carbonate (TUMS) chewable tablet 1,000 mg, TID  HYDROmorphone HCl PF (DILAUDID) injection 1 mg, Q4H PRN  HYDROmorphone (DILAUDID) injection 2 mg, Q4H PRN  midazolam PF (VERSED) injection 2 mg, Once  pantoprazole (PROTONIX) injection 40 mg, Daily  vitamin D (ERGOCALCIFEROL) capsule 50,000 Units, Weekly  oxyCODONE (OXY-IR) immediate release tablet 30 mg, Q8H PRN  levothyroxine (SYNTHROID) tablet 25 mcg, Daily  midodrine (PROAMATINE) tablet 5 mg, BID WC  glucose (GLUTOSE) 40 % oral gel 15 g, PRN  dextrose 50 % IV solution, PRN  glucagon (rDNA) injection 1 mg, PRN  dextrose 5 % solution, PRN  sodium chloride flush 0.9 % injection 5-40 mL, 2 times per day  sodium chloride flush 0.9 % injection 5-40 mL, PRN  0.9 % sodium chloride infusion, PRN  ondansetron (ZOFRAN-ODT) disintegrating tablet 4 mg, Q8H PRN   Or  ondansetron (ZOFRAN) injection 4 mg, Q6H PRN  polyethylene glycol (GLYCOLAX) packet 17 g, Daily PRN  acetaminophen (TYLENOL) tablet 650 mg, Q6H PRN   Or  acetaminophen (TYLENOL) suppository 650 mg, Q6H PRN  heparin (porcine) injection 5,800 Units, PRN  heparin (porcine) injection 5,000 Units, 3 times per day  LORazepam (ATIVAN) tablet 0.5 mg, Nightly PRN        Physical exam:     Vitals:  BP (!) 155/64   Pulse 78   Temp 95.7 °F (35.4 °C) (Temporal)   Resp 18   Ht 6' 4\" (1.93 m)   Wt 204 lb 12.9 oz (92.9 kg)   SpO2 93%   BMI 24.93 kg/m²   Constitutional: extubated   Awake and alert   Skin: no rash, turgor wnl  Heent:  eomi, mmm  Neck: no bruits or jvd noted  Cardiovascular:  S1, S2 without m/r/g  Respiratory: CTA B without w/r/r  Abdomen:  +bs, soft, nt, nd  Ext: no  lower extremity edema      Data:   Labs:  CBC:   Recent Labs     02/21/22  0423 02/22/22  0318 02/23/22  0515   WBC 2.8* 2.3* 2.2*   HGB 9.3* 8.8* 9.9*   PLT 80* 35* 39*     BMP:    Recent Labs     02/21/22 0423 02/22/22 0318 02/23/22  0515   * 131* 130*   K 4.6 3.9 4.1   CL 93* 93* 93*   CO2 18* 22 18*   BUN 47* 37* 37*   CREATININE 6.4* 5.5* 6.1*   GLUCOSE 48* 91 90     Ca/Mg/Phos:   Recent Labs     02/21/22  1723 02/22/22 0318 02/23/22  0515   CALCIUM 6.7* 6.9* 6.7*     Hepatic:   No results for input(s): AST, ALT, ALB, BILITOT, ALKPHOS in the last 72 hours. Troponin: No results for input(s): TROPONINI in the last 72 hours. BNP: No results for input(s): BNP in the last 72 hours. Lipids: No results for input(s): CHOL, TRIG, HDL, LDLCALC, LABVLDL in the last 72 hours. ABGs:   No results for input(s): PHART, PO2ART, MPP9ACM in the last 72 hours. INR:   Recent Labs     02/22/22  1000   INR 1.35*     UA:No results for input(s): Hermina El, GLUCOSEU, BILIRUBINUR, Scot Rein, BLOODU, PHUR, PROTEINU, UROBILINOGEN, NITRU, LEUKOCYTESUR, Vonita Kyleigh in the last 72 hours. Urine Microscopic: No results for input(s): LABCAST, BACTERIA, COMU, HYALCAST, WBCUA, RBCUA, EPIU in the last 72 hours. Urine Culture: No results for input(s): LABURIN in the last 72 hours. Urine Chemistry: No results for input(s): Michael Merles, PROTEINUR, NAUR in the last 72 hours. IMAGING:  CT BIOPSY BONE MARROW   Final Result   Successful CT guided bone marrow aspiration and core biopsy of the left iliac   bone.          CT GUIDED NEEDLE PLACEMENT   Final Result   Successful CT guided bone marrow aspiration and core biopsy of the left iliac   bone. CT CHEST WO CONTRAST   Final Result   Bilateral airspace disease, suspicious for pneumonia. Superimposed pleural effusions are seen with body wall anasarca, compatible   with a component of fluid overload. Findings of renal osteodystrophy      RECOMMENDATIONS:   Unavailable         XR CHEST PORTABLE   Final Result   Moderate bilateral airspace disease. This could represent a combination of   edema and or pneumonia. Moderate improved aeration of the left lung. XR CHEST PORTABLE   Final Result   The endotracheal tube measures approximately 6.6 cm above the lux. The enteric catheter is coiled in the gastric fundus. Cardiomegaly with vascular congestion and patchy airspace disease, a   component of which is felt likely to represent pulmonary edema. Improved   aeration in the left upper lobe. XR CHEST PORTABLE   Final Result   1. Endotracheal tube with the tip 2.3 cm above the lux. Recommend   withdrawal by 1.5 cm.      2.  The left hemithorax is completely opacified. This could represent a   combination of large pleural effusion and/or consolidation. 3.  Patchy airspace disease throughout the right lung and small right pleural   effusion. CT ABDOMEN PELVIS WO CONTRAST Additional Contrast? None   Final Result   Addendum 1 of 1   ADDENDUM:   There is a transcription error in the impression. The 1st item should    read   as follows:      Stable broad-based ventral abdominal wall hernia containing loops of small   large bowel without associated obstruction. Final   1. Stable broad-based ventral abdominal wall hernia containing loops of small   large bowel at associated obstruction. 2. Severe renal atrophy with diffuse vascular calcifications and findings of   renal osteodystrophy. 3. Small layering bilateral pleural effusions with mild pulmonary edema and   anasarca. Assessment/Plan   1.  ESRD on HD  Getting HD . 2. HTN    3.  Anemia    4  Seizure likely 2/2 hypocalcemia     5 diarrhea    6 Metabolic acidosis    7 Hypocalcemia  PTH is low at 11 . has hypoparathyroidism   Phos 11         Increased the calcium carbonate to 1000 mg po tid   Should resume outpt HD tomorrow at outpt unit if discharged home today   continue to take calcium carbonate daily               Thank you for allowing us to participate in care of Claudeen Adolph, MD  Feel free to contact me   [facility] [address]  Office : [phone]  Fax :[phone]

## 2022-02-23 NOTE — PROGRESS NOTES
Pt repositioned and pillow support provided. Pt will not open mouth for any oral medications. Per previous notes pt not swallowing and pocketing food/medications. Vital signs stable. O2 sats in 90s on room air. Pt lung sounds are coarse crackles. Pt is spontaneously coughing, but no sputum noted with cough. Glucose checked and found to be 76. Pt remains on 10% dextrose.

## 2022-02-23 NOTE — PROGRESS NOTES
Speech  Language And Dysphagia Treatment Note    Name: Khushboo Berrios  : 1970  Medical Diagnosis: Hypocalcemia [E83.51]  Diarrhea [Q80.5]  Metabolic acidosis [P92.0]  Dialysis patient ([facility] [Z99.2]  Pancytopenia ([facility] [D61.818]  Diarrhea, unspecified type [R19.7]  Treatment Diagnosis: Oropharyngeal Dysphagia, Dysarthria, Cognitive-Linguistic Deficits, Speech-Language Deficits   Pain: Pt did not respond to pain questions     Patient's response to therapy:  Pt with minimal eye contact and not following commands, this appears to be a change from prior therapy sessions. Dysphagia Treatment:  Current Diet Level:   1)  Dysphagia III Soft and Bite-Sized with thin liquids/no straws/meds with applesauce  2) If respiratory status declines or if po diet is not tolerated, HOLD diet pending SLP re-evaluation  Tolerance of Current Diet Level: Per chart review, pt has been pocketing PO meds and not swallowing. Assessment of Texture Tolerance:  -Impressions: Pt awake and positioned upright in bed. Pt with minimal eye contact with his head turning from side to side. Pt did not follow basic commands to complete oral motor movements. Pt non-verbal during session. With attempts at swabbing oral cavity with toothette, Pt with defensive behaviors and moving his head side to side to avoid toothette. Pt not safe for PO trials at this time due to cognitive status. Recommend NPO with ongoing swallowing assessment as Pt is able to participate. Diet and Treatment Recommendations 2022:  Recommend NPO with ongoing swallowing assessment     Compensatory Strategies:  Alternate solids/liquids , Upright as possible with all PO intake , No straws , Assist Feed , Small bites/sips , Eat/feed slowly, Remain upright 30-45 min     Dysphagia Goals, addressed this date:  1.) Pt will functionally tolerate ongoing assessment of swallow function with diet to be determined as indicated (ongoing 2022)  2.) Pt will demonstrate understanding of aspiration risk and precautions via education/demonstration with occasional prompting (ongoing 2/23/2022)  3.) Pt will advance to least restrictive diet as indicated (ongoing 2/23/2022)  4.) If clinical s/s of aspiration/penetration continue to be noted, Pt will participate in Modified Barium Swallow Study (ongoing 2/23/2022)     Speech Language Treatment:  Impressions: Unable to address goals this date, Pt with change in cognitive status. Speech Language STG, addressed this date:  1.) Pt will improve articulatory precision, rate and prosody in connected speech via graded tasks to 80%   2.) Pt will improve auditory processing/comprehension of commands and questions to 80%, via graded tasks   3.) Pt will improve short term recall via graded tasks to 80%  4.) Pt will improve verbal expression for functional expression via graded tasks to 80%  5.) Pt will participate in ongoing cognitive assessment with goals to be established as indicated     Patient/Family Education:Education given to the Pt's nurse, who verbalized understanding    Plan: Continue as per plan of care    Discharge Recommendations: Pt will benefit from continued skilled Speech Therapy for Speech and Dysphagia services, prior to returning home. Treatment time  Timed Code Treatment Minutes: 0 minutes   Total Treatment time: 10 minutes    If patient discharges prior to next session this note will serve as a discharge summary.       Signature:   Kristpoher Pyle M.A., [address] [place] [address]  Speech-Language Pathologist

## 2022-02-23 NOTE — PLAN OF CARE
Problem: Falls - Risk of:  Goal: Will remain free from falls  Description: Will remain free from falls  Outcome: Ongoing  Goal: Absence of physical injury  Description: Absence of physical injury  Outcome: Ongoing     Problem: Skin Integrity:  Goal: Will show no infection signs and symptoms  Description: Will show no infection signs and symptoms  Outcome: Ongoing  Goal: Absence of new skin breakdown  Description: Absence of new skin breakdown  Outcome: Ongoing     Problem: Daily Care:  Goal: Daily care needs are met  Description: Daily care needs are met  Outcome: Ongoing     Problem: Skin Integrity:  Goal: Skin integrity will stabilize  Description: Skin integrity will stabilize  Outcome: Ongoing     Problem: Pain:  Goal: Pain level will decrease  Description: Pain level will decrease  Outcome: Ongoing  Goal: Control of acute pain  Description: Control of acute pain  Outcome: Ongoing  Goal: Control of chronic pain  Description: Control of chronic pain  Outcome: Ongoing     Problem: Health Behavior:  Goal: Ability to manage health-related needs will improve  Description: Ability to manage health-related needs will improve  Outcome: Ongoing  Goal: Identification of resources available to assist in meeting health care needs will improve  Description: Identification of resources available to assist in meeting health care needs will improve  Outcome: Ongoing     Problem: Nutritional:  Goal: Ability to identify appropriate dietary choices will improve  Description: Ability to identify appropriate dietary choices will improve  Outcome: Ongoing     Problem: Musculor/Skeletal Functional Status  Goal: Highest potential functional level  Outcome: Ongoing  Goal: Absence of falls  Outcome: Ongoing

## 2022-02-23 NOTE — PROGRESS NOTES
Hospitalist Progress Note      PCP: No primary care provider on file. Date of Admission: 2/12/2022    Chief Complaint:  Dropped sats, seized    Hospital Course:   Called to rapid response earlier today when the patient dropped his oxygen sats. He has been admitted with hypocalcemia was having some jerky-like movements and we suspected that he aspirated he was suctioned at the bedside and seem to be improving we wrapped up with a rapid response short time later they were calling a second rapid response was called overhead as the patient had had a seizure and dropped his sats again. This was witnessed by my partner. Patient has a previous history of status epilepticus and based on this we made the decision to intubate the patient for airway protection. He was intubated and transferred to the ICU central line was placed as he was a difficult stick and did not have any access appropriate in case he needed any pressors. He had 1 peripheral IV in his neck. Central line was placed  Subjective:     Hypocalcemia, hypoglycemia, he is not eating or following commands. He is refusing everything for nursing.     Medications:  Reviewed    Infusion Medications    dextrose 50 mL/hr at 02/23/22 1217    dextrose Stopped (02/15/22 1907)    sodium chloride 25 mL (02/21/22 1823)     Scheduled Medications    calcium carbonate  1,000 mg Oral TID    midazolam  2 mg IntraVENous Once    pantoprazole  40 mg IntraVENous Daily    vitamin D  50,000 Units Oral Weekly    levothyroxine  25 mcg Oral Daily    midodrine  5 mg Oral BID WC    sodium chloride flush  5-40 mL IntraVENous 2 times per day    heparin (porcine)  5,000 Units SubCUTAneous 3 times per day     PRN Meds: dextrose bolus (hypoglycemia) **OR** dextrose bolus (hypoglycemia), HYDROmorphone, HYDROmorphone, oxyCODONE, glucose, dextrose, glucagon (rDNA), dextrose, sodium chloride flush, sodium chloride, ondansetron **OR** ondansetron, polyethylene glycol, acetaminophen **OR** acetaminophen, heparin (porcine), LORazepam      Intake/Output Summary (Last 24 hours) at 2/23/2022 1707  Last data filed at 2/23/2022 1107  Gross per 24 hour   Intake 400 ml   Output 3400 ml   Net -3000 ml       Physical Exam Performed:    BP (!) 155/64   Pulse 78   Temp 95.7 °F (35.4 °C) (Temporal)   Resp 18   Ht 6' 4\" (1.93 m)   Wt 204 lb 12.9 oz (92.9 kg)   SpO2 93%   BMI 24.93 kg/m²     General appearance: Alert, in no acute distress  HEENT: Pupils equal, round, and reactive to light. Conjunctivae/corneas clear. Neck: Supple, with full range of motion. No jugular venous distention. Trachea midline. Respiratory: Patient has lots of upper respiratory noises following intubation we were able to suction a significant amount of what appeared to be blood from his ET tube  Cardiovascular: Regular rate and rhythm with normal S1/S2 without murmurs, rubs or gallops. Abdomen: Soft, he has a massive hernia of the front abdomen  Musculoskeletal: No clubbing, cyanosis or edema bilaterally. Fingers are bent from arthritis  Skin: Skin color, texture, turgor normal.  No rashes or lesions. Neurologic: No focal deficit or sensory loss  Psychiatric: Alert  Capillary Refill: Brisk,3 seconds, normal   Peripheral Pulses: +2 palpable, equal bilaterally       Labs:   Recent Labs     02/21/22 0423 02/22/22 0318 02/23/22  0515   WBC 2.8* 2.3* 2.2*   HGB 9.3* 8.8* 9.9*   HCT 28.9* 27.6* 30.6*   PLT 80* 35* 39*     Recent Labs     02/21/22 0423 02/21/22  0423 02/21/22  1723 02/22/22 0318 02/23/22  0515   *  --   --  131* 130*   K 4.6  --   --  3.9 4.1   CL 93*  --   --  93* 93*   CO2 18*  --   --  22 18*   BUN 47*  --   --  37* 37*   CREATININE 6.4*  --   --  5.5* 6.1*   CALCIUM 6.6*   < > 6.7* 6.9* 6.7*    < > = values in this interval not displayed. No results for input(s): AST, ALT, BILIDIR, BILITOT, ALKPHOS in the last 72 hours.   Recent Labs     02/22/22  1000   INR 1.35*     No results for input(s): [place] Dec in the last 72 hours. Urinalysis:    No results found for: [place], BACTERIA, RBCUA, BLOODU, [address]    Radiology:  CT BIOPSY BONE MARROW   Final Result   Successful CT guided bone marrow aspiration and core biopsy of the left iliac   bone. CT GUIDED NEEDLE PLACEMENT   Final Result   Successful CT guided bone marrow aspiration and core biopsy of the left iliac   bone. CT CHEST WO CONTRAST   Final Result   Bilateral airspace disease, suspicious for pneumonia. Superimposed pleural effusions are seen with body wall anasarca, compatible   with a component of fluid overload. Findings of renal osteodystrophy      RECOMMENDATIONS:   Unavailable         XR CHEST PORTABLE   Final Result   Moderate bilateral airspace disease. This could represent a combination of   edema and or pneumonia. Moderate improved aeration of the left lung. XR CHEST PORTABLE   Final Result   The endotracheal tube measures approximately 6.6 cm above the lux. The enteric catheter is coiled in the gastric fundus. Cardiomegaly with vascular congestion and patchy airspace disease, a   component of which is felt likely to represent pulmonary edema. Improved   aeration in the left upper lobe. XR CHEST PORTABLE   Final Result   1. Endotracheal tube with the tip 2.3 cm above the lux. Recommend   withdrawal by 1.5 cm.      2.  The left hemithorax is completely opacified. This could represent a   combination of large pleural effusion and/or consolidation. 3.  Patchy airspace disease throughout the right lung and small right pleural   effusion. CT ABDOMEN PELVIS WO CONTRAST Additional Contrast? None   Final Result   Addendum 1 of 1   ADDENDUM:   There is a transcription error in the impression.   The 1st item should    read   as follows:      Stable broad-based ventral abdominal wall hernia containing loops of small   large bowel without associated obstruction. Final   1. Stable broad-based ventral abdominal wall hernia containing loops of small   large bowel at associated obstruction. 2. Severe renal atrophy with diffuse vascular calcifications and findings of   renal osteodystrophy. 3. Small layering bilateral pleural effusions with mild pulmonary edema and   anasarca. CT HEAD WO CONTRAST    (Results Pending)           Assessment/Plan:    Active Hospital Problems    Diagnosis     Aspiration into airway [T17.908A]     Atelectasis [J98.11]     End stage renal disease due to benign hypertension (HCC) [I12.0, N18.6]     Acute hypoxemic respiratory failure (HCC) [J96.01]     HTN (hypertension), benign [I10]     Seizure ([facility] [R56.9]     Diarrhea [R19.7]     JOANNE (acute kidney injury) ([facility] [N17.9]     Hypocalcemia [E83.51]      Acute respiratory failure with hypoxia requiring  mechanical ventilation, liberated from mechanical ventilation  Secondary to presumed aspiration pneumonia as well as seizure activity. Status post liberation from mechanical ventilation  Has been seen by pulmonary    End-stage renal disease on dialysis  Patient is scheduled to have dialysis   Nephrology on board appreciate recommendations  HD on 2/21    ? Seizure  ? Could be secondary to hypocalcemia  Neurology has been consulted  Currently intubated and sedated  No history of seizure  diffuse slowing is suggestive of moderate diffuse encephalopathy on EEG no evidence of epileptiform discharges, focal or lateralizing abnormalities  Neurology has been consulted  No need to start AED as per neurology. AMS/ encephalopathy  - cause is unclear but he is not talking or eating.  - he was very functional prior to all this.   - will check ct head to rule out bleed or mass or anoxic brain      Aspiration  On cefepime  Procalcitonin is elevated, no leukocytosis, cultures so far negative  Status post bronchoscopy, cultures so far negative    Hypocalcemia, primary hypoparathyroidism  Patient's calcium was been replaced and he is going to have additional adjustments made with dialysis. Endo has been consulted for evaluation of persistent hypercalcemia, recommending consulting oncology for  Gammopathy  O ionized calcium 0.74, will give calcium gluconate. Persistent hypocalcemia, has been started on vitamin D. Replete calcium today    Massive ventral wall abdominal hernia  Stable per imaging    Hypoglycemia   Eating Now and started on dysphagia diet  Continue to monitor blood glucose    Diarrhea  C. difficile has been ruled out    Pancytopenia  -Anemia partially d/t ESRD  -History of copper and zinc deficiency, results pending  -IgG was elevated in Nov 2021  Edwin Isaacs preliminary result showing total protein at 5.7   -QIG are wnl  -Peripheral smear reviewed by Dr. Marlene Vogt, no dysplastic findings, no significant schistocytes.    -Transfuse PRBC if hgb <7  -Transfuse platelets if plt <10O or 50k with active bleeding.  -Hold anticoagulation if platelet count <95Y. - remains pancytopenic, plts lower today at 35k,  Will plan for bone marrow bx mom prevented this from occurring today as she refused to give consent. Later she changed her mind and it has now been done, results pending. DVT Prophylaxis: Heparin  Diet: Diet NPO  Code Status: Full Code    PT/OT Eval Status: Appropriate for SNF    Dispo -likely patient can be transferred out of ICU to progressive care unit.  3T   He does not need ICU, he is appropriate for SNF, CM following          Zac Cano MD

## 2022-02-24 NOTE — PROGRESS NOTES
Responded to [employer]-support provided to patient's mother, brother and other family. Mother spoke of patient's long health history and his being tired of being sick. Family decided to make patient DNR-CCA and mother and pt's brother are having further discussion regarding patient's wishes and his condition. Continued support provided.

## 2022-02-24 NOTE — FLOWSHEET NOTE
Treatment time: 3 hours  Net UF: 3000 ml    Pre weight: 95.9 kg   Post weight: 92.9 kg  EDW: 73 kg    Access used: R Tunneled Femoral Catheter  Access function: Good with  ml/min    Medications or blood products given: None    Regular outpatient schedule: MWF    Summary of response to treatment: Post treatment pt is alert, VSS, conts confused to time and place, tolerated treatment and fluid removal well, report called to primary care nurse, pt stable upon d/c from [address] [place] [address].              Copy of dialysis treatment record placed in chart, to be scanned into EMR.       02/23/22 0755 02/23/22 1107   Vital Signs   BP (!) 173/66 (!) 155/64   Temp 95.3 °F (35.2 °C) 95.7 °F (35.4 °C)   Pulse 67 78   Resp 20 18   Weight 211 lb 6.7 oz (95.9 kg) 204 lb 12.9 oz (92.9 kg)   Weight Method Bed scale  (HOB flat, 1 pillow, 1 blanket, 1 telebox.) Estimated  (3000ml net UF deducted from pre-wt.)   Percent Weight Change 0 0

## 2022-02-24 NOTE — PROGRESS NOTES
Patient wbc is 1.9. physicians are aware and patient has oncology on board. Will continue to monitor.

## 2022-02-24 NOTE — CONSULTS
PALLIATIVE MEDICINE CONSULTATION     Patient name:Carter Washington   CUW:[id number]    :1970  Room/Bed:ICU-3911/3911-01   LOS: 12 days         Date of consult:2022    Consult Information  Palliative Medicine Consult performed by: LOUISA Montes CNP, CNP      Inpatient consult to Palliative Care  Consult performed by: LOUISA Montes CNP  Consult ordered by: Molly Wellington MD  Reason for consult: [address]     Inpatient consult to Palliative Care  Consult performed by: LOUISA Montes CNP  Consult ordered by: Molly Wellington MD             ASSESSMENT/RECOMMENDATIONS     49 y.o. male with Debility and seizures      Symptom Management:  1. Debility- dependent for all care at baseline  2. Seizures- pt continuing to have seizures intubated to protect airway  3. Goals of Care- pt had code blue this am, talked to pts mother Pepe Haas she wants to withdrawal care in am if pt survives that long there are 2 friends that want to come say goodbye. Mom will call in am to verify that she is ready no one plans to be with pt when extubated. Updated to [facility], she wants no more tests, blood or procedures. Patient/Family Goals of Care :    talked to pts mother Pepe Haas she wants to withdrawal care in am if pt survives that long there are 2 friends that want to come say goodbye. Mom will call in am to verify that she is ready no one plans to be with pt when extubated. Updated to [facility], she wants no more tests, blood or procedures. Disposition/Discharge Plan:   pending    Advance Directives:  · Surrogate Decision Maker: Marry-mother  · Code status:  [id number]    Case discussed with: patient, floor RN, Dr Bayron Monsalve  Thank you for allowing us to participate in the care of this patient. HISTORY     CC: Seizures  HPI: The patient is a 49 y.o. male presents because of diarrhea. He has been having diarrhea for over a month. He has used Imodium which has not working.   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 Pt has a history of status epilepticus and was intubated to protect airway. Palliative Medicine SymptomScreening/ROS:    Review of Systems   Constitutional: Positive for unexpected weight change. HENT: Positive for trouble swallowing. Eyes: Negative. Respiratory: Positive for shortness of breath. Intubated and sedated   Cardiovascular: Positive for leg swelling. Gastrointestinal: Positive for abdominal distention. Endocrine: Negative. Genitourinary: Negative. Musculoskeletal: Positive for arthralgias. Skin: Positive for pallor. Allergic/Immunologic: Negative. Neurological: Positive for weakness. Sedated   Psychiatric/Behavioral: Positive for confusion. Palliative Performance Scale:     [] 60%  Amb reduced; Sig dz. Can't do hobbies/housework; Intake normal or reduced, Occasional assist; LOC full/confusion   [] 50%  Mainly sit/lie; Extensive disease. Mainly assist, Intake normal or reduced; Occasional assist; LOC full/confusion   [] 40%  Mainly in bed; Extensive disease; Mainly assist; Intake normal or reduced; Occasional assist; LOC full/confusion   [] 30%  Bed bound, Extensive disease; Total care; Intake reduced; LOC full/confusion   [] 20%  Bed bound; Extensive disease; Total care; Intake minimal; Drowsy/coma   [x] 10%  Bed bound; Extensive disease; Total care; Mouth care only; Drowsy/coma   []  0%   Death       Home med list and hospital medications reviewed in chart as of 2/24/2022     EXAM     Vitals:    02/24/22 1253   BP:    Pulse: 96   Resp: 14   Temp:    SpO2: 100%       Physical Exam  Constitutional:       Comments: Intubated and sedated   HENT:      Head: Normocephalic and atraumatic. Mouth/Throat:      Mouth: Mucous membranes are dry. Comments: intubated  Eyes:      Comments: sedated   Cardiovascular:      Rate and Rhythm: Tachycardia present. Rhythm irregular. Heart sounds: No murmur heard. No gallop. Pulmonary:      Comments: Intubated on vent  Abdominal:      General: Abdomen is flat. Musculoskeletal:      Cervical back: Neck supple. Right lower leg: Edema present. Left lower leg: Edema present. Skin:     General: Skin is dry.    Neurological:      Comments: sedated                  Current labs in the epic chart reviewed as of 2/24/2022   Review of previous notes, admits, labs, radiology and testing relevant to this consult done in this chart today 2/24/2022      Total time: 70 minutes  >50% of time spent counseling patient at bedside or POA/family member if applicable , reviewing information and discussing care, coordinating with care team  Signed By: Electronically signed by LOUISA Hernandez CNP on 2/24/2022 at 1:57 PM  Palliative Medicine   [phone]    February 24, 2022

## 2022-02-24 NOTE — FLOWSHEET NOTE
Treatment time: 2 hours  Net UF: 1100 ml     Pre weight: 92.5 kg  Post weight:91.3 kg  EDW: 73 kg  Crit Line Used: Yes Ending Profile: A  Refill Present: No     02/24/22 1130 02/24/22 1415   Vital Signs   BP (!) 219/86 (!) 192/37   Temp 96.2 °F (35.7 °C) 96.1 °F (35.6 °C)   Pulse 83 90   Resp 13 11   SpO2 100 % 100 %   Height 6' 4\" (1.93 m) 6' 4\" (1.93 m)   Weight 203 lb 14.8 oz (92.5 kg) 201 lb 4.5 oz (91.3 kg)   Weight Method Bed scale Bed scale   Percent Weight Change 0 -1.3   Dry Weight 160 lb 15 oz (73 kg) 160 lb 15 oz (73 kg)     Access used: R Fem TDC    Access function: Well with  ml/min     Medications or blood products given: Ativan 1mg IVP for seizure activity     Regular outpatient schedule: [facility] [address]      Summary of response to treatment: Patient tolerated treatment well, with hypertension and seizure like activity during treatment. Patient remained stable throughout entire treatment and upon the dialysis RN exiting the bedside. Report given to Claudia Kern RN and copy of dialysis treatment record placed in chart, to be scanned into EMR.

## 2022-02-24 NOTE — PROGRESS NOTES
[facility]   Progress Note      2/24/2022 10:09 AM        Name: Juan Jose Sierra . Admitted: 2/12/2022    SUBJECTIVE:  Code blue called for patient this morning, he is now sedated on ventilator. Spoke with RN.      Reviewed interval ancillary notes    Current Medications  calcium gluconate 1000 mg in sodium chloride 50 mL, Once  propofol 1000 MG/100ML injection,   norepinephrine (LEVOPHED) 16 mg in sodium chloride 0.9 % 250 mL infusion (non-weight-based), Continuous  dextrose 10 % infusion, Continuous  dextrose bolus (hypoglycemia) 10% 125 mL, PRN   Or  dextrose bolus (hypoglycemia) 10% 250 mL, PRN  calcium carbonate (TUMS) chewable tablet 1,000 mg, TID  HYDROmorphone HCl PF (DILAUDID) injection 1 mg, Q4H PRN  HYDROmorphone (DILAUDID) injection 2 mg, Q4H PRN  midazolam PF (VERSED) injection 2 mg, Once  pantoprazole (PROTONIX) injection 40 mg, Daily  vitamin D (ERGOCALCIFEROL) capsule 50,000 Units, Weekly  oxyCODONE (OXY-IR) immediate release tablet 30 mg, Q8H PRN  levothyroxine (SYNTHROID) tablet 25 mcg, Daily  midodrine (PROAMATINE) tablet 5 mg, BID WC  glucose (GLUTOSE) 40 % oral gel 15 g, PRN  dextrose 50 % IV solution, PRN  glucagon (rDNA) injection 1 mg, PRN  dextrose 5 % solution, PRN  sodium chloride flush 0.9 % injection 5-40 mL, 2 times per day  sodium chloride flush 0.9 % injection 5-40 mL, PRN  0.9 % sodium chloride infusion, PRN  ondansetron (ZOFRAN-ODT) disintegrating tablet 4 mg, Q8H PRN   Or  ondansetron (ZOFRAN) injection 4 mg, Q6H PRN  polyethylene glycol (GLYCOLAX) packet 17 g, Daily PRN  acetaminophen (TYLENOL) tablet 650 mg, Q6H PRN   Or  acetaminophen (TYLENOL) suppository 650 mg, Q6H PRN  heparin (porcine) injection 5,800 Units, PRN  LORazepam (ATIVAN) tablet 0.5 mg, Nightly PRN        Objective:  BP (!) 138/96   Pulse 71   Temp 96.8 °F (36 °C) (Temporal)   Resp 19   Ht 6' 4\" (1.93 m)   Wt 203 lb 14.8 oz (92.5 kg)   SpO2 100%   BMI 24.82 kg/m²     Intake/Output Summary (Last 24 hours) at 2/24/2022 1009  Last data filed at 2/24/2022 0528  Gross per 24 hour   Intake 3261.99 ml   Output 3400 ml   Net -138.01 ml      Wt Readings from Last 3 Encounters:   02/24/22 203 lb 14.8 oz (92.5 kg)   12/18/21 185 lb 10 oz (84.2 kg)   12/11/21 170 lb (77.1 kg)       General appearance:  Appears comfortable  Eyes: Sclera clear. Pupils equal.  ENT: Moist oral mucosa. Trachea midline, no adenopathy. Cardiovascular: Regular rhythm, normal S1, S2. No murmur. No edema in lower extremities  Respiratory: Not using accessory muscles. Good inspiratory effort. Clear to auscultation bilaterally, no wheeze or crackles. GI: Abdomen soft, no tenderness, not distended  Musculoskeletal: No cyanosis in digits, neck supple  Neurology: CN 2-12 grossly intact. No speech or motor deficits  Psych: Normal affect. Alert and oriented in time, place and person  Skin: Warm, dry, normal turgor    Labs and Tests:  CBC:   Recent Labs     02/22/22 0318 02/22/22  0318 02/23/22  0515 02/24/22  0436 02/24/22  0941   WBC 2.3*  --  2.2* 1.9*  --    HGB 8.8*   < > 9.9* 10.1* 10.2*   PLT 35*  --  39* 22*  --     < > = values in this interval not displayed. BMP:    Recent Labs     02/22/22 0318 02/23/22  0515 02/24/22  0436   * 130* 130*   K 3.9 4.1 4.2   CL 93* 93* 94*   CO2 22 18* 20*   BUN 37* 37* 31*   CREATININE 5.5* 6.1* 5.7*   GLUCOSE 91 90 70     Hepatic: No results for input(s): AST, ALT, ALB, BILITOT, ALKPHOS in the last 72 hours. ASSESSMENT AND PLAN    Active Problems:    Hypocalcemia    JOANNE (acute kidney injury) ([address]    Diarrhea    Seizure ([address]    Acute hypoxemic respiratory failure (HCC)    HTN (hypertension), benign    Aspiration into airway    Atelectasis    End stage renal disease due to benign hypertension ([address]  Resolved Problems:    * No resolved hospital problems. *        1.  Pancytopenia  -Anemia partially d/t ESRD  -History of copper and zinc deficiency, results pending  -IgG was elevated in Nov 2021    SPEP preliminary result showing total protein at 5.7   -QIG are wnl  -Peripheral smear reviewed by Dr. Ginny Ledesma, no dysplastic findings, no significant schistocytes.    -Transfuse PRBC if hgb <7  -Transfuse platelets if plt <20F or 50k with active bleeding.  -Hold anticoagulation if platelet count <52X. - remains pancytopenic, plts lower today at 22k  - bone marrow bx and aspiration done 2/22/22, results pending  - zinc and copper levels low, discussed with pharmacist, Michael Garduno. She will look into how best to replace and if drug is available.      2. Hypocalcemia  -Calcium has been replaced.  -Endocrinology is following.   -Concern for immunoglobulins binding to calcium resulting in hypocalcemia.    - SPEP NO Evidence of monoclonal protein  -Kappa and lambda free light chains are elevated but ratio is normal   -QIG are wnl     3. Acute respiratory failure with hypoxia  -Extubated. -Pulmonology following.     4.  ESRD on dialysis  -Management per nephrology      Gino Tubbs, [facility]  [facility]

## 2022-02-24 NOTE — PROGRESS NOTES
Reassessment completed, see doc flowsheets. Intubated with 70% FiO2, PEEP 8. Pupils sluggish. Lungs diminished, edematous to upper and lower extremities.

## 2022-02-24 NOTE — PROGRESS NOTES
Reassessment completed, eyes are wide open, Pupils unequal. L>R. Has weak cough and gag. Minimal corneal reflex. Large amt of bright red blood suctioned from patient's mouth, nothing from ETT. Oral care given. SR on monitor.

## 2022-02-24 NOTE — PROGRESS NOTES
Nephrology progress  Note                                                                                                                                                                                                                                                                                                                                                               Office : [phone]     Fax :[phone]              Patient's Name: Milton Watson  9:18 AM  2/24/2022    Reason for Consult:  ESRD on HD      Interval hx     Had cardiac arrest today   Was resuscitated   Had HD last night with UF 3000 ml     Intubated now     Calcium level low       Past Medical History:   Diagnosis Date    Blood transfusion     Chronic pain     Diabetes mellitus ([address]     Dialysis     tues-thur-sat    Guillain Acworth syndrome     2002 after flu shot    Hemodialysis patient ([address]     HTN (hypertension), benign     Low blood pressure     Pancreatitis     Peripheral Neuropathy     Pneumonia 11/11/2021    Renal failure 1992    on dialysis 3x/wk  (T, Th, Sat)    Status epilepticus ([address] 12/11/2021       Past Surgical History:   Procedure Laterality Date    BRONCHOSCOPY N/A 2/14/2022    BRONCHOSCOPY DIAGNOSTIC OR CELL [address] ONLY performed by Patricia Merida MD at [facility] N/A 12/17/2021    COLONOSCOPY DIAGNOSTIC performed by Manjit Sanon MD at [address] [place] [address]  2/22/2022    CT BONE MARROW BIOPSY 2/22/2022 [facility] CT SCAN    DIALYSIS FISTULA CREATION      left arm/currently non functioning    KIDNEY TRANSPLANT  9/1994    R-kidney    TOE SURGERY  10-8-2010    cut and realign 1st, 2nd, toe left foot , fixation 1st, 2nd toe left foot    TUNNELED VENOUS CATHETER PLACEMENT      left chest for dialysis    TUNNELED VENOUS PORT PLACEMENT      UPPER GASTROINTESTINAL ENDOSCOPY N/A 12/17/2021    EGD ESOPHAGOGASTRODUODENOSCOPY performed by Valerie Rizvi Darren Helm MD at [facility] ENDOSCOPY       Family History   Problem Relation Age of Onset    Diabetes Mother         reports that he has never smoked. He has never used smokeless tobacco. He reports that he does not drink alcohol and does not use drugs.     Allergies:  Influenza virus vaccine    Current Medications:    calcium gluconate 1000 mg in sodium chloride 50 mL, Once  propofol 1000 MG/100ML injection,   dextrose 10 % infusion, Continuous  dextrose bolus (hypoglycemia) 10% 125 mL, PRN   Or  dextrose bolus (hypoglycemia) 10% 250 mL, PRN  calcium carbonate (TUMS) chewable tablet 1,000 mg, TID  HYDROmorphone HCl PF (DILAUDID) injection 1 mg, Q4H PRN  HYDROmorphone (DILAUDID) injection 2 mg, Q4H PRN  midazolam PF (VERSED) injection 2 mg, Once  pantoprazole (PROTONIX) injection 40 mg, Daily  vitamin D (ERGOCALCIFEROL) capsule 50,000 Units, Weekly  oxyCODONE (OXY-IR) immediate release tablet 30 mg, Q8H PRN  levothyroxine (SYNTHROID) tablet 25 mcg, Daily  midodrine (PROAMATINE) tablet 5 mg, BID WC  glucose (GLUTOSE) 40 % oral gel 15 g, PRN  dextrose 50 % IV solution, PRN  glucagon (rDNA) injection 1 mg, PRN  dextrose 5 % solution, PRN  sodium chloride flush 0.9 % injection 5-40 mL, 2 times per day  sodium chloride flush 0.9 % injection 5-40 mL, PRN  0.9 % sodium chloride infusion, PRN  ondansetron (ZOFRAN-ODT) disintegrating tablet 4 mg, Q8H PRN   Or  ondansetron (ZOFRAN) injection 4 mg, Q6H PRN  polyethylene glycol (GLYCOLAX) packet 17 g, Daily PRN  acetaminophen (TYLENOL) tablet 650 mg, Q6H PRN   Or  acetaminophen (TYLENOL) suppository 650 mg, Q6H PRN  heparin (porcine) injection 5,800 Units, PRN  LORazepam (ATIVAN) tablet 0.5 mg, Nightly PRN        Physical exam:     Vitals:  BP (!) 138/96   Pulse 71   Temp 96.8 °F (36 °C) (Temporal)   Resp 19   Ht 6' 4\" (1.93 m)   Wt 203 lb 14.8 oz (92.5 kg)   SpO2 100%   BMI 24.82 kg/m²   Constitutional: extubated   Awake and alert   Skin: no rash, turgor wnl  Heent:  eomi, mmm  Neck: no bruits or jvd noted  Cardiovascular:  S1, S2 without m/r/g  Respiratory: CTA B without w/r/r  Abdomen:  +bs, soft, nt, nd  Ext: no  lower extremity edema      Data:   Labs:  CBC:   Recent Labs     02/22/22 0318 02/23/22  0515 02/24/22  0436   WBC 2.3* 2.2* 1.9*   HGB 8.8* 9.9* 10.1*   PLT 35* 39* 22*     BMP:    Recent Labs     02/22/22 0318 02/23/22  0515 02/24/22  0436   * 130* 130*   K 3.9 4.1 4.2   CL 93* 93* 94*   CO2 22 18* 20*   BUN 37* 37* 31*   CREATININE 5.5* 6.1* 5.7*   GLUCOSE 91 90 70     Ca/Mg/Phos:   Recent Labs     02/22/22 0318 02/23/22  0515 02/24/22  0436   CALCIUM 6.9* 6.7* 6.9*     Hepatic:   No results for input(s): AST, ALT, ALB, BILITOT, ALKPHOS in the last 72 hours. Troponin: No results for input(s): TROPONINI in the last 72 hours. BNP: No results for input(s): BNP in the last 72 hours. Lipids: No results for input(s): CHOL, TRIG, HDL, LDLCALC, LABVLDL in the last 72 hours. ABGs:   No results for input(s): PHART, PO2ART, NIE0KWH in the last 72 hours. INR:   Recent Labs     02/22/22  1000   INR 1.35*     UA:No results for input(s): [place] , GLUCOSEU, BILIRUBINUR, Aaron Dyson, BLOODU, PHUR, PROTEINU, UROBILINOGEN, NITRU, LEUKOCYTESUR, Satya Oz in the last 72 hours. Urine Microscopic: No results for input(s): LABCAST, BACTERIA, COMU, HYALCAST, WBCUA, RBCUA, EPIU in the last 72 hours. Urine Culture: No results for input(s): LABURIN in the last 72 hours. Urine Chemistry: No results for input(s): Anna Hun, PROTEINUR, NAUR in the last 72 hours. IMAGING:  CT HEAD WO CONTRAST   Final Result   1. No acute intracranial abnormality. 2. Chronic thickening of the calvarium with innumerable sclerotic foci. Findings likely represent metabolic bone disease. RECOMMENDATIONS:   Unavailable         CT BIOPSY BONE MARROW   Final Result   Successful CT guided bone marrow aspiration and core biopsy of the left iliac   bone.          CT GUIDED NEEDLE PLACEMENT   Final Result   Successful CT guided bone marrow aspiration and core biopsy of the left iliac   bone. CT CHEST WO CONTRAST   Final Result   Bilateral airspace disease, suspicious for pneumonia. Superimposed pleural effusions are seen with body wall anasarca, compatible   with a component of fluid overload. Findings of renal osteodystrophy      RECOMMENDATIONS:   Unavailable         XR CHEST PORTABLE   Final Result   Moderate bilateral airspace disease. This could represent a combination of   edema and or pneumonia. Moderate improved aeration of the left lung. XR CHEST PORTABLE   Final Result   The endotracheal tube measures approximately 6.6 cm above the lux. The enteric catheter is coiled in the gastric fundus. Cardiomegaly with vascular congestion and patchy airspace disease, a   component of which is felt likely to represent pulmonary edema. Improved   aeration in the left upper lobe. XR CHEST PORTABLE   Final Result   1. Endotracheal tube with the tip 2.3 cm above the lux. Recommend   withdrawal by 1.5 cm.      2.  The left hemithorax is completely opacified. This could represent a   combination of large pleural effusion and/or consolidation. 3.  Patchy airspace disease throughout the right lung and small right pleural   effusion. CT ABDOMEN PELVIS WO CONTRAST Additional Contrast? None   Final Result   Addendum 1 of 1   ADDENDUM:   There is a transcription error in the impression. The 1st item should    read   as follows:      Stable broad-based ventral abdominal wall hernia containing loops of small   large bowel without associated obstruction. Final   1. Stable broad-based ventral abdominal wall hernia containing loops of small   large bowel at associated obstruction. 2. Severe renal atrophy with diffuse vascular calcifications and findings of   renal osteodystrophy.    3. Small layering bilateral pleural effusions with mild pulmonary edema and   anasarca. XR CHEST PORTABLE    (Results Pending)       Assessment/Plan   1. ESRD on HD  Got HD yesterday   Labs reviewed   During intubated noted to have excessive secretions   Will dialyze today for 2 hrs mainly for UF     2. S/p cardiac arrest   Likely cardiac event   resuscitated now       3. Anemia of chronic ds     4  Seizure likely 2/2 hypocalcemia     5 diarrhea. Resolved     6 Metabolic acidosis. Correct with HD     7 Hypocalcemia  PTH is low at 11 . has hypoparathyroidism   Getting calcium gluconate iv  daily   Also on calcium carbonate 1000 mg po tid which according to pharmacist he has been refusing.                Thank you for allowing us to participate in care of Kenroy Larios MD  Feel free to contact me   [facility] [address]  Office : [phone]  Fax :[phone]

## 2022-02-24 NOTE — CONSULTS
PULMONARY AND CRITICAL CARE MEDICINE CONSULT NOTE      Name: Kate Varela  Sex: male  : 1970  MRN: [id number]  Admission Date: 2022  Admission Diagnosis: Hypocalcemia [E83.51]  Diarrhea [V05.2]  Metabolic acidosis [F15.6]  Dialysis patient [facility]) [Z99.2]  Pancytopenia ([facility] [D61.818]  Diarrhea, unspecified type [R19.7]      HPI: Patient is a 49 y.o. male with past medical history significant for end-stage renal disease on hemodialysis every Tuesday, Thursday, Saturday, history of kidney transplant , history of GBS in , contracted and wheelchair-bound, hypertension, diabetes mellitus, noncompliance with hemodialysis regularly follows up at [facility] for nephrology presented initially in the hospital for diarrhea flossing for a month. He was noted to be hypocalcemic and was on the floor when he had a seizure episode leading to aspiration. Patient required intubation at that time and was eventually extubated on 2/15/2022. Patient was able to converse post extubation and was able to also eat a little bit. He was noted to be pancytopenic for which he underwent bone marrow biopsy 2 days ago, results pending. Over the last couple of days, patient has been having altered mental status and nonverbal status. He has been refusing to eat. Today he underwent cardiac arrest which was asystole requiring 2 rounds of CPR, 1 calcium and 1 bicarbonate and had to be intubated again. He was noted to have foamy secretions in the mouth and the endotracheal tube at the time of intubation. Patient had not been much responsive since then. Etiology of cardiac arrest has been unclear at this point. 14 point ROS could not be obtained due to patient factors.        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    Hemodialysis patient ([facility]     HTN (hypertension), benign     Low blood pressure     Pancreatitis     Peripheral Neuropathy     Pneumonia 11/11/2021    Renal failure 1992    on dialysis 3x/wk  (T, Th, Sat)    Status epilepticus ([address] 12/11/2021     Past Surgical History:   Procedure Laterality Date    BRONCHOSCOPY N/A 2/14/2022    BRONCHOSCOPY DIAGNOSTIC OR CELL [address] ONLY performed by Rik Childers MD at [address] [facility] N/A 12/17/2021    COLONOSCOPY DIAGNOSTIC performed by Candice Razo MD at [address]  2/22/2022    CT BONE MARROW BIOPSY 2/22/2022 F CT SCAN    DIALYSIS FISTULA CREATION      left arm/currently non functioning    KIDNEY TRANSPLANT  9/1994    R-kidney    TOE SURGERY  10-8-2010    cut and realign 1st, 2nd, toe left foot , fixation 1st, 2nd toe left foot    TUNNELED VENOUS CATHETER PLACEMENT      left chest for dialysis    TUNNELED VENOUS PORT PLACEMENT      UPPER GASTROINTESTINAL ENDOSCOPY N/A 12/17/2021    EGD ESOPHAGOGASTRODUODENOSCOPY performed by Candice Razo MD at [address] History     Socioeconomic History    Marital status: Single     Spouse name: Not on file    Number of children: Not on file    Years of education: Not on file    Highest education level: Not on file   Occupational History    Not on file   Tobacco Use    Smoking status: Never Smoker    Smokeless tobacco: Never Used   Substance and Sexual Activity    Alcohol use: No    Drug use: No    Sexual activity: Not on file   Other Topics Concern    Not on file   Social History Narrative    Not on file     Social Determinants of Health     Financial Resource Strain:     Difficulty of Paying Living Expenses: Not on file   Food Insecurity:     Worried About [address] in the Last Year: Not on file    [address] in the Last Year: Not on file   Transportation Needs:     Lack of Transportation (Medical):  Not on file    Lack of Transportation (Non-Medical):  Not on file   Physical Activity:     Days of Exercise per Week: Not on file    Minutes of Exercise per Session: Not on file   Stress:     Feeling of Stress : Not on file   Social Connections:     Frequency of Communication with Friends and Family: Not on file    Frequency of Social Gatherings with Friends and Family: Not on file    Attends Orthodoxy Services: Not on file    Active Member of Clubs or Organizations: Not on file    Attends Club or Organization Meetings: Not on file    Marital Status: Not on file   Intimate Partner Violence:     Fear of Current or Ex-Partner: Not on file    Emotionally Abused: Not on file    Physically Abused: Not on file    Sexually Abused: Not on file   Housing Stability:     Unable to Pay for Housing in the Last Year: Not on file    Number of Places Lived in the Last Year: Not on file    Unstable Housing in the Last Year: Not on file     Family History   Problem Relation Age of Onset    Diabetes Mother      Allergies   Allergen Reactions    Influenza Virus Vaccine      Caused paralysis       MEDICATIONS:     Scheduled Meds:     propofol        calcium carbonate  1,000 mg Oral TID    midazolam  2 mg IntraVENous Once    pantoprazole  40 mg IntraVENous Daily    vitamin D  50,000 Units Oral Weekly    levothyroxine  25 mcg Oral Daily    midodrine  5 mg Oral BID WC    sodium chloride flush  5-40 mL IntraVENous 2 times per day     Current Infusions:    norepinephrine Stopped (02/24/22 1151)    propofol Stopped (02/24/22 1127)    EPINEPHrine infusion (NON-WEIGHT-BASED) Stopped (02/24/22 1045)    vasopressin (Septic Shock) infusion Stopped (02/24/22 1045)    dextrose 50 mL/hr at 02/24/22 0528    dextrose Stopped (02/15/22 1907)    sodium chloride 25 mL (02/21/22 1823)       PRN meds:  LORazepam, morphine, LORazepam, atropine, dextrose bolus (hypoglycemia) **OR** dextrose bolus (hypoglycemia), HYDROmorphone, HYDROmorphone, oxyCODONE, glucose, dextrose, glucagon (rDNA), dextrose, sodium chloride flush, sodium chloride, ondansetron **OR** ondansetron, polyethylene glycol, acetaminophen **OR** acetaminophen, heparin (porcine), LORazepam    PHYSICAL EXAM:    BP (!) 192/37   Pulse 90   Temp 96.1 °F (35.6 °C)   Resp 11   Ht 6' 4\" (1.93 m)   Wt 201 lb 4.5 oz (91.3 kg)   SpO2 100%   BMI 24.50 kg/m²  I/O last 3 completed shifts: In: 2600 [I.V.:2862]  Out: 3400  I/O this shift: In: 470 [I.V.:10]  Out: 1560       Intake/Output Summary (Last 24 hours) at 2/24/2022 1451  Last data filed at 2/24/2022 1415  Gross per 24 hour   Intake 3331.99 ml   Output 1560 ml   Net 1771.99 ml         CONSTITUTIONAL: He is a 49y.o.-year-old who appears his stated age. He is in no acute distress. CARDIOVASCULAR: S1 S2 RRR. Without murmer  RESPIRATORY & CHEST: Lungs are clear to auscultation and percussion. No wheezing, no crackles. Good air movement  GASTROINTESTINAL & ABDOMEN: Soft, nontender, positive bowel sounds in all quadrants, non-distended, without hepatosplenomegaly. GENITOURINARY: Deferred. MUSCULOSKELETAL: No tenderness to palpation of the axial skeleton. There is no clubbing. No cyanosis. No edema of the lower extremities. SKIN OF BODY: No rash or jaundice. PSYCHIATRIC EVALUATION: Could not be assessed  HEMATOLOGIC/LYMPHATIC/ IMMUNOLOGIC: No palpable lymphadenopathy. NEUROLOGIC: Unresponsive. Groslly non-focal.     LABS:    Recent Labs     02/22/22  0318 02/22/22  0318 02/23/22  0515 02/24/22  0436 02/24/22  0941   WBC 2.3*  --  2.2* 1.9*  --    RBC 3.61*  --  4.01* 4.05*  --    HGB 8.8*   < > 9.9* 10.1* 10.2*   HCT 27.6*  --  30.6* 30.8*  --    MCH 24.3*  --  24.6* 24.9*  --    MCHC 31.9  --  32.3 32.7  --    RDW 16.9*  --  17.3* 17.2*  --    PLT 35*  --  39* 22*  --    MPV 8.4  --  8.8 8.1  --    NEUTOPHILPCT 58.0  --  48.0 51.5  --    MONOPCT 12.0  --  16.2 15.8  --    BASOPCT 0.0  --  1.4 1.5  --     < > = values in this interval not displayed. Recent Labs     02/23/22  0515 02/24/22  0436 02/24/22  0933   * 130* 129*   K 4.1 4.2 4.4   CL 93* 94* 91*   ANIONGAP 19* 16 20*   CO2 18* 20* 18*   BUN 37* 31* 31*   CREATININE 6.1* 5.7* 5.5*   CALCIUM 6.7* 6.9* 6.9*   GLUCOSE 90 70 111*     Recent Labs     02/24/22  0941   PHART 7.393   ZJO6FOO 31.6*   PO2ART 184.0*   LMK2VWB 19.3*   C6BSGTON 100   BEART -6*     Recent Labs     02/22/22  1000   INR 1.35*       Recent Labs     02/24/22  0933   TROPONINI 0.07*       IMAGING:  I reviewed the chest x-ray from 2/24/2022 in my interpretation is as follows. Increased pulmonary vascular congestion. IMPRESSION:  Cardiac arrest  Acute hypoxic respiratory failure  Seizure  End-stage renal disease on hemodialysis  Severe protein calorie malnutrition  Pancytopenia  History of GBS  Contracted and wheelchair-bound      PLAN:  Patient is off all sedation. During dialysis, he had one episode of seizure-like activity for which Ativan was given. He also initially presented with seizures which were attributed to possible hypocalcemia. He has been getting IV calcium replacement since then. Ionized calcium was noted to be 0.74 today before cardiac arrest.  He was given 1 calcium gluconate and repeat and his calcium was 0.95. CT head from yesterday did not show any acute intracranial abnormality explaining seizures. Cardiac arrest, asystole requiring 2 rounds of epinephrine and CPR with bicarb and calcium with return of spontaneous circulation. Patient was hypotensive post cardiac arrest requiring Levophed which was then weaned off due to hypertension. Likely cause of cardiac arrest was seizure leading to encephalopathy and hypoxia. Patient was noted to have foamy secretions in the mouth and endotracheal tube at the time of intubation. Acute hypoxic respiratory failure requiring mechanical ventilation. Continue mechanical ventilation at the current settings. Titrate FiO2 as tolerated.   Chest x-ray with mild increased pulmonary vascular congestion. End-stage renal disease on hemodialysis. Received hemodialysis yesterday and also today. Does not look to be volume overload. Patient has history of noncompliance with hemodialysis as outpatient. He also has severe protein calorie malnutrition. Initially presented with hypercalcemia which was attributed to likely primary parathyroidism. Has been on calcium replacement since then. Patient also has pancytopenia of unclear etiology. Underwent bone marrow biopsy, results pending. Protonix for stress ulcer prophylaxis. I had a detailed discussion with family at bedside including mother. Family has changed her CODE STATUS to DNR CC. Patient has very poor prognosis with very poor chance of recovery. He has been slowly declining over the last few months. Family plan to withdraw care tomorrow. Critical Care Time: 39 Minutes  Total critical care time caring for this patient with life threatening illness, including direct patient contact, management of life support systems, review of data including imaging and labs, discussions with other team members and physicians excluding procedures. Electronically signed by Ildefonso Canas MD on 2/24/22 at 2:51 PM EST     This note was completed using dragon medical speech recognition software. Grammatical errors, random word insertions, pronoun errors and incomplete sentences are occasional consequences of this technology due to software limitations. If there are questions or concerns about the content of this note of information contained within the body of this dictation, they should be addressed with the provider for clarification.

## 2022-02-24 NOTE — PROGRESS NOTES
Notified by HD RN that patient appears to be having seizure activity. Describes head going back and forth and eyes rolling side to side and back in head. Ativan 1mg given per HD RN.

## 2022-02-24 NOTE — PROGRESS NOTES
Patient found to have white foamy secretions around mouth, unresponsive and no pulse, code blue called.

## 2022-02-25 NOTE — PROGRESS NOTES
02/24/22 2003   Vent Patient Data   Plateau Pressure 25 NOB39   Static Compliance 26 mL/cmH2O   Dynamic Compliance 25.6 mL/cmH2O

## 2022-02-25 NOTE — FLOWSHEET NOTE
02/25/22 1332   Encounter Summary   Services provided to: Patient and family together   Referral/Consult From: Nurse   Support System Family members   Continue Visiting   (Prayer for peace per family request, support. GB 2/25)   Complexity of Encounter Moderate   Length of Encounter 30 minutes   Grief and Life Adjustment   Type End of life;Palliative care   Assessment Approachable;Tearful;Grieving;Coping   Intervention Active listening;Explored feelings, thoughts, concerns;Explored coping resources;Nurtured hope;Prayer;Sustaining presence/ Ministry of presence;Grief care; End of life care; Discussed belief system/Yazidism practices/wendi;Discussed illness/injury and it's impact   Outcome Connection/belonging;Comfort;Engaged in conversation; Shared life review;Expressed feelings/needs/concerns;Coping;Encouraged;Receptive      initiated this visit w/the pt per spiritual care referral from pt's nurse. Pt's aunts were present at bedside and engaged w/ easily in storytelling and processing hospitalization. Aunts requested prayer for on-going \"peace\" at this time.  offered prayer in accordance w/the family's request. Throughout the course of this visit  offered empathetic listening, encouragement, and prayer. No other immediate needs/concerns were expressed at this time. Should any further spiritual needs arise please contact spiritual care services.      Electronically signed by Alma Delia Flores on 2/25/2022 at 1:35 PM

## 2022-02-25 NOTE — PROGRESS NOTES
Nephrology progress  Note                                                                                                                                                                                                                                                                                                                                                               Office : [phone]     Fax :[phone]              Patient's Name: Adrianne Canela  12:07 PM  2/25/2022    Reason for Consult:  ESRD on HD      Interval hx     Had cardiac arrest yesterday   Condition critical   Had HD yesterday   Intubated now on vent support       Past Medical History:   Diagnosis Date    Blood transfusion     Chronic pain     Diabetes mellitus ([address]     Dialysis     tues-thur-sat    Guillain Bryant syndrome     2002 after flu shot    Hemodialysis patient ([address]     HTN (hypertension), benign     Low blood pressure     Pancreatitis     Peripheral Neuropathy     Pneumonia 11/11/2021    Renal failure 1992    on dialysis 3x/wk  (T, Th, Sat)    Status epilepticus ([address] 12/11/2021       Past Surgical History:   Procedure Laterality Date    BRONCHOSCOPY N/A 2/14/2022    BRONCHOSCOPY DIAGNOSTIC OR CELL [address] ONLY performed by Danny Sinha MD at [address] [facility] N/A 12/17/2021    COLONOSCOPY DIAGNOSTIC performed by Glenn Peralta MD at [address] [place] [address]  2/22/2022    CT BONE MARROW BIOPSY 2/22/2022 [facility] CT SCAN    DIALYSIS FISTULA CREATION      left arm/currently non functioning    KIDNEY TRANSPLANT  9/1994    R-kidney    TOE SURGERY  10-8-2010    cut and realign 1st, 2nd, toe left foot , fixation 1st, 2nd toe left foot    TUNNELED VENOUS CATHETER PLACEMENT      left chest for dialysis    TUNNELED VENOUS PORT PLACEMENT      UPPER GASTROINTESTINAL ENDOSCOPY N/A 12/17/2021    EGD ESOPHAGOGASTRODUODENOSCOPY performed by Glenn Peralta MD at [facility] ENDOSCOPY       Family History   Problem Relation Age of Onset    Diabetes Mother         reports that he has never smoked. He has never used smokeless tobacco. He reports that he does not drink alcohol and does not use drugs.     Allergies:  Influenza virus vaccine    Current Medications:    norepinephrine (LEVOPHED) 16 mg in sodium chloride 0.9 % 250 mL infusion (non-weight-based), Continuous  propofol injection, Titrated  EPINEPHrine (EPINEPHrine HCL) 5 mg in dextrose 5 % 250 mL infusion, Continuous  vasopressin 20 Units in dextrose 5 % 100 mL infusion, Continuous  LORazepam (ATIVAN) injection 1 mg, Q4H PRN  morphine injection 4 mg, Q15 Min PRN  LORazepam (ATIVAN) injection 1 mg, Q15 Min PRN  atropine 1 % ophthalmic solution 2 drop, Q4H PRN  dextrose 10 % infusion, Continuous  dextrose bolus (hypoglycemia) 10% 125 mL, PRN   Or  dextrose bolus (hypoglycemia) 10% 250 mL, PRN  calcium carbonate (TUMS) chewable tablet 1,000 mg, TID  HYDROmorphone HCl PF (DILAUDID) injection 1 mg, Q4H PRN  HYDROmorphone (DILAUDID) injection 2 mg, Q4H PRN  midazolam PF (VERSED) injection 2 mg, Once  pantoprazole (PROTONIX) injection 40 mg, Daily  vitamin D (ERGOCALCIFEROL) capsule 50,000 Units, Weekly  oxyCODONE (OXY-IR) immediate release tablet 30 mg, Q8H PRN  levothyroxine (SYNTHROID) tablet 25 mcg, Daily  glucose (GLUTOSE) 40 % oral gel 15 g, PRN  dextrose 50 % IV solution, PRN  glucagon (rDNA) injection 1 mg, PRN  dextrose 5 % solution, PRN  sodium chloride flush 0.9 % injection 5-40 mL, 2 times per day  sodium chloride flush 0.9 % injection 5-40 mL, PRN  0.9 % sodium chloride infusion, PRN  ondansetron (ZOFRAN-ODT) disintegrating tablet 4 mg, Q8H PRN   Or  ondansetron (ZOFRAN) injection 4 mg, Q6H PRN  polyethylene glycol (GLYCOLAX) packet 17 g, Daily PRN  acetaminophen (TYLENOL) tablet 650 mg, Q6H PRN   Or  acetaminophen (TYLENOL) suppository 650 mg, Q6H PRN  heparin (porcine) injection 5,800 Units, PRN  LORazepam (ATIVAN) tablet 0.5 mg, Nightly PRN        Physical exam:     Vitals:  BP (!) 130/90   Pulse 60   Temp 95.9 °F (35.5 °C) (Temporal)   Resp 18   Ht 6' 4\" (1.93 m)   Wt 198 lb 3.1 oz (89.9 kg)   SpO2 98%   BMI 24.12 kg/m²   Constitutional: extubated   Awake and alert   Skin: no rash, turgor wnl  Heent:  eomi, mmm  Neck: no bruits or jvd noted  Cardiovascular:  S1, S2 without m/r/g  Respiratory: CTA B without w/r/r  Abdomen:  +bs, soft, nt, nd  Ext: no  lower extremity edema      Data:   Labs:  CBC:   Recent Labs     02/23/22  0515 02/24/22  0436 02/24/22  0941   WBC 2.2* 1.9*  --    HGB 9.9* 10.1* 10.2*   PLT 39* 22*  --      BMP:    Recent Labs     02/23/22  0515 02/24/22  0436 02/24/22  0933   * 130* 129*   K 4.1 4.2 4.4   CL 93* 94* 91*   CO2 18* 20* 18*   BUN 37* 31* 31*   CREATININE 6.1* 5.7* 5.5*   GLUCOSE 90 70 111*     Ca/Mg/Phos:   Recent Labs     02/23/22  0515 02/24/22  0436 02/24/22  0933   CALCIUM 6.7* 6.9* 6.9*     Hepatic:   No results for input(s): AST, ALT, ALB, BILITOT, ALKPHOS in the last 72 hours. Troponin:   Recent Labs     02/24/22  0933   TROPONINI 0.07*     BNP: No results for input(s): BNP in the last 72 hours. Lipids: No results for input(s): CHOL, TRIG, HDL, LDLCALC, LABVLDL in the last 72 hours. ABGs:   Recent Labs     02/24/22  0941   PHART 7.393   PO2ART 184.0*   XED0UDR 31.6*     INR:   No results for input(s): INR in the last 72 hours. UA:No results for input(s): [place], GLUCOSEU, BILIRUBINUR, [place], BLOODU, PHUR, PROTEINU, UROBILINOGEN, NITRU, LEUKOCYTESUR, LABMICR, URINETYPE in the last 72 hours. Urine Microscopic: No results for input(s): LABCAST, BACTERIA, COMU, HYALCAST, WBCUA, RBCUA, EPIU in the last 72 hours. Urine Culture: No results for input(s): LABURIN in the last 72 hours. Urine Chemistry: No results for input(s): Marcel Duffel, PROTEINUR, NAUR in the last 72 hours.           IMAGING:  XR CHEST PORTABLE   Final Result   ET tube is 3-4 cm superior to the lux. Persistent moderate congestion and/or infiltrates identified in the lungs,   with only mild improvement. Gaseous prominence of bowel in the left upper abdomen, incompletely imaged. CT HEAD WO CONTRAST   Final Result   1. No acute intracranial abnormality. 2. Chronic thickening of the calvarium with innumerable sclerotic foci. Findings likely represent metabolic bone disease. RECOMMENDATIONS:   Unavailable         CT BIOPSY BONE MARROW   Final Result   Successful CT guided bone marrow aspiration and core biopsy of the left iliac   bone. CT GUIDED NEEDLE PLACEMENT   Final Result   Successful CT guided bone marrow aspiration and core biopsy of the left iliac   bone. CT CHEST WO CONTRAST   Final Result   Bilateral airspace disease, suspicious for pneumonia. Superimposed pleural effusions are seen with body wall anasarca, compatible   with a component of fluid overload. Findings of renal osteodystrophy      RECOMMENDATIONS:   Unavailable         XR CHEST PORTABLE   Final Result   Moderate bilateral airspace disease. This could represent a combination of   edema and or pneumonia. Moderate improved aeration of the left lung. XR CHEST PORTABLE   Final Result   The endotracheal tube measures approximately 6.6 cm above the lux. The enteric catheter is coiled in the gastric fundus. Cardiomegaly with vascular congestion and patchy airspace disease, a   component of which is felt likely to represent pulmonary edema. Improved   aeration in the left upper lobe. XR CHEST PORTABLE   Final Result   1. Endotracheal tube with the tip 2.3 cm above the lux. Recommend   withdrawal by 1.5 cm.      2.  The left hemithorax is completely opacified. This could represent a   combination of large pleural effusion and/or consolidation. 3.  Patchy airspace disease throughout the right lung and small right pleural   effusion.          CT ABDOMEN PELVIS WO CONTRAST Additional Contrast? None   Final Result   Addendum 1 of 1   ADDENDUM:   There is a transcription error in the impression. The 1st item should    read   as follows:      Stable broad-based ventral abdominal wall hernia containing loops of small   large bowel without associated obstruction. Final   1. Stable broad-based ventral abdominal wall hernia containing loops of small   large bowel at associated obstruction. 2. Severe renal atrophy with diffuse vascular calcifications and findings of   renal osteodystrophy. 3. Small layering bilateral pleural effusions with mild pulmonary edema and   anasarca. Assessment/Plan   1. ESRD on HD  Got HD yesterday     2. S/p cardiac arrest   Likely cardiac event   resuscitated now   On vent support     3. Anemia of chronic ds     4  Seizure likely 2/2 hypocalcemia     5 diarrhea. Resolved     6 Metabolic acidosis. Correct with HD     7 Hypocalcemia  PTH is low at 11 . has hypoparathyroidism   Getting calcium gluconate iv  daily   Also on calcium carbonate 1000 mg po tid which according to pharmacist he has been refusing. Condition critical   Family  planning to do withdrawal of care today   Awaiting all family members   Will sign off.  Call as needed       Thank you for allowing us to participate in care of Rachell Leslie MD  Feel free to contact me   [facility] [address]  Office : [phone]  Fax :[phone]

## 2022-02-25 NOTE — PROGRESS NOTES
Hospitalist Progress Note      PCP: No primary care provider on file. Date of Admission: 2/12/2022    Chief Complaint:  Dropped sats, seized    Hospital Course:   Called to rapid response earlier today when the patient dropped his oxygen sats. He has been admitted with hypocalcemia was having some jerky-like movements and we suspected that he aspirated he was suctioned at the bedside and seem to be improving we wrapped up with a rapid response short time later they were calling a second rapid response was called overhead as the patient had had a seizure and dropped his sats again. This was witnessed by my partner. Patient has a previous history of status epilepticus and based on this we made the decision to intubate the patient for airway protection. He was intubated and transferred to the ICU central line was placed as he was a difficult stick and did not have any access appropriate in case he needed any pressors. He had 1 peripheral IV in his neck. Central line was placed  Subjective:     Coded this morning, possibly after seizing. Lots of secretion out on intubation.     Medications:  Reviewed    Infusion Medications    norepinephrine Stopped (02/24/22 1151)    propofol Stopped (02/24/22 1127)    EPINEPHrine infusion (NON-WEIGHT-BASED) Stopped (02/24/22 1045)    vasopressin (Septic Shock) infusion Stopped (02/24/22 1045)    dextrose 50 mL/hr at 02/24/22 0528    dextrose Stopped (02/15/22 1907)    sodium chloride 25 mL (02/21/22 1823)     Scheduled Medications    propofol        calcium carbonate  1,000 mg Oral TID    midazolam  2 mg IntraVENous Once    pantoprazole  40 mg IntraVENous Daily    vitamin D  50,000 Units Oral Weekly    levothyroxine  25 mcg Oral Daily    midodrine  5 mg Oral BID WC    sodium chloride flush  5-40 mL IntraVENous 2 times per day     PRN Meds: LORazepam, morphine, LORazepam, atropine, dextrose bolus (hypoglycemia) **OR** dextrose bolus (hypoglycemia), HYDROmorphone, HYDROmorphone, oxyCODONE, glucose, dextrose, glucagon (rDNA), dextrose, sodium chloride flush, sodium chloride, ondansetron **OR** ondansetron, polyethylene glycol, acetaminophen **OR** acetaminophen, heparin (porcine), LORazepam      Intake/Output Summary (Last 24 hours) at 2/24/2022 2037  Last data filed at 2/24/2022 1415  Gross per 24 hour   Intake 3617.46 ml   Output 1560 ml   Net 2057.46 ml       Physical Exam Performed:    BP (!) 147/45   Pulse 69   Temp 96.4 °F (35.8 °C) (Temporal)   Resp 24   Ht 6' 4\" (1.93 m)   Wt 201 lb 4.5 oz (91.3 kg)   SpO2 (!) 84%   BMI 24.50 kg/m²     General appearance: coded , now intubated and on sedation  HEENT: Pupils equal, round, and reactive to light. Conjunctivae/corneas clear. Neck: Supple, with full range of motion. No jugular venous distention. Trachea midline. Respiratory: Patient has lots of upper respiratory noises following intubation we were able to suction a significant amount of of pink froth  Cardiovascular: Regular rate and rhythm with normal S1/S2 without murmurs, rubs or gallops. Abdomen: Soft, he has a massive hernia of the front abdomen  Musculoskeletal: No clubbing, cyanosis or edema bilaterally. Fingers are bent from arthritis  Skin: Skin color, texture, turgor normal.  No rashes or lesions. Neurologic: mot following commands prior  Psychiatric: sedated   Capillary Refill: Brisk,3 seconds, normal   Peripheral Pulses: +2 palpable, equal bilaterally       Labs:   Recent Labs     02/22/22  0318 02/22/22  0318 02/23/22  0515 02/24/22  0436 02/24/22  0941   WBC 2.3*  --  2.2* 1.9*  --    HGB 8.8*   < > 9.9* 10.1* 10.2*   HCT 27.6*  --  30.6* 30.8*  --    PLT 35*  --  39* 22*  --     < > = values in this interval not displayed.      Recent Labs     02/23/22  0515 02/24/22  0436 02/24/22  0933   * 130* 129*   K 4.1 4.2 4.4   CL 93* 94* 91*   CO2 18* 20* 18*   BUN 37* 31* 31*   CREATININE 6.1* 5.7* 5.5*   CALCIUM 6.7* 6.9* 6.9*     No results for input(s): AST, ALT, BILIDIR, BILITOT, ALKPHOS in the last 72 hours. Recent Labs     02/22/22  1000   INR 1.35*     Recent Labs     02/24/22  0933   TROPONINI 0.07*       Urinalysis:    No results found for: Louisa Li, BACTERIA, RBCUA, BLOODU, SPECGRAV, GLUCOSEU    Radiology:  XR CHEST PORTABLE   Final Result   ET tube is 3-4 cm superior to the lux. Persistent moderate congestion and/or infiltrates identified in the lungs,   with only mild improvement. Gaseous prominence of bowel in the left upper abdomen, incompletely imaged. CT HEAD WO CONTRAST   Final Result   1. No acute intracranial abnormality. 2. Chronic thickening of the calvarium with innumerable sclerotic foci. Findings likely represent metabolic bone disease. RECOMMENDATIONS:   Unavailable         CT BIOPSY BONE MARROW   Final Result   Successful CT guided bone marrow aspiration and core biopsy of the left iliac   bone. CT GUIDED NEEDLE PLACEMENT   Final Result   Successful CT guided bone marrow aspiration and core biopsy of the left iliac   bone. CT CHEST WO CONTRAST   Final Result   Bilateral airspace disease, suspicious for pneumonia. Superimposed pleural effusions are seen with body wall anasarca, compatible   with a component of fluid overload. Findings of renal osteodystrophy      RECOMMENDATIONS:   Unavailable         XR CHEST PORTABLE   Final Result   Moderate bilateral airspace disease. This could represent a combination of   edema and or pneumonia. Moderate improved aeration of the left lung. XR CHEST PORTABLE   Final Result   The endotracheal tube measures approximately 6.6 cm above the lux. The enteric catheter is coiled in the gastric fundus. Cardiomegaly with vascular congestion and patchy airspace disease, a   component of which is felt likely to represent pulmonary edema. Improved   aeration in the left upper lobe.          XR CHEST PORTABLE Final Result   1. Endotracheal tube with the tip 2.3 cm above the lux. Recommend   withdrawal by 1.5 cm.      2.  The left hemithorax is completely opacified. This could represent a   combination of large pleural effusion and/or consolidation. 3.  Patchy airspace disease throughout the right lung and small right pleural   effusion. CT ABDOMEN PELVIS WO CONTRAST Additional Contrast? None   Final Result   Addendum 1 of 1   ADDENDUM:   There is a transcription error in the impression. The 1st item should    read   as follows:      Stable broad-based ventral abdominal wall hernia containing loops of small   large bowel without associated obstruction. Final   1. Stable broad-based ventral abdominal wall hernia containing loops of small   large bowel at associated obstruction. 2. Severe renal atrophy with diffuse vascular calcifications and findings of   renal osteodystrophy. 3. Small layering bilateral pleural effusions with mild pulmonary edema and   anasarca. Assessment/Plan:    Active Hospital Problems    Diagnosis     Aspiration into airway [T17.908A]     Atelectasis [J98.11]     End stage renal disease due to benign hypertension (HCC) [I12.0, N18.6]     Acute hypoxemic respiratory failure (HCC) [J96.01]     HTN (hypertension), benign [I10]     Seizure ([address] [R56.9]     Diarrhea [R19.7]     JOANNE (acute kidney injury) ([address] [N17.9]     Hypocalcemia [E83.51]      Acute respiratory failure with hypoxia requiring  mechanical ventilation, liberated from mechanical ventilation  Secondary to presumed aspiration pneumonia as well as seizure activity. Status post liberation from mechanical ventilation  Has been seen by pulmonary  Was re- intubated in code blue this am    End-stage renal disease on dialysis  Patient is scheduled to have dialysis   Nephrology on board appreciate recommendations  HD on 2/21    ? Seizure  ?  Could be secondary to hypocalcemia  Neurology has been consulted  Currently intubated and sedated  No history of seizure  diffuse slowing is suggestive of moderate diffuse encephalopathy on EEG no evidence of epileptiform discharges, focal or lateralizing abnormalities  Neurology has been consulted  No need to start AED as per neurology. AMS/ encephalopathy  - cause is unclear but he is not talking or eating.  - he was very functional prior to all this. - will check ct head to rule out bleed or mass or anoxic brain    Aspiration  On cefepime  Procalcitonin is elevated, no leukocytosis, cultures so far negative  Status post bronchoscopy, cultures so far negative    Hypocalcemia, primary hypoparathyroidism  Patient's calcium was been replaced and he is going to have additional adjustments made with dialysis. Endo has been consulted for evaluation of persistent hypercalcemia, recommending consulting oncology for  Gammopathy  O ionized calcium 0.74, will give calcium gluconate. Persistent hypocalcemia, has been started on vitamin D. Replete calcium today    Massive ventral wall abdominal hernia  Stable per imaging    Hypoglycemia   Eating Now and started on dysphagia diet  Continue to monitor blood glucose    Diarrhea  C. difficile has been ruled out    Pancytopenia  -Anemia partially d/t ESRD  -History of copper and zinc deficiency, results pending  -IgG was elevated in Nov 2021  [facility] preliminary result showing total protein at 5.7   -QIG are wnl  -Peripheral smear reviewed by Dr. Brooklyn Mayberry, no dysplastic findings, no significant schistocytes.    -Transfuse PRBC if hgb <7  -Transfuse platelets if plt <52P or 50k with active bleeding.  -Hold anticoagulation if platelet count <22D. - remains pancytopenic, plts lower today at 35k,  Will plan for bone marrow bx mom prevented this from occurring today as she refused to give consent. Later she changed her mind and it has now been done, results pending.     DVT Prophylaxis: Heparin  Diet: Diet NPO  Code Status: DNR-CC    PT/OT Eval Status: Appropriate for SNF    Dispo -family has changed code status and will plan to withdraw in the morning.         Danilo Elliott MD

## 2022-02-25 NOTE — PROGRESS NOTES
[facility] notified of [facility] Referral #[id number].

## 2022-02-25 NOTE — DISCHARGE SUMMARY
Hospital Medicine  Death/Discharge Summary    Vianney Rankin       :  1970              MRN:  [id number]    Admit date:  2022    Discharge date:  2022    Admitting Physician:  Mirella Rodriguez MD  Discharge Physician: Chandana Dinh MD          Admission Condition:  serious    Discharged Condition:      History of Present Illness: Called to rapid response earlier today when the patient dropped his oxygen sats. He has been admitted with hypocalcemia was having some jerky-like movements and we suspected that he aspirated he was suctioned at the bedside and seem to be improving we wrapped up with a rapid response short time later they were calling a second rapid response was called overhead as the patient had had a seizure and dropped his sats again. This was witnessed by my partner. Patient has a previous history of status epilepticus and based on this we made the decision to intubate the patient for airway protection. He was intubated and transferred to the ICU central line was placed as he was a difficult stick and did not have any access appropriate in case he needed any pressors. He had 1 peripheral IV in his neck. Central line     He coded on the morning of    We got him back but he was gain intubated and required a central line.  he was unresponsive on vent with no sedation. Family decided to withdraw. Discharge Physical Exam:    Called by nurse that patient had become asystole on telemetry. Patient seen and examined. No palpable pulse. Pupils fixed and dilated. No cardiac or pulmonary activity. Patient pronounced dead.      Time of Death: 13:58    Cause of Death: multisystem organ failure  Acute respiratory failure           Disposition:   [facility] [place]    Time spent on Discharged is 30 minutes      Signed:  Minal Kelley MD, MD  2022, 4:37 PM

## 2022-02-25 NOTE — PROGRESS NOTES
Patient remains on the ventilator at 100%, pupils 5 non reactive,heart rate remains between 50-60, no BM through the night. Mother Clarissa Record updated in regards of patients declining status.  Plan for withdraw of care in the AM.

## 2022-02-25 NOTE — PROGRESS NOTES
02/25/22 0833   Vent Patient Data   Plateau Pressure 23 [id number]   Static Compliance 28 mL/cmH2O   Dynamic Compliance 24.33 mL/cmH2O

## 2022-02-25 NOTE — PROGRESS NOTES
Mother, Justina Tsai, called and informed about declining status. She said the traveling family members will be landing soon. She would like a call if the patient passes before scheduled withdraw of care.

## 2022-02-25 NOTE — PROGRESS NOTES
BRIEF NUTRITION NOTE     Pt is now on mechanical ventilation. Per chart review, plan is for withdrawal of care this morning when all family can be present. Thus, RD will follow at low nutrition risk. Should plan of care change and aggressive nutrition is desired, please submit a dietitian consult.      Electronically signed by Courtney Brock RD, BRIGHT on 2/25/2022 at 10:53 AM   Contact number: [phone]

## 2022-02-25 NOTE — PROGRESS NOTES
Responded to referral for end-of-life and grief support made by attending RN, Katerina Álvarez. Visited with patient's Mother and family. Mother reflected on the pain of losing a child. She reminisced about her son and spoke of the relationship he shared with family and friends. Family and Mother referenced loved ones who were with Zora Jaquez now. \"His grandparents loved him very much and I know they are together now,\" she said. Family grieving but, appeared at peace and mutually supportive of one another. Spiritual and grief support provided through active listening, pastoral presence, comfort, prayer, and blessing. No further spiritual care needs expressed by Mother/family at this time.

## 2022-02-25 NOTE — PLAN OF CARE
Problem: Falls - Risk of:  Goal: Will remain free from falls  Description: Will remain free from falls  2/25/2022 0026 by Haley Case RN  Outcome: Ongoing  2/24/2022 1853 by Eleuterio Crystal RN  Outcome: Ongoing  Goal: Absence of physical injury  Description: Absence of physical injury  2/25/2022 0026 by Haley Case RN  Outcome: Ongoing  2/24/2022 1853 by Eleuterio Crystal RN  Outcome: Ongoing     Problem: Skin Integrity:  Goal: Will show no infection signs and symptoms  Description: Will show no infection signs and symptoms  2/25/2022 0026 by Haley Case RN  Outcome: Ongoing  2/24/2022 1853 by Eleuterio Crystal RN  Outcome: Ongoing  Goal: Absence of new skin breakdown  Description: Absence of new skin breakdown  2/25/2022 0026 by Haley Case RN  Outcome: Ongoing  2/24/2022 1853 by Eleuterio Crystal RN  Outcome: Ongoing     Problem: Daily Care:  Goal: Daily care needs are met  Description: Daily care needs are met  2/25/2022 0026 by Haley Case RN  Outcome: Ongoing  2/24/2022 1853 by Eleuterio Crystal RN  Outcome: Ongoing     Problem: Skin Integrity:  Goal: Skin integrity will stabilize  Description: Skin integrity will stabilize  2/25/2022 0026 by Haley Case RN  Outcome: Ongoing  2/24/2022 1853 by Eleuterio Crystal RN  Outcome: Ongoing     Problem: Pain:  Goal: Pain level will decrease  Description: Pain level will decrease  2/25/2022 0026 by Haley Case RN  Outcome: Ongoing  2/24/2022 1853 by Eleuterio Crystal RN  Outcome: Ongoing  Goal: Control of acute pain  Description: Control of acute pain  2/25/2022 0026 by Haley Case RN  Outcome: Ongoing  2/24/2022 1853 by Eleuterio Crystal RN  Outcome: Ongoing  Goal: Control of chronic pain  Description: Control of chronic pain  2/25/2022 0026 by Haley Case RN  Outcome: Ongoing  2/24/2022 1853 by Eleuterio Crystal RN  Outcome: Ongoing     Problem: Health Behavior:  Goal: Ability to manage health-related needs will improve  Description: Ability to manage health-related needs will improve  2/25/2022 0026 by Elisabeth Salter RN  Outcome: Ongoing  2/24/2022 1853 by Citlaly Acuña RN  Outcome: Ongoing  Goal: Identification of resources available to assist in meeting health care needs will improve  Description: Identification of resources available to assist in meeting health care needs will improve  2/25/2022 0026 by Elisabeth Salter RN  Outcome: Ongoing  2/24/2022 1853 by Citlaly Acuña RN  Outcome: Ongoing     Problem: Nutritional:  Goal: Ability to identify appropriate dietary choices will improve  Description: Ability to identify appropriate dietary choices will improve  2/25/2022 0026 by Elisabeth Salter RN  Outcome: Ongoing  2/24/2022 1853 by Citlaly Acuña RN  Outcome: Ongoing     Problem: Musculor/Skeletal Functional Status  Goal: Highest potential functional level  2/25/2022 0026 by Elisabeth Salter RN  Outcome: Ongoing  2/24/2022 1853 by Citlaly Acuña RN  Outcome: Ongoing  Goal: Absence of falls  2/25/2022 0026 by Elisabeth Salter RN  Outcome: Ongoing  2/24/2022 1853 by Citlaly Acuña RN  Outcome: Ongoing

## 2022-02-25 NOTE — PROGRESS NOTES
Speech Language Pathology  Attempt / Discharge     Sydney Patricio   1970     Attempted to see pt for follow up dysphagia therapy. Pt currently on mechanical ventilation after a code blue was called yesterday. According to [facility], family plans to withdraw care. Will d/c from SLP treatment at this time, please re consult if needs arise or plan of care changes. Kalyn Cintron M.A. CCC-SLP NANY [phone]  Speech-Language Pathologist   2/25/2022 9:09 AM    The speech-language pathologist was present, directed the patient's care, made skilled judgment and was responsible for assessment and treatment.     Tita VAIL,  Speech-Language Pathology

## 2022-02-25 NOTE — PROGRESS NOTES
Palliative Care: Withdraw of care [facility] [phone]. Family and  at bedside for spiritual support. Palliative will continue to follow as needed.

## 2022-03-01 NOTE — PROCEDURES
Yuly Sterling is a 49 y.o. male patient. 1. Metabolic acidosis    2. Diarrhea, unspecified type    3. Dialysis patient ([address]    4. Pancytopenia ([facility]    5. Hypocalcemia    6. Seizure ([facility]    7. 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 rate (!) 0, height 6' 4\" (1.93 m), weight 198 lb 3.1 oz (89.9 kg), SpO2 96 %. Central Line    Date/Time: 3/1/2022 1:22 PM  Performed by: Carie Campuzano MD  Authorized by: Carie Campuzano MD   Consent: The procedure was performed in an emergent situation.   Indications: vascular access    Anesthesia:  Local Anesthetic: lidocaine 1% without epinephrine  Preparation: skin prepped with 2% chlorhexidine  Skin prep agent dried: skin prep agent completely dried prior to procedure  Sterile barriers: all five maximum sterile barriers used - cap, mask, sterile gown, sterile gloves, and large sterile sheet  Hand hygiene: hand hygiene performed prior to central venous catheter insertion  Location details: left femoral  Patient position: flat  Catheter type: triple lumen  Catheter size: 7 Fr  Ultrasound guidance: yes  Sterile ultrasound techniques: sterile gel and sterile probe covers were used  Number of attempts: 1  Successful placement: yes  Post-procedure: line sutured  Assessment: blood return through all ports  Patient tolerance: patient tolerated the procedure well with no immediate complications  Comments: Minimal blood loss          Rafaela Weller MD  3/1/2022

## 2022-03-07 LAB
Lab: NORMAL
REPORT: NORMAL
THIS TEST SENT TO: NORMAL

## 2022-03-21 LAB
FUNGUS (MYCOLOGY) CULTURE: NORMAL
FUNGUS STAIN: NORMAL

## 2022-04-05 LAB
AFB CULTURE (MYCOBACTERIA): NORMAL
AFB SMEAR: NORMAL

## 2022-07-09 ENCOUNTER — TELEPHONE ENCOUNTER (OUTPATIENT)
Dept: URBAN - METROPOLITAN AREA CLINIC 121 | Facility: CLINIC | Age: 52
End: 2022-07-09

## 2022-07-09 RX ORDER — ALPRAZOLAM 2 MG/1
TABLET ORAL TWICE A DAY
Refills: 0 | OUTPATIENT
Start: 2018-03-21 | End: 2018-04-11

## 2022-07-09 RX ORDER — ALPRAZOLAM 2 MG/1
TABLET ORAL TWICE A DAY
Refills: 0 | OUTPATIENT
Start: 2018-04-11 | End: 2018-08-22

## 2022-07-09 RX ORDER — DEXLANSOPRAZOLE 60 MG/1
ONCE A DAY CAPSULE, DELAYED RELEASE ORAL ONCE A DAY
Refills: 0 | OUTPATIENT
Start: 2018-04-11 | End: 2018-06-21

## 2022-07-09 RX ORDER — OMEPRAZOLE 40 MG/1
CAPSULE, DELAYED RELEASE ORAL TWICE A DAY
Refills: 0 | OUTPATIENT
Start: 2018-04-11 | End: 2018-04-11

## 2022-07-09 RX ORDER — OXYCODONE HYDROCHLORIDE 30 MG/1
TABLET ORAL THREE TIMES A DAY
Refills: 0 | OUTPATIENT
Start: 2018-03-21 | End: 2018-04-11

## 2022-07-09 RX ORDER — OMEPRAZOLE 40 MG/1
CAPSULE, DELAYED RELEASE ORAL TWICE A DAY
Refills: 0 | OUTPATIENT
Start: 2018-03-21 | End: 2018-04-11

## 2022-07-09 RX ORDER — DEXLANSOPRAZOLE 60 MG/1
ONCE A DAY CAPSULE, DELAYED RELEASE ORAL ONCE A DAY
Refills: 0 | OUTPATIENT
Start: 2018-06-21 | End: 2018-07-17

## 2022-07-09 RX ORDER — HUMAN INSULIN 100 [IU]/ML
INJECTION, SUSPENSION SUBCUTANEOUS TAKE AS DIRECTED
Refills: 0 | OUTPATIENT
Start: 2018-04-11 | End: 2018-08-22

## 2022-07-09 RX ORDER — DEXLANSOPRAZOLE 30 MG/1
CAPSULE, DELAYED RELEASE ORAL ONCE A DAY
Refills: 0 | OUTPATIENT
Start: 2018-04-11 | End: 2018-08-22

## 2022-07-09 RX ORDER — DEXLANSOPRAZOLE 30 MG/1
CAPSULE, DELAYED RELEASE ORAL ONCE A DAY
Refills: 0 | OUTPATIENT
Start: 2018-08-22 | End: 2018-08-22

## 2022-07-09 RX ORDER — OXYCODONE HYDROCHLORIDE 30 MG/1
TABLET ORAL THREE TIMES A DAY
Refills: 0 | OUTPATIENT
Start: 2018-04-11 | End: 2018-08-22

## 2022-07-10 ENCOUNTER — TELEPHONE ENCOUNTER (OUTPATIENT)
Dept: URBAN - METROPOLITAN AREA CLINIC 121 | Facility: CLINIC | Age: 52
End: 2022-07-10

## 2022-07-10 RX ORDER — ONDANSETRON HYDROCHLORIDE 4 MG/2ML
1 EVERY 4 - 6 HOURS AS NEEDED INJECTION, SOLUTION INTRAMUSCULAR; INTRAVENOUS
Refills: 0 | Status: ACTIVE | COMMUNITY
Start: 2018-03-21

## 2022-07-10 RX ORDER — OXYCODONE HYDROCHLORIDE 30 MG/1
TABLET ORAL THREE TIMES A DAY
Refills: 0 | Status: ACTIVE | COMMUNITY
Start: 2018-08-22

## 2022-07-10 RX ORDER — DEXLANSOPRAZOLE 30 MG/1
ONCE A DAY CAPSULE, DELAYED RELEASE ORAL ONCE A DAY
Refills: 2 | Status: ACTIVE | COMMUNITY
Start: 2018-08-23

## 2022-07-10 RX ORDER — HUMAN INSULIN 100 [IU]/ML
INJECTION, SUSPENSION SUBCUTANEOUS TAKE AS DIRECTED
Refills: 0 | Status: ACTIVE | COMMUNITY
Start: 2018-08-22

## 2022-07-10 RX ORDER — PANCRELIPASE 36000; 180000; 114000 [USP'U]/1; [USP'U]/1; [USP'U]/1
TAKE 3 WITH MAIN MEALS CAPSULE, DELAYED RELEASE PELLETS ORAL
Refills: 1 | Status: ACTIVE | COMMUNITY
Start: 2018-08-22

## 2022-07-10 RX ORDER — DEXLANSOPRAZOLE 60 MG/1
ONCE A DAY CAPSULE, DELAYED RELEASE ORAL ONCE A DAY
Refills: 0 | Status: ACTIVE | COMMUNITY
Start: 2018-07-17

## 2022-07-10 RX ORDER — ALPRAZOLAM 2 MG/1
TABLET ORAL TWICE A DAY
Refills: 0 | Status: ACTIVE | COMMUNITY
Start: 2018-08-22

## 2023-07-07 NOTE — PROGRESS NOTES
ID Follow-up NOTE    CC:   Bacteremia  Antibiotics: Vancomycin    Admit Date: 12/11/2021  Hospital Day: 6    Subjective:     Patient feels good  No specific complaint    Objective:     Patient Vitals for the past 8 hrs:   BP Temp Temp src Pulse Resp SpO2   12/16/21 0647 131/85 97.3 °F (36.3 °C) Oral 80 16 94 %   12/16/21 0344 106/65 97.3 °F (36.3 °C) Oral 88 16 --     I/O last 3 completed shifts: In: 850 [P.O.:540; I.V.:10; Blood:300]  Out: 1300   No intake/output data recorded. EXAM:  GENERAL: No apparent distress.     HEENT: Membranes moist, no oral lesion  NECK:  Supple, no lymphadenopathy  LUNGS: Clear b/l, no rales, no dullness  CARDIAC: RRR, no murmur appreciated  ABD:  + BS, soft / NT  EXT:  No rash, no edema, no lesions  NEURO: No focal neurologic findings  PSYCH: Orientation, sensorium, mood normal  LINES:  R fem HD line      Data Review:  Lab Results   Component Value Date    WBC 3.8 (L) 12/16/2021    HGB 7.8 (L) 12/16/2021    HCT 23.6 (L) 12/16/2021    MCV 76.4 (L) 12/16/2021     (L) 12/16/2021     Lab Results   Component Value Date    CREATININE 4.6 (H) 12/16/2021    BUN 25 (H) 12/16/2021     (L) 12/16/2021    K 4.3 12/16/2021    CL 97 (L) 12/16/2021    CO2 23 12/16/2021       Hepatic Function Panel:   Lab Results   Component Value Date    ALKPHOS 287 12/11/2021    ALT 13 12/11/2021    AST 20 12/11/2021    PROT 7.5 12/11/2021    PROT 7.4 04/12/2012    BILITOT 0.5 12/11/2021    BILIDIR <0.2 12/11/2021    IBILI see below 12/11/2021    LABALBU 3.4 12/11/2021       Micro:  12/11 SARS CoV-2 PCR pos  12/11 BC x 1 CNS; BC x2 viridian Streptococcus    - BC S epidermidis, Streptococcus gordonii, S salivarius, S mitis/ oralis    - BC S salivarius group, S mitis / oralis     Streptococcus mitis / Streptococcus oralis Streptococcus salivarius ssp salivarius    BACTERIAL SUSCEPTIBILITY PANEL BY AIRAM BACTERIAL SUSCEPTIBILITY PANEL BY AIRAM   ampicillin >=16 mcg/mL Resistant 0.5 mcg/mL Intermediate cefTRIAXone 4 mcg/mL Resistant 0.25 mcg/mL Sensitive   levofloxacin 1 mcg/mL Sensitive 1 mcg/mL Sensitive   linezolid <=2 mcg/mL Sensitive <=2 mcg/mL Sensitive   tetracycline 0.5 mcg/mL Sensitive >=16 mcg/mL Resistant   vancomycin 0.5 mcg/mL Sensitive 0.5 mcg/mL Sensitive          SARS CoV-2 PCR pos     Imagin/14 Brain MRI   1.  No acute infarction, recent hemorrhage, or intracranial mass. 2.  A few nonspecific T2/flair hyperintensities in the subcortical white matter. Findings may be related to chronic small vessel ischemic disease sequela of prior injury or infection, or less likely a demyelinating process. 3.  Metabolic bone disease.     Scheduled Meds:   Venelex   Topical BID    midodrine  5 mg Oral BID WC    sodium chloride flush  5-40 mL IntraVENous 2 times per day    heparin (porcine)  5,000 Units SubCUTAneous 3 times per day    levothyroxine  25 mcg Oral Daily    pantoprazole  40 mg IntraVENous Daily    [Held by provider] insulin lispro  0-6 Units SubCUTAneous TID WC    [Held by provider] insulin lispro  0-3 Units SubCUTAneous Nightly    vancomycin (VANCOCIN) intermittent dosing (placeholder)   Other RX Placeholder       Continuous Infusions:   sodium chloride      sodium chloride 25 mL (12/15/21 0348)       PRN Meds:  sodium chloride, perflutren lipid microspheres, zolpidem, sodium chloride flush, sodium chloride, ondansetron **OR** ondansetron, polyethylene glycol, acetaminophen **OR** acetaminophen, glucose, dextrose, glucagon (rDNA), heparin (porcine)      Assessment:     Mult med problems including CRF, chronic pancreatitis, hx Guillain [place] syn  CRF access R fem line x 6 mo, placed in [place], hx access problems / thrombosed vessels per pt     Encephalopathy  Seizure   Hypoglycemia      COVID-19 infection     BC x 1 with CNS - contaminant    BC x 2 multiple Streptococcal sp (S mitis/oralis, S salivaris, S gordonii)       Plan:     Cont vancomycin - dose at HD x 2 weeks, through 12/29  Will f/u on [facility] results (asked Micro 12/14 to w/u both sets [facility] - ID / sensitivities)  Confer with Renal regarding HD access  Check Echo - ordered 12/15    Medical Decision Making:   The following items were considered in medical decision making:  Discussion of patient care with other providers  Reviewed clinical lab tests  Reviewed radiology tests  Reviewed other diagnostic tests/interventions  Microbiology cultures and other micro tests reviewed      Discussed with pt  Valerie Mohamud MD Rinvoq Counseling: I discussed with the patient the risks of Rinvoq therapy including but not limited to upper respiratory tract infections, shingles, cold sores, bronchitis, nausea, cough, fever, acne, and headache. Live vaccines should be avoided.  This medication has been linked to serious infections; higher rate of mortality; malignancy and lymphoproliferative disorders; major adverse cardiovascular events; thrombosis; thrombocytopenia, anemia, and neutropenia; lipid elevations; liver enzyme elevations; and gastrointestinal perforations.

## (undated) DEVICE — SPECIMEN TRAP: Brand: ARGYLE

## (undated) DEVICE — CONMED CHANNEL MASTER PULMONARY AND PEDIATRIC CLEANING BRUSH, 160 CM X 2.0 MM: Brand: CONMED

## (undated) DEVICE — SYRINGE MED 10ML POLYPR LUERSLIP TIP FLAT TOP W/O SFTY DISP

## (undated) DEVICE — GOWN AURORA NONREINF LG: Brand: MEDLINE INDUSTRIES, INC.

## (undated) DEVICE — ADAPTER TBNG DIA15MM SWVL FBROPT BRONCHSCP TERM 2 AXIS PEEP

## (undated) DEVICE — SYRINGE MED 50ML LUERLOCK TIP

## (undated) DEVICE — PROCEDURE KIT ENDOSCP CUST

## (undated) DEVICE — SINGLE USE BIOPSY VALVE MAJ-210: Brand: SINGLE USE BIOPSY VALVE (STERILE)

## (undated) DEVICE — SINGLE USE SUCTION VALVE MAJ-209: Brand: SINGLE USE SUCTION VALVE (STERILE)